# Patient Record
Sex: FEMALE | Race: WHITE | Employment: OTHER | URBAN - METROPOLITAN AREA
[De-identification: names, ages, dates, MRNs, and addresses within clinical notes are randomized per-mention and may not be internally consistent; named-entity substitution may affect disease eponyms.]

---

## 2017-05-22 ENCOUNTER — OFFICE VISIT (OUTPATIENT)
Dept: NEUROLOGY | Age: 82
End: 2017-05-22

## 2017-05-22 VITALS
DIASTOLIC BLOOD PRESSURE: 62 MMHG | OXYGEN SATURATION: 94 % | WEIGHT: 137.4 LBS | SYSTOLIC BLOOD PRESSURE: 160 MMHG | BODY MASS INDEX: 25.96 KG/M2 | HEART RATE: 80 BPM

## 2017-05-22 DIAGNOSIS — R26.9 GAIT DISTURBANCE: Primary | ICD-10-CM

## 2017-05-22 RX ORDER — DICLOFENAC SODIUM 10 MG/G
GEL TOPICAL 4 TIMES DAILY
Status: ON HOLD | COMMUNITY
End: 2022-04-11

## 2017-05-22 RX ORDER — LEVOTHYROXINE SODIUM 25 UG/1
25 TABLET ORAL
COMMUNITY
End: 2022-05-02 | Stop reason: SDUPTHER

## 2017-05-22 RX ORDER — FAMOTIDINE 40 MG/1
20 TABLET, FILM COATED ORAL 2 TIMES DAILY
COMMUNITY
End: 2022-04-14

## 2017-05-22 RX ORDER — PREGABALIN 100 MG/1
CAPSULE ORAL 2 TIMES DAILY
Status: ON HOLD | COMMUNITY
End: 2022-04-11

## 2017-05-22 RX ORDER — FUROSEMIDE 20 MG/1
TABLET ORAL DAILY
Status: ON HOLD | COMMUNITY
End: 2022-04-11

## 2017-05-22 RX ORDER — NIACIN 50 MG
TABLET ORAL
Status: ON HOLD | COMMUNITY
End: 2022-04-11

## 2017-05-22 RX ORDER — AMITRIPTYLINE HYDROCHLORIDE 25 MG/1
TABLET, FILM COATED ORAL
Status: ON HOLD | COMMUNITY
End: 2022-04-11

## 2017-05-22 NOTE — MR AVS SNAPSHOT
Visit Information Date & Time Provider Department Dept. Phone Encounter #  
 5/22/2017  1:00 PM Jd Baez MD Neurology [facility] [phone] [id number] Follow-up Instructions Return if symptoms worsen or fail to improve. Upcoming Health Maintenance Date Due DTaP/Tdap/Td series (1 - Tdap) 6/14/1946 ZOSTER VACCINE AGE 60> 6/14/1985 GLAUCOMA SCREENING Q2Y 6/14/1990 Pneumococcal 65+ Low/Medium Risk (1 of 2 - PCV13) 6/14/1990 MEDICARE YEARLY EXAM 6/14/1990 INFLUENZA AGE 9 TO ADULT 8/1/2017 Allergies as of 5/22/2017  Review Complete On: 5/22/2017 By: Nela Honeycutt LPN Severity Noted Reaction Type Reactions Dilaudid [Hydromorphone (Bulk)]  05/21/2010    Rash, Nausea and Vomiting, Swelling Fenofibrate  08/31/2015    Nausea Only Iodine  05/21/2010    Rash, Nausea and Vomiting, Swelling Pcn [Penicillins]  05/21/2010    Rash, Nausea and Vomiting, Swelling, Contact Dermatitis Prilosec [Omeprazole]  08/31/2015    Nausea Only Shellfish Derived  08/31/2015    Swelling Sulfa (Sulfonamide Antibiotics)  05/21/2010    Rash, Nausea and Vomiting, Swelling Current Immunizations  Never Reviewed No immunizations on file. Not reviewed this visit You Were Diagnosed With   
  
 Codes Comments Gait disturbance    -  Primary ICD-10-CM: R26.9 ICD-9-CM: 698. 2 Vitals BP Pulse Weight(growth percentile) LMP SpO2 BMI  
 160/62 80 137 lb 6.4 oz (62.3 kg) 11[birth year]16[birth year]1975 94% 25.96 kg/m2 OB Status Smoking Status Postmenopausal Never Smoker BMI and BSA Data Body Mass Index Body Surface Area  
 25.96 kg/m 2 1.64 m 2 Preferred Pharmacy Pharmacy Name Phone [address] [phone] Your Updated Medication List  
  
   
This list is accurate as of: 5/22/17  2:04 PM.  Always use your most recent med list.  
  
  
  
  
 amitriptyline 25 mg tablet Commonly known as:  ELAVIL Take  by mouth nightly. atorvastatin 40 mg tablet Commonly known as:  LIPITOR  
  
 calcium-cholecalciferol (D3) tablet Commonly known as:  Calcium 600 + D Take 1 Tab by mouth two (2) times a day. CENTRUM SILVER 500-250 mcg Chew Generic drug:  MV-Min-Folic Acid-Lutein Take  by mouth. diclofenac 1 % Gel Commonly known as:  VOLTAREN Apply  to affected area four (4) times daily. famotidine 40 mg tablet Commonly known as:  PEPCID Take 40 mg by mouth daily. FISH OIL 1,000 mg Cap Generic drug:  omega-3 fatty acids-vitamin e Take  by mouth.  
  
 gabapentin 100 mg capsule Commonly known as:  NEURONTIN  
  
 JANTOVEN 2 mg tablet Generic drug:  warfarin TAKE ONE (1) TABLET(S) DAILY  
  
 LASIX 20 mg tablet Generic drug:  furosemide Take  by mouth daily. levothyroxine 25 mcg tablet Commonly known as:  SYNTHROID Take  by mouth Daily (before breakfast). LYRICA 100 mg capsule Generic drug:  pregabalin Take  by mouth two (2) times a day. meloxicam 7.5 mg tablet Commonly known as:  MOBIC Take  by mouth daily. metoprolol tartrate 25 mg tablet Commonly known as:  LOPRESSOR  
TAKE ONE HALF (1/2) TABLET(S) TWICEDAILY  
  
 nicotinic acid 50 mg tablet Commonly known as:  NIACIN Take  by mouth Daily (before breakfast). Omeprazole delayed release 20 mg tablet Commonly known as:  PRILOSEC D/R Take  by mouth. PLAVIX 75 mg Tab Generic drug:  clopidogrel TAKE ONE (1) TABLET(S) ONCE DAILY PRESERVISION AREDS 2 (OMEGA-3) PO Take  by mouth. PRESERVISION PO Take  by mouth.  
  
 triamterene-hydroCHLOROthiazide 75-50 mg per tablet Commonly known as:  Lena Montanatter TAKE ONE (1) TABLET(S) ONCE DAILY  
  
 TYLENOL ARTHRITIS PAIN 650 mg CR tablet Generic drug:  acetaminophen Take 650 mg by mouth every six (6) hours as needed for Pain. VITAMIN B-6 25 mg tablet Generic drug:  pyridoxine (vitamin B6) Take 25 mg by mouth daily. VITAMIN C 250 mg tablet Generic drug:  ascorbic acid (vitamin C) Take  by mouth. XALATAN 0.005 % ophthalmic solution Generic drug:  latanoprost  
Administer 1 Drop to both eyes nightly. one drop in left eye daily XARELTO 10 mg tablet Generic drug:  rivaroxaban Take  by mouth daily. We Performed the Following REFERRAL TO PHYSICAL THERAPY [QUU54 Custom] Comments:  
 Evaluate and treat; Gait and balance training Follow-up Instructions Return if symptoms worsen or fail to improve. Referral Information Referral ID Referred By Referred To  
  
 [id number] DANIEL Nunez Not Available Visits Status Start Date End Date 1 New Request 5/22/17 5/22/18 If your referral has a status of pending review or denied, additional information will be sent to support the outcome of this decision. Introducing hospitals & HEALTH SERVICES! [employer] introduces [employer] patient portal. Now you can access parts of your medical record, email your doctor's office, and request medication refills online. 1. In your internet browser, go to [email or web address] 2. Click on the First Time User? Click Here link in the Sign In box. You will see the New Member Sign Up page. 3. Enter your [employer] Access Code exactly as it appears below. You will not need to use this code after youve completed the sign-up process. If you do not sign up before the expiration date, you must request a new code. · [employer] Access Code: [email or web address] Expires: 8/20/2017  1:17 PM 
 
4. Enter the last four digits of your Social Security Number (xxxx) and Date of Birth (mm/dd/yyyy) as indicated and click Submit. You will be taken to the next sign-up page. 5. Create a [employer] ID.  This will be your [employer] login ID and cannot be changed, so think of one that is secure and easy to remember. 6. Create a [employer] password. You can change your password at any time. 7. Enter your Password Reset Question and Answer. This can be used at a later time if you forget your password. 8. Enter your e-mail address. You will receive e-mail notification when new information is available in [address] 9. Click Sign Up. You can now view and download portions of your medical record. 10. Click the Download Summary menu link to download a portable copy of your medical information. If you have questions, please visit the Frequently Asked Questions section of the [employer] website. Remember, [employer] is NOT to be used for urgent needs. For medical emergencies, dial 911. Now available from your iPhone and Android! Please provide this summary of care documentation to your next provider. Your primary care clinician is listed as Radha Alan. If you have any questions after today's visit, please call [phone].

## 2017-05-22 NOTE — PROGRESS NOTES
Neurology Progress Note    Patient ID:  Anaid Orr  [id number]  56 y.o.  6/14/1925      Subjective:   History:  Ms. Waqas Kaur history of R parietal and temporal stroke 1999, On Oxygen due to SOB (not sure why), Esophageal stricture, Afib on Xarelto, CHF, DM not on medication   previously seen by Dr Kathryn Moreno for syncope. Patient now comes in for falls for 1 yr, occurring 1/ 2 weeks, described as suddenly felt lightheaded lasting for few secs, while standing, does not happen while sitting or laying down. (-) chest pain (-) SOB (-) palpitations    Last event was 5/19/17, was trying going up steps, due to R knee pain (+) bilateral knee swelling. Patient going to see Dr Gilmar Mcconnell (orthopedic). Baseline uses a walker. Patient scheduled to see an endocrinologist.    Patient feels that she sways. Objective:   ROS:  Per HPI-  Otherwise 12 point ROS was negative    Meds:  Current Outpatient Prescriptions on File Prior to Visit   Medication Sig Dispense Refill    atorvastatin (LIPITOR) 40 mg tablet       acetaminophen (TYLENOL ARTHRITIS PAIN) 650 mg CR tablet Take 650 mg by mouth every six (6) hours as needed for Pain.  rivaroxaban (XARELTO) 10 mg tablet Take  by mouth daily.  metoprolol (LOPRESSOR) 25 mg tablet TAKE ONE HALF (1/2) TABLET(S) TWICEDAILY 30 Tab 0    pyridoxine (VITAMIN B-6) 25 mg tablet Take 25 mg by mouth daily.  omega-3 fatty acids-vitamin e (FISH OIL) 1,000 mg Cap Take  by mouth.  VIT C/DL-E AC/LUT/COPPER/ZNOX (PRESERVISION PO) Take  by mouth.  Calcium-Cholecalciferol, D3, (CALCIUM 600 + D) 600-400 mg-unit Tab Take 1 Tab by mouth two (2) times a day. 100 Tab 11    MV-Min-Folic Acid-Lutein (CENTRUM SILVER) 500-250 mcg Chew Take  by mouth.  latanoprost (XALATAN) 0.005 % ophthalmic solution Administer 1 Drop to both eyes nightly. one drop in left eye daily       meloxicam (MOBIC) 7.5 mg tablet Take  by mouth daily.       gabapentin (NEURONTIN) 100 mg capsule       ascorbic acid (VITAMIN C) 250 mg tablet Take  by mouth.  PLAVIX 75 mg tablet TAKE ONE (1) TABLET(S) ONCE DAILY 90 Tab 1    JANTOVEN 2 mg tablet TAKE ONE (1) TABLET(S) DAILY 30 Tab 1    triamterene-hydrochlorothiazide (MAXZIDE) 75-50 mg per tablet TAKE ONE (1) TABLET(S) ONCE DAILY 90 Tab 1    Omeprazole 20 mg TbEC Take  by mouth. No current facility-administered medications on file prior to visit. Imaging:    CT Results (recent): MRI Results (recent):      IR Results (recent):      VAS/US Results (recent):    Results from Hospital Encounter encounter on 11/10/15   LOWER EXT ART PVR MULT LEVEL SEG PRESSURES    Reviewed records in [employer] and media tab today    Lab Review     No visits with results within 3 Month(s) from this visit. Latest known visit with results is:    Hospital Outpatient Visit on 03/02/2016   Component Date Value Ref Range Status    Creatinine (POC) 03/02/2016 1.5* 0.6 - 1.3 MG/DL Final    GFR-AA (POC) 03/02/2016 40* >60 ml/min/1.73m2 Final    GFR, non-AA (POC) 03/02/2016 33* >60 ml/min/1.73m2 Final    Comment: Estimated GFR is calculated using the IDMS-traceable Modification of Diet in Renal Disease (MDRD) Study equation, reported for both  Americans (GFRAA) and non- Americans (GFRNA), and normalized to 1.73m2 body surface area. The physician must decide which value applies to the patient. The MDRD study equation should only be used in individuals age 25 or older. It has not been validated for the following: pregnant women, patients with serious comorbid conditions, or on certain medications, or persons with extremes of body size, muscle mass, or nutritional status. Exam:  Visit Vitals    /62    Pulse 80    Wt 62.3 kg (137 lb 6.4 oz)    SpO2 94%    BMI 25.96 kg/m2     Gen: Awake, alert, follows commands  Appropriate appearance, normal speech. Oriented to all spheres.   No visual field defect on confrontation exam.  Full eyes movement, with 2-3 beats nystagmus on both L and R gaze  Normal gag and swallow. All remaining cranial nerves were normal  Motor function: 5/5 in all extremities  Sensory: intact to LT, PP and JPS  DTRs + UE, absent knees and ankles in all extremities, (-) Babinski  Gait: arthralgic gait due to R knee pain      Assessment:     1. Gait disturbance      Falls, multifactorial (arthritis of the knees, DM neuropathy, lightheaded spells (BP issue, blood sugar issue, afib/CHF? )      Plan:   1. Discussed with patient and  that her falls seem to be multifactorial. Certainly stroke is in the differential, but patient is already on Xarelto and because of her age, further work up is not warranted  2. Will refer for gait and balance training  3. Agree with patient seeing ortho for her bilateral knee arthritis and Endo for her diabetes  4. Check BP and blood sugar during an event  5. Follow up with cardiologist regarding afib    Follow-up Disposition:  Return if symptoms worsen or fail to improve.           Sima Truong MD  Diplomate, American Board of Psychiatry and Neurology  Diplomate, Neuromuscular Medicine  Diplomate, American Board of Electrodiagnostic Medicine

## 2017-05-22 NOTE — LETTER
5/22/2017 2:10 PM 
 
Patient:  Levy Dacosta YOB: 1925 Date of Visit: 5/22/2017 Dear MD Rj Mcmanus [place] [phone] VIA Facsimile: [phone] 
 : Thank you for referring Ms. David Hidalgo to me for evaluation/treatment. Below are the relevant portions of my assessment and plan of care. If you have questions, please do not hesitate to call me. I look forward to following Ms. Demetra Reeves along with you. Sincerely, Juan Real MD

## 2017-06-20 ENCOUNTER — HOSPITAL ENCOUNTER (OUTPATIENT)
Dept: MAMMOGRAPHY | Age: 82
Discharge: HOME | End: 2017-06-20
Attending: FAMILY MEDICINE
Payer: MEDICARE

## 2017-06-20 DIAGNOSIS — M81.0 OSTEOPOROSIS: ICD-10-CM

## 2017-06-20 PROCEDURE — 77080 DXA BONE DENSITY AXIAL: CPT

## 2019-07-02 ENCOUNTER — HOSPITAL ENCOUNTER (OUTPATIENT)
Dept: GENERAL RADIOLOGY | Age: 84
Discharge: HOME | End: 2019-07-02
Attending: FAMILY MEDICINE
Payer: MEDICARE

## 2019-07-02 DIAGNOSIS — M25.512 LEFT SHOULDER PAIN: ICD-10-CM

## 2019-07-02 PROCEDURE — 73030 X-RAY EXAM OF SHOULDER: CPT

## 2022-04-09 ENCOUNTER — HOSPITAL ENCOUNTER (INPATIENT)
Age: 87
LOS: 5 days | Discharge: NURSING, REHAB OR LONG-TERM CARE | DRG: 438 | End: 2022-04-14
Attending: EMERGENCY MEDICINE | Admitting: FAMILY MEDICINE
Payer: MEDICARE

## 2022-04-09 ENCOUNTER — APPOINTMENT (OUTPATIENT)
Dept: GENERAL RADIOLOGY | Age: 87
DRG: 438 | End: 2022-04-09
Attending: EMERGENCY MEDICINE
Payer: MEDICARE

## 2022-04-09 ENCOUNTER — APPOINTMENT (OUTPATIENT)
Dept: CT IMAGING | Age: 87
DRG: 438 | End: 2022-04-09
Attending: EMERGENCY MEDICINE
Payer: MEDICARE

## 2022-04-09 DIAGNOSIS — K85.00 IDIOPATHIC ACUTE PANCREATITIS WITHOUT INFECTION OR NECROSIS: ICD-10-CM

## 2022-04-09 DIAGNOSIS — M54.41 CHRONIC BILATERAL LOW BACK PAIN WITH BILATERAL SCIATICA: ICD-10-CM

## 2022-04-09 DIAGNOSIS — R57.1 HYPOVOLEMIC SHOCK (HCC): ICD-10-CM

## 2022-04-09 DIAGNOSIS — G89.29 CHRONIC BILATERAL LOW BACK PAIN WITH BILATERAL SCIATICA: ICD-10-CM

## 2022-04-09 DIAGNOSIS — R53.83 MALAISE AND FATIGUE: Primary | ICD-10-CM

## 2022-04-09 DIAGNOSIS — N19 UREMIA, ACUTE: ICD-10-CM

## 2022-04-09 DIAGNOSIS — M54.42 CHRONIC BILATERAL LOW BACK PAIN WITH BILATERAL SCIATICA: ICD-10-CM

## 2022-04-09 DIAGNOSIS — E86.0 SEVERE DEHYDRATION: ICD-10-CM

## 2022-04-09 DIAGNOSIS — N17.9 AKI (ACUTE KIDNEY INJURY) (HCC): ICD-10-CM

## 2022-04-09 DIAGNOSIS — R53.81 MALAISE AND FATIGUE: Primary | ICD-10-CM

## 2022-04-09 PROBLEM — K85.90 ACUTE PANCREATITIS: Status: ACTIVE | Noted: 2022-04-09

## 2022-04-09 LAB
ALBUMIN SERPL-MCNC: 1.9 G/DL (ref 3.5–5)
ALBUMIN/GLOB SERPL: 0.5 {RATIO} (ref 1.1–2.2)
ALP SERPL-CCNC: 119 U/L (ref 45–117)
ALT SERPL-CCNC: 32 U/L (ref 12–78)
ANION GAP SERPL CALC-SCNC: 12 MMOL/L (ref 5–15)
APPEARANCE UR: ABNORMAL
AST SERPL-CCNC: 34 U/L (ref 15–37)
BACTERIA URNS QL MICRO: NEGATIVE /HPF
BASE DEFICIT BLDV-SCNC: 10.9 MMOL/L
BASOPHILS # BLD: 0 K/UL (ref 0–0.1)
BASOPHILS NFR BLD: 0 % (ref 0–1)
BDY SITE: ABNORMAL
BILIRUB SERPL-MCNC: 0.6 MG/DL (ref 0.2–1)
BILIRUB UR QL: NEGATIVE
BNP SERPL-MCNC: 3910 PG/ML
BUN SERPL-MCNC: 86 MG/DL (ref 6–20)
BUN/CREAT SERPL: 35 (ref 12–20)
CALCIUM SERPL-MCNC: 9.1 MG/DL (ref 8.5–10.1)
CHLORIDE SERPL-SCNC: 114 MMOL/L (ref 97–108)
CO2 SERPL-SCNC: 16 MMOL/L (ref 21–32)
COLOR UR: ABNORMAL
COMMENT, HOLDF: NORMAL
CREAT SERPL-MCNC: 2.46 MG/DL (ref 0.55–1.02)
DIFFERENTIAL METHOD BLD: ABNORMAL
EOSINOPHIL # BLD: 0.1 K/UL (ref 0–0.4)
EOSINOPHIL NFR BLD: 1 % (ref 0–7)
EPITH CASTS URNS QL MICRO: ABNORMAL /LPF
ERYTHROCYTE [DISTWIDTH] IN BLOOD BY AUTOMATED COUNT: 12.8 % (ref 11.5–14.5)
GAS FLOW.O2 O2 DELIVERY SYS: 4 L/MIN
GLOBULIN SER CALC-MCNC: 3.7 G/DL (ref 2–4)
GLUCOSE SERPL-MCNC: 122 MG/DL (ref 65–100)
GLUCOSE UR STRIP.AUTO-MCNC: NEGATIVE MG/DL
HCO3 BLDV-SCNC: 14 MMOL/L (ref 23–28)
HCT VFR BLD AUTO: 31.6 % (ref 35–47)
HGB BLD-MCNC: 10.1 G/DL (ref 11.5–16)
HGB UR QL STRIP: NEGATIVE
HYALINE CASTS URNS QL MICRO: ABNORMAL /LPF (ref 0–5)
IMM GRANULOCYTES # BLD AUTO: 0 K/UL
IMM GRANULOCYTES NFR BLD AUTO: 0 %
KETONES UR QL STRIP.AUTO: ABNORMAL MG/DL
LACTATE SERPL-SCNC: 1.2 MMOL/L (ref 0.4–2)
LEUKOCYTE ESTERASE UR QL STRIP.AUTO: ABNORMAL
LIPASE SERPL-CCNC: 1125 U/L (ref 73–393)
LYMPHOCYTES # BLD: 0.2 K/UL (ref 0.8–3.5)
LYMPHOCYTES NFR BLD: 2 % (ref 12–49)
MAGNESIUM SERPL-MCNC: 1.7 MG/DL (ref 1.6–2.4)
MCH RBC QN AUTO: 33.4 PG (ref 26–34)
MCHC RBC AUTO-ENTMCNC: 32 G/DL (ref 30–36.5)
MCV RBC AUTO: 104.6 FL (ref 80–99)
METAMYELOCYTES NFR BLD MANUAL: 1 %
MONOCYTES # BLD: 0.3 K/UL (ref 0–1)
MONOCYTES NFR BLD: 3 % (ref 5–13)
NEUTS BAND NFR BLD MANUAL: 26 % (ref 0–6)
NEUTS SEG # BLD: 8.5 K/UL (ref 1.8–8)
NEUTS SEG NFR BLD: 67 % (ref 32–75)
NITRITE UR QL STRIP.AUTO: NEGATIVE
NRBC # BLD: 0 K/UL (ref 0–0.01)
NRBC BLD-RTO: 0 PER 100 WBC
PCO2 BLDV: 28.8 MMHG (ref 41–51)
PH BLDV: 7.3 [PH] (ref 7.32–7.42)
PH UR STRIP: 5 [PH] (ref 5–8)
PLATELET # BLD AUTO: 216 K/UL (ref 150–400)
PMV BLD AUTO: 10.3 FL (ref 8.9–12.9)
PO2 BLDV: 120 MMHG (ref 25–40)
POTASSIUM SERPL-SCNC: 3.9 MMOL/L (ref 3.5–5.1)
PROT SERPL-MCNC: 5.6 G/DL (ref 6.4–8.2)
PROT UR STRIP-MCNC: 30 MG/DL
RBC # BLD AUTO: 3.02 M/UL (ref 3.8–5.2)
RBC #/AREA URNS HPF: ABNORMAL /HPF (ref 0–5)
RBC MORPH BLD: ABNORMAL
RBC MORPH BLD: ABNORMAL
RETICS # AUTO: 0.04 M/UL (ref 0.02–0.08)
RETICS/RBC NFR AUTO: 1.3 % (ref 0.7–2.1)
SAMPLES BEING HELD,HOLD: NORMAL
SAO2 % BLDV: 98 % (ref 65–88)
SAO2% DEVICE SAO2% SENSOR NAME: ABNORMAL
SODIUM SERPL-SCNC: 142 MMOL/L (ref 136–145)
SP GR UR REFRACTOMETRY: 1.02 (ref 1–1.03)
SPECIMEN SITE: ABNORMAL
TROPONIN-HIGH SENSITIVITY: 39 NG/L (ref 0–51)
TSH SERPL DL<=0.05 MIU/L-ACNC: 0.45 UIU/ML (ref 0.36–3.74)
UR CULT HOLD, URHOLD: NORMAL
UROBILINOGEN UR QL STRIP.AUTO: 1 EU/DL (ref 0.2–1)
WBC # BLD AUTO: 9.1 K/UL (ref 3.6–11)
WBC URNS QL MICRO: ABNORMAL /HPF (ref 0–4)
YEAST URNS QL MICRO: PRESENT

## 2022-04-09 PROCEDURE — 81001 URINALYSIS AUTO W/SCOPE: CPT

## 2022-04-09 PROCEDURE — 74176 CT ABD & PELVIS W/O CONTRAST: CPT

## 2022-04-09 PROCEDURE — 99285 EMERGENCY DEPT VISIT HI MDM: CPT

## 2022-04-09 PROCEDURE — 83735 ASSAY OF MAGNESIUM: CPT

## 2022-04-09 PROCEDURE — 65270000029 HC RM PRIVATE

## 2022-04-09 PROCEDURE — 71045 X-RAY EXAM CHEST 1 VIEW: CPT

## 2022-04-09 PROCEDURE — 83605 ASSAY OF LACTIC ACID: CPT

## 2022-04-09 PROCEDURE — 74011250637 HC RX REV CODE- 250/637: Performed by: EMERGENCY MEDICINE

## 2022-04-09 PROCEDURE — 83880 ASSAY OF NATRIURETIC PEPTIDE: CPT

## 2022-04-09 PROCEDURE — 36415 COLL VENOUS BLD VENIPUNCTURE: CPT

## 2022-04-09 PROCEDURE — 82803 BLOOD GASES ANY COMBINATION: CPT

## 2022-04-09 PROCEDURE — 84443 ASSAY THYROID STIM HORMONE: CPT

## 2022-04-09 PROCEDURE — 96375 TX/PRO/DX INJ NEW DRUG ADDON: CPT

## 2022-04-09 PROCEDURE — 85025 COMPLETE CBC W/AUTO DIFF WBC: CPT

## 2022-04-09 PROCEDURE — 74011250636 HC RX REV CODE- 250/636: Performed by: EMERGENCY MEDICINE

## 2022-04-09 PROCEDURE — 87040 BLOOD CULTURE FOR BACTERIA: CPT

## 2022-04-09 PROCEDURE — 93005 ELECTROCARDIOGRAM TRACING: CPT

## 2022-04-09 PROCEDURE — 80053 COMPREHEN METABOLIC PANEL: CPT

## 2022-04-09 PROCEDURE — 84484 ASSAY OF TROPONIN QUANT: CPT

## 2022-04-09 PROCEDURE — 83690 ASSAY OF LIPASE: CPT

## 2022-04-09 PROCEDURE — 77030019905 HC CATH URETH INTMIT MDII -A

## 2022-04-09 PROCEDURE — 85045 AUTOMATED RETICULOCYTE COUNT: CPT

## 2022-04-09 PROCEDURE — 96365 THER/PROPH/DIAG IV INF INIT: CPT

## 2022-04-09 PROCEDURE — 96361 HYDRATE IV INFUSION ADD-ON: CPT

## 2022-04-09 RX ORDER — HEPARIN SODIUM 5000 [USP'U]/ML
5000 INJECTION, SOLUTION INTRAVENOUS; SUBCUTANEOUS EVERY 8 HOURS
Status: DISCONTINUED | OUTPATIENT
Start: 2022-04-10 | End: 2022-04-14 | Stop reason: HOSPADM

## 2022-04-09 RX ORDER — ONDANSETRON 2 MG/ML
4 INJECTION INTRAMUSCULAR; INTRAVENOUS
Status: DISCONTINUED | OUTPATIENT
Start: 2022-04-09 | End: 2022-04-14 | Stop reason: HOSPADM

## 2022-04-09 RX ORDER — SODIUM CHLORIDE 0.9 % (FLUSH) 0.9 %
5-40 SYRINGE (ML) INJECTION AS NEEDED
Status: DISCONTINUED | OUTPATIENT
Start: 2022-04-09 | End: 2022-04-14 | Stop reason: HOSPADM

## 2022-04-09 RX ORDER — DOPAMINE HYDROCHLORIDE 320 MG/100ML
5-20 INJECTION, SOLUTION INTRAVENOUS
Status: DISCONTINUED | OUTPATIENT
Start: 2022-04-09 | End: 2022-04-10

## 2022-04-09 RX ORDER — ACETAMINOPHEN 500 MG
1000 TABLET ORAL
Status: COMPLETED | OUTPATIENT
Start: 2022-04-09 | End: 2022-04-09

## 2022-04-09 RX ORDER — SODIUM CHLORIDE 9 MG/ML
75 INJECTION, SOLUTION INTRAVENOUS CONTINUOUS
Status: DISCONTINUED | OUTPATIENT
Start: 2022-04-09 | End: 2022-04-11

## 2022-04-09 RX ORDER — SODIUM CHLORIDE 0.9 % (FLUSH) 0.9 %
5-40 SYRINGE (ML) INJECTION EVERY 8 HOURS
Status: DISCONTINUED | OUTPATIENT
Start: 2022-04-09 | End: 2022-04-14 | Stop reason: HOSPADM

## 2022-04-09 RX ORDER — ACETAMINOPHEN 325 MG/1
650 TABLET ORAL
Status: DISCONTINUED | OUTPATIENT
Start: 2022-04-09 | End: 2022-04-14 | Stop reason: HOSPADM

## 2022-04-09 RX ORDER — POLYETHYLENE GLYCOL 3350 17 G/17G
17 POWDER, FOR SOLUTION ORAL DAILY PRN
Status: DISCONTINUED | OUTPATIENT
Start: 2022-04-09 | End: 2022-04-14 | Stop reason: HOSPADM

## 2022-04-09 RX ORDER — DOPAMINE HYDROCHLORIDE 320 MG/100ML
10 INJECTION, SOLUTION INTRAVENOUS
Status: DISCONTINUED | OUTPATIENT
Start: 2022-04-09 | End: 2022-04-09

## 2022-04-09 RX ORDER — ONDANSETRON 4 MG/1
4 TABLET, ORALLY DISINTEGRATING ORAL
Status: DISCONTINUED | OUTPATIENT
Start: 2022-04-09 | End: 2022-04-14 | Stop reason: HOSPADM

## 2022-04-09 RX ORDER — ACETAMINOPHEN 650 MG/1
650 SUPPOSITORY RECTAL
Status: DISCONTINUED | OUTPATIENT
Start: 2022-04-09 | End: 2022-04-14 | Stop reason: HOSPADM

## 2022-04-09 RX ADMIN — ACETAMINOPHEN 1000 MG: 500 TABLET ORAL at 21:29

## 2022-04-09 RX ADMIN — SODIUM CHLORIDE 1000 ML: 9 INJECTION, SOLUTION INTRAVENOUS at 18:50

## 2022-04-09 RX ADMIN — DOPAMINE HYDROCHLORIDE 10 MCG/KG/MIN: 320 INJECTION, SOLUTION INTRAVENOUS at 20:57

## 2022-04-09 RX ADMIN — SODIUM CHLORIDE, POTASSIUM CHLORIDE, SODIUM LACTATE AND CALCIUM CHLORIDE 1000 ML: 600; 310; 30; 20 INJECTION, SOLUTION INTRAVENOUS at 20:51

## 2022-04-09 RX ADMIN — SODIUM CHLORIDE, POTASSIUM CHLORIDE, SODIUM LACTATE AND CALCIUM CHLORIDE 1000 ML: 600; 310; 30; 20 INJECTION, SOLUTION INTRAVENOUS at 22:43

## 2022-04-09 RX ADMIN — ONDANSETRON 4 MG: 2 INJECTION INTRAMUSCULAR; INTRAVENOUS at 21:27

## 2022-04-09 NOTE — ED TRIAGE NOTES
Dx with PNA left lower lobe brought in by EMS for generalized weakness and abdominal discomfort. Taking doxycyclin. On 4L at baseline. Lives alone.

## 2022-04-09 NOTE — ED NOTES
Received report from Little Aaron RN who states plan for patient is for Dr Awa Covington to re-evaluate after normal saline infusion prior to starting Dopamine drip.

## 2022-04-10 ENCOUNTER — APPOINTMENT (OUTPATIENT)
Dept: NON INVASIVE DIAGNOSTICS | Age: 87
DRG: 438 | End: 2022-04-10
Attending: FAMILY MEDICINE
Payer: MEDICARE

## 2022-04-10 ENCOUNTER — APPOINTMENT (OUTPATIENT)
Dept: ULTRASOUND IMAGING | Age: 87
DRG: 438 | End: 2022-04-10
Attending: FAMILY MEDICINE
Payer: MEDICARE

## 2022-04-10 LAB
ANION GAP SERPL CALC-SCNC: 10 MMOL/L (ref 5–15)
BUN SERPL-MCNC: 68 MG/DL (ref 6–20)
BUN/CREAT SERPL: 37 (ref 12–20)
CALCIUM SERPL-MCNC: 8.4 MG/DL (ref 8.5–10.1)
CHLORIDE SERPL-SCNC: 116 MMOL/L (ref 97–108)
CHOLEST SERPL-MCNC: <50 MG/DL
CO2 SERPL-SCNC: 16 MMOL/L (ref 21–32)
CREAT SERPL-MCNC: 1.83 MG/DL (ref 0.55–1.02)
ECHO AV AREA PEAK VELOCITY: 1.7 CM2
ECHO AV AREA VTI: 1.6 CM2
ECHO AV AREA/BSA PEAK VELOCITY: 1.2 CM2/M2
ECHO AV AREA/BSA VTI: 1.1 CM2/M2
ECHO AV MEAN GRADIENT: 3 MMHG
ECHO AV MEAN VELOCITY: 0.9 M/S
ECHO AV PEAK GRADIENT: 5 MMHG
ECHO AV PEAK VELOCITY: 1.2 M/S
ECHO AV VELOCITY RATIO: 0.5
ECHO AV VTI: 28.5 CM
ECHO LA DIAMETER INDEX: 2.6 CM/M2
ECHO LA DIAMETER: 3.8 CM
ECHO LA VOL 2C: 38 ML (ref 22–52)
ECHO LA VOL 4C: 41 ML (ref 22–52)
ECHO LA VOL BP: 41 ML (ref 22–52)
ECHO LA VOL/BSA BIPLANE: 28 ML/M2 (ref 16–34)
ECHO LA VOLUME AREA LENGTH: 46 ML
ECHO LA VOLUME INDEX A2C: 26 ML/M2 (ref 16–34)
ECHO LA VOLUME INDEX A4C: 28 ML/M2 (ref 16–34)
ECHO LA VOLUME INDEX AREA LENGTH: 32 ML/M2 (ref 16–34)
ECHO LV E' LATERAL VELOCITY: 7 CM/S
ECHO LV E' SEPTAL VELOCITY: 7 CM/S
ECHO LV FRACTIONAL SHORTENING: 29 % (ref 28–44)
ECHO LV INTERNAL DIMENSION DIASTOLE INDEX: 2.33 CM/M2
ECHO LV INTERNAL DIMENSION DIASTOLIC: 3.4 CM (ref 3.9–5.3)
ECHO LV INTERNAL DIMENSION SYSTOLIC INDEX: 1.64 CM/M2
ECHO LV INTERNAL DIMENSION SYSTOLIC: 2.4 CM
ECHO LV IVSD: 0.9 CM (ref 0.6–0.9)
ECHO LV MASS 2D: 84.9 G (ref 67–162)
ECHO LV MASS INDEX 2D: 58.1 G/M2 (ref 43–95)
ECHO LV POSTERIOR WALL DIASTOLIC: 0.9 CM (ref 0.6–0.9)
ECHO LV RELATIVE WALL THICKNESS RATIO: 0.53
ECHO LVOT AREA: 3.1 CM2
ECHO LVOT AV VTI INDEX: 0.51
ECHO LVOT DIAM: 2 CM
ECHO LVOT MEAN GRADIENT: 1 MMHG
ECHO LVOT PEAK GRADIENT: 2 MMHG
ECHO LVOT PEAK VELOCITY: 0.6 M/S
ECHO LVOT STROKE VOLUME INDEX: 31.2 ML/M2
ECHO LVOT SV: 45.5 ML
ECHO LVOT VTI: 14.5 CM
ECHO MV A VELOCITY: 1.02 M/S
ECHO MV E DECELERATION TIME (DT): 220.8 MS
ECHO MV E VELOCITY: 0.98 M/S
ECHO MV E/A RATIO: 0.96
ECHO MV E/E' LATERAL: 14
ECHO MV E/E' RATIO (AVERAGED): 14
ECHO MV E/E' SEPTAL: 14
ECHO MV REGURGITANT PEAK GRADIENT: 81 MMHG
ECHO MV REGURGITANT PEAK VELOCITY: 4.5 M/S
ECHO PV MAX VELOCITY: 0.8 M/S
ECHO PV PEAK GRADIENT: 3 MMHG
ECHO RV INTERNAL DIMENSION: 4.1 CM
ECHO RV TAPSE: 2.3 CM (ref 1.7–?)
ECHO RVOT PEAK GRADIENT: 1 MMHG
ECHO RVOT PEAK VELOCITY: 0.5 M/S
ECHO TV REGURGITANT MAX VELOCITY: 3.19 M/S
ECHO TV REGURGITANT PEAK GRADIENT: 41 MMHG
ERYTHROCYTE [DISTWIDTH] IN BLOOD BY AUTOMATED COUNT: 12.9 % (ref 11.5–14.5)
FOLATE SERPL-MCNC: 37.7 NG/ML (ref 5–21)
GLUCOSE SERPL-MCNC: 161 MG/DL (ref 65–100)
HCT VFR BLD AUTO: 32 % (ref 35–47)
HDLC SERPL-MCNC: 8 MG/DL
HDLC SERPL: NORMAL {RATIO} (ref 0–5)
HGB BLD-MCNC: 10.2 G/DL (ref 11.5–16)
INR PPP: 1.1 (ref 0.9–1.1)
IRON SATN MFR SERPL: 7 % (ref 20–50)
IRON SERPL-MCNC: 8 UG/DL (ref 35–150)
LDLC SERPL CALC-MCNC: NORMAL MG/DL (ref 0–100)
LIPASE SERPL-CCNC: 597 U/L (ref 73–393)
MCH RBC QN AUTO: 33.4 PG (ref 26–34)
MCHC RBC AUTO-ENTMCNC: 31.9 G/DL (ref 30–36.5)
MCV RBC AUTO: 104.9 FL (ref 80–99)
NRBC # BLD: 0 K/UL (ref 0–0.01)
NRBC BLD-RTO: 0 PER 100 WBC
PLATELET # BLD AUTO: 202 K/UL (ref 150–400)
PMV BLD AUTO: 10.3 FL (ref 8.9–12.9)
POTASSIUM SERPL-SCNC: 3.5 MMOL/L (ref 3.5–5.1)
PROTHROMBIN TIME: 11.9 SEC (ref 9–11.1)
RBC # BLD AUTO: 3.05 M/UL (ref 3.8–5.2)
SODIUM SERPL-SCNC: 142 MMOL/L (ref 136–145)
TIBC SERPL-MCNC: 109 UG/DL (ref 250–450)
TRIGL SERPL-MCNC: 96 MG/DL (ref ?–150)
VIT B12 SERPL-MCNC: 804 PG/ML (ref 193–986)
VLDLC SERPL CALC-MCNC: NORMAL MG/DL
WBC # BLD AUTO: 9.9 K/UL (ref 3.6–11)

## 2022-04-10 PROCEDURE — 93306 TTE W/DOPPLER COMPLETE: CPT | Performed by: SPECIALIST

## 2022-04-10 PROCEDURE — 82746 ASSAY OF FOLIC ACID SERUM: CPT

## 2022-04-10 PROCEDURE — 74011250636 HC RX REV CODE- 250/636: Performed by: FAMILY MEDICINE

## 2022-04-10 PROCEDURE — 93306 TTE W/DOPPLER COMPLETE: CPT

## 2022-04-10 PROCEDURE — 87040 BLOOD CULTURE FOR BACTERIA: CPT

## 2022-04-10 PROCEDURE — 85610 PROTHROMBIN TIME: CPT

## 2022-04-10 PROCEDURE — 85027 COMPLETE CBC AUTOMATED: CPT

## 2022-04-10 PROCEDURE — 74011250636 HC RX REV CODE- 250/636: Performed by: INTERNAL MEDICINE

## 2022-04-10 PROCEDURE — 74011000250 HC RX REV CODE- 250: Performed by: INTERNAL MEDICINE

## 2022-04-10 PROCEDURE — C9113 INJ PANTOPRAZOLE SODIUM, VIA: HCPCS | Performed by: INTERNAL MEDICINE

## 2022-04-10 PROCEDURE — 80048 BASIC METABOLIC PNL TOTAL CA: CPT

## 2022-04-10 PROCEDURE — 36415 COLL VENOUS BLD VENIPUNCTURE: CPT

## 2022-04-10 PROCEDURE — 76705 ECHO EXAM OF ABDOMEN: CPT

## 2022-04-10 PROCEDURE — 77030037877 HC DRSG MEPILEX >48IN BORD MOLN -A

## 2022-04-10 PROCEDURE — 2709999900 HC NON-CHARGEABLE SUPPLY

## 2022-04-10 PROCEDURE — P9047 ALBUMIN (HUMAN), 25%, 50ML: HCPCS | Performed by: INTERNAL MEDICINE

## 2022-04-10 PROCEDURE — 82607 VITAMIN B-12: CPT

## 2022-04-10 PROCEDURE — 80061 LIPID PANEL: CPT

## 2022-04-10 PROCEDURE — 77030038269 HC DRN EXT URIN PURWCK BARD -A

## 2022-04-10 PROCEDURE — 83540 ASSAY OF IRON: CPT

## 2022-04-10 PROCEDURE — 74011000250 HC RX REV CODE- 250: Performed by: FAMILY MEDICINE

## 2022-04-10 PROCEDURE — 65610000006 HC RM INTENSIVE CARE

## 2022-04-10 PROCEDURE — 83690 ASSAY OF LIPASE: CPT

## 2022-04-10 RX ORDER — ALBUMIN HUMAN 250 G/1000ML
12.5 SOLUTION INTRAVENOUS ONCE
Status: COMPLETED | OUTPATIENT
Start: 2022-04-10 | End: 2022-04-10

## 2022-04-10 RX ADMIN — SODIUM CHLORIDE, PRESERVATIVE FREE 10 ML: 5 INJECTION INTRAVENOUS at 01:50

## 2022-04-10 RX ADMIN — SODIUM CHLORIDE 75 ML/HR: 9 INJECTION, SOLUTION INTRAVENOUS at 01:39

## 2022-04-10 RX ADMIN — HEPARIN SODIUM 5000 UNITS: 5000 INJECTION INTRAVENOUS; SUBCUTANEOUS at 14:04

## 2022-04-10 RX ADMIN — SODIUM CHLORIDE, PRESERVATIVE FREE 10 ML: 5 INJECTION INTRAVENOUS at 06:05

## 2022-04-10 RX ADMIN — HEPARIN SODIUM 5000 UNITS: 5000 INJECTION INTRAVENOUS; SUBCUTANEOUS at 21:25

## 2022-04-10 RX ADMIN — PHENYLEPHRINE HYDROCHLORIDE 40 MCG/MIN: 10 INJECTION INTRAVENOUS at 14:02

## 2022-04-10 RX ADMIN — PHENYLEPHRINE HYDROCHLORIDE 60 MCG/MIN: 10 INJECTION INTRAVENOUS at 20:12

## 2022-04-10 RX ADMIN — ALBUMIN (HUMAN) 12.5 G: 0.25 INJECTION, SOLUTION INTRAVENOUS at 09:28

## 2022-04-10 RX ADMIN — SODIUM CHLORIDE 40 MG: 9 INJECTION, SOLUTION INTRAMUSCULAR; INTRAVENOUS; SUBCUTANEOUS at 11:20

## 2022-04-10 RX ADMIN — SODIUM CHLORIDE 75 ML/HR: 9 INJECTION, SOLUTION INTRAVENOUS at 14:55

## 2022-04-10 RX ADMIN — SODIUM CHLORIDE, PRESERVATIVE FREE 10 ML: 5 INJECTION INTRAVENOUS at 21:25

## 2022-04-10 RX ADMIN — SODIUM CHLORIDE, PRESERVATIVE FREE 10 ML: 5 INJECTION INTRAVENOUS at 14:04

## 2022-04-10 RX ADMIN — HEPARIN SODIUM 5000 UNITS: 5000 INJECTION INTRAVENOUS; SUBCUTANEOUS at 02:04

## 2022-04-10 RX ADMIN — PHENYLEPHRINE HYDROCHLORIDE 30 MCG/MIN: 10 INJECTION INTRAVENOUS at 09:33

## 2022-04-10 NOTE — PROGRESS NOTES
0700- Bedside shift change report given to Woody Bautista (oncoming nurse) by Nancy Maldonado (offgoing nurse). Report included the following information SBAR, Kardex, ED Summary, Intake/Output, MAR, Recent Results, Cardiac Rhythm paced and Alarm Parameters   Primary Nurse Raza Todd RN and Nancy Maldonado RN performed a dual skin assessment on this patient No impairment noted  Destin score is 16.    0730- Shift assessment complete, see doc flowsheets. 0800- Writer spoke with Dr. Rubia Raymundo regarding pts urinary status. Plan is to wait until she wakes up more and then straight cath only if necessary later this afternoon. Raza Todd RN.     9934- Reassessment complete, see doc flowsheets. 1530- Reassessment complete, see doc flowsheets. 1900- Bedside shift change report given to Kristen (oncoming nurse) by Woody Bautista (offgoing nurse). Report included the following information SBAR, Kardex, ED Summary, Intake/Output, MAR, Recent Results, Cardiac Rhythm NSR and Alarm Parameters .

## 2022-04-10 NOTE — ED NOTES
TRANSFER - OUT REPORT:    Verbal report given to Nikole Noel RN (name) on Isac Pack  being transferred to ICU (unit) for routine progression of care       Report consisted of patients Situation, Background, Assessment and   Recommendations(SBAR). Information from the following report(s) SBAR, Kardex, ED Summary, Intake/Output, MAR and Recent Results was reviewed with the receiving nurse. Lines:   Peripheral IV 04/09/22 Right Arm (Active)        Opportunity for questions and clarification was provided.       Patient transported with:   Monitor  O2 @ 3 liters  Patient-specific medications from Pharmacy  Registered Nurse

## 2022-04-10 NOTE — PROGRESS NOTES
Admission Medication Reconciliation:     The patient is alone in the room and unable to complete the interview. Medication not located in the room. Pharmacist spoke with the patient's nurse. Family left to obtain a list of medications and will return to the hospital with this list. The nurse will contact the ED pharmacist when the medication list arrives. Addendum 4/10/22 4562  Pharmacist called and spoke with the patient's spouse and son. The patient's son will bring a list of the patient's home medications to the hospital around 10 am on 4/11. The patient's son will notify the nurse to alert the pharmacist that the home medication list has arrived.      Thank you,      Sharron Powell, PharmD, BCPS

## 2022-04-10 NOTE — ED NOTES
Shift change given to Natalee Tan RN. Opportunity for questions/clarification given. Dr. Moraima Whiteside at bedside. The patient has been examined and the H&P has been reviewed:    There is no interval changes since last encounter.    EGD: Epigastric abdominal pain  Sedation: GA  ASA: Per anesthesia  Mallampati: Per anesthesia    Endoscopy risks, benefits and alternative options discussed and understood by patient/family.          Active Hospital Problems    Diagnosis  POA    Epigastric abdominal pain [R10.13]  Yes      Resolved Hospital Problems   No resolved problems to display.

## 2022-04-10 NOTE — PROGRESS NOTES
Hugo Jnesen demarcus [address]  [address] [facility], [address] [place] [address]  [phone]      Medical Progress Note      NAME: David Issa   :  1925  MRM:  [id number]    Date of service: 4/10/2022  6:44 AM       Assessment and Plan:   1. Acute pancreatitis. Unclear cause. Keep n.p.o., IV fluids, IV morphine as needed for pain. Check triglycerides. Improving lipase       2. Hypovolemic shock. Continue IVF. Better BP. On ruchi. 3.  EVERT on CKD stage 3. likely due to volume depletion. Improving with IVF. 4.  Anemia. Stable H/H. Check iron studies. 5.  Hx of CHF/ Elevated BNP. Compensated. Check echo. Watch fluid status. 6.  RT kidney lesion. Needs outpatient workup if pt wishes. 7.  Chronic respiratory failure with hypoxia due to CHF. Uses 4LPM of O2. Continue supplement O2. Subjective:     Chief Complaint[de-identified] Patient was seen and examined as a follow up for pancreatitis. Chart was reviewed. c/o epigastric abdominal pain and sore throat. ROS:  (bold if positive, if negative)    Tolerating PT  Tolerating Diet        Objective:     Last 24hrs VS reviewed since prior progress note. Most recent are:    Visit Vitals  /66   Pulse 84   Temp 98 °F (36.7 °C)   Resp 13   Ht 5' (1.524 m)   Wt 51.3 kg (113 lb)   SpO2 100%   BMI 22.07 kg/m²     SpO2 Readings from Last 6 Encounters:   04/10/22 100%   17 94%   09/29/15 99%   08/31/15 95%   12 95%   11 97%    O2 Flow Rate (L/min): 3 l/min       Intake/Output Summary (Last 24 hours) at 4/10/2022 0644  Last data filed at 2022 2225  Gross per 24 hour   Intake 2000 ml   Output    Net 2000 ml        Physical Exam:    Gen:  Well-developed, well-nourished, in no acute distress  HEENT:  Pink conjunctivae, PERRL, hearing intact to voice, moist mucous membranes  Neck:  Supple, without masses, thyroid non-tender  Resp:  No accessory muscle use, clear breath sounds without wheezes rales or rhonchi  Card:   No murmurs, normal S1, S2 without thrills, bruits or peripheral edema  Abd:  Soft, non-tender, non-distended, normoactive bowel sounds are present, no palpable organomegaly and no detectable hernias  Lymph:  No cervical or inguinal adenopathy  Musc:  No cyanosis or clubbing  Skin:  No rashes or ulcers, skin turgor is good  Neuro:  Cranial nerves are grossly intact, no focal motor weakness, follows commands appropriately  Psych:  Good insight, oriented to person, place and time, alert  __________________________________________________________________  Medications Reviewed: (see below)  Medications:     Current Facility-Administered Medications   Medication Dose Route Frequency    ondansetron (ZOFRAN) injection 4 mg  4 mg IntraVENous Q1H PRN    DOPamine (INTROPIN) 800 mg in dextrose 5% 250 mL infusion  5-20 mcg/kg/min IntraVENous TITRATE    sodium chloride (NS) flush 5-40 mL  5-40 mL IntraVENous Q8H    sodium chloride (NS) flush 5-40 mL  5-40 mL IntraVENous PRN    acetaminophen (TYLENOL) tablet 650 mg  650 mg Oral Q6H PRN    Or    acetaminophen (TYLENOL) suppository 650 mg  650 mg Rectal Q6H PRN    polyethylene glycol (MIRALAX) packet 17 g  17 g Oral DAILY PRN    ondansetron (ZOFRAN ODT) tablet 4 mg  4 mg Oral Q8H PRN    Or    ondansetron (ZOFRAN) injection 4 mg  4 mg IntraVENous Q6H PRN    heparin (porcine) injection 5,000 Units  5,000 Units SubCUTAneous Q8H    0.9% sodium chloride infusion  75 mL/hr IntraVENous CONTINUOUS        Lab Data Reviewed: (see below)  Lab Review:     Recent Labs     04/10/22  0346 04/09/22  1837   WBC 9.9 9.1   HGB 10.2* 10.1*   HCT 32.0* 31.6*    216     Recent Labs     04/10/22  0346 04/09/22  1837    142   K 3.5 3.9   * 114*   CO2 16* 16*   * 122*   BUN 68* 86*   CREA 1.83* 2.46*   CA 8.4* 9.1   MG  --  1.7   ALB  --  1.9*   TBILI  --  0.6   ALT  --  32   INR 1.1  --      Lab Results   Component Value Date/Time    Glucose (POC) 210 (H) 05/12/2009 04:35 PM    Glucose (POC) 107 (H) 05/07/2009 04:33 PM    Glucose (POC) 68 05/07/2009 11:54 AM    Glucose (POC) 80 05/07/2009 08:31 AM     No results for input(s): PH, PCO2, PO2, HCO3, FIO2 in the last 72 hours. Recent Labs     04/10/22  0346   INR 1.1     All Micro Results     Procedure Component Value Units Date/Time    CULTURE, BLOOD [[id number]] Collected: 04/09/22 1837    Order Status: Completed Specimen: Blood Updated: 04/10/22 0534     Special Requests: NO SPECIAL REQUESTS        Culture result: NO GROWTH AFTER 10 HOURS       CULTURE, BLOOD [[id number]] Collected: 04/10/22 0346    Order Status: Completed Specimen: Blood Updated: 04/10/22 0440    URINE CULTURE HOLD SAMPLE [[id number]] Collected: 04/09/22 2300    Order Status: Completed Specimen: Urine from Serum Updated: 04/09/22 2310     Urine culture hold       Urine on hold in Microbiology dept for 2 days. If unpreserved urine is submitted, it cannot be used for addtional testing after 24 hours, recollection will be required. I have reviewed notes of prior 24hr. Other pertinent lab: Total time spent with patient: 28 I personally reviewed chart, notes, data and current medications in the medical record. I have personally examined and treated the patient at bedside during this period.                  Care Plan discussed with: Patient, Family, Nursing Staff and >50% of time spent in counseling and coordination of care    Discussed:  Care Plan    Prophylaxis:  Lovenox    Disposition:  Home w/Family           ___________________________________________________    Attending Physician: Xiomara Huggins MD

## 2022-04-10 NOTE — ED PROVIDER NOTES
The history is provided by the patient, the spouse and a relative. Fatigue  This is a new problem. The current episode started more than 1 week ago. The problem has been gradually worsening. There was no focality noted. Primary symptoms comment: loss of appetite, not drinking well, vague abdominal pain. There has been no fever. Associated symptoms include shortness of breath (chronic on home O2). Pertinent negatives include no vomiting, no altered mental status and no confusion. Meds prior to arrival: doxycycline for 8 days. 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     Social History     Socioeconomic History    Marital status:      Spouse name: Not on file    Number of children: Not on file    Years of education: Not on file    Highest education level: Not on file   Occupational History    Not on file   Tobacco Use    Smoking status: Never Smoker    Smokeless tobacco: Never Used   Substance and Sexual Activity    Alcohol use: No    Drug use: No    Sexual activity: Not on file   Other Topics Concern    Not on file   Social History Narrative    Not on file     Social Determinants of Health     Financial Resource Strain:     Difficulty of Paying Living Expenses: Not on file   Food Insecurity:     Worried About Running Out of Food in the Last Year: Not on file    Som of Food in the Last Year: Not on file   Transportation Needs:     Lack of Transportation (Medical): Not on file    Lack of Transportation (Non-Medical): Not on file   Physical Activity:     Days of Exercise per Week: Not on file    Minutes of Exercise per Session: Not on file   Stress:     Feeling of Stress : Not on file   Social Connections:     Frequency of Communication with Friends and Family: Not on file    Frequency of Social Gatherings with Friends and Family: Not on file    Attends Yarsani Services: Not on file    Active Member of [address] [place] [address] or Organizations: Not on file    Attends Club or Organization Meetings: Not on file    Marital Status: Not on file   Intimate Partner Violence:     Fear of Current or Ex-Partner: Not on file    Emotionally Abused: Not on file    Physically Abused: Not on file    Sexually Abused: Not on file   Housing Stability:     Unable to Pay for Housing in the Last Year: Not on file    Number of [place] in the Last Year: Not on file    Unstable Housing in the Last Year: Not on file         ALLERGIES: Dilaudid [hydromorphone (bulk)], Fenofibrate, Iodine, Pcn [penicillins], Prilosec [omeprazole], Shellfish derived, and Sulfa (sulfonamide antibiotics)    Review of Systems   Constitutional: Positive for fatigue. Respiratory: Positive for shortness of breath (chronic on home O2). Gastrointestinal: Positive for abdominal pain. Negative for vomiting. Psychiatric/Behavioral: Negative for confusion. All other systems reviewed and are negative. Vitals:    04/09/22 2225 04/09/22 2226 04/09/22 2230 04/09/22 2231   BP: (!) 110/43 (!) 110/43 (!) 122/48    Pulse: (!) 106 (!) 110 (!) 111    Resp: 19  15    Temp:       SpO2: 100%   100%   Weight:       Height:                Physical Exam  Vitals and nursing note reviewed. Constitutional:       General: She is not in acute distress. Appearance: She is well-developed. She is ill-appearing. HENT:      Head: Normocephalic and atraumatic.    Eyes:      Conjunctiva/sclera: Conjunctivae normal.   Cardiovascular:      Rate and Rhythm: Normal rate and regular rhythm. Heart sounds: Normal heart sounds. Pulmonary:      Effort: Pulmonary effort is normal. No respiratory distress. Breath sounds: Normal breath sounds. Abdominal:      General: There is no distension. Palpations: Abdomen is soft. Tenderness: There is abdominal tenderness (epigastric). There is no guarding or rebound. Musculoskeletal:         General: No deformity. Normal range of motion. Cervical back: Neck supple. Skin:     General: Skin is warm and dry. Neurological:      Mental Status: She is alert. Cranial Nerves: No cranial nerve deficit. Psychiatric:         Behavior: Behavior normal.          [facility]  ED Course as of 04/09/22 2242   Sat Apr 09, 2022   1751 EKG 1747: Rate 84, atrial sensed ventricular paced rhythm. No ST segment or T wave abnormalities. [DK]      ED Course User Index  [DK] Malachi Pugh MD     80year-old female presents after taking 8 days of doxycycline for suspected pneumonia diagnosed by auscultation in primary care office. She has had poor oral intake and has not been drinking well at home and having increasing fatigue now with abdominal pain. She arrives hypotensive and appears intravascularly volume depleted. After 2 L of IV fluid her IVC remains completely compressed and on dopamine infusion her cardiac contractility has improved but she remains persistently hypotensive. She has been taking blood pressure medicines and I suspect these are exacerbating. Family and patient are asking for full medical treatment for any identified condition, the patient does not want compressions or invasive mechanical ventilation. One-to-one time was spent with patient discussing CODE STATUS, condition, reassessing hemodynamic status and volume responsiveness and performing judicious fluid resuscitation given her history of heart failure.       Procedures    Critical Care Time: 42 minutes  I personally performed critical care time separate of billable procedures which may include ordering and interpretation of testing, ordering of medications, direct patient assessment and reassessment, discussion with consultants or family, and documentation. Perfect Serve Consult for Admission  10:43 PM    ED Room Number: ER20/20  Patient Name and age:  Alphonse Mauricio 80 y.o.  female  Working Diagnosis:   1. Malaise and fatigue    2. Idiopathic acute pancreatitis without infection or necrosis    3. Hypovolemic shock ([facility]    4. Severe dehydration    5. EVERT (acute kidney injury) ([facility]    6.  Uremia, acute        COVID-19 Suspicion:  no  Sepsis present:  no  Reassessment needed: no  Code Status:  Do Not Resuscitate  Readmission: no  Isolation Requirements:  no  Recommended Level of Care:  ICU  Department:[facility] ED - [phone]

## 2022-04-10 NOTE — PROGRESS NOTES
46 80 Yr old female pt arrived via stretcher from ED. Pt sleepy but arouses easily and is appropriate. Pt placed on monitor. Chlorhexidine bath given. Pt denies pain at present. Dopamine gtt continues. 0300 Sleeping quietly w/o c/o voiced. Dopa titrating down. 0400 Awakened for labs, pt moans and cries. Labs and 1 set blood cultures drawn and sent.

## 2022-04-10 NOTE — H&P
[address] Adult  Hospitalist Group    History & Physical    Date of service: 4/9/2022    Patient name: Pina Herrera  MRN: [id number]  YOB: 1925  Age: 80 y.o. Primary care provider:  Johnathan Avery MD     Source of Information: patient, medical records                                   Chief complain: Abdominal pain    History of present illness  Pina Herrera is a 80 y.o. female who presented with abdominal pain and weakness. Patient states that this has been going on for about a week. She reports abdominal pain is in the epigastric area and associated with nausea and retching. She has a decreased appetite but states food does not make the pain worse. She denies any fevers but endorses having chills. She states that she was diagnosed with pneumonia about a week ago by her PCP and was prescribed doxycycline. 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 Authorizing Provider   levothyroxine (SYNTHROID) 25 mcg tablet Take  by mouth Daily (before breakfast). Provider, Historical   ANTIOX #8/OM3/DHA/EPA/LUT/ZEAX (PRESERVISION AREDS 2, OMEGA-3, PO) Take  by mouth. Provider, Historical   pregabalin (LYRICA) 100 mg capsule Take  by mouth two (2) times a day. Provider, Historical   amitriptyline (ELAVIL) 25 mg tablet Take  by mouth nightly.     Provider, Historical   furosemide (LASIX) 20 mg tablet Take  by mouth daily. Provider, Historical   famotidine (PEPCID) 40 mg tablet Take 40 mg by mouth daily. Provider, Historical   nicotinic acid (NIACIN) 50 mg tablet Take  by mouth Daily (before breakfast). Provider, Historical   diclofenac (VOLTAREN) 1 % gel Apply  to affected area four (4) times daily. Provider, Historical   meloxicam (MOBIC) 7.5 mg tablet Take  by mouth daily. Provider, Historical   atorvastatin (LIPITOR) 40 mg tablet  8/30/15   Provider, Historical   gabapentin (NEURONTIN) 100 mg capsule  8/30/15   Provider, Historical   acetaminophen (TYLENOL ARTHRITIS PAIN) 650 mg CR tablet Take 650 mg by mouth every six (6) hours as needed for Pain. Provider, Historical   ascorbic acid (VITAMIN C) 250 mg tablet Take  by mouth. Provider, Historical   rivaroxaban (XARELTO) 10 mg tablet Take  by mouth daily. Provider, Historical   metoprolol (LOPRESSOR) 25 mg tablet TAKE ONE HALF (1/2) TABLET(S) TWICEDAILY 6/3/14   Jil Spatz, MD   PLAVIX 75 mg tablet TAKE ONE (1) TABLET(S) ONCE DAILY 5/6/12   Jil Spatz, MD   JANTOVEN 2 mg tablet TAKE ONE (1) TABLET(S) DAILY 4/6/12   Jil Spatz, MD   triamterene-hydrochlorothiazide (MAXZIDE) 75-50 mg per tablet TAKE ONE (1) TABLET(S) ONCE DAILY 1/15/12   Sita Reyes MD   pyridoxine (VITAMIN B-6) 25 mg tablet Take 25 mg by mouth daily. Provider, Historical   omega-3 fatty acids-vitamin e (FISH OIL) 1,000 mg Cap Take  by mouth. Provider, Historical   VIT C/DL-E AC/LUT/COPPER/ZNOX (PRESERVISION PO) Take  by mouth. Provider, Historical   Calcium-Cholecalciferol, D3, (CALCIUM 600 + D) 600-400 mg-unit Tab Take 1 Tab by mouth two (2) times a day. 11/16/10   El Yañez MD   MV-Min-Folic Acid-Lutein (CENTRUM SILVER) 500-250 mcg Chew Take  by mouth. Provider, Historical   latanoprost (XALATAN) 0.005 % ophthalmic solution Administer 1 Drop to both eyes nightly.  one drop in left eye daily     Provider, Historical   Omeprazole 20 mg TbEC Take  by mouth. Provider, Historical     Allergies   Allergen Reactions    Dilaudid [Hydromorphone (Bulk)] Rash, Nausea and Vomiting and Swelling    Fenofibrate Nausea Only    Iodine Rash, Nausea and Vomiting and Swelling    Pcn [Penicillins] Rash, Nausea and Vomiting, Swelling and Contact Dermatitis    Prilosec [Omeprazole] Nausea Only    Shellfish Derived Swelling    Sulfa (Sulfonamide Antibiotics) Rash, Nausea and Vomiting and Swelling      No family history on file. Social history    Social History     Tobacco Use   Smoking Status Never Smoker   Smokeless Tobacco Never Used       Social History     Substance and Sexual Activity   Alcohol Use No       Code status  Code status discussed with the patient/caregivers. DNR    Review of systems    A comprehensive review of systems was negative except for that written in the History of Present Illness. Physical Examination   Visit Vitals  BP (!) 110/51   Pulse (!) 107   Temp 97.5 °F (36.4 °C)   Resp 19   Ht 5' (1.524 m)   Wt 51.3 kg (113 lb)   LMP 11/16/1975   SpO2 100%   BMI 22.07 kg/m²      O2 Flow Rate (L/min): 4 l/min   O2 Device: Nasal cannula    General:   Elderly, frail, ill-appearing no acute distress   Head:  Normocephalic, without obvious abnormality, atraumatic   Eyes:  Conjunctivae/corneas clear.  PERRL, EOMs intact   Head/Neck: Nares normal. No nasal drainage or sinus tenderness  Lips, mucosa, and tongue normal   Teeth and gums normal  Clear oropharynx  Trachea midline  No palpable adenopathy  No thyroid enlargement, tenderness     Lungs:   Symmetrical chest expansion and respiratory effort  Clear to auscultation bilaterally       Heart:  Regular rhythm   Sounds normal; no murmur, rub or gallop   Abdomen:    Generalized abdominal tenderness     Back: No CVA tenderness   Extremities: Extremities normal, atraumatic  No cyanosis or edema     Skin: No rashes or ulcers   Musculo-      skeletal: Gait not tested  Normal symmetry, ROM, strength and tone   Neuro: Cranial nerves grossly normal     Psych: Alert, oriented x3  Normal affect, judgement and insight     Data Review    24 Hour Results:  Recent Results (from the past 24 hour(s))   EKG, 12 LEAD, INITIAL    Collection Time: 04/09/22  5:47 PM   Result Value Ref Range    Ventricular Rate 84 BPM    Atrial Rate 84 BPM    P-R Interval 206 ms    QRS Duration 156 ms    Q-T Interval 408 ms    QTC Calculation (Bezet) 482 ms    Calculated P Axis 71 degrees    Calculated R Axis -75 degrees    Calculated T Axis 80 degrees    Diagnosis       Atrial-sensed ventricular-paced rhythm  Abnormal ECG  When compared with ECG of 06-MAY-2009 11:47,  Electronic ventricular pacemaker has replaced Sinus rhythm     CBC WITH AUTOMATED DIFF    Collection Time: 04/09/22  6:37 PM   Result Value Ref Range    WBC 9.1 3.6 - 11.0 K/uL    RBC 3.02 (L) 3.80 - 5.20 M/uL    HGB 10.1 (L) 11.5 - 16.0 g/dL    HCT 31.6 (L) 35.0 - 47.0 %    .6 (H) 80.0 - 99.0 FL    MCH 33.4 26.0 - 34.0 PG    MCHC 32.0 30.0 - 36.5 g/dL    RDW 12.8 11.5 - 14.5 %    PLATELET 670 298 - 276 K/uL    MPV 10.3 8.9 - 12.9 FL    NRBC 0.0 0  WBC    ABSOLUTE NRBC 0.00 0.00 - 0.01 K/uL    NEUTROPHILS 67 32 - 75 %    BAND NEUTROPHILS 26 (H) 0 - 6 %    LYMPHOCYTES 2 (L) 12 - 49 %    MONOCYTES 3 (L) 5 - 13 %    EOSINOPHILS 1 0 - 7 %    BASOPHILS 0 0 - 1 %    METAMYELOCYTES 1 (H) 0 %    IMMATURE GRANULOCYTES 0 %    ABS. NEUTROPHILS 8.5 (H) 1.8 - 8.0 K/UL    ABS. LYMPHOCYTES 0.2 (L) 0.8 - 3.5 K/UL    ABS. MONOCYTES 0.3 0.0 - 1.0 K/UL    ABS. EOSINOPHILS 0.1 0.0 - 0.4 K/UL    ABS. BASOPHILS 0.0 0.0 - 0.1 K/UL    ABS. IMM.  GRANS. 0.0 K/UL    DF MANUAL      RBC COMMENTS MACROCYTOSIS  1+        RBC COMMENTS MISAEL CELLS  PRESENT       METABOLIC PANEL, COMPREHENSIVE    Collection Time: 04/09/22  6:37 PM   Result Value Ref Range    Sodium 142 136 - 145 mmol/L    Potassium 3.9 3.5 - 5.1 mmol/L    Chloride 114 (H) 97 - 108 mmol/L    CO2 16 (L) 21 - 32 mmol/L    Anion gap 12 5 - 15 mmol/L    Glucose 122 (H) 65 - 100 mg/dL    BUN 86 (H) 6 - 20 MG/DL    Creatinine 2.46 (H) 0.55 - 1.02 MG/DL    BUN/Creatinine ratio 35 (H) 12 - 20      GFR est AA 22 (L) >60 ml/min/1.73m2    GFR est non-AA 18 (L) >60 ml/min/1.73m2    Calcium 9.1 8.5 - 10.1 MG/DL    Bilirubin, total 0.6 0.2 - 1.0 MG/DL    ALT (SGPT) 32 12 - 78 U/L    AST (SGOT) 34 15 - 37 U/L    Alk. phosphatase 119 (H) 45 - 117 U/L    Protein, total 5.6 (L) 6.4 - 8.2 g/dL    Albumin 1.9 (L) 3.5 - 5.0 g/dL    Globulin 3.7 2.0 - 4.0 g/dL    A-G Ratio 0.5 (L) 1.1 - 2.2     LACTIC ACID    Collection Time: 04/09/22  6:37 PM   Result Value Ref Range    Lactic acid 1.2 0.4 - 2.0 MMOL/L   SAMPLES BEING HELD    Collection Time: 04/09/22  6:37 PM   Result Value Ref Range    SAMPLES BEING HELD 1BLUE,1SST     COMMENT        Add-on orders for these samples will be processed based on acceptable specimen integrity and analyte stability, which may vary by analyte.    TROPONIN-HIGH SENSITIVITY    Collection Time: 04/09/22  6:37 PM   Result Value Ref Range    Troponin-High Sensitivity 39 0 - 51 ng/L   NT-PRO BNP    Collection Time: 04/09/22  6:37 PM   Result Value Ref Range    NT pro-BNP 3,910 (H) <450 PG/ML   MAGNESIUM    Collection Time: 04/09/22  6:37 PM   Result Value Ref Range    Magnesium 1.7 1.6 - 2.4 mg/dL   RETICULOCYTE COUNT    Collection Time: 04/09/22  6:37 PM   Result Value Ref Range    Reticulocyte count 1.3 0.7 - 2.1 %    Absolute Retic Cnt. 0.0396 0.0164 - 0.0776 M/ul   TSH 3RD GENERATION    Collection Time: 04/09/22  6:39 PM   Result Value Ref Range    TSH 0.45 0.36 - 3.74 uIU/mL   BLOOD GAS, VENOUS    Collection Time: 04/09/22  9:03 PM   Result Value Ref Range    VENOUS PH 7.30 (L) 7.32 - 7.42      VENOUS PCO2 28.8 (L) 41 - 51 mmHg    VENOUS PO2 120 (H) 25 - 40 mmHg    VENOUS BICARBONATE 14 (L) 23 - 28 mmol/L    VENOUS BASE DEFICIT 10.9 mmol/L    VENOUS O2 SATURATION 98 (H) 65 - 88 %    O2 METHOD NASAL CANNULA      O2 FLOW RATE 4.00 L/min    Sample source VENOUS BLOOD      SITE OTHER       Recent Labs     04/09/22  1837   WBC 9.1   HGB 10.1*   HCT 31.6*        Recent Labs     04/09/22 1837      K 3.9   *   CO2 16*   *   BUN 86*   CREA 2.46*   CA 9.1   MG 1.7   ALB 1.9*   TBILI 0.6   ALT 32       Imaging  CT ABD PELV WO CONT    Result Date: 4/9/2022  EXAM:  CT ABD PELV WO CONT INDICATION: Acute kidney injury. Abdominal discomfort. Hypotension. COMPARISON: CT 9/1/2009. TECHNIQUE: Helical CT of the abdomen  and pelvis  without contrast. Coronal and sagittal reformats are performed. CT dose reduction was achieved through use of a standardized protocol tailored for this examination and automatic exposure control for dose modulation. FINDINGS: Solid organ evaluation is limited without contrast. The visualized lung bases demonstrate trace pleural effusions with adjacent atelectasis. The heart size is normal. There is no pericardial effusion. Bilateral kidney cysts are demonstrated measuring up to 8.1 cm on the right. A heterogeneous right kidney lesion measures 3.1 x 3.1 cm (series 2, image 29). There is no renal calculus or hydronephrosis. There is mild pancreatic atrophy with possible mild peripancreatic fat stranding. There is no pseudocyst. The liver, spleen, and adrenal glands are normal.  The gall bladder is surgically absent with postsurgical biliary duct prominence. There are no dilated bowel loops. The appendix is not visualized. There is marked distal colonic diverticulosis without focal adjacent inflammation. There are no enlarged lymph nodes. There is no free fluid or free air. The aorta tapers without aneurysm. There is aortoiliac atherosclerosis. The urinary bladder is unremarkable. There is no pelvic mass. There is a left hip prosthesis. There are lower lumbar and right hip degenerative changes. There is no aggressive bony lesion.      1. Possible mild peripancreatic inflammation raising the possibility of acute pancreatitis. No pseudocyst. 2. Incompletely characterized heterogeneous right kidney lesion measuring 3.1 cm for which a solid mass is not excluded. 3. Trace pleural effusions with adjacent atelectasis. XR CHEST PORT    Result Date: 4/9/2022  EXAM:  XR CHEST PORT INDICATION: Fatigue. Hypotension. COMPARISON: 5/25/2011 TECHNIQUE: Portable AP semiupright chest view at 1859 hours FINDINGS: There is a left chest ICD with wires terminating in profile with the right ventricle and right atrium. The cardiomediastinal and hilar contours are within normal limits. The pulmonary vasculature is within normal limits. The lungs and pleural spaces are clear. There is no pneumothorax. There are degenerative changes in the shoulders and chronic deformity of the left humeral neck. The upper abdomen is unremarkable. No acute process. Assessment and Plan     Acute pancreatitis  -Keep n.p.o., IV fluids, IV morphine as needed for pain  -Check triglycerides and right upper quadrant ultrasound  -Trend lipase    Hypovolemic shock  -Secondary to above  -Currently on dopamine drip, wean as tolerated  -Continue IV fluids  -Monitor cultures    Anemia  -Baseline unknown but likely iron deficiency  -Check iron profile, B12, folate    EVERT on CKD  -Likely due to hypovolemia  -Continue to monitor    Elevated BNP  -History of CHF and on 4 L nasal cannula  -Check echo  -Pharmacy to confirm meds      Diet: N.p.o. Activity: As tolerated  DVT prophylaxis: Heparin  Isolation precautions: None       Signed by:  Stephanie Amato MD    April 9, 2022 at 11:05 PM

## 2022-04-10 NOTE — PROGRESS NOTES
1900- Bedside shift change report given to Kristen MilliganRN (oncoming nurse) by Emili Da Silva (offgoing nurse). Report included the following information SBAR, Kardex, ED Summary, Intake/Output, MAR, Recent Results, Cardiac Rhythm NSR and Alarm Parameters . 1930-Shift assessment complete, see doc flowsheets. 0600-blood glucose 59. Notified hospitalist on call; dr. Karime Doe. Per Md RN to give D10 250ml now. Telephone order readback to clarified. See flow sheet for documentation  See MAR for medication administration and drip titration      0700- Bedside shift change report given to [facility] (oncoming nurse) by Bobby Sanchez (offgoing nurse). Report included the following information SBAR, Kardex, ED Summary, Intake/Output, MAR, Recent Results, Cardiac Rhythm paced and Alarm Parameters.

## 2022-04-10 NOTE — PROGRESS NOTES
CARE MANAGEMENT NOTE      CM attempted to meet with Pt to complete assessment, however, Pt is sleeping. No family in room.  CM will follow up when Pt and/or family is available.       _____________________________________  MARLIN Hendrickson - Care Management  4/10/2022   3:58 PM

## 2022-04-10 NOTE — CONSULTS
Consult Date: 4/10/2022    Consults    Subjective    A 81 yo female presented with abdominal pain. Pt was found to have EVERT and pancreatitis. Pt was admitted to the floor. She is transferred to ICU this morning for hypotension requiring Dopamine infusion. Pt is DNR. 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    Social History     Tobacco Use    Smoking status: Never Smoker    Smokeless tobacco: Never Used   Substance Use Topics    Alcohol use: No       Current Facility-Administered Medications   Medication Dose Route Frequency Provider Last Rate Last Admin    PHENYLephrine (POORNIMA-SYNEPHRINE) 30 mg in 0.9% sodium chloride 250 mL infusion   mcg/min IntraVENous TITRATE Andrea Saenz MD 15 mL/hr at 04/10/22 0933 30 mcg/min at 04/10/22 0933    ondansetron (ZOFRAN) injection 4 mg  4 mg IntraVENous Q1H PRN Yumi Machuca MD   4 mg at 04/09/22 2127    DOPamine (INTROPIN) 800 mg in dextrose 5% 250 mL infusion  5-20 mcg/kg/min IntraVENous TITRATE Yumi Machuca MD 3.4 mL/hr at 04/10/22 0935 3.5 mcg/kg/min at 04/10/22 0935    sodium chloride (NS) flush 5-40 mL  5-40 mL IntraVENous Q8H Dennie Hageman, MD   10 mL at 04/10/22 2764    sodium chloride (NS) flush 5-40 mL  5-40 mL IntraVENous PRN Dennie Hageman, MD        acetaminophen (TYLENOL) tablet 650 mg  650 mg Oral Q6H PRN Dennie Hageman, MD        Or    acetaminophen (TYLENOL) suppository 650 mg  650 mg Rectal Q6H PRN Dennie Hageman, MD        polyethylene glycol [facility]) packet 17 g  17 g Oral DAILY PRN Dennie Hageman, MD        ondansetron [facility]) tablet 4 mg  4 mg Oral Q8H PRN Dennie Hageman, MD        Or    ondansetron [facility]) injection 4 mg  4 mg IntraVENous Q6H PRN Dennie Hageman, MD        heparin (porcine) injection 5,000 Units  5,000 Units SubCUTAneous Q8H Dennie Hageman, MD   5,000 Units at 04/10/22 0204    0.9% sodium chloride infusion  75 mL/hr IntraVENous CONTINUOUS Dennie Hageman, MD 75 mL/hr at 04/10/22 0139 75 mL/hr at 04/10/22 0139        Review of Systems  Unable to obtain given clinical condition   Objective     Vital signs for last 24 hours:  Visit Vitals  BP (!) 108/46   Pulse 70   Temp 97.9 °F (36.6 °C)   Resp 13   Ht 5' (1.524 m)   Wt 51.3 kg (113 lb)   LMP 11/16/1975   SpO2 100%   BMI 22.07 kg/m²       Intake/Output this shift:  Current Shift: 04/10 0701 - 04/10 1900  In: 447 [I.V.:447]  Out: 100 [Urine:100]  Last 3 Shifts: 04/08 1901 - 04/10 0700  In: 3516 [I.V.:3516]  Out: 0     Data Review:   Recent Results (from the past 24 hour(s))   EKG, 12 LEAD, INITIAL    Collection Time: 04/09/22  5:47 PM   Result Value Ref Range    Ventricular Rate 84 BPM    Atrial Rate 84 BPM    P-R Interval 206 ms    QRS Duration 156 ms    Q-T Interval 408 ms    QTC Calculation (Bezet) 482 ms    Calculated P Axis 71 degrees    Calculated R Axis -75 degrees    Calculated T Axis 80 degrees    Diagnosis       Atrial-sensed ventricular-paced rhythm  Abnormal ECG  When compared with ECG of 06-MAY-2009 11:47,  Electronic ventricular pacemaker has replaced Sinus rhythm     CBC WITH AUTOMATED DIFF    Collection Time: 04/09/22  6:37 PM   Result Value Ref Range    WBC 9.1 3.6 - 11.0 K/uL    RBC 3.02 (L) 3.80 - 5.20 M/uL    HGB 10.1 (L) 11.5 - 16.0 g/dL    HCT 31.6 (L) 35.0 - 47.0 %    .6 (H) 80.0 - 99.0 FL    MCH 33.4 26.0 - 34.0 PG    MCHC 32.0 30.0 - 36.5 g/dL    RDW 12.8 11.5 - 14.5 %    PLATELET 981 865 - 470 K/uL    MPV 10.3 8.9 - 12.9 FL    NRBC 0.0 0  WBC    ABSOLUTE NRBC 0.00 0.00 - 0.01 K/uL    NEUTROPHILS 67 32 - 75 %    BAND NEUTROPHILS 26 (H) 0 - 6 %    LYMPHOCYTES 2 (L) 12 - 49 %    MONOCYTES 3 (L) 5 - 13 %    EOSINOPHILS 1 0 - 7 %    BASOPHILS 0 0 - 1 %    METAMYELOCYTES 1 (H) 0 %    IMMATURE GRANULOCYTES 0 %    ABS. NEUTROPHILS 8.5 (H) 1.8 - 8.0 K/UL    ABS. LYMPHOCYTES 0.2 (L) 0.8 - 3.5 K/UL    ABS. MONOCYTES 0.3 0.0 - 1.0 K/UL    ABS. EOSINOPHILS 0.1 0.0 - 0.4 K/UL    ABS. BASOPHILS 0.0 0.0 - 0.1 K/UL    ABS. IMM. GRANS. 0.0 K/UL    DF MANUAL      RBC COMMENTS MACROCYTOSIS  1+        RBC COMMENTS MISAEL CELLS  PRESENT       METABOLIC PANEL, COMPREHENSIVE    Collection Time: 04/09/22  6:37 PM   Result Value Ref Range    Sodium 142 136 - 145 mmol/L    Potassium 3.9 3.5 - 5.1 mmol/L    Chloride 114 (H) 97 - 108 mmol/L    CO2 16 (L) 21 - 32 mmol/L    Anion gap 12 5 - 15 mmol/L    Glucose 122 (H) 65 - 100 mg/dL    BUN 86 (H) 6 - 20 MG/DL    Creatinine 2.46 (H) 0.55 - 1.02 MG/DL    BUN/Creatinine ratio 35 (H) 12 - 20      GFR est AA 22 (L) >60 ml/min/1.73m2    GFR est non-AA 18 (L) >60 ml/min/1.73m2    Calcium 9.1 8.5 - 10.1 MG/DL    Bilirubin, total 0.6 0.2 - 1.0 MG/DL    ALT (SGPT) 32 12 - 78 U/L    AST (SGOT) 34 15 - 37 U/L    Alk.  phosphatase 119 (H) 45 - 117 U/L    Protein, total 5.6 (L) 6.4 - 8.2 g/dL    Albumin 1.9 (L) 3.5 - 5.0 g/dL    Globulin 3.7 2.0 - 4.0 g/dL    A-G Ratio 0.5 (L) 1.1 - 2.2     CULTURE, BLOOD    Collection Time: 04/09/22  6:37 PM    Specimen: Blood   Result Value Ref Range    Special Requests: NO SPECIAL REQUESTS      Culture result: NO GROWTH AFTER 10 HOURS     LACTIC ACID    Collection Time: 04/09/22  6:37 PM   Result Value Ref Range    Lactic acid 1.2 0.4 - 2.0 MMOL/L   SAMPLES BEING HELD    Collection Time: 04/09/22  6:37 PM   Result Value Ref Range    SAMPLES BEING HELD 1BLUE,1SST     COMMENT        Add-on orders for these samples will be processed based on acceptable specimen integrity and analyte stability, which may vary by analyte.    TROPONIN-HIGH SENSITIVITY    Collection Time: 04/09/22  6:37 PM   Result Value Ref Range    Troponin-High Sensitivity 39 0 - 51 ng/L   NT-PRO BNP    Collection Time: 04/09/22  6:37 PM   Result Value Ref Range    NT pro-BNP 3,910 (H) <450 PG/ML   MAGNESIUM    Collection Time: 04/09/22  6:37 PM   Result Value Ref Range    Magnesium 1.7 1.6 - 2.4 mg/dL   RETICULOCYTE COUNT    Collection Time: 04/09/22  6:37 PM   Result Value Ref Range    Reticulocyte count 1.3 0.7 - 2.1 %    Absolute Retic Cnt. 0.0396 0.0164 - 0.0776 M/ul   LIPASE    Collection Time: 04/09/22  6:37 PM   Result Value Ref Range    Lipase 1,125 (H) 73 - 393 U/L   TSH 3RD GENERATION    Collection Time: 04/09/22  6:39 PM   Result Value Ref Range    TSH 0.45 0.36 - 3.74 uIU/mL   BLOOD GAS, VENOUS    Collection Time: 04/09/22  9:03 PM   Result Value Ref Range    VENOUS PH 7.30 (L) 7.32 - 7.42      VENOUS PCO2 28.8 (L) 41 - 51 mmHg    VENOUS PO2 120 (H) 25 - 40 mmHg    VENOUS BICARBONATE 14 (L) 23 - 28 mmol/L    VENOUS BASE DEFICIT 10.9 mmol/L    VENOUS O2 SATURATION 98 (H) 65 - 88 %    O2 METHOD NASAL CANNULA      O2 FLOW RATE 4.00 L/min    Sample source VENOUS BLOOD      SITE OTHER     URINALYSIS W/MICROSCOPIC    Collection Time: 04/09/22 11:00 PM   Result Value Ref Range    Color YELLOW/STRAW      Appearance CLOUDY (A) CLEAR      Specific gravity 1.020 1.003 - 1.030      pH (UA) 5.0 5.0 - 8.0      Protein 30 (A) NEG mg/dL    Glucose Negative NEG mg/dL    Ketone TRACE (A) NEG mg/dL    Bilirubin Negative NEG      Blood Negative NEG      Urobilinogen 1.0 0.2 - 1.0 EU/dL    Nitrites Negative NEG      Leukocyte Esterase SMALL (A) NEG      WBC 20-50 0 - 4 /hpf    RBC 0-5 0 - 5 /hpf    Epithelial cells MODERATE (A) FEW /lpf    Bacteria Negative NEG /hpf    Hyaline cast 5-10 0 - 5 /lpf    Yeast PRESENT (A) NEG     URINE CULTURE HOLD SAMPLE    Collection Time: 04/09/22 11:00 PM    Specimen: Serum; Urine Result Value Ref Range    Urine culture hold        Urine on hold in Microbiology dept for 2 days. If unpreserved urine is submitted, it cannot be used for addtional testing after 24 hours, recollection will be required. METABOLIC PANEL, BASIC    Collection Time: 04/10/22  3:46 AM   Result Value Ref Range    Sodium 142 136 - 145 mmol/L    Potassium 3.5 3.5 - 5.1 mmol/L    Chloride 116 (H) 97 - 108 mmol/L    CO2 16 (L) 21 - 32 mmol/L    Anion gap 10 5 - 15 mmol/L    Glucose 161 (H) 65 - 100 mg/dL    BUN 68 (H) 6 - 20 MG/DL    Creatinine 1.83 (H) 0.55 - 1.02 MG/DL    BUN/Creatinine ratio 37 (H) 12 - 20      GFR est AA 31 (L) >60 ml/min/1.73m2    GFR est non-AA 26 (L) >60 ml/min/1.73m2    Calcium 8.4 (L) 8.5 - 10.1 MG/DL   CBC W/O DIFF    Collection Time: 04/10/22  3:46 AM   Result Value Ref Range    WBC 9.9 3.6 - 11.0 K/uL    RBC 3.05 (L) 3.80 - 5.20 M/uL    HGB 10.2 (L) 11.5 - 16.0 g/dL    HCT 32.0 (L) 35.0 - 47.0 %    .9 (H) 80.0 - 99.0 FL    MCH 33.4 26.0 - 34.0 PG    MCHC 31.9 30.0 - 36.5 g/dL    RDW 12.9 11.5 - 14.5 %    PLATELET 258 094 - 938 K/uL    MPV 10.3 8.9 - 12.9 FL    NRBC 0.0 0  WBC    ABSOLUTE NRBC 0.00 0.00 - 0.01 K/uL   LIPASE    Collection Time: 04/10/22  3:46 AM   Result Value Ref Range    Lipase 597 (H) 73 - 393 U/L   PROTHROMBIN TIME + INR    Collection Time: 04/10/22  3:46 AM   Result Value Ref Range    INR 1.1 0.9 - 1.1      Prothrombin time 11.9 (H) 9.0 - 11.1 sec       Physical Exam    I examined the patient via telemedicine, with its associated limitations.   I beamed in and examined the patient with assistance of house staff     On exam:    Gen:confused, oriented to self    HEENT:  Mucous membrane dry  Chest: symmetrical chest rise  CV:S1,S2  Abd: soft, non-distended  Ext: no edema  Neuro: FC intermittent    Assessment and Plan:  A 79 yo female with pancreatitis and EVERT is transferred to ICU with hypotension    Plan:   Start Phenylephrine and wean Dopamine to keep MAP>65  Albumin *1  Continue IVF  Follow Echo results  US of abdomen   NPO   Monitor UO and renal indices. Trend Lactic acid   Monitor H/H and Plts     DNR  Protonix/ SQ Heparin    Discussed with bedside RN     I reviewed the electronic medical record, the x-rays, labs, progress notes, previous history and physicals and consultation notes that were available in the electronic medical record. I performed all aspects of the physical examination via Telemedicine associated with two way audio and video communication and with the on-site assistance of  the bedside nurse. I am located in [place] and the patient is located in [facility]   The patient is critically ill in the ICU. I  personally  reviewed the pertinent medical records, laboratory/ pathology data and radiographic images. The decision making regarding this patient is as documented above, which was generated  following  discussion  with the multidisciplinary ICU team and creation of a treatment plan for  the patient. We discussed the patient's interval history and future coordination of care and  plans. The patient's medications  were reviewed and changes made as stipulated above. Due to  critical illness impairing one or more vital organs of this patient resulting in life threatening clinical situation  I have provided direct, frequent personal  assessment and manipulation in management plan and spent 35 minutes  of  critical care time excluding the time spent on procedures and teaching. Greater than 50% of this time  in patients care was  employed  in counseling and coordination of care and engaged in face to face discussion of case management issues, addressing questions, and outlining a plan of  therapy. Pt at risk of life threatening deterioration from hypotension. Pt seen and evaluated via tele encounter. Audio and Video were used for this interaction.         ATTENTION:  This medical record was transcribed using an electronic medical records/speech recognition system. Although proofread, it may and can contain electronic, spelling and other errors. Corrections may be executed at a later time. Please feel free to contact us for any clarifications as needed.

## 2022-04-10 NOTE — ED NOTES
Verbal shift change report given to Ayaz Nichols RN (oncoming nurse) by Valeriy De Paz RN (offgoing nurse). Report included the following information SBAR, Kardex, ED Summary and MAR.

## 2022-04-10 NOTE — PROGRESS NOTES
Problem: Pressure Injury - Risk of  Goal: *Prevention of pressure injury  Description: Document Destin Scale and appropriate interventions in the flowsheet. Outcome: Progressing Towards Goal  Note: Pressure Injury Interventions:  Sensory Interventions: Assess changes in LOC,Assess need for specialty bed,Avoid rigorous massage over bony prominences,Discuss PT/OT consult with provider,Float heels,Keep linens dry and wrinkle-free,Maintain/enhance activity level,Minimize linen layers,Monitor skin under medical devices,Pressure redistribution bed/mattress (bed type),Turn and reposition approx. every two hours (pillows and wedges if needed)    Moisture Interventions: Absorbent underpads,Apply protective barrier, creams and emollients,Assess need for specialty bed,Check for incontinence Q2 hours and as needed,Internal/External urinary devices,Limit adult briefs,Minimize layers,Offer toileting Q_hr    Activity Interventions: Assess need for specialty bed,Pressure redistribution bed/mattress(bed type),PT/OT evaluation    Mobility Interventions: Assess need for specialty bed,Float heels,HOB 30 degrees or less,Pressure redistribution bed/mattress (bed type),PT/OT evaluation,Turn and reposition approx. every two hours(pillow and wedges)    Nutrition Interventions: Document food/fluid/supplement intake,Discuss nutritional consult with provider    Friction and Shear Interventions: Apply protective barrier, creams and emollients,Feet elevated on foot rest,Foam dressings/transparent film/skin sealants,HOB 30 degrees or less,Lift sheet,Lift team/patient mobility team,Minimize layers,Transferring/repositioning devices                Problem: Patient Education: Go to Patient Education Activity  Goal: Patient/Family Education  Outcome: Progressing Towards Goal     Problem: Falls - Risk of  Goal: *Absence of Falls  Description: Document Rei Fall Risk and appropriate interventions in the flowsheet.   Outcome: Progressing Towards Goal  Note: Fall Risk Interventions:  Mobility Interventions: Bed/chair exit alarm,OT consult for ADLs,Patient to call before getting OOB,PT Consult for mobility concerns,PT Consult for assist device competence,Strengthening exercises (ROM-active/passive)    Mentation Interventions: Adequate sleep, hydration, pain control,Bed/chair exit alarm,Door open when patient unattended,Evaluate medications/consider consulting pharmacy,Increase mobility,More frequent rounding,Toileting rounds,Update white board,Room close to nurse's station,Reorient patient    Medication Interventions: Assess postural VS orthostatic hypotension,Bed/chair exit alarm,Patient to call before getting OOB,Evaluate medications/consider consulting pharmacy,Teach patient to arise slowly    Elimination Interventions: Bed/chair exit alarm,Call light in reach,Patient to call for help with toileting needs,Toileting schedule/hourly rounds              Problem: Patient Education: Go to Patient Education Activity  Goal: Patient/Family Education  Outcome: Progressing Towards Goal

## 2022-04-11 PROBLEM — K85.90 ACUTE PANCREATITIS: Status: ACTIVE | Noted: 2022-04-09

## 2022-04-11 LAB
ANION GAP SERPL CALC-SCNC: 11 MMOL/L (ref 5–15)
ANION GAP SERPL CALC-SCNC: 7 MMOL/L (ref 5–15)
ATRIAL RATE: 84 BPM
BASOPHILS # BLD: 0 K/UL (ref 0–0.1)
BASOPHILS NFR BLD: 0 % (ref 0–1)
BUN SERPL-MCNC: 44 MG/DL (ref 6–20)
BUN SERPL-MCNC: 53 MG/DL (ref 6–20)
BUN/CREAT SERPL: 35 (ref 12–20)
BUN/CREAT SERPL: 40 (ref 12–20)
CALCIUM SERPL-MCNC: 7.2 MG/DL (ref 8.5–10.1)
CALCIUM SERPL-MCNC: 7.7 MG/DL (ref 8.5–10.1)
CALCULATED P AXIS, ECG09: 71 DEGREES
CALCULATED R AXIS, ECG10: -75 DEGREES
CALCULATED T AXIS, ECG11: 80 DEGREES
CHLORIDE SERPL-SCNC: 119 MMOL/L (ref 97–108)
CHLORIDE SERPL-SCNC: 123 MMOL/L (ref 97–108)
CO2 SERPL-SCNC: 14 MMOL/L (ref 21–32)
CO2 SERPL-SCNC: 18 MMOL/L (ref 21–32)
CREAT SERPL-MCNC: 1.25 MG/DL (ref 0.55–1.02)
CREAT SERPL-MCNC: 1.33 MG/DL (ref 0.55–1.02)
DIAGNOSIS, 93000: NORMAL
DIFFERENTIAL METHOD BLD: ABNORMAL
EOSINOPHIL # BLD: 0.1 K/UL (ref 0–0.4)
EOSINOPHIL NFR BLD: 1 % (ref 0–7)
ERYTHROCYTE [DISTWIDTH] IN BLOOD BY AUTOMATED COUNT: 13.2 % (ref 11.5–14.5)
GLUCOSE BLD STRIP.AUTO-MCNC: 207 MG/DL (ref 65–117)
GLUCOSE BLD STRIP.AUTO-MCNC: 234 MG/DL (ref 65–117)
GLUCOSE BLD STRIP.AUTO-MCNC: 59 MG/DL (ref 65–117)
GLUCOSE SERPL-MCNC: 191 MG/DL (ref 65–100)
GLUCOSE SERPL-MCNC: 53 MG/DL (ref 65–100)
HCT VFR BLD AUTO: 30.8 % (ref 35–47)
HGB BLD-MCNC: 9.6 G/DL (ref 11.5–16)
IMM GRANULOCYTES # BLD AUTO: 0 K/UL
IMM GRANULOCYTES NFR BLD AUTO: 0 %
LACTATE SERPL-SCNC: 1.8 MMOL/L (ref 0.4–2)
LIPASE SERPL-CCNC: 411 U/L (ref 73–393)
LYMPHOCYTES # BLD: 0.9 K/UL (ref 0.8–3.5)
LYMPHOCYTES NFR BLD: 9 % (ref 12–49)
MAGNESIUM SERPL-MCNC: 1.5 MG/DL (ref 1.6–2.4)
MAGNESIUM SERPL-MCNC: 1.6 MG/DL (ref 1.6–2.4)
MCH RBC QN AUTO: 33.9 PG (ref 26–34)
MCHC RBC AUTO-ENTMCNC: 31.2 G/DL (ref 30–36.5)
MCV RBC AUTO: 108.8 FL (ref 80–99)
METAMYELOCYTES NFR BLD MANUAL: 1 %
MONOCYTES # BLD: 0.6 K/UL (ref 0–1)
MONOCYTES NFR BLD: 6 % (ref 5–13)
NEUTS BAND NFR BLD MANUAL: 1 % (ref 0–6)
NEUTS SEG # BLD: 8.1 K/UL (ref 1.8–8)
NEUTS SEG NFR BLD: 82 % (ref 32–75)
NRBC # BLD: 0 K/UL (ref 0–0.01)
NRBC BLD-RTO: 0 PER 100 WBC
P-R INTERVAL, ECG05: 206 MS
PHOSPHATE SERPL-MCNC: 2 MG/DL (ref 2.6–4.7)
PLATELET # BLD AUTO: 219 K/UL (ref 150–400)
PMV BLD AUTO: 10.2 FL (ref 8.9–12.9)
POTASSIUM SERPL-SCNC: 3.4 MMOL/L (ref 3.5–5.1)
POTASSIUM SERPL-SCNC: 3.4 MMOL/L (ref 3.5–5.1)
Q-T INTERVAL, ECG07: 408 MS
QRS DURATION, ECG06: 156 MS
QTC CALCULATION (BEZET), ECG08: 482 MS
RBC # BLD AUTO: 2.83 M/UL (ref 3.8–5.2)
RBC MORPH BLD: ABNORMAL
SERVICE CMNT-IMP: ABNORMAL
SODIUM SERPL-SCNC: 144 MMOL/L (ref 136–145)
SODIUM SERPL-SCNC: 148 MMOL/L (ref 136–145)
VENTRICULAR RATE, ECG03: 84 BPM
WBC # BLD AUTO: 9.7 K/UL (ref 3.6–11)

## 2022-04-11 PROCEDURE — 74011250637 HC RX REV CODE- 250/637: Performed by: INTERNAL MEDICINE

## 2022-04-11 PROCEDURE — C9113 INJ PANTOPRAZOLE SODIUM, VIA: HCPCS | Performed by: INTERNAL MEDICINE

## 2022-04-11 PROCEDURE — 97161 PT EVAL LOW COMPLEX 20 MIN: CPT

## 2022-04-11 PROCEDURE — 77030038269 HC DRN EXT URIN PURWCK BARD -A

## 2022-04-11 PROCEDURE — 74011250636 HC RX REV CODE- 250/636: Performed by: INTERNAL MEDICINE

## 2022-04-11 PROCEDURE — 83735 ASSAY OF MAGNESIUM: CPT

## 2022-04-11 PROCEDURE — 74011000250 HC RX REV CODE- 250: Performed by: HOSPITALIST

## 2022-04-11 PROCEDURE — 80048 BASIC METABOLIC PNL TOTAL CA: CPT

## 2022-04-11 PROCEDURE — 74011000258 HC RX REV CODE- 258: Performed by: INTERNAL MEDICINE

## 2022-04-11 PROCEDURE — 94761 N-INVAS EAR/PLS OXIMETRY MLT: CPT

## 2022-04-11 PROCEDURE — 85025 COMPLETE CBC W/AUTO DIFF WBC: CPT

## 2022-04-11 PROCEDURE — 74011000250 HC RX REV CODE- 250: Performed by: INTERNAL MEDICINE

## 2022-04-11 PROCEDURE — 36415 COLL VENOUS BLD VENIPUNCTURE: CPT

## 2022-04-11 PROCEDURE — 74011250637 HC RX REV CODE- 250/637: Performed by: NURSE PRACTITIONER

## 2022-04-11 PROCEDURE — 74011250636 HC RX REV CODE- 250/636: Performed by: FAMILY MEDICINE

## 2022-04-11 PROCEDURE — 83690 ASSAY OF LIPASE: CPT

## 2022-04-11 PROCEDURE — 82962 GLUCOSE BLOOD TEST: CPT

## 2022-04-11 PROCEDURE — 65610000006 HC RM INTENSIVE CARE

## 2022-04-11 PROCEDURE — 83605 ASSAY OF LACTIC ACID: CPT

## 2022-04-11 PROCEDURE — 97530 THERAPEUTIC ACTIVITIES: CPT

## 2022-04-11 PROCEDURE — 84100 ASSAY OF PHOSPHORUS: CPT

## 2022-04-11 PROCEDURE — 74011250637 HC RX REV CODE- 250/637: Performed by: HOSPITALIST

## 2022-04-11 PROCEDURE — 94762 N-INVAS EAR/PLS OXIMTRY CONT: CPT

## 2022-04-11 PROCEDURE — 2709999900 HC NON-CHARGEABLE SUPPLY

## 2022-04-11 PROCEDURE — 74011000250 HC RX REV CODE- 250: Performed by: FAMILY MEDICINE

## 2022-04-11 PROCEDURE — 74011250636 HC RX REV CODE- 250/636: Performed by: HOSPITALIST

## 2022-04-11 PROCEDURE — 77010033678 HC OXYGEN DAILY

## 2022-04-11 PROCEDURE — 97165 OT EVAL LOW COMPLEX 30 MIN: CPT

## 2022-04-11 RX ORDER — METOPROLOL SUCCINATE 25 MG/1
25 TABLET, EXTENDED RELEASE ORAL 2 TIMES DAILY
COMMUNITY
End: 2022-04-14

## 2022-04-11 RX ORDER — POTASSIUM CHLORIDE 14.9 MG/ML
20 INJECTION INTRAVENOUS
Status: DISCONTINUED | OUTPATIENT
Start: 2022-04-11 | End: 2022-04-11

## 2022-04-11 RX ORDER — GABAPENTIN 100 MG/1
200 CAPSULE ORAL 2 TIMES DAILY
Status: DISCONTINUED | OUTPATIENT
Start: 2022-04-11 | End: 2022-04-11

## 2022-04-11 RX ORDER — HEPARIN SODIUM 5000 [USP'U]/ML
5000 INJECTION, SOLUTION INTRAVENOUS; SUBCUTANEOUS EVERY 8 HOURS
Status: DISCONTINUED | OUTPATIENT
Start: 2022-04-11 | End: 2022-04-11 | Stop reason: SDUPTHER

## 2022-04-11 RX ORDER — ASPIRIN 81 MG/1
81 TABLET ORAL DAILY
COMMUNITY
End: 2022-05-02 | Stop reason: SDUPTHER

## 2022-04-11 RX ORDER — ONDANSETRON 4 MG/1
4 TABLET, ORALLY DISINTEGRATING ORAL
COMMUNITY
End: 2022-05-02 | Stop reason: SDUPTHER

## 2022-04-11 RX ORDER — POTASSIUM CHLORIDE 750 MG/1
40 TABLET, FILM COATED, EXTENDED RELEASE ORAL
Status: COMPLETED | OUTPATIENT
Start: 2022-04-11 | End: 2022-04-11

## 2022-04-11 RX ORDER — AMITRIPTYLINE HYDROCHLORIDE 25 MG/1
25 TABLET, FILM COATED ORAL
COMMUNITY
End: 2022-05-02 | Stop reason: SDUPTHER

## 2022-04-11 RX ORDER — BENZONATATE 100 MG/1
100 CAPSULE ORAL
COMMUNITY
End: 2022-05-02 | Stop reason: SDUPTHER

## 2022-04-11 RX ORDER — ASPIRIN 81 MG/1
81 TABLET ORAL DAILY
Status: DISCONTINUED | OUTPATIENT
Start: 2022-04-12 | End: 2022-04-14 | Stop reason: HOSPADM

## 2022-04-11 RX ORDER — FERROUS SULFATE 137(45) MG
142 TABLET, EXTENDED RELEASE ORAL
COMMUNITY
End: 2022-05-02 | Stop reason: SDUPTHER

## 2022-04-11 RX ORDER — MIDODRINE HYDROCHLORIDE 5 MG/1
10 TABLET ORAL
Status: DISCONTINUED | OUTPATIENT
Start: 2022-04-11 | End: 2022-04-14 | Stop reason: HOSPADM

## 2022-04-11 RX ORDER — POTASSIUM CHLORIDE 7.45 MG/ML
10 INJECTION INTRAVENOUS
Status: COMPLETED | OUTPATIENT
Start: 2022-04-11 | End: 2022-04-11

## 2022-04-11 RX ORDER — DEXTROSE MONOHYDRATE 100 MG/ML
250 INJECTION, SOLUTION INTRAVENOUS ONCE
Status: COMPLETED | OUTPATIENT
Start: 2022-04-11 | End: 2022-04-11

## 2022-04-11 RX ORDER — GABAPENTIN 100 MG/1
200 CAPSULE ORAL EVERY EVENING
Status: DISCONTINUED | OUTPATIENT
Start: 2022-04-11 | End: 2022-04-14 | Stop reason: HOSPADM

## 2022-04-11 RX ORDER — POTASSIUM CHLORIDE 7.45 MG/ML
10 INJECTION INTRAVENOUS
Status: DISCONTINUED | OUTPATIENT
Start: 2022-04-11 | End: 2022-04-11

## 2022-04-11 RX ORDER — PANTOPRAZOLE SODIUM 40 MG/1
40 TABLET, DELAYED RELEASE ORAL
Status: DISCONTINUED | OUTPATIENT
Start: 2022-04-11 | End: 2022-04-14 | Stop reason: HOSPADM

## 2022-04-11 RX ORDER — KETOROLAC TROMETHAMINE 30 MG/ML
15 INJECTION, SOLUTION INTRAMUSCULAR; INTRAVENOUS
Status: COMPLETED | OUTPATIENT
Start: 2022-04-11 | End: 2022-04-11

## 2022-04-11 RX ORDER — MAGNESIUM SULFATE 100 %
4 CRYSTALS MISCELLANEOUS AS NEEDED
Status: DISCONTINUED | OUTPATIENT
Start: 2022-04-11 | End: 2022-04-14 | Stop reason: HOSPADM

## 2022-04-11 RX ORDER — LATANOPROST 50 UG/ML
1 SOLUTION/ DROPS OPHTHALMIC EVERY EVENING
Status: DISCONTINUED | OUTPATIENT
Start: 2022-04-11 | End: 2022-04-14 | Stop reason: HOSPADM

## 2022-04-11 RX ORDER — LEVOTHYROXINE SODIUM 25 UG/1
25 TABLET ORAL
Status: DISCONTINUED | OUTPATIENT
Start: 2022-04-12 | End: 2022-04-14 | Stop reason: HOSPADM

## 2022-04-11 RX ORDER — BENZONATATE 100 MG/1
100 CAPSULE ORAL
Status: DISCONTINUED | OUTPATIENT
Start: 2022-04-11 | End: 2022-04-14 | Stop reason: HOSPADM

## 2022-04-11 RX ORDER — DEXTROSE MONOHYDRATE 100 MG/ML
0-250 INJECTION, SOLUTION INTRAVENOUS AS NEEDED
Status: DISCONTINUED | OUTPATIENT
Start: 2022-04-11 | End: 2022-04-14 | Stop reason: HOSPADM

## 2022-04-11 RX ORDER — PANTOPRAZOLE SODIUM 40 MG/1
40 TABLET, DELAYED RELEASE ORAL
Status: DISCONTINUED | OUTPATIENT
Start: 2022-04-12 | End: 2022-04-11

## 2022-04-11 RX ORDER — PANTOPRAZOLE SODIUM 40 MG/1
40 TABLET, DELAYED RELEASE ORAL DAILY
COMMUNITY
End: 2022-05-02 | Stop reason: SDUPTHER

## 2022-04-11 RX ORDER — ATORVASTATIN CALCIUM 20 MG/1
40 TABLET, FILM COATED ORAL DAILY
Status: DISCONTINUED | OUTPATIENT
Start: 2022-04-12 | End: 2022-04-11

## 2022-04-11 RX ORDER — MAGNESIUM SULFATE HEPTAHYDRATE 40 MG/ML
2 INJECTION, SOLUTION INTRAVENOUS ONCE
Status: COMPLETED | OUTPATIENT
Start: 2022-04-11 | End: 2022-04-11

## 2022-04-11 RX ORDER — SACUBITRIL AND VALSARTAN 97; 103 MG/1; MG/1
1 TABLET, FILM COATED ORAL 2 TIMES DAILY
COMMUNITY
End: 2022-04-14

## 2022-04-11 RX ORDER — AMITRIPTYLINE HYDROCHLORIDE 10 MG/1
25 TABLET, FILM COATED ORAL
Status: DISCONTINUED | OUTPATIENT
Start: 2022-04-11 | End: 2022-04-11

## 2022-04-11 RX ADMIN — GABAPENTIN 200 MG: 100 CAPSULE ORAL at 18:27

## 2022-04-11 RX ADMIN — MIDODRINE HYDROCHLORIDE 10 MG: 5 TABLET ORAL at 12:13

## 2022-04-11 RX ADMIN — POTASSIUM CHLORIDE 10 MEQ: 7.46 INJECTION, SOLUTION INTRAVENOUS at 16:59

## 2022-04-11 RX ADMIN — POTASSIUM PHOSPHATE, MONOBASIC POTASSIUM PHOSPHATE, DIBASIC: 224; 236 INJECTION, SOLUTION, CONCENTRATE INTRAVENOUS at 08:23

## 2022-04-11 RX ADMIN — HEPARIN SODIUM 5000 UNITS: 5000 INJECTION INTRAVENOUS; SUBCUTANEOUS at 05:24

## 2022-04-11 RX ADMIN — MAGNESIUM SULFATE HEPTAHYDRATE 2 G: 40 INJECTION, SOLUTION INTRAVENOUS at 16:51

## 2022-04-11 RX ADMIN — SODIUM CHLORIDE, PRESERVATIVE FREE 10 ML: 5 INJECTION INTRAVENOUS at 21:01

## 2022-04-11 RX ADMIN — HEPARIN SODIUM 5000 UNITS: 5000 INJECTION INTRAVENOUS; SUBCUTANEOUS at 13:13

## 2022-04-11 RX ADMIN — POTASSIUM CHLORIDE 40 MEQ: 750 TABLET, EXTENDED RELEASE ORAL at 16:38

## 2022-04-11 RX ADMIN — ALUMINUM HYDROXIDE AND MAGNESIUM HYDROXIDE 15 ML: 200; 200 SUSPENSION ORAL at 20:56

## 2022-04-11 RX ADMIN — SODIUM BICARBONATE: 84 INJECTION, SOLUTION INTRAVENOUS at 17:52

## 2022-04-11 RX ADMIN — PANTOPRAZOLE SODIUM 40 MG: 40 TABLET, DELAYED RELEASE ORAL at 16:38

## 2022-04-11 RX ADMIN — SODIUM CHLORIDE 40 MG: 9 INJECTION, SOLUTION INTRAMUSCULAR; INTRAVENOUS; SUBCUTANEOUS at 08:04

## 2022-04-11 RX ADMIN — KETOROLAC TROMETHAMINE 15 MG: 30 INJECTION, SOLUTION INTRAMUSCULAR at 20:56

## 2022-04-11 RX ADMIN — HEPARIN SODIUM 5000 UNITS: 5000 INJECTION INTRAVENOUS; SUBCUTANEOUS at 21:00

## 2022-04-11 RX ADMIN — SODIUM BICARBONATE: 84 INJECTION, SOLUTION INTRAVENOUS at 07:34

## 2022-04-11 RX ADMIN — SODIUM CHLORIDE 75 ML/HR: 9 INJECTION, SOLUTION INTRAVENOUS at 04:11

## 2022-04-11 RX ADMIN — SODIUM CHLORIDE, PRESERVATIVE FREE 10 ML: 5 INJECTION INTRAVENOUS at 05:24

## 2022-04-11 RX ADMIN — SODIUM CHLORIDE, PRESERVATIVE FREE 10 ML: 5 INJECTION INTRAVENOUS at 13:13

## 2022-04-11 RX ADMIN — POTASSIUM CHLORIDE 10 MEQ: 7.46 INJECTION, SOLUTION INTRAVENOUS at 18:27

## 2022-04-11 RX ADMIN — DEXTROSE MONOHYDRATE 250 ML: 100 INJECTION, SOLUTION INTRAVENOUS at 06:01

## 2022-04-11 RX ADMIN — PHENYLEPHRINE HYDROCHLORIDE 20 MCG/MIN: 10 INJECTION INTRAVENOUS at 17:52

## 2022-04-11 RX ADMIN — PHENYLEPHRINE HYDROCHLORIDE 55 MCG/MIN: 10 INJECTION INTRAVENOUS at 06:03

## 2022-04-11 RX ADMIN — MIDODRINE HYDROCHLORIDE 10 MG: 5 TABLET ORAL at 16:38

## 2022-04-11 RX ADMIN — LATANOPROST 1 DROP: 50 SOLUTION OPHTHALMIC at 18:32

## 2022-04-11 NOTE — PROGRESS NOTES
Reason for Admission:   Acute pancreatitis,hypovolemic shock,EVERT,metabolic acidosis                    RUR Score:     16%             PCP: First and Last name:   Rahul Lugo MD     Name of Practice:    Are you a current patient: Yes/No: yes   Approximate date of last visit:    Can you participate in a virtual visit if needed:     Do you (patient/family) have any concerns for transition/discharge? no              Plan for utilizing home health: To be determined by discharge    Current Advanced Directive/Advance Care Plan:  DNR      Healthcare Decision Maker:          Wally Durham would default to her . However, is displaying some memory deficits. Pt would like to see  to designate her son,Tristan as her primary decision-maker. Spiritual care consultation placed. Transition of Care Plan:          I met with pt, her  and son,Tristan. Pt confirmed their address as correct on demographics. Pt states she ,her  and son all live together @ the address on demographics. At home,pt has a chair lift that helps maneuver her in her chair through the garage into their home. Pt ambulates @ home with the assistance of a rolling walker. She eats her meals @ the dining room table. Son prepares all meals. Son and pt.'s  clean their home and do the laundry,etc.    Tristan Ann's number is [phone]. DME @ home:  Home oxygen (4 liters continuous) through [employer]  Chairlift    Pt has had home health previously but \"it has been such a long time ago that I don't remember. \"    Pt;s chief complaint today is \"I want to eat and drink. \"  PCP is DR Inderjit Ruiz. Outpatient cardiologist is Dr Yunior Kuhn. Pt has Medicare A & B. PT/OT consulted. Will follow therapy recommendations and will discuss with pt and pt.'s family.     Franklin Mullen  Perfect Serve

## 2022-04-11 NOTE — WOUND CARE
Wound Consult:  New consult Visit. Chart reviewed. Consulted for buttocks ecchymosis. Spoke with patients nurse,  [address] RN. Patient is resting on a progressa bed with supportive mattress. Heels off loaded with heel boots. Patient is awake, alert; requires 2 assist to move side to side in bed. Destin score 15  Assessment:  POA Buttocks/ischial area- pink/purplish with areas of purple, all lenny. Ecchymosis. Patient denies falling at home. Bilateral heels- pink/red/blanches, boggy, heel boots applied  Treatment:  Sacral foam to sacrum for protection  Heel boots applied bilaterally  Wound Recommendations:  Buttocks- sacral foam for protection, change every 3 days or as needed for incontinence,  Heel boots while in bed  Skin Care / PI Prevention Recommendations:  1. Minimize friction/shear: minimize layers of linen/pads under patient. 2. Off load pressure/reposition:  turn and reposition approximately every 2 hours; float heels with pillows or use off loading heel boots; waffle cushion for sitting; position wedge. 3. Manage Moisture - keep skin folds dry; incontinence skin care with incontinence wipes; zinc guard barrier ointment; purewick in use to help contain urine. 4. Continue to monitor nutrition, pain, and skin risk scale, and skin assessment. Plan:  Spoke with Dr. Arvin Kay regarding findings and proposed orders for treatment. We will continue to reassess  and as needed. Please re-consult should concerns arise despite continued skin/PI prevention measures.     [address] [place], Wound / [address] [facility] Healing Office [phone]

## 2022-04-11 NOTE — PROGRESS NOTES
Spiritual Care Assessment/Progress Note  [address]      NAME: Nikki Andrade      MRN: [id number]  AGE: 80 y.o.  SEX: female  Cheondoism Affiliation: Taoism   Language: English     4/11/2022     Total Time (in minutes): 102     Spiritual Assessment begun in [facility] INTENSIVE CARE through conversation with:         [x]Patient        [x] Family    [] Friend(s)        Reason for Consult: Advance medical directive consult     Spiritual beliefs: (Please include comment if needed)     [x] Identifies with a sweetie tradition:    Taoism     [x] Supported by a sweetie community:            [] Claims no spiritual orientation:           [] Seeking spiritual identity:                [] Adheres to an individual form of spirituality:           [] Not able to assess:                           Identified resources for coping:      [] Prayer                               [] Music                  [] Guided Imagery     [x] Family/friends                 [] Pet visits     [] Devotional reading                         [] Unknown     [] Other:                                         Interventions offered during this visit: (See comments for more details)    Patient Interventions: Advance medical directive consult,Advance medical directive completed,Affirmation of sweetie,Iconic (affirming the presence of God/Higher Power),Initial/Spiritual assessment, patient floor,Prayer (assurance of)           Plan of Care:     [] Support spiritual and/or cultural needs    [] Support AMD and/or advance care planning process      [] Support grieving process   [] Coordinate Rites and/or Rituals    [] Coordination with community clergy   [] No spiritual needs identified at this time   [] Detailed Plan of Care below (See Comments)  [] Make referral to Music Therapy  [] Make referral to Pet Therapy     [] Make referral to Addiction services  [] Make referral to [facility]  [] Make referral to Spiritual Care Partner  [] No future visits requested        [x] Contact Spiritual Care for further referrals     Comments: Responded to  request for and Advance Medical Directive (AMD) consult in the ICU. Miss Uday Cueto was very clear that she wanted her son Nas Alatorre to be her primary medical decision maker. She indicated her  is having some memory problems. She has 3 other children who all have things going on in their lives. Medina Dominguez has been caring for Kelli Keller and her . We reviewed the entire AMD form and called Medina Dominguez to assure he would be primary decision maker. Medina Dominguez and his mother talked and they are in agreement that at this time Miss Uday Cueto is choosing not to name a secondary decision maker. Reviewed most of the form with Medina Garibayfouzia over the phone. Nurse Dilshad Mendoza and myself witnessed her signature. Made copies of the AMD form, gave the original and 3 copies back to Kelli Arianna. Gave one copy to her nurse. She shared he is a life long [address]. She knows of Father Paulino Romero but never met him. She would be delighted to meet him when he comes by tomorrow if possible. Provided her with the Communion prayer. Contact Spiritual Care for any further referrals.   Dru Russell., MS., [address] ([phone])

## 2022-04-11 NOTE — PROGRESS NOTES
Problem: Self Care Deficits Care Plan (Adult)  Goal: *Acute Goals and Plan of Care (Insert Text)  Description: FUNCTIONAL STATUS PRIOR TO ADMISSION: Patient was modified independent using a walker for functional mobility and short distance ambulation. She uses a wheelchair for longer distances and community ambulation. Patient needs occasional assistance for LB ADLs but otherwise independent with self care. HOME SUPPORT: The patient lived with  and son. Occupational Therapy Goals  Initiated 4/11/2022  1. Patient will perform grooming with supervision/set-up within 7 day(s). 2.  Patient will perform upper body dressing and bathing with minimal assistance within 7 day(s). 3.  Patient will perform lower body dressing and bathing with moderate assistance using AE PRN within 7 day(s). 4.  Patient will perform toilet transfers with minimal assistance using rolling walker within 7 day(s). 5.  Patient will perform all aspects of toileting with moderate assistance  within 7 day(s). 6.  Patient will participate in upper extremity therapeutic exercise/activities with supervision/set-up for 10 minutes within 7 day(s). 7.  Patient will utilize energy conservation techniques during functional activities with verbal cues within 7 day(s). Outcome: Progressing Towards Goal   OCCUPATIONAL THERAPY EVALUATION  Patient: Ciera Esparza (84 y.o. female)  Date: 4/11/2022  Primary Diagnosis: Acute pancreatitis [K85.90]        Precautions: fall       ASSESSMENT  Based on the objective data described below, the patient presents with decreased activity tolerance, generalized weakness, impaired balance, and low BP following admission on 4/9/22 for PNA and pancreatitis. Patient was alert, oriented x4, and motivated for activity. She is currently requiring pressors but cleared for activity as tolerated. BP was low but remained stable and appropriately increased with positional changes.   Patient performed bed mobility with significant x2 person assist.  Patient with poor B shoulder ROM (L>R) however this is her baseline. She engaged in ADLs with fair tolerance. Patient verbalizing increased anxiety when sitting EOB associated with fear of falling. She was unable to tolerate OOB transfers today therefore returned to bed and placed in modified chair position. Patient would benefit from continued skilled OT to progress towards goals and improve overall independence. Current Level of Function Impacting Discharge (ADLs/self-care): Patient required max A x2 for bed mobility. Patient required setup to mod A for UB ADLs and max to total A for LB ADLs. Functional Outcome Measure: The patient scored Total: 20/100 on the Barthel Index outcome measure. Patient will benefit from skilled therapy intervention to address the above noted impairments. PLAN :  Recommendations and Planned Interventions: self care training, functional mobility training, therapeutic exercise, balance training, therapeutic activities, endurance activities, patient education, home safety training, and family training/education    Frequency/Duration: Patient will be followed by occupational therapy 5 times a week to address goals. Recommendation for discharge: (in order for the patient to meet his/her long term goals)  Occupational therapy at least 2 days/week in the home with family assistance vs. Rehab pending progress    This discharge recommendation:  Has been made in collaboration with the attending provider and/or case management    IF patient discharges home will need the following DME: none       SUBJECTIVE:   Patient stated My  can help but I just have to remind him several times.  patient shared her  has Dementia but can assist physically at home some.       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    [facility]     [facility]rom[facility]embolus [facility])      Past Surgical History:   Procedure Laterality Date    HX CHOLECYSTECTOMY      HX HEENT      HX ORTHOPAEDIC      VASCULAR SURGERY PROCEDURE UNLIST         Expanded or extensive additional review of patient history:     Home Situation  Home Environment: Private residence  # Steps to Enter:  (chair lift)  One/Two Story Residence: One story  Living Alone: No  Support Systems: Spouse/Significant Other,Child(ramo)  Patient Expects to be Discharged to[de-identified] Home with family assistance  Current DME Used/Available at Home: Oxygen, portable,Cane, straight  Tub or Shower Type:  (built in shower seat)    Hand dominance: Right    EXAMINATION OF PERFORMANCE DEFICITS:  Cognitive/Behavioral Status:  Neurologic State: Alert  Orientation Level: Oriented X4  Cognition: Follows commands  Perception: Appears intact  Perseveration: No perseveration noted  Safety/Judgement: Awareness of environment    Skin: Intact in the uppers    Edema: None noted in the uppers    Hearing: Auditory  Auditory Impairment: Hard of hearing, bilateral    Vision/Perceptual:    Tracking: Able to track stimulus in all quadrants w/o difficulty    Diplopia: No    Acuity: Within Defined Limits       Range of Motion:  AROM: Generally decreased, functional  PROM: Generally decreased, functional    Strength:  Strength: Generally decreased, functional     Coordination:  Fine Motor Skills-Upper: Left Intact; Right Intact    Gross Motor Skills-Upper: Left Intact; Right Intact    Tone & Sensation:  Tone: normal  Sensation: intact per pt report      Balance:  Sitting: Impaired;High guard; With support  Sitting - Static: Fair (occasional)  Sitting - Dynamic: Fair (occasional)    Functional Mobility and Transfers for ADLs:  Bed Mobility:  Rolling: Maximum assistance;Assist x2  Supine to Sit: Maximum assistance;Assist x2  Sit to Supine: Maximum assistance;Assist x2  Scooting: Maximum assistance;Assist x2      ADL Assessment:  Feeding: Setup    Oral Facial Hygiene/Grooming: Minimum assistance    Bathing: Maximum assistance    Upper Body Dressing: Moderate assistance    Lower Body Dressing: Total assistance    Toileting: Total assistance    Cognitive Retraining  Safety/Judgement: Awareness of environment    Functional Measure:    Barthel Index:  Bathin  Bladder: 5  Bowels: 5  Groomin  Dressin  Feedin  Mobility: 0  Stairs: 0  Toilet Use: 0  Transfer (Bed to Chair and Back): 5  Total: 20/100      The Barthel ADL Index: Guidelines  1. The index should be used as a record of what a patient does, not as a record of what a patient could do. 2. The main aim is to establish degree of independence from any help, physical or verbal, however minor and for whatever reason. 3. The need for supervision renders the patient not independent. 4. A patient's performance should be established using the best available evidence. Asking the patient, friends/relatives and nurses are the usual sources, but direct observation and common sense are also important. However direct testing is not needed. 5. Usually the patient's performance over the preceding 24-48 hours is important, but occasionally longer periods will be relevant. 6. Middle categories imply that the patient supplies over 50 per cent of the effort. 7. Use of aids to be independent is allowed. Score Interpretation (from [address] [address])    Independent   60-79 Minimally independent   40-59 Partially dependent   20-39 Very dependent   <20 Totally dependent     -Jl Medina, Barthel, D.W. (1965). Functional evaluation: the Barthel Index. [address] [facility] ([address] (1997).  The Barthel activities of daily living index: self-reporting versus actual performance in the old (> or = 75 years). Journal of [address] [place] [address] 45(9), 14 [facility], IVON, Caroline Winter., Florina Pope. (1999). Measuring the change in disability after inpatient rehabilitation; comparison of the responsiveness of the Barthel Index and Functional [place] Measure. Journal of Neurology, Neurosurgery, and Psychiatry, 66(4), 250-840. HUMBERTO Hernandez, JEN Painting, & Vesna Shepard M.A. (2004) Assessment of post-stroke quality of life in cost-effectiveness studies: The usefulness of the Barthel Index and the EuroQoL-5D. Quality of Life Research, 15, 821-48     Occupational Therapy Evaluation Charge Determination   History Examination Decision-Making   LOW Complexity : Brief history review  LOW Complexity : 1-3 performance deficits relating to physical, cognitive , or psychosocial skils that result in activity limitations and / or participation restrictions  LOW Complexity : No comorbidities that affect functional and no verbal or physical assistance needed to complete eval tasks       Based on the above components, the patient evaluation is determined to be of the following complexity level: LOW     Activity Tolerance:   Good and Fair    After treatment patient left in no apparent distress:    Supine in bed, Call bell within reach, and Bed / chair alarm activated    COMMUNICATION/EDUCATION:   The patients plan of care was discussed with: Physical therapist, Registered nurse, and patient . Home safety education was provided and the patient/caregiver indicated understanding., Patient/family have participated as able in goal setting and plan of care. , and Patient/family agree to work toward stated goals and plan of care. This patients plan of care is appropriate for delegation to [facility].     Thank you for this referral.  Mady Urias, OTR/L  Time Calculation: 38 mins

## 2022-04-11 NOTE — CONSULTS
Sue Marvin, NP-C  [phone] cell     Gastroenterology Consultation Note      Admit Date: 4/9/2022  Consult Date: 4/11/2022   I greatly appreciate your asking me to see Ciera Esparza, thank you very much for the opportunity to participate in her care. Narrative Assessment and Plan   GI consult for acute pancreatitis in this 81 y/o female admitted with 1 week history of epigastric pain with nausea and dry heaves. Reports it started after starting doxycycline for pneumonia. However, she also reports that 2-3 weeks ago she was taking a large MVI and it became stuck about mid-esophagus, it finally passed to distal esophagus where it remained for several hours with severe burning pain before it passed. No current odynophagia or dysphagia and none before episode. No reflux symptoms, takes pantoprazole daily. Pain has currently resolved and no nausea. Lipase 1125 (now 411), WBC normal, HCT 30.8, normal LFTs,TG 96, MONISHA on labs. CT showed possible mild peripancreatic fat stranding, s/p CCY with post-op biliary duct dilation. Ultrasound with dilated intra hepatic and extra hepatic ducts, CBD 11 mm. Last EGD in 2017 by Dr. Hannah Pcaheco with moderate stenosis GE junction that was dilated. No NSAID use, no alcohol or tobacco use. · Increase PPI to BID, no plans for endoscopic examination at this time  · Full liquids, may ADAT but would keep diet soft--even after discharge home     Subjective:     Chief Complaint: Abdominal Pain and Nausea    History of Present Illness: GI consult for acute pancreatitis in this 81 y/o female admitted with 1 week history of epigastric pain with nausea and dry heaves. Reports it started after starting doxycycline for pneumonia. However, she also reports that 2-3 weeks ago she was taking a large MVI and it became stuck about mid-esophagus, it finally passed to distal esophagus where it remained for several hours with severe burning pain before it passed.  No current odynophagia or dysphagia and none before episode. No reflux symptoms, takes pantoprazole daily. Pain has currently resolved and no nausea. Labs: WBC 9.7, hgb 9.6, hct 30.8, .8, sodium 148, potassium 3.4, BUN 53, creatinine 1.33, lipase 1125 (now 411), B12 804, folate 37.7, TB 96, iron 8, TIBC 109, saturation 7, TB 0.6, , AST 34, ALT 32. CT showed possible mild peripancreatic fat stranding, s/p CCY with post-op biliary duct dilation. Ultrasound with dilated intra hepatic and extra hepatic ducts, CBD 11 mm. ERCP in 2009 for gallstone pancreatitis. Last EGD in 2017 by Dr. Audrey Liu with moderate stenosis GE junction that was dilated. No NSAID use, no alcohol or tobacco use. 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      Allergies   Allergen Reactions    Dilaudid [Hydromorphone (Bulk)] Rash, Nausea and Vomiting and Swelling    Fenofibrate Nausea Only    Iodine Rash, Nausea and Vomiting and Swelling    Pcn [Penicillins] Rash, Nausea and Vomiting, Swelling and Contact Dermatitis    Prilosec [Omeprazole] Nausea Only    Shellfish Derived Swelling    Sulfa (Sulfonamide Antibiotics) Rash, Nausea and Vomiting and Swelling            Home Medications:  Prior to Admission Medications Prescriptions Last Dose Informant Patient Reported? Taking? ANTIOX #8/OM3/DHA/EPA/LUT/ZEAX (PRESERVISION AREDS 2, OMEGA-3, PO)  Family Member Yes Yes   Sig: Take 1 Caplet by mouth daily. MV-Min-Folic Acid-Lutein (CENTRUM SILVER) 500-250 mcg Chew  Family Member Yes Yes   Sig: Take 1 Tablet by mouth daily. acetaminophen (TYLENOL ARTHRITIS PAIN) 650 mg CR tablet  Family Member Yes Yes   Sig: Take 650 mg by mouth every six (6) hours as needed for Pain. amitriptyline (ELAVIL) 25 mg tablet  Family Member Yes Yes   Sig: Take 25 mg by mouth nightly. aspirin delayed-release 81 mg tablet  Family Member Yes Yes   Sig: Take 81 mg by mouth daily. atorvastatin (LIPITOR) 40 mg tablet  Family Member Yes Yes   Sig: Take 40 mg by mouth daily. benzonatate (TESSALON) 100 mg capsule  Family Member Yes Yes   Sig: Take 100 mg by mouth three (3) times daily as needed for Cough. famotidine (PEPCID) 40 mg tablet  Family Member Yes Yes   Sig: Take 20 mg by mouth two (2) times a day. ferrous sulfate (Slow Fe) 142 mg (45 mg iron) ER tablet  Family Member Yes Yes   Sig: Take 142 mg by mouth Daily (before breakfast). gabapentin (NEURONTIN) 100 mg capsule  Family Member Yes Yes   Sig: Take 200 mg by mouth two (2) times a day. latanoprost (XALATAN) 0.005 % ophthalmic solution  Family Member Yes Yes   Sig: Administer 1 Drop to left eye every evening. one drop in left eye in the evening   levothyroxine (SYNTHROID) 25 mcg tablet  Family Member Yes Yes   Sig: Take 25 mcg by mouth Daily (before breakfast). metoprolol succinate (TOPROL-XL) 25 mg XL tablet  Family Member Yes Yes   Sig: Take 25 mg by mouth two (2) times a day. ondansetron (ZOFRAN ODT) 4 mg disintegrating tablet  Family Member Yes Yes   Sig: Take 4 mg by mouth every six (6) hours as needed for Nausea or Vomiting. pantoprazole (PROTONIX) 40 mg tablet  Family Member Yes Yes   Sig: Take 40 mg by mouth daily.    pyridoxine (VITAMIN B-6) 25 mg tablet  Family Member Yes Yes   Sig: Take 25 mg by mouth daily. sacubitriL-valsartan (Entresto)  mg tablet  Family Member Yes Yes   Sig: Take 1 Tablet by mouth two (2) times a day. Facility-Administered Medications: None       Hospital Medications:  Current Facility-Administered Medications   Medication Dose Route Frequency    sodium bicarbonate (8.4%) 100 mEq in dextrose 5% 1,000 mL infusion   IntraVENous CONTINUOUS    midodrine (PROAMATINE) tablet 10 mg  10 mg Oral TID WITH MEALS    glucose chewable tablet 16 g  4 Tablet Oral PRN    glucagon (GLUCAGEN) injection 1 mg  1 mg IntraMUSCular PRN    dextrose 10% infusion 0-250 mL  0-250 mL IntraVENous PRN    latanoprost (XALATAN) 0.005 % ophthalmic solution 1 Drop  1 Drop Left Eye QPM    [START ON 4/12/2022] pantoprazole (PROTONIX) tablet 40 mg  40 mg Oral ACB    [START ON 4/12/2022] aspirin delayed-release tablet 81 mg  81 mg Oral DAILY    benzonatate (TESSALON) capsule 100 mg  100 mg Oral TID PRN    [START ON 4/12/2022] levothyroxine (SYNTHROID) tablet 25 mcg  25 mcg Oral ACB    gabapentin (NEURONTIN) capsule 200 mg  200 mg Oral QPM    PHENYLephrine (POORNIMA-SYNEPHRINE) 30 mg in 0.9% sodium chloride 250 mL infusion   mcg/min IntraVENous TITRATE    ondansetron (ZOFRAN) injection 4 mg  4 mg IntraVENous Q1H PRN    sodium chloride (NS) flush 5-40 mL  5-40 mL IntraVENous Q8H    sodium chloride (NS) flush 5-40 mL  5-40 mL IntraVENous PRN    acetaminophen (TYLENOL) tablet 650 mg  650 mg Oral Q6H PRN    Or    acetaminophen (TYLENOL) suppository 650 mg  650 mg Rectal Q6H PRN    polyethylene glycol (MIRALAX) packet 17 g  17 g Oral DAILY PRN    ondansetron (ZOFRAN ODT) tablet 4 mg  4 mg Oral Q8H PRN    Or    ondansetron (ZOFRAN) injection 4 mg  4 mg IntraVENous Q6H PRN    heparin (porcine) injection 5,000 Units  5,000 Units SubCUTAneous Q8H       Review of Systems: Per HPI, otherwise negative.       Objective:     Physical Exam:  Visit Vitals  BP (!) 104/52   Pulse 94   Temp 97.7 °F (36.5 °C)   Resp 20   Ht 5' (1.524 m)   Wt 51.3 kg (113 lb)   SpO2 100%   BMI 22.07 kg/m²     SpO2 Readings from Last 6 Encounters:   04/11/22 100%   05/22/17 94%   09/29/15 99%   08/31/15 95%   03/01/12 95%   05/25/11 97%    O2 Flow Rate (L/min): 4 l/min       Intake/Output Summary (Last 24 hours) at 4/11/2022 1408  Last data filed at 4/11/2022 1200  Gross per 24 hour   Intake 3043.73 ml   Output 400 ml   Net 2643.73 ml      General: no distress, comfortable  Skin:  No rash or palpable dermatologic mass lesions  HEENT: Pupils equal, sclera anicteric, oropharynx with no gross lesions  Cardiovascular: No abnormal audible heart sounds, well perfused, no edema  Respiratory:  No abnormal audible breath sounds, normal respiratory effort, no throacic deformity  GI:  Abdomen nondistended, nontender, no mass, no free fluid, no rebound or guarding. Musculoskeletal:  No skeletal deformity nor acute arthritis noted. Neurological:  Motor and sensory function intact in upper extremeties  Psychiatric:  Normal affect, memory intact, appears to have insight into current illness    Laboratory:    Recent Results (from the past 24 hour(s))   CBC WITH AUTOMATED DIFF    Collection Time: 04/11/22  4:16 AM   Result Value Ref Range    WBC 9.7 3.6 - 11.0 K/uL    RBC 2.83 (L) 3.80 - 5.20 M/uL    HGB 9.6 (L) 11.5 - 16.0 g/dL    HCT 30.8 (L) 35.0 - 47.0 %    .8 (H) 80.0 - 99.0 FL    MCH 33.9 26.0 - 34.0 PG    MCHC 31.2 30.0 - 36.5 g/dL    RDW 13.2 11.5 - 14.5 %    PLATELET 704 452 - 007 K/uL    MPV 10.2 8.9 - 12.9 FL    NRBC 0.0 0  WBC    ABSOLUTE NRBC 0.00 0.00 - 0.01 K/uL    NEUTROPHILS 82 (H) 32 - 75 %    BAND NEUTROPHILS 1 0 - 6 %    LYMPHOCYTES 9 (L) 12 - 49 %    MONOCYTES 6 5 - 13 %    EOSINOPHILS 1 0 - 7 %    BASOPHILS 0 0 - 1 %    METAMYELOCYTES 1 (H) 0 %    IMMATURE GRANULOCYTES 0 %    ABS. NEUTROPHILS 8.1 (H) 1.8 - 8.0 K/UL    ABS. LYMPHOCYTES 0.9 0.8 - 3.5 K/UL    ABS.  MONOCYTES 0.6 0.0 - 1.0 K/UL ABS. EOSINOPHILS 0.1 0.0 - 0.4 K/UL    ABS. BASOPHILS 0.0 0.0 - 0.1 K/UL    ABS. IMM. GRANS. 0.0 K/UL    DF MANUAL      RBC COMMENTS NORMOCYTIC, NORMOCHROMIC     METABOLIC PANEL, BASIC    Collection Time: 04/11/22  4:16 AM   Result Value Ref Range    Sodium 148 (H) 136 - 145 mmol/L    Potassium 3.4 (L) 3.5 - 5.1 mmol/L    Chloride 123 (H) 97 - 108 mmol/L    CO2 14 (LL) 21 - 32 mmol/L    Anion gap 11 5 - 15 mmol/L    Glucose 53 (LL) 65 - 100 mg/dL    BUN 53 (H) 6 - 20 MG/DL    Creatinine 1.33 (H) 0.55 - 1.02 MG/DL    BUN/Creatinine ratio 40 (H) 12 - 20      GFR est AA 45 (L) >60 ml/min/1.73m2    GFR est non-AA 37 (L) >60 ml/min/1.73m2    Calcium 7.7 (L) 8.5 - 10.1 MG/DL   MAGNESIUM    Collection Time: 04/11/22  4:16 AM   Result Value Ref Range    Magnesium 1.6 1.6 - 2.4 mg/dL   PHOSPHORUS    Collection Time: 04/11/22  4:16 AM   Result Value Ref Range    Phosphorus 2.0 (L) 2.6 - 4.7 MG/DL   LIPASE    Collection Time: 04/11/22  4:16 AM   Result Value Ref Range    Lipase 411 (H) 73 - 393 U/L   GLUCOSE, POC    Collection Time: 04/11/22  5:43 AM   Result Value Ref Range    Glucose (POC) 59 (L) 65 - 117 mg/dL    Performed by [employer]    GLUCOSE, POC    Collection Time: 04/11/22  7:59 AM   Result Value Ref Range    Glucose (POC) 234 (H) 65 - 117 mg/dL    Performed by [employer]    LACTIC ACID    Collection Time: 04/11/22  8:17 AM   Result Value Ref Range    Lactic acid 1.8 0.4 - 2.0 MMOL/L   GLUCOSE, POC    Collection Time: 04/11/22 11:07 AM   Result Value Ref Range    Glucose (POC) 207 (H) 65 - 117 mg/dL    Performed by [employer]          Assessment/Plan:     Active Problems:    Acute pancreatitis (4/9/2022)         See above narrative for full detail.     Sandra Hobby, NP

## 2022-04-11 NOTE — PROGRESS NOTES
Progress Note  Date:2022       Room:[address] [place] [address]  Patient Louis Gregory     YOB: 1925     Age:89 y.o. Subjective    Subjective awake; reported pain   Review of Systems  Objective         Vitals Last 24 Hours:  TEMPERATURE:  Temp  Av.1 °F (36.7 °C)  Min: 97.7 °F (36.5 °C)  Max: 98.7 °F (37.1 °C)  RESPIRATIONS RANGE: Resp  Av.5  Min: 10  Max: 26  PULSE OXIMETRY RANGE: SpO2  Av %  Min: 100 %  Max: 100 %  PULSE RANGE: Pulse  Av.9  Min: 64  Max: 104  BLOOD PRESSURE RANGE: Systolic (89LEQ), ALT:986 , Min:82 , VXT:704   ; Diastolic (16JHW), DGP:13, Min:35, Max:98    I/O (24Hr): Intake/Output Summary (Last 24 hours) at 2022 1000  Last data filed at 2022 0800  Gross per 24 hour   Intake 2479.69 ml   Output 400 ml   Net 2079.69 ml     Objective  Labs/Imaging/Diagnostics    Labs:  CBC:  Recent Labs     04/11/22  0416 04/10/22  0346 04/09/22  1837   WBC 9.7 9.9 9.1   RBC 2.83* 3.05* 3.02*   HGB 9.6* 10.2* 10.1*   HCT 30.8* 32.0* 31.6*   .8* 104.9* 104.6*   RDW 13.2 12.9 12.8    202 216     CHEMISTRIES:  Recent Labs     04/11/22  0416 04/10/22  0346 04/09/22  1837   * 142 142   K 3.4* 3.5 3.9   * 116* 114*   CO2 14* 16* 16*   BUN 53* 68* 86*   CA 7.7* 8.4* 9.1   PHOS 2.0*  --   --    MG 1.6  --  1.7   PT/INR:  Recent Labs     04/10/22  0346   INR 1.1     APTT:No results for input(s): APTT in the last 72 hours. LIVER PROFILE:  Recent Labs     22  1837   AST 34   ALT 32     Lab Results   Component Value Date/Time    ALT (SGPT) 32 2022 06:37 PM    AST (SGOT) 34 2022 06:37 PM    Alk. phosphatase 119 (H) 2022 06:37 PM    Bilirubin, total 0.6 2022 06:37 PM       Imaging Last 24 Hours:  ECHO ADULT COMPLETE    Result Date: 4/10/2022    Left Ventricle: Left ventricle size is normal. Normal wall thickness. See diagram for wall motion findings.  The EF by visual approximation is 35 - 40%.visualization ot the endocardium is difficult.   Right Ventricle: Right ventricle is mildly dilated. Lead present in the right ventricle.   Aortic Valve: Tricuspid valve. Moderately calcified cusp.   Mitral Valve: Mildly thickened leaflet. Moderately calcified leaflet. Severe annular calcification of the mitral valve. Calcified subvalvular apparatus. Mild transvalvular regurgitation.   Tricuspid Valve: Mildly thickened leaflets. Mildly elevated RVSP.   Right Atrium: Right atrium is dilated. Assessment//Plan   Active Problems:    Acute pancreatitis (4/9/2022)        Hypotension  Pancreatitis  CKD  CAD  DM    Neuro  pain controlled and CAM negative  -h/o prior CVA per notes but no obvious deficits noted this morning    cvs  remains on vasopressors and meeting MAP goals without ongoing hypoperfusion; given habitus and age, we added midodrine but did not yet touch the SBP goals  -s/p resuscitative efforts    pulm  Chronic respiratory failure with hypoxia due to CHF and uses 4L O2 at home; acceptable oxygenation and wob    gi  pancreatitis on fluids and pain regimen; may need to switch from morphine if creatinine worsens  US demonstrated:     1. Intrahepatic and extrahepatic biliary ductal dilatation. 2. Status post cholecystectomy. 3. Again identified is a solid right upper pole renal mass, concerning for renal  cell carcinoma. 4. Nonvisualization of the pancreas. gu  acute on chronic renal failure  numbers improving; may need follow-up for renal mass    Heme - no evidence of hemorrhage    ID-no compelling evidence of acute bacterial infection    Endo - at risk for both hypo and hyperglycemia - monitor for now    CCT 35 minutes excluding teaching and procedures    I performed all aspects of the physical examination via Telemedicine associated with two way audio and video communication and with the on-site assistance of the bedside nurse.   I am located in [place] and the patient is located in [place] at [address] [facility]   The patient is critically ill in the ICU. I  personally  reviewed the pertinent medical records, laboratory/ pathology data and radiographic images. The decision making regarding this patient is as documented above, which was generated  following  discussion  with the multidisciplinary ICU team and creation of a treatment plan for  the patient. We discussed the patient's interval history and future coordination of care and  plans. The patient's medications  were reviewed and changes made as stipulated above. Due to  critical illness impairing one or more vital organs of this patient resulting in life threatening clinical situation  I have provided direct, frequent personal  assessment and manipulation in management plan.     Electronically signed by Destin Leija MD on 4/11/2022 at 10:00 AM

## 2022-04-11 NOTE — PROGRESS NOTES
[facility]: MED RECONCILIATION    Comments/Recommendations:    PTA medications reviewed with patient's son who brought in the medication list from home. Provided list is consistent with history in chart. Will place a copy of her home medication list in the ICU paper chart. Information obtained from: Provided home medication list placed in patient's paper chart    Allergies: Dilaudid [hydromorphone (bulk)], Fenofibrate, Iodine, Pcn [penicillins], Prilosec [omeprazole], Shellfish derived, and Sulfa (sulfonamide antibiotics)    Prior to Admission Medications:     Medication Documentation Review Audit     Reviewed by YONY BecerrilD (Pharmacist) on 04/11/22 at 1208    Medication Sig Documenting Provider Last Dose Status Taking?   acetaminophen (TYLENOL ARTHRITIS PAIN) 650 mg CR tablet Take 650 mg by mouth every six (6) hours as needed for Pain. Provider, Historical  Active Yes   amitriptyline (ELAVIL) 25 mg tablet Take 25 mg by mouth nightly. Provider, Historical  Active Yes   ANTIOX #8/OM3/DHA/EPA/LUT/ZEAX (PRESERVISION AREDS 2, OMEGA-3, PO) Take 1 Caplet by mouth daily. Provider, Historical  Active Yes   aspirin delayed-release 81 mg tablet Take 81 mg by mouth daily. Provider, Historical  Active Yes   atorvastatin (LIPITOR) 40 mg tablet Take 40 mg by mouth daily. Provider, Historical  Active Yes           Med Note Parag Fuentesr Apr 11, 2022 10:07 AM)     benzonatate (TESSALON) 100 mg capsule Take 100 mg by mouth three (3) times daily as needed for Cough. Provider, Historical  Active Yes   famotidine (PEPCID) 40 mg tablet Take 20 mg by mouth two (2) times a day. Provider, Historical  Active Yes   ferrous sulfate (Slow Fe) 142 mg (45 mg iron) ER tablet Take 142 mg by mouth Daily (before breakfast). Provider, Historical  Active Yes   gabapentin (NEURONTIN) 100 mg capsule Take 200 mg by mouth two (2) times a day.  Provider, Historical  Active Yes           Med Note Parag Fuentesr Apr 11, 2022 10:10 AM)     latanoprost (XALATAN) 0.005 % ophthalmic solution Administer 1 Drop to left eye every evening. one drop in left eye in the evening Provider, Historical  Active Yes   levothyroxine (SYNTHROID) 25 mcg tablet Take 25 mcg by mouth Daily (before breakfast). Provider, Historical  Active Yes   metoprolol succinate (TOPROL-XL) 25 mg XL tablet Take 25 mg by mouth two (2) times a day. Provider, Historical  Active Yes   MV-Min-Folic Acid-Lutein (CENTRUM SILVER) 500-250 mcg Chew Take 1 Tablet by mouth daily. Provider, Historical  Active Yes           Med Note Kathleen Velazquez Apr 11, 2022 10:06 AM)     ondansetron (ZOFRAN ODT) 4 mg disintegrating tablet Take 4 mg by mouth every six (6) hours as needed for Nausea or Vomiting. Provider, Historical  Active Yes   pantoprazole (PROTONIX) 40 mg tablet Take 40 mg by mouth daily. Provider, Historical  Active Yes   pyridoxine (VITAMIN B-6) 25 mg tablet Take 25 mg by mouth daily. Provider, Historical  Active Yes   sacubitriL-valsartan (Entresto)  mg tablet Take 1 Tablet by mouth two (2) times a day.  Provider, Historical  Active Yes              Ivett Mckeon [facility]   Contact: [phone]

## 2022-04-11 NOTE — PROGRESS NOTES
Neuro: A&Ox4, follows command, moves all extremities. Cardiac: patient has pacemaker, vpaced. On ruchi gtt for BP control, ruchi titrated to maintain MAP >65. started on midodrine  Respiratory: on 4L NC, this is baseline O2 need because of CHF. O2 >90%  GI: abd tenderness. Diet advanced to full liquids. No bm  : adequate urine output  Progress Notes:  Afebrile   and son updated  Electrolyte repleated per order  Will continue to monitor  Problem: Pressure Injury - Risk of  Goal: *Prevention of pressure injury  Description: Document Destin Scale and appropriate interventions in the flowsheet. Note: Pressure Injury Interventions:  Sensory Interventions: Avoid rigorous massage over bony prominences,Assess need for specialty bed    Moisture Interventions: Check for incontinence Q2 hours and as needed    Activity Interventions: Pressure redistribution bed/mattress(bed type),PT/OT evaluation    Mobility Interventions: Pressure redistribution bed/mattress (bed type),HOB 30 degrees or less    Nutrition Interventions: Document food/fluid/supplement intake    Friction and Shear Interventions: Foam dressings/transparent film/skin sealants,HOB 30 degrees or less                Problem: Falls - Risk of  Goal: *Absence of Falls  Description: Document Rei Fall Risk and appropriate interventions in the flowsheet.   Outcome: Progressing Towards Goal  Note: Fall Risk Interventions:  Mobility Interventions: Bed/chair exit alarm,Communicate number of staff needed for ambulation/transfer,Patient to call before getting OOB    Mentation Interventions: Evaluate medications/consider consulting pharmacy    Medication Interventions: Patient to call before getting OOB,Evaluate medications/consider consulting pharmacy    Elimination Interventions: Bed/chair exit alarm,Call light in reach,Toilet paper/wipes in reach              Problem: Patient Education: Go to Patient Education Activity  Goal: Patient/Family Education  Outcome: Progressing Towards Goal     Problem: Hypotension  Goal: *Blood pressure within specified parameters  Outcome: Progressing Towards Goal  Goal: *Fluid volume balance  Outcome: Progressing Towards Goal  Goal: *Labs within defined limits  Outcome: Progressing Towards Goal

## 2022-04-11 NOTE — PROGRESS NOTES
Problem: Mobility Impaired (Adult and Pediatric)  Goal: *Acute Goals and Plan of Care (Insert Text)  Description: FUNCTIONAL STATUS PRIOR TO ADMISSION: Patient was modified independent using a rolling walker for functional mobility. HOME SUPPORT PRIOR TO ADMISSION: The patient lived with family but did not require assist.    Physical Therapy Goals  Initiated 4/11/2022  1. Patient will move from supine to sit and sit to supine , scoot up and down, and roll side to side in bed with independence within 7 day(s). 2.  Patient will transfer from bed to chair and chair to bed with modified independence using the least restrictive device within 7 day(s). 3.  Patient will perform sit to stand with modified independence within 7 day(s). 4.  Patient will ambulate with modified independence for 200 feet with the least restrictive device within 7 day(s). 4/11/2022 1637 by Kamran Gonzalez PT  Outcome: Progressing Towards Goal   PHYSICAL THERAPY EVALUATION  Patient: Tomy Barksdale (1 y.o. female)  Date: 4/11/2022  Primary Diagnosis: Acute pancreatitis [K85.90]        Precautions: fall       ASSESSMENT  Based on the objective data described below, the patient presents with generalized weakness and debility. Communicated with nurse cleared for therapy patient on pressure support caution with her blood pressure. Rolled on the edge of bed max assist x 2, supine to sit max assist x 2, dangle on the edge of bed max assist x 2 sitting balance poor need some support while sitting on the edge of bed patient anxious about falling reassured patient she will not fall therapist on both her side. Tolerated sitting for at least 10 minutes. Assisted back to bed reposition up high on the bed place bed to modified chair position. Performed some active range of motion exercise on both LE all planes. Activated bed alarm notified nurse who agreed to monitor patient.       Current Level of Function Impacting Discharge (mobility/balance): max assist x 2 with bed mobility    Functional Outcome Measure: The patient scored 20/100 on the barthel index outcome measure . Other factors to consider for discharge: fall     Patient will benefit from skilled therapy intervention to address the above noted impairments. PLAN :  Recommendations and Planned Interventions: bed mobility training, transfer training, gait training, therapeutic exercises, neuromuscular re-education, orthotic/prosthetic training, patient and family training/education and therapeutic activities      Frequency/Duration: Patient will be followed by physical therapy:  5 times a week to address goals. Recommendation for discharge: (in order for the patient to meet his/her long term goals)  Therapy up to 5 days/week in SNF setting    This discharge recommendation:  Has been made in collaboration with the attending provider and/or case management    IF patient discharges home will need the following DME: patient owns DME required for discharge         SUBJECTIVE:   Patient stated [address] 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    Stroke ([facility] [address]     Thromboembolus (HCC)      Past Surgical History:   Procedure Laterality Date    HX CHOLECYSTECTOMY      HX HEENT      HX ORTHOPAEDIC      VASCULAR SURGERY PROCEDURE UNLIST         Personal factors and/or comorbidities impacting plan of care:     Home Situation  Home Environment: Private residence  # Steps to Enter:  (chair lift)  One/Two Story Residence: One story  Living Alone: No  Support Systems: Spouse/Significant Other,Child(ramo)  Patient Expects to be Discharged to[de-identified] Home with family assistance  Current DME Used/Available at Home: Oxygen, portable,Cane, straight  Tub or Shower Type:  (built in shower seat)    EXAMINATION/PRESENTATION/DECISION MAKING:   Critical Behavior:  Neurologic State: Alert  Orientation Level: Oriented X4  Cognition: Follows commands     Hearing: Auditory  Auditory Impairment: Hard of hearing, bilateral    Range Of Motion:  AROM: Generally decreased, functional           PROM: Generally decreased, functional           Strength:    Strength: Generally decreased, functional                    Tone & Sensation:                                  Coordination:  Coordination: Generally decreased, functional  Vision:      Functional Mobility:  Bed Mobility:  Rolling: Maximum assistance;Assist x2  Supine to Sit: Maximum assistance;Assist x2  Sit to Supine: Maximum assistance;Assist x2  Scooting: Maximum assistance;Assist x2  Transfers:                             Balance:   Sitting: Impaired;High guard; With support  Sitting - Static: Fair (occasional)  Sitting - Dynamic: Fair (occasional)  Ambulation/Gait Training:              Therapeutic Exercises:   Educate and instructed patient to continue active range of motion exercise on both legs while up on chair or on bed multiple times. Functional Measure:  Barthel Index:    Bathin  Bladder: 5  Bowels: 5  Groomin  Dressin  Feedin  Mobility: 0  Stairs: 0  Toilet Use: 0  Transfer (Bed to Chair and Back): 5  Total: 20/100       The Barthel ADL Index: Guidelines  1. The index should be used as a record of what a patient does, not as a record of what a patient could do. 2. The main aim is to establish degree of independence from any help, physical or verbal, however minor and for whatever reason. 3. The need for supervision renders the patient not independent. 4. A patient's performance should be established using the best available evidence.  Asking the patient, friends/relatives and nurses are the usual sources, but direct observation and common sense are also important. However direct testing is not needed. 5. Usually the patient's performance over the preceding 24-48 hours is important, but occasionally longer periods will be relevant. 6. Middle categories imply that the patient supplies over 50 per cent of the effort. 7. Use of aids to be independent is allowed. Score Interpretation (from [address])    Independent   60-79 Minimally independent   40-59 Partially dependent   20-39 Very dependent   <20 Totally dependent     -Daya Medina., Barthel, D.W. (1965). Functional evaluation: the Barthel Index. [address] ([address] (1997). The Barthel activities of daily living index: self-reporting versus actual performance in the old (> or = 75 years). Journal of [address] [place] [address] 45(7), [address] [facility], J.YOSEFF, Pérez Mantilla., Bhavani Barakat. (1999). Measuring the change in disability after inpatient rehabilitation; comparison of the responsiveness of the Barthel Index and Functional [place] Measure. Journal of Neurology, Neurosurgery, and Psychiatry, 66(4), 732-813. Ping Hanna, N.J.A, STEVAN Painting.JOSÉ MIGUEL, & Lewis Hampton MQING. (2004) Assessment of post-stroke quality of life in cost-effectiveness studies: The usefulness of the Barthel Index and the EuroQoL-5D.  Quality of Life Research, 15, 105-33          Physical Therapy Evaluation Charge Determination   History Examination Presentation Decision-Making   LOW Complexity : Zero comorbidities / personal factors that will impact the outcome / POC LOW Complexity : 1-2 Standardized tests and measures addressing body structure, function, activity limitation and / or participation in recreation  LOW Complexity : Stable, uncomplicated  Other outcome measures barthel  LOW       Based on the above components, the patient evaluation is determined to be of the following complexity level: LOW     Pain Ratin/10    Activity Tolerance:   Good    After treatment patient left in no apparent distress:   Supine in bed, Heels elevated for pressure relief, Call bell within reach, Bed / chair alarm activated and Side rails x 3    COMMUNICATION/EDUCATION:   The patients plan of care was discussed with: Occupational therapist and Registered nurse. Fall prevention education was provided and the patient/caregiver indicated understanding. Thank you for this referral.  Trice Bush, PT,WCC.    Time Calculation: 28 mins

## 2022-04-11 NOTE — PROGRESS NOTES
Hugo Jensen [facility] 79  [address] [facility], [place], [address] [place] [address]  [phone]      Medical Progress Note      NAME: Crys Waldrop   :  1925  MRM:  [id number]    Date of service: 2022  6:44 AM       Assessment and Plan:   1. Acute pancreatitis. Burning pain epigastric area with radiating to the back and chest.  Keep n.p.o., IV fluids, IV morphine as needed for pain. Normal triglycerides. Improving lipase. GI consult      2. Hypovolemic shock. Continue IVF. Better BP. On ruchi. 3.  EVERT on CKD stage 3. likely due to volume depletion. Improving with IVF. 4.  Metabolic acidosis. This is most likely due to above/ volume depletion. Lactic acid is normal. Start bicarb drip    5. Anemia. Stable H/H. Check iron studies. 6.  Hx of chronic systolic CHF/ Elevated BNP. EF is 35%. Compensated. Watch fluid status. 7.  RT kidney lesion/ mass. Pt doesn't want further workup. 8.  Chronic respiratory failure with hypoxia due to chronic systolic CHF. Uses 4LPM of O2. Continue supplement O2.       code status: DNR       Subjective:     Chief Complaint[de-identified] Patient was seen and examined as a follow up for pancreatitis. Chart was reviewed. c/o epigastric burning pain       ROS:  (bold if positive, if negative)    Tolerating PT  Tolerating Diet        Objective:     Last 24hrs VS reviewed since prior progress note.  Most recent are:    Visit Vitals  BP (!) 111/52   Pulse (!) 104   Temp 97.7 °F (36.5 °C)   Resp 18   Ht 5' (1.524 m)   Wt 51.3 kg (113 lb)   SpO2 100%   BMI 22.07 kg/m²     SpO2 Readings from Last 6 Encounters:   22 100%   17 94%   09/29/15 99%   08/31/15 95%   12 95%   11 97%    O2 Flow Rate (L/min): 4 l/min       Intake/Output Summary (Last 24 hours) at 2022 5989  Last data filed at 2022 0400  Gross per 24 hour   Intake 2519.3 ml   Output 500 ml   Net 2019.3 ml        Physical Exam:    Gen:  Well-developed, well-nourished, in no acute distress  HEENT:  Pink conjunctivae, PERRL, hearing intact to voice, moist mucous membranes  Neck:  Supple, without masses, thyroid non-tender  Resp:  No accessory muscle use, clear breath sounds without wheezes rales or rhonchi  Card:  No murmurs, normal S1, S2 without thrills, bruits or peripheral edema  Abd:  Soft, non-tender, non-distended, normoactive bowel sounds are present, no palpable organomegaly and no detectable hernias  Lymph:  No cervical or inguinal adenopathy  Musc:  No cyanosis or clubbing  Skin:  No rashes or ulcers, skin turgor is good  Neuro:  Cranial nerves are grossly intact, no focal motor weakness, follows commands appropriately  Psych:  Good insight, oriented to person, place and time, alert  __________________________________________________________________  Medications Reviewed: (see below)  Medications:     Current Facility-Administered Medications   Medication Dose Route Frequency    sodium bicarbonate (8.4%) 100 mEq in dextrose 5% 1,000 mL infusion   IntraVENous CONTINUOUS    potassium phosphate 30 mmol in dextrose 5% 250 mL infusion   IntraVENous ONCE    PHENYLephrine (POORNIMA-SYNEPHRINE) 30 mg in 0.9% sodium chloride 250 mL infusion   mcg/min IntraVENous TITRATE    pantoprazole (PROTONIX) 40 mg in 0.9% sodium chloride 10 mL injection  40 mg IntraVENous DAILY    ondansetron (ZOFRAN) injection 4 mg  4 mg IntraVENous Q1H PRN    sodium chloride (NS) flush 5-40 mL  5-40 mL IntraVENous Q8H    sodium chloride (NS) flush 5-40 mL  5-40 mL IntraVENous PRN    acetaminophen (TYLENOL) tablet 650 mg  650 mg Oral Q6H PRN    Or    acetaminophen (TYLENOL) suppository 650 mg  650 mg Rectal Q6H PRN    polyethylene glycol (MIRALAX) packet 17 g  17 g Oral DAILY PRN    ondansetron (ZOFRAN ODT) tablet 4 mg  4 mg Oral Q8H PRN    Or    ondansetron (ZOFRAN) injection 4 mg  4 mg IntraVENous Q6H PRN    heparin (porcine) injection 5,000 Units  5,000 Units SubCUTAneous Q8H    0.9% sodium chloride infusion  75 mL/hr IntraVENous CONTINUOUS        Lab Data Reviewed: (see below)  Lab Review:     Recent Labs     04/11/22  0416 04/10/22  0346 04/09/22  1837   WBC 9.7 9.9 9.1   HGB 9.6* 10.2* 10.1*   HCT 30.8* 32.0* 31.6*    202 216     Recent Labs     04/11/22 0416 04/10/22  0346 04/09/22  1837   * 142 142   K 3.4* 3.5 3.9   * 116* 114*   CO2 14* 16* 16*   GLU 53* 161* 122*   BUN 53* 68* 86*   CREA 1.33* 1.83* 2.46*   CA 7.7* 8.4* 9.1   MG 1.6  --  1.7   PHOS 2.0*  --   --    ALB  --   --  1.9*   TBILI  --   --  0.6   ALT  --   --  32   INR  --  1.1  --      Lab Results   Component Value Date/Time    Glucose (POC) 59 (L) 04/11/2022 05:43 AM    Glucose (POC) 210 (H) 05/12/2009 04:35 PM    Glucose (POC) 107 (H) 05/07/2009 04:33 PM    Glucose (POC) 68 05/07/2009 11:54 AM    Glucose (POC) 80 05/07/2009 08:31 AM     No results for input(s): PH, PCO2, PO2, HCO3, FIO2 in the last 72 hours. Recent Labs     04/10/22  0346   INR 1.1     All Micro Results     Procedure Component Value Units Date/Time    CULTURE, BLOOD [[id number]] Collected: 04/09/22 1837    Order Status: Completed Specimen: Blood Updated: 04/11/22 0520     Special Requests: NO SPECIAL REQUESTS        Culture result: NO GROWTH 2 DAYS       MRSA CULTURE [[id number]]     Order Status: Sent Specimen: Nasal from Nares     CULTURE, BLOOD [[id number]] Collected: 04/10/22 0346    Order Status: Completed Specimen: Blood Updated: 04/10/22 0440    URINE CULTURE HOLD SAMPLE [[id number]] Collected: 04/09/22 2300    Order Status: Completed Specimen: Urine from Serum Updated: 04/09/22 2310     Urine culture hold       Urine on hold in Microbiology dept for 2 days. If unpreserved urine is submitted, it cannot be used for addtional testing after 24 hours, recollection will be required. I have reviewed notes of prior 24hr. Other pertinent lab:      Total time spent with patient: 28 I personally reviewed chart, notes, data and current medications in the medical record. I have personally examined and treated the patient at bedside during this period.                  Care Plan discussed with: Patient, Family, Nursing Staff and >50% of time spent in counseling and coordination of care    Discussed:  Care Plan    Prophylaxis:  Lovenox    Disposition:  Home w/Family           ___________________________________________________    Attending Physician: Moise Blackburn MD

## 2022-04-12 LAB
ANION GAP SERPL CALC-SCNC: 6 MMOL/L (ref 5–15)
BASOPHILS # BLD: 0.1 K/UL (ref 0–0.1)
BASOPHILS NFR BLD: 2 % (ref 0–1)
BUN SERPL-MCNC: 38 MG/DL (ref 6–20)
BUN/CREAT SERPL: 35 (ref 12–20)
CALCIUM SERPL-MCNC: 7.2 MG/DL (ref 8.5–10.1)
CHLORIDE SERPL-SCNC: 119 MMOL/L (ref 97–108)
CO2 SERPL-SCNC: 20 MMOL/L (ref 21–32)
CREAT SERPL-MCNC: 1.09 MG/DL (ref 0.55–1.02)
DIFFERENTIAL METHOD BLD: ABNORMAL
EOSINOPHIL # BLD: 0.7 K/UL (ref 0–0.4)
EOSINOPHIL NFR BLD: 9 % (ref 0–7)
ERYTHROCYTE [DISTWIDTH] IN BLOOD BY AUTOMATED COUNT: 13.2 % (ref 11.5–14.5)
GLUCOSE SERPL-MCNC: 81 MG/DL (ref 65–100)
HCT VFR BLD AUTO: 27.6 % (ref 35–47)
HGB BLD-MCNC: 8.6 G/DL (ref 11.5–16)
IMM GRANULOCYTES # BLD AUTO: 0 K/UL
IMM GRANULOCYTES NFR BLD AUTO: 0 %
LYMPHOCYTES # BLD: 1.1 K/UL (ref 0.8–3.5)
LYMPHOCYTES NFR BLD: 15 % (ref 12–49)
MAGNESIUM SERPL-MCNC: 2.7 MG/DL (ref 1.6–2.4)
MCH RBC QN AUTO: 34.3 PG (ref 26–34)
MCHC RBC AUTO-ENTMCNC: 31.2 G/DL (ref 30–36.5)
MCV RBC AUTO: 110 FL (ref 80–99)
METAMYELOCYTES NFR BLD MANUAL: 1 %
MONOCYTES # BLD: 0.3 K/UL (ref 0–1)
MONOCYTES NFR BLD: 4 % (ref 5–13)
MYELOCYTES NFR BLD MANUAL: 1 %
NEUTS SEG # BLD: 5 K/UL (ref 1.8–8)
NEUTS SEG NFR BLD: 68 % (ref 32–75)
NRBC # BLD: 0 K/UL (ref 0–0.01)
NRBC BLD-RTO: 0 PER 100 WBC
PLATELET # BLD AUTO: 201 K/UL (ref 150–400)
PMV BLD AUTO: 10.2 FL (ref 8.9–12.9)
POTASSIUM SERPL-SCNC: 4.3 MMOL/L (ref 3.5–5.1)
RBC # BLD AUTO: 2.51 M/UL (ref 3.8–5.2)
RBC MORPH BLD: ABNORMAL
SODIUM SERPL-SCNC: 145 MMOL/L (ref 136–145)
WBC # BLD AUTO: 7.4 K/UL (ref 3.6–11)

## 2022-04-12 PROCEDURE — 97530 THERAPEUTIC ACTIVITIES: CPT

## 2022-04-12 PROCEDURE — 65660000000 HC RM CCU STEPDOWN

## 2022-04-12 PROCEDURE — 74011000250 HC RX REV CODE- 250: Performed by: FAMILY MEDICINE

## 2022-04-12 PROCEDURE — 80048 BASIC METABOLIC PNL TOTAL CA: CPT

## 2022-04-12 PROCEDURE — 77010033678 HC OXYGEN DAILY

## 2022-04-12 PROCEDURE — 74011250636 HC RX REV CODE- 250/636: Performed by: INTERNAL MEDICINE

## 2022-04-12 PROCEDURE — 85025 COMPLETE CBC W/AUTO DIFF WBC: CPT

## 2022-04-12 PROCEDURE — 74011250637 HC RX REV CODE- 250/637: Performed by: NURSE PRACTITIONER

## 2022-04-12 PROCEDURE — 74011250636 HC RX REV CODE- 250/636: Performed by: FAMILY MEDICINE

## 2022-04-12 PROCEDURE — 74011250637 HC RX REV CODE- 250/637: Performed by: INTERNAL MEDICINE

## 2022-04-12 PROCEDURE — 36415 COLL VENOUS BLD VENIPUNCTURE: CPT

## 2022-04-12 PROCEDURE — 74011000250 HC RX REV CODE- 250: Performed by: INTERNAL MEDICINE

## 2022-04-12 PROCEDURE — 97116 GAIT TRAINING THERAPY: CPT

## 2022-04-12 PROCEDURE — 77030038269 HC DRN EXT URIN PURWCK BARD -A

## 2022-04-12 PROCEDURE — 83735 ASSAY OF MAGNESIUM: CPT

## 2022-04-12 PROCEDURE — 97110 THERAPEUTIC EXERCISES: CPT

## 2022-04-12 PROCEDURE — 94761 N-INVAS EAR/PLS OXIMETRY MLT: CPT

## 2022-04-12 RX ORDER — AMITRIPTYLINE HYDROCHLORIDE 10 MG/1
25 TABLET, FILM COATED ORAL
Status: DISCONTINUED | OUTPATIENT
Start: 2022-04-12 | End: 2022-04-14 | Stop reason: HOSPADM

## 2022-04-12 RX ADMIN — MIDODRINE HYDROCHLORIDE 10 MG: 5 TABLET ORAL at 12:29

## 2022-04-12 RX ADMIN — HEPARIN SODIUM 5000 UNITS: 5000 INJECTION INTRAVENOUS; SUBCUTANEOUS at 15:00

## 2022-04-12 RX ADMIN — PANTOPRAZOLE SODIUM 40 MG: 40 TABLET, DELAYED RELEASE ORAL at 17:33

## 2022-04-12 RX ADMIN — SODIUM CHLORIDE, PRESERVATIVE FREE 10 ML: 5 INJECTION INTRAVENOUS at 14:40

## 2022-04-12 RX ADMIN — PHENYLEPHRINE HYDROCHLORIDE 10 MCG/MIN: 10 INJECTION INTRAVENOUS at 20:23

## 2022-04-12 RX ADMIN — PHENYLEPHRINE HYDROCHLORIDE 10 MCG/MIN: 10 INJECTION INTRAVENOUS at 20:08

## 2022-04-12 RX ADMIN — HEPARIN SODIUM 5000 UNITS: 5000 INJECTION INTRAVENOUS; SUBCUTANEOUS at 21:41

## 2022-04-12 RX ADMIN — SODIUM CHLORIDE, PRESERVATIVE FREE 10 ML: 5 INJECTION INTRAVENOUS at 06:13

## 2022-04-12 RX ADMIN — HEPARIN SODIUM 5000 UNITS: 5000 INJECTION INTRAVENOUS; SUBCUTANEOUS at 06:12

## 2022-04-12 RX ADMIN — LEVOTHYROXINE SODIUM 25 MCG: 0.03 TABLET ORAL at 06:46

## 2022-04-12 RX ADMIN — MIDODRINE HYDROCHLORIDE 10 MG: 5 TABLET ORAL at 08:21

## 2022-04-12 RX ADMIN — LATANOPROST 1 DROP: 50 SOLUTION OPHTHALMIC at 20:10

## 2022-04-12 RX ADMIN — ASPIRIN 81 MG: 81 TABLET, COATED ORAL at 08:21

## 2022-04-12 RX ADMIN — GABAPENTIN 200 MG: 100 CAPSULE ORAL at 17:20

## 2022-04-12 RX ADMIN — PANTOPRAZOLE SODIUM 40 MG: 40 TABLET, DELAYED RELEASE ORAL at 06:46

## 2022-04-12 RX ADMIN — ALUMINUM HYDROXIDE AND MAGNESIUM HYDROXIDE 15 ML: 200; 200 SUSPENSION ORAL at 20:12

## 2022-04-12 RX ADMIN — SODIUM CHLORIDE, PRESERVATIVE FREE 10 ML: 5 INJECTION INTRAVENOUS at 21:41

## 2022-04-12 RX ADMIN — MIDODRINE HYDROCHLORIDE 10 MG: 5 TABLET ORAL at 17:20

## 2022-04-12 RX ADMIN — SODIUM BICARBONATE: 84 INJECTION, SOLUTION INTRAVENOUS at 17:33

## 2022-04-12 RX ADMIN — AMITRIPTYLINE HYDROCHLORIDE 25 MG: 10 TABLET, FILM COATED ORAL at 21:40

## 2022-04-12 NOTE — PROGRESS NOTES
Problem: Pressure Injury - Risk of  Goal: *Prevention of pressure injury  Description: Document Dsetin Scale and appropriate interventions in the flowsheet. Outcome: Progressing Towards Goal  Note: Pressure Injury Interventions:  Sensory Interventions: Avoid rigorous massage over bony prominences    Moisture Interventions: Check for incontinence Q2 hours and as needed    Activity Interventions: Pressure redistribution bed/mattress(bed type),PT/OT evaluation    Mobility Interventions: Pressure redistribution bed/mattress (bed type),PT/OT evaluation    Nutrition Interventions: Document food/fluid/supplement intake    Friction and Shear Interventions: Foam dressings/transparent film/skin sealants,HOB 30 degrees or less                Problem: Patient Education: Go to Patient Education Activity  Goal: Patient/Family Education  Outcome: Progressing Towards Goal     Problem: Falls - Risk of  Goal: *Absence of Falls  Description: Document Rei Fall Risk and appropriate interventions in the flowsheet.   Outcome: Progressing Towards Goal  Note: Fall Risk Interventions:  Mobility Interventions: Bed/chair exit alarm,Communicate number of staff needed for ambulation/transfer,Patient to call before getting OOB    Mentation Interventions: Evaluate medications/consider consulting pharmacy    Medication Interventions: Patient to call before getting OOB,Evaluate medications/consider consulting pharmacy    Elimination Interventions: Bed/chair exit alarm,Call light in reach,Toilet paper/wipes in reach              Problem: Patient Education: Go to Patient Education Activity  Goal: Patient/Family Education  Outcome: Progressing Towards Goal     Problem: Hypotension  Goal: *Blood pressure within specified parameters  Outcome: Progressing Towards Goal  Goal: *Fluid volume balance  Outcome: Progressing Towards Goal  Goal: *Labs within defined limits  Outcome: Progressing Towards Goal     Problem: Patient Education: Go to Patient Education Activity  Goal: Patient/Family Education  Outcome: Progressing Towards Goal     Problem: Patient Education: Go to Patient Education Activity  Goal: Patient/Family Education  Outcome: Progressing Towards Goal     Problem: Patient Education: Go to Patient Education Activity  Goal: Patient/Family Education  Outcome: Progressing Towards Goal

## 2022-04-12 NOTE — PROGRESS NOTES
Hugo Jensen [facility] 79  [address] [facility], [place], [address] [place] [address]  [phone]      Medical Progress Note      NAME: Gaye Mayen   :  1925  MRM:  [id number]    Date of service: 2022  6:44 AM       Assessment and Plan:   1. Acute pancreatitis/ abdominal pain. Burning pain epigastric area with radiating to the back and chest. Tolerating diet. IV fluids, IV morphine as needed for pain. Normal triglycerides. Improving lipase. Evaluated by GI. Pt doesn't want EGD      2. Hypotension. Started on midodrine. Wean ruchi. 3.  EVERT on CKD stage 3. likely due to volume depletion. Improving with IVF. 4.  Metabolic acidosis. This is most likely due to above/ volume depletion. Lactic acid is normal. On bicarb drip, improving     5. Anemia. Iron deficient by serology. Pt refused iron infusion. 6.  Hx of chronic systolic CHF/ Elevated BNP. EF is 35%. Compensated. Watch fluid status. On entresto, which is on hold due to low BP.     7.  RT kidney lesion/ mass. Pt doesn't want further workup. Discussed this with pt and her son     8. Chronic respiratory failure with hypoxia due to chronic systolic CHF. Uses 4LPM of O2. Continue supplement O2.       code status: DNR       Subjective:     Chief Complaint[de-identified] Patient was seen and examined as a follow up for pancreatitis. Chart was reviewed. abdominal pian is better. Still hypotensive. ROS:  (bold if positive, if negative)    Tolerating PT  Tolerating Diet        Objective:     Last 24hrs VS reviewed since prior progress note.  Most recent are:    Visit Vitals  /75   Pulse 95   Temp 97.8 °F (36.6 °C)   Resp 25   Ht 5' (1.524 m)   Wt 51.3 kg (113 lb)   SpO2 100%   BMI 22.07 kg/m²     SpO2 Readings from Last 6 Encounters:   22 100%   17 94%   09/29/15 99%   08/31/15 95%   12 95%   11 97%    O2 Flow Rate (L/min): 4 l/min       Intake/Output Summary (Last 24 hours) at 2022 8284  Last data filed at 4/12/2022 0649  Gross per 24 hour   Intake 2757.49 ml   Output 600 ml   Net 2157.49 ml        Physical Exam:    Gen:  Well-developed, well-nourished, in no acute distress  HEENT:  Pink conjunctivae, PERRL, hearing intact to voice, moist mucous membranes  Neck:  Supple, without masses, thyroid non-tender  Resp:  No accessory muscle use, clear breath sounds without wheezes rales or rhonchi  Card:  No murmurs, normal S1, S2 without thrills, bruits or peripheral edema  Abd:  Soft, non-tender, non-distended, normoactive bowel sounds are present, no palpable organomegaly and no detectable hernias  Lymph:  No cervical or inguinal adenopathy  Musc:  No cyanosis or clubbing  Skin:  No rashes or ulcers, skin turgor is good  Neuro:  Cranial nerves are grossly intact, no focal motor weakness, follows commands appropriately  Psych:  Good insight, oriented to person, place and time, alert  __________________________________________________________________  Medications Reviewed: (see below)  Medications:     Current Facility-Administered Medications   Medication Dose Route Frequency    midodrine (PROAMATINE) tablet 10 mg  10 mg Oral TID WITH MEALS    glucose chewable tablet 16 g  4 Tablet Oral PRN    glucagon (GLUCAGEN) injection 1 mg  1 mg IntraMUSCular PRN    dextrose 10% infusion 0-250 mL  0-250 mL IntraVENous PRN    latanoprost (XALATAN) 0.005 % ophthalmic solution 1 Drop  1 Drop Left Eye QPM    aspirin delayed-release tablet 81 mg  81 mg Oral DAILY    benzonatate (TESSALON) capsule 100 mg  100 mg Oral TID PRN    levothyroxine (SYNTHROID) tablet 25 mcg  25 mcg Oral ACB    gabapentin (NEURONTIN) capsule 200 mg  200 mg Oral QPM    pantoprazole (PROTONIX) tablet 40 mg  40 mg Oral ACB&D    sodium bicarbonate (8.4%) 100 mEq in 0.45% sodium chloride 1,000 mL infusion   IntraVENous CONTINUOUS    aluminum-magnesium hydroxide (MAALOX) oral suspension 15 mL  15 mL Oral QID PRN    PHENYLephrine (POORNIMA-SYNEPHRINE) 30 mg in 0.9% sodium chloride 250 mL infusion   mcg/min IntraVENous TITRATE    ondansetron (ZOFRAN) injection 4 mg  4 mg IntraVENous Q1H PRN    sodium chloride (NS) flush 5-40 mL  5-40 mL IntraVENous Q8H    sodium chloride (NS) flush 5-40 mL  5-40 mL IntraVENous PRN    acetaminophen (TYLENOL) tablet 650 mg  650 mg Oral Q6H PRN    Or    acetaminophen (TYLENOL) suppository 650 mg  650 mg Rectal Q6H PRN    polyethylene glycol (MIRALAX) packet 17 g  17 g Oral DAILY PRN    ondansetron (ZOFRAN ODT) tablet 4 mg  4 mg Oral Q8H PRN    Or    ondansetron (ZOFRAN) injection 4 mg  4 mg IntraVENous Q6H PRN    heparin (porcine) injection 5,000 Units  5,000 Units SubCUTAneous Q8H        Lab Data Reviewed: (see below)  Lab Review:     Recent Labs     04/12/22  0610 04/11/22  0416 04/10/22  0346   WBC 7.4 9.7 9.9   HGB 8.6* 9.6* 10.2*   HCT 27.6* 30.8* 32.0*    219 202     Recent Labs     04/12/22  0610 04/11/22  1539 04/11/22  0416 04/10/22  0346 04/10/22  0346 04/09/22  1837 04/09/22  1837    144 148*   < > 142   < > 142   K 4.3 3.4* 3.4*   < > 3.5   < > 3.9   * 119* 123*   < > 116*   < > 114*   CO2 20* 18* 14*   < > 16*   < > 16*   GLU 81 191* 53*   < > 161*   < > 122*   BUN 38* 44* 53*   < > 68*   < > 86*   CREA 1.09* 1.25* 1.33*   < > 1.83*   < > 2.46*   CA 7.2* 7.2* 7.7*   < > 8.4*   < > 9.1   MG  --  1.5* 1.6  --   --   --  1.7   PHOS  --   --  2.0*  --   --   --   --    ALB  --   --   --   --   --   --  1.9*   TBILI  --   --   --   --   --   --  0.6   ALT  --   --   --   --   --   --  32   INR  --   --   --   --  1.1  --   --     < > = values in this interval not displayed.      Lab Results   Component Value Date/Time    Glucose (POC) 207 (H) 04/11/2022 11:07 AM    Glucose (POC) 234 (H) 04/11/2022 07:59 AM    Glucose (POC) 59 (L) 04/11/2022 05:43 AM    Glucose (POC) 210 (H) 05/12/2009 04:35 PM    Glucose (POC) 107 (H) 05/07/2009 04:33 PM     No results for input(s): PH, PCO2, PO2, HCO3, FIO2 in the last 72 hours. Recent Labs     04/10/22  0346   INR 1.1     All Micro Results     Procedure Component Value Units Date/Time    CULTURE, BLOOD [[id number]] Collected: 04/09/22 1837    Order Status: Completed Specimen: Blood Updated: 04/12/22 0603     Special Requests: NO SPECIAL REQUESTS        Culture result: NO GROWTH 3 DAYS       MRSA CULTURE [[id number]]     Order Status: Sent Specimen: Nasal from Nares     CULTURE, BLOOD [[id number]] Collected: 04/10/22 0346    Order Status: Completed Specimen: Blood Updated: 04/10/22 0440    URINE CULTURE HOLD SAMPLE [[id number]] Collected: 04/09/22 2300    Order Status: Completed Specimen: Urine from Serum Updated: 04/09/22 2310     Urine culture hold       Urine on hold in Microbiology dept for 2 days. If unpreserved urine is submitted, it cannot be used for addtional testing after 24 hours, recollection will be required. I have reviewed notes of prior 24hr. Other pertinent lab: Total time spent with patient: 28 I personally reviewed chart, notes, data and current medications in the medical record. I have personally examined and treated the patient at bedside during this period.                  Care Plan discussed with: Patient, Family, Nursing Staff and >50% of time spent in counseling and coordination of care    Discussed:  Care Plan    Prophylaxis:  Lovenox    Disposition:  Home w/Family           ___________________________________________________    Attending Physician: Sharron Dey MD

## 2022-04-12 NOTE — PROGRESS NOTES
1905- Bedside and Verbal shift change report given to GERARDO Peterson (oncoming nurse) by Mari Roa RN (offgoing nurse). Report included the following information SBAR, Intake/Output, Recent Results, Med Rec Status, Cardiac Rhythm Vpaced and Alarm Parameters .      Visit Vitals  BP (!) 95/35   Pulse 93   Temp 97.7 °F (36.5 °C)   Resp 18   Ht 5' (1.524 m)   Wt 51.3 kg (113 lb)   LMP 11/16/1975   SpO2 100%   BMI 22.07 kg/m²     Lab Results 24Hours     Procedure Component Value Units Date/Time    METABOLIC PANEL, BASIC [[id number]]  (Abnormal) Collected: 04/11/22 1539    Order Status: Completed Specimen: Serum or Plasma Updated: 04/11/22 1616     Sodium 144 mmol/L      Potassium 3.4 mmol/L      Chloride 119 mmol/L      CO2 18 mmol/L      Anion gap 7 mmol/L      Glucose 191 mg/dL      BUN 44 MG/DL      Creatinine 1.25 MG/DL      BUN/Creatinine ratio 35        GFR est AA 48 ml/min/1.73m2      GFR est non-AA 40 ml/min/1.73m2      Calcium 7.2 MG/DL     MAGNESIUM [[id number]]  (Abnormal) Collected: 04/11/22 1539    Order Status: Completed Specimen: Serum or Plasma Updated: 04/11/22 1616     Magnesium 1.5 mg/dL     LACTIC ACID [[id number]] Collected: 04/11/22 0817    Order Status: Completed Specimen: Whole Blood from Plasma Updated: 04/11/22 0905     Lactic acid 1.8 MMOL/L     METABOLIC PANEL, BASIC [[id number]]  (Abnormal) Collected: 04/11/22 0416    Order Status: Completed Specimen: Serum or Plasma Updated: 04/11/22 0541     Sodium 148 mmol/L      Potassium 3.4 mmol/L      Chloride 123 mmol/L      CO2 14 mmol/L      Anion gap 11 mmol/L      Glucose 53 mg/dL      BUN 53 MG/DL      Creatinine 1.33 MG/DL      BUN/Creatinine ratio 40        GFR est AA 45 ml/min/1.73m2      GFR est non-AA 37 ml/min/1.73m2      Calcium 7.7 MG/DL     MAGNESIUM [[id number]] Collected: 04/11/22 0416    Order Status: Completed Specimen: Serum or Plasma Updated: 04/11/22 0541     Magnesium 1.6 mg/dL     PHOSPHORUS [[id number]]  (Abnormal) Collected: 04/11/22 0416 Order Status: Completed Specimen: Serum or Plasma Updated: 04/11/22 0541     Phosphorus 2.0 MG/DL     LIPASE [[id number]]  (Abnormal) Collected: 04/11/22 0416    Order Status: Completed Specimen: Serum or Plasma Updated: 04/11/22 0541     Lipase 411 U/L     CBC WITH AUTOMATED DIFF [[id number]]  (Abnormal) Collected: 04/11/22 0416    Order Status: Completed Specimen: Whole Blood Updated: 04/11/22 0533     WBC 9.7 K/uL      RBC 2.83 M/uL      HGB 9.6 g/dL      HCT 30.8 %      .8 FL      MCH 33.9 PG      MCHC 31.2 g/dL      RDW 13.2 %      PLATELET 630 K/uL      MPV 10.2 FL      NRBC 0.0  WBC      ABSOLUTE NRBC 0.00 K/uL      NEUTROPHILS 82 %      BAND NEUTROPHILS 1 %      LYMPHOCYTES 9 %      MONOCYTES 6 %      EOSINOPHILS 1 %      BASOPHILS 0 %      METAMYELOCYTES 1 %      IMMATURE GRANULOCYTES 0 %      ABS. NEUTROPHILS 8.1 K/UL      ABS. LYMPHOCYTES 0.9 K/UL      ABS. MONOCYTES 0.6 K/UL      ABS. EOSINOPHILS 0.1 K/UL      ABS. BASOPHILS 0.0 K/UL      ABS. IMM. GRANS. 0.0 K/UL      DF MANUAL        RBC COMMENTS NORMOCYTIC, NORMOCHROMIC           See flow sheet for assessment   See MAR for medication administration and drip titration    2038- notified hospitalist on call; Dr. Kalina Guajardo. Patient c/o heart burn and request medication to help. Also, she c/o chronic back pain. Per MD, give ketorolac 15 mg iv now. Per MD all contraindication reviewed and overrided per MD. Telephone order received read back and clarified. 7069-   Visit Vitals  /75   Pulse 95   Temp 97.8 °F (36.6 °C)   Resp 25   Ht 5' (1.524 m)   Wt 51.3 kg (113 lb)   LMP 11/16/1975   SpO2 100%   BMI 22.07 kg/m²         0700- Bedside and Verbal shift change report given to GERARDO Bangura (oncoming nurse) by Art Garcia (offgoing nurse). Report included the following information SBAR, Intake/Output, Med Rec Status, Cardiac Rhythm Vpace, Alarm Parameters  and Quality Measures.

## 2022-04-12 NOTE — PROGRESS NOTES
768 [facility] visit. Mrs. Francesca Lennox is alert and talkative. She asked for a small piece of host because she is having difficulty swallowing. Prayer and communion offered. She shared about her children. She has four, three boys and a girl. Active listening offered and assured of prayer for her. She is a member of [employer].     SATURNINO Burton, RN, ACSW, LCSW   Page:  [phone]

## 2022-04-12 NOTE — PROGRESS NOTES
Problem: Mobility Impaired (Adult and Pediatric)  Goal: *Acute Goals and Plan of Care (Insert Text)  Description: FUNCTIONAL STATUS PRIOR TO ADMISSION: Patient was modified independent using a rolling walker for functional mobility. HOME SUPPORT PRIOR TO ADMISSION: The patient lived with family but did not require assist.    Physical Therapy Goals  Initiated 4/11/2022  1. Patient will move from supine to sit and sit to supine , scoot up and down, and roll side to side in bed with independence within 7 day(s). 2.  Patient will transfer from bed to chair and chair to bed with modified independence using the least restrictive device within 7 day(s). 3.  Patient will perform sit to stand with modified independence within 7 day(s). 4.  Patient will ambulate with modified independence for 200 feet with the least restrictive device within 7 day(s). Outcome: Progressing Towards Goal   PHYSICAL THERAPY TREATMENT  Patient: Clay Salas (54 y.o. female)  Date: 4/12/2022  Diagnosis: Acute pancreatitis [K85.90] <principal problem not specified>       Precautions:  fall  Chart, physical therapy assessment, plan of care and goals were reviewed. ASSESSMENT  Patient continues with skilled PT services and is progressing towards goals. Communicated with nurse cleared for therapy. Patient supine on bed when received. Rolled on the edge of bed mod assist x 2, supine to sit mod assist x 2, sit to stand mod assist x 2, dangle on the edge of bed with and without support no loss of balance. Ambulate with rolling walker mod assist mod assist x 2 in the room towards the recliner. OOB to chair as tolerated. Performed some active range of motion exercise on both LE all planes while on bed and on the recliner. Educate and instructed patient to continue active range of motion exercise on both legs while up on chair or on bed multiple times.  Recommend patient to be up on the chair at least 3 times a day every meal times as tolerated. Communicated with nurse who agreed to monitor patient and assist back to bed when ask. Current Level of Function Impacting Discharge (mobility/balance): mod assist x 2 with rolling walker. Other factors to consider for discharge: fall         PLAN :  Patient continues to benefit from skilled intervention to address the above impairments. Continue treatment per established plan of care. to address goals. Recommendation for discharge: (in order for the patient to meet his/her long term goals)  Therapy up to 5 days/week in SNF setting    This discharge recommendation:  Has been made in collaboration with the attending provider and/or case management    IF patient discharges home will need the following DME: patient owns DME required for discharge       SUBJECTIVE:   Patient stated ok we can try to sit up.     OBJECTIVE DATA SUMMARY:   Critical Behavior:  Neurologic State: Alert  Orientation Level: Oriented X4  Cognition: Follows commands,Appropriate for age attention/concentration  Safety/Judgement: Awareness of environment  Functional Mobility Training:  Bed Mobility:  Rolling: Moderate assistance;Assist x2  Supine to Sit: Moderate assistance;Assist x2  Sit to Supine: Moderate assistance;Assist x2  Scooting: Moderate assistance;Assist x2        Transfers:  Sit to Stand: Moderate assistance;Assist x2  Stand to Sit: Moderate assistance;Assist x2  Stand Pivot Transfers: Moderate assistance;Assist x2     Bed to Chair: Moderate assistance;Assist x2                    Balance:  Sitting: Impaired;High guard  Sitting - Static: Fair (occasional)  Sitting - Dynamic: Fair (occasional)  Standing: Impaired; With support  Standing - Static: Fair  Standing - Dynamic : Fair  Ambulation/Gait Training:  Distance (ft): 5 Feet (ft)  Assistive Device: Walker, rolling;Gait belt  Ambulation - Level of Assistance:  Moderate assistance;Assist x2     Gait Description (WDL): Exceptions to WDL  Gait Abnormalities: Path deviations; Step to gait        Base of Support: Widened     Speed/Jenise: Fluctuations; Slow  Step Length: Right shortened;Left shortened                      Therapeutic Exercises:   Educate and instructed patient to continue active range of motion exercise on both legs while up on chair or on bed multiple times. Recommend patient to be up on the chair at least 3 times a day every meal times as tolerated. Pain Ratin/10    Activity Tolerance:   Good    After treatment patient left in no apparent distress:   Sitting in chair, Heels elevated for pressure relief, Call bell within reach, and Bed / chair alarm activated    COMMUNICATION/COLLABORATION:   The patients plan of care was discussed with: Occupational therapist, Registered nurse, and Case management. Apple Hernandez PT,WCC.    Time Calculation: 36 mins

## 2022-04-12 NOTE — PROGRESS NOTES
Progress Note  Date:2022       Room:[facility]  Patient Ina Genao     YOB: 1925     Age:89 y.o. Subjective    Subjective:  Diet:  Poor intake. No nausea or vomiting. Pain:  She complains of pain that is mild. She reports pain is improving. Review of Systems   Constitutional: Negative for appetite change, fatigue and fever. Gastrointestinal: Positive for abdominal pain (Described as tenderness, better than yesterday). Negative for blood in stool, nausea and vomiting. Skin: Negative for pallor. Objective         Vitals Last 24 Hours:  TEMPERATURE:  Temp  Av.9 °F (36.6 °C)  Min: 97.7 °F (36.5 °C)  Max: 98.1 °F (36.7 °C)  RESPIRATIONS RANGE: Resp  Av  Min: 15  Max: 26  PULSE OXIMETRY RANGE: SpO2  Av.5 %  Min: 91 %  Max: 100 %  PULSE RANGE: Pulse  Av.2  Min: 66  Max: 96  BLOOD PRESSURE RANGE: Systolic (03KAT), AWI:174 , Min:85 , ECQ:449   ; Diastolic (79AVT), IBW:87, Min:26, Max:94    I/O (24Hr): Intake/Output Summary (Last 24 hours) at 2022 1025  Last data filed at 2022 0824  Gross per 24 hour   Intake 2494.13 ml   Output 600 ml   Net 1894.13 ml     Objective:  General Appearance:  Comfortable and not in pain. Vital signs: (most recent): Blood pressure (!) 108/59, pulse 79, temperature 98 °F (36.7 °C), resp. rate 24, height 5' (1.524 m), weight 51.3 kg (113 lb), last menstrual period 1975, SpO2 91 %. No fever. Output: Producing stool. HEENT: Normal HEENT exam.    Lungs:  Normal effort and normal respiratory rate. Breath sounds clear to auscultation. Heart: Normal rate. Regular rhythm. S1 normal and S2 normal.  No murmur. Abdomen: Abdomen is soft and non-distended. Bowel sounds are normal.   There is epigastric tenderness. Extremities: Normal range of motion. There is no dependent edema. Pulses: Distal pulses are intact.     Neurological: Patient is alert and oriented to person, place and time. Pupils:  Pupils are equal, round, and reactive to light. Skin:  Warm and dry. Labs/Imaging/Diagnostics    Labs:  CBC:  Recent Labs     04/12/22  0610 04/11/22 0416 04/10/22  0346   WBC 7.4 9.7 9.9   RBC 2.51* 2.83* 3.05*   HGB 8.6* 9.6* 10.2*   HCT 27.6* 30.8* 32.0*   .0* 108.8* 104.9*   RDW 13.2 13.2 12.9    219 202     CHEMISTRIES:  Recent Labs     04/12/22  0610 04/11/22  1539 04/11/22 0416 04/10/22  0346 04/09/22  1837    144 148*   < > 142   K 4.3 3.4* 3.4*   < > 3.9   * 119* 123*   < > 114*   CO2 20* 18* 14*   < > 16*   BUN 38* 44* 53*   < > 86*   CA 7.2* 7.2* 7.7*   < > 9.1   PHOS  --   --  2.0*  --   --    MG  --  1.5* 1.6  --  1.7    < > = values in this interval not displayed. PT/INR:  Recent Labs     04/10/22  0346   INR 1.1     APTT:No results for input(s): APTT in the last 72 hours. LIVER PROFILE:  Recent Labs     04/09/22  1837   AST 34   ALT 32     Lab Results   Component Value Date/Time    ALT (SGPT) 32 04/09/2022 06:37 PM    AST (SGOT) 34 04/09/2022 06:37 PM    Alk. phosphatase 119 (H) 04/09/2022 06:37 PM    Bilirubin, total 0.6 04/09/2022 06:37 PM       Imaging Last 24 Hours:  No results found. Assessment//Plan   Active Problems:    Acute pancreatitis (4/9/2022)      Assessment:  (GI consult for acute pancreatitis in this 79 y/o female admitted with 1 week history of epigastric pain with nausea and dry heaves. Reports it started after starting doxycycline for pneumonia. However, she also reports that 2-3 weeks ago she was taking a large MVI and it became stuck about mid-esophagus, it finally passed to distal esophagus where it remained for several hours with severe burning pain before it passed. No current odynophagia or dysphagia and none before episode. No reflux symptoms, takes pantoprazole daily. Pain has currently resolved and no nausea. Lipase 1125 (now 411), WBC normal, HCT 30.8, normal LFTs,TG 96, MONISHA on labs.  CT showed possible mild peripancreatic fat stranding, s/p CCY with post-op biliary duct dilation. Ultrasound with dilated intra hepatic and extra hepatic ducts, CBD 11 mm. Last EGD in 2017 by Dr. Swapna Andrade with moderate stenosis GE junction that was dilated. No NSAID use, no alcohol or tobacco use. 4/12/2022: Epigastric tenderness better this morning. No nausea or vomiting. Tolerating full liquids, taking small bites. ). Plan:   (- Continue PPI BID. She has declined any endoscopic procedures, we also were not planning on EGD. - Advance diet to dysphagia diet. GI will sign off, please call if needed further. ).        Electronically signed by Alma Cordero NP on 4/12/2022 at 10:25 AM

## 2022-04-12 NOTE — PROGRESS NOTES
Rounded on Nondenominational patients and provided Anointing of the Sick at request of patient.     Scotty Whitman

## 2022-04-12 NOTE — PROGRESS NOTES
Problem: Self Care Deficits Care Plan (Adult)  Goal: *Acute Goals and Plan of Care (Insert Text)  Description: FUNCTIONAL STATUS PRIOR TO ADMISSION: Patient was modified independent using a walker for functional mobility and short distance ambulation. She uses a wheelchair for longer distances and community ambulation. Patient needs occasional assistance for LB ADLs but otherwise independent with self care. HOME SUPPORT: The patient lived with  and son. Occupational Therapy Goals  Initiated 4/11/2022  1. Patient will perform grooming with supervision/set-up within 7 day(s). 2.  Patient will perform upper body dressing and bathing with minimal assistance within 7 day(s). 3.  Patient will perform lower body dressing and bathing with moderate assistance using AE PRN within 7 day(s). 4.  Patient will perform toilet transfers with minimal assistance using rolling walker within 7 day(s). 5.  Patient will perform all aspects of toileting with moderate assistance  within 7 day(s). 6.  Patient will participate in upper extremity therapeutic exercise/activities with supervision/set-up for 10 minutes within 7 day(s). 7.  Patient will utilize energy conservation techniques during functional activities with verbal cues within 7 day(s). Outcome: Progressing Towards Goal   OCCUPATIONAL THERAPY TREATMENT  Patient: Stacey Titus (45 y.o. female)  Date: 4/12/2022  Diagnosis: Acute pancreatitis [K85.90] <principal problem not specified>       Precautions:  fall  Chart, occupational therapy assessment, plan of care, and goals were reviewed. ASSESSMENT  Patient continues with skilled OT services and is progressing towards goals. Ms. Zack Kwan was received in bed agreeable and motivated for activity. Patient's BP running low however SBP remained >95 throughout tx. Patient performed bed mobility with x2 person assist.  She had improved sitting balance but continues to c/o fear of falling.   Patient performed transfer from bed to chair to remain OOB using rolling walker. Education provided regarding safe transfer techniques and energy conservation techniques. Encouraged UE exercises with demo and patient agreeable to complete frequently as tolerated. Patient is making good progress overall but would benefit from continued skilled OT. Current Level of Function Impacting Discharge (ADLs): Patient required mod A x2 for bed mobility and OOB transfers. Patient required total A for LB dressing. PLAN :  Patient continues to benefit from skilled intervention to address the above impairments. Continue treatment per established plan of care to address goals. Recommend with staff:   Recommend patient be OOB to chair as frequently as tolerated; Goal of 3x/day for all meals for 60 minutes at a time. For toileting needs, recommend BSC. Encourage patient involvement in personal care as able. Encourage exercises frequently throughout the day. Recommend next OT session: Continue towards set goals. Recommendation for discharge: (in order for the patient to meet his/her long term goals)  To be determined: therapy 5 days/week at rehab setting vs.  Home health pending progress     This discharge recommendation:  Has been made in collaboration with the attending provider and/or case management    IF patient discharges home will need the following DME: none       SUBJECTIVE:   Patient stated I can sit up for awhile.     OBJECTIVE DATA SUMMARY:   Cognitive/Behavioral Status:  Neurologic State: Alert  Orientation Level: Oriented X4  Cognition: Follows commands  Perception: Appears intact  Perseveration: No perseveration noted  Safety/Judgement: Awareness of environment    Functional Mobility and Transfers for ADLs:  Bed Mobility:  Rolling: Minimum assistance;Assist x1;Additional time  Supine to Sit: Moderate assistance;Assist x2; Additional time  Sit to Supine:  (pt remained up at end of tx)  Scooting: Moderate assistance;Assist x2    Transfers:  Sit to Stand: Moderate assistance;Assist x2; Additional time  Bed to Chair: Moderate assistance;Assist x2; Additional time    Balance:  Sitting: Impaired;High guard  Sitting - Static: Fair (occasional)  Sitting - Dynamic: Fair (occasional)  Standing: Impaired; With support  Standing - Static: Fair  Standing - Dynamic : Fair    ADL Intervention:  Lower Body Dressing Assistance  Dressing Assistance: Maximum assistance  Socks: Maximum assistance  Position Performed: Long sitting on bed     Cognitive Retraining  Safety/Judgement: Awareness of environment    Activity Tolerance:   Good    After treatment patient left in no apparent distress:   Sitting in chair, Call bell within reach, and Bed / chair alarm activated    COMMUNICATION/COLLABORATION:   The patients plan of care was discussed with: Physical therapist, Registered nurse, and patient .      Washington Cabrera OTR/L  Time Calculation: 31 mins

## 2022-04-12 NOTE — PROGRESS NOTES
Progress Note  Date:2022       Room:[address] [place] [address]  Patient Surjit Liu     YOB: 1925     Age:89 y.o. Subjective    Subjective reduced vasopressor requirements and tolerating po  Review of Systems denied abdominal pain; lower back and leg pain; no fever/chills; no CP  Objective         Vitals Last 24 Hours:  TEMPERATURE:  Temp  Av.9 °F (36.6 °C)  Min: 97.7 °F (36.5 °C)  Max: 98.1 °F (36.7 °C)  RESPIRATIONS RANGE: Resp  Av  Min: 15  Max: 26  PULSE OXIMETRY RANGE: SpO2  Av.5 %  Min: 91 %  Max: 100 %  PULSE RANGE: Pulse  Av.2  Min: 66  Max: 96  BLOOD PRESSURE RANGE: Systolic (29KFT), XTO:476 , Min:85 , ORF:932   ; Diastolic (74IWM), MXX:59, Min:26, Max:94    I/O (24Hr):     Intake/Output Summary (Last 24 hours) at 2022 1027  Last data filed at 2022 0824  Gross per 24 hour   Intake 2494.13 ml   Output 600 ml   Net 1894.13 ml     Objective   Exam facilitated by use of telemedicine platform and with assistance from in house team  Gen  awake and interactive; no acute distress; full sentences  Head  nc/at  Eyes  anicteric sclera; eomi  Ent  normal external anatomy  Cvs  sinus rhythm without external evidence of hypoperfusion  Pulm  normal WOB and adequate SaO2 on RA  GI-no evidence of tenderness with passive movement; no bruising or ecchymosis  Ext  no c/e  Msk  normal bulk  Neuro  oriented, no deficits in strength noted; no tremor; follows commands  Labs/Imaging/Diagnostics    Labs:  CBC:  Recent Labs     22  0610 22  0416 04/10/22  0346   WBC 7.4 9.7 9.9   RBC 2.51* 2.83* 3.05*   HGB 8.6* 9.6* 10.2*   HCT 27.6* 30.8* 32.0*   .0* 108.8* 104.9*   RDW 13.2 13.2 12.9    219 202     CHEMISTRIES:  Recent Labs     22  0610 22  1539 22  0416 04/10/22  0346 22  1837    144 148*   < > 142   K 4.3 3.4* 3.4*   < > 3.9   * 119* 123*   < > 114*   CO2 20* 18* 14*   < > 16*   BUN 38* 44* 53*   < > 86*   CA 7.2* 7.2* 7.7*   < > 9.1   PHOS  --   --  2.0*  --   --    MG  --  1.5* 1.6  --  1.7    < > = values in this interval not displayed. PT/INR:  Recent Labs     04/10/22  0346   INR 1.1     APTT:No results for input(s): APTT in the last 72 hours. LIVER PROFILE:  Recent Labs     04/09/22  1837   AST 34   ALT 32     Lab Results   Component Value Date/Time    ALT (SGPT) 32 04/09/2022 06:37 PM    AST (SGOT) 34 04/09/2022 06:37 PM    Alk. phosphatase 119 (H) 04/09/2022 06:37 PM    Bilirubin, total 0.6 04/09/2022 06:37 PM       Imaging Last 24 Hours:  No results found.   Assessment//Plan   Active Problems:    Acute pancreatitis (4/9/2022)      Assessment & Plan    Hypotension  Pancreatitis  CKD  CAD  DM     Neuro  pain controlled and CAM negative  -h/o prior CVA per notes but no obvious deficits   -may benefit from PT OT     cvs  remains on vasopressors and meeting MAP goals without ongoing hypoperfusion; given habitus and age, we added midodrine; will drop BP goals as still no evidence of hypoperfusion  -s/p resuscitative efforts     pulm  Chronic respiratory failure with hypoxia due to CHF and uses 4L O2 at home; acceptable oxygenation and wob     gi  pancreatitis s/p fluids and remains on pain regimen; may need to switch from morphine if creatinine worsens but not an issue as yet; advancing diet  US demonstrated: Intrahepatic and extrahepatic biliary ductal dilatation; solid right upper pole renal mass, concerning for renal cell carcinoma.       gu  acute on chronic renal failure  numbers improving; may need follow-up for renal mass     Heme - no evidence of hemorrhage     ID-no compelling evidence of acute bacterial infection     Endo - at risk for both hypo and hyperglycemia - monitor for now     CCT 35 minutes excluding teaching and procedures     I performed all aspects of the physical examination via Telemedicine associated with two way audio and video communication and with the on-site assistance of the bedside nurse. Rocio Mohamud am located in [place] and the patient is located in [place] at [facility]   The patient is critically ill in the ICU. Mikayla Curry  reviewed the pertinent medical records, laboratory/ pathology data and radiographic images.  The decision making regarding this patient is as documented above, which was generated  following  discussion  with the multidisciplinary ICU team and creation of a treatment plan for  the patient. We discussed the patient's interval history and future coordination of care and Rosy Flor patient's medications  were reviewed and changes made as stipulated above.  Due to  critical illness impairing one or more vital organs of this patient resulting in life threatening clinical situation  I have provided direct, frequent personal  assessment and manipulation in management plan.      Electronically signed by Nishant Sanchez MD on 4/12/2022 at 10:27 AM

## 2022-04-12 NOTE — PROGRESS NOTES
Comprehensive Nutrition Assessment    Type and Reason for Visit: Initial,RD nutrition re-screen/LOS    Nutrition Recommendations/Plan:   1. Continue soft & bite sized diet order  2. Provide Ensure Compact once daily to aid in meeting kcal needs (220 kcal, 32 g carbs, 9 g protein)    Nutrition Assessment:   Pt is a 80year old female admitted with Acute pancreatitis [K85.90]. 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 No PO intake documented. Patient sitting up in bed eating lunch at time of RD visit. States her appetite has been poor x 1 week. Denies N/V/D at this time. Shellfish allergy. States -115# - lack of recent weight hx. Denies weight changes within last year. States her abd pain was due to abx. Voiced acceptance of Ensure once daily, though she prefers Boost. On 4 L O2, baseline. Wt Readings from Last 10 Encounters:   04/10/22 51.3 kg (113 lb)   05/22/17 62.3 kg (137 lb 6.4 oz)   09/29/15 58.6 kg (129 lb 1.6 oz)   09/05/15 58.1 kg (128 lb)   08/31/15 58.1 kg (128 lb)   05/15/12 57.2 kg (126 lb)   03/19/12 61.2 kg (135 lb)   03/06/12 63 kg (139 lb)   03/01/12 62.1 kg (137 lb)   05/31/11 59.5 kg (131 lb 3.2 oz)       Malnutrition Assessment:  Malnutrition Status:   Moderate malnutrition    Context:  Acute illness     Findings of the 6 clinical characteristics of malnutrition:   Energy Intake:  1 - 75% or less of est energy req for 7 or more days  Weight Loss:  No significant weight loss     Body Fat Loss:  1 - Mild body fat loss, Fat overlying ribs   Muscle Mass Loss:  1 - Mild muscle mass loss, Clavicles (pectoralis & deltoids),Temples (temporalis)  Fluid Accumulation:  No significant fluid accumulation,     Strength:  Not performed       Estimated Daily Nutrient Needs:  Energy (kcal): 1238 (MSJ x 1.2 + 250); Weight Used for Energy Requirements: Current  Protein (g): 51-61 (1.0-1.2); Weight Used for Protein Requirements: Current  Fluid (ml/day): 1238; Method Used for Fluid Requirements: 1 ml/kcal    Nutrition Related Findings:       ABD: Poor appetite with active bowel sounds  Last BM: 04/12/22, Soft  Edema:Generalized: No Edema (4/12/2022  4:00 AM)  LLE: Trace (4/12/2022  8:00 AM)  LUE: Trace (4/12/2022  8:00 AM)  RLE: Trace (4/12/2022  8:00 AM)  RUE: Trace (4/12/2022  8:00 AM)      Nutr. Labs:    Lab Results   Component Value Date/Time    GFR est AA 56 (L) 04/12/2022 06:10 AM    GFR est non-AA 47 (L) 04/12/2022 06:10 AM    Creatinine (POC) 1.5 (H) 03/02/2016 12:08 PM    Creatinine 1.09 (H) 04/12/2022 06:10 AM    BUN 38 (H) 04/12/2022 06:10 AM    Sodium 145 04/12/2022 06:10 AM    Potassium 4.3 04/12/2022 06:10 AM    Chloride 119 (H) 04/12/2022 06:10 AM    CO2 20 (L) 04/12/2022 06:10 AM       Lab Results   Component Value Date/Time    Glucose 81 04/12/2022 06:10 AM    Glucose (POC) 207 (H) 04/11/2022 11:07 AM       No results found for: HBA1C, AIW3ZUCC, FYJ1ZPMD, EQO2SXBS      Nutr. Meds:  Maalox PRN, heparin, synthroid, midodrine, Zofran PRN, protonix, ruchi-syn*, miralax PRN      Wounds:    Multiple (bruises, see wound care note)       Current Nutrition Therapies:  ADULT DIET Dysphagia - Soft & Bite Sized  ADULT ORAL NUTRITION SUPPLEMENT Dinner; Standard 4 oz    Anthropometric Measures:  · Height:  5' (152.4 cm)  · Current Body Wt:  51.3 kg (113 lb)   · Admission Body Wt:  113 lb    · Usual Body Wt:        · Ideal Body Wt:  100 lbs:  113 %   Body mass index is 22.07 kg/m².   · BMI Category:  Normal weight (BMI 22.0-24.9) age over 72       Nutrition Diagnosis:   · Moderate malnutrition related to inadequate protein-energy intake (predicted) as evidenced by moderate loss of subcutaneous fat,mild muscle loss    Nutrition Interventions:   Food and/or Nutrient Delivery: Continue current diet,Start oral nutrition supplement  Nutrition Education and Counseling: No recommendations at this time  Coordination of Nutrition Care: Continue to monitor while inpatient,Interdisciplinary rounds    Goals:  PO intake >/= 75% estimated needs within 1 - 3 days       Nutrition Monitoring and Evaluation:   Behavioral-Environmental Outcomes: None identified  Food/Nutrient Intake Outcomes: Food and nutrient intake,Supplement intake  Physical Signs/Symptoms Outcomes: Biochemical data,Hemodynamic status,Skin,Weight    Discharge Planning:     Too soon to determine     Electronically signed by Marlena López RD on 2/84/1762  Contact: [phone] or via [employer]IS[employer]ZE

## 2022-04-12 NOTE — PROGRESS NOTES
PT informed me that now pt is agreeable to go to SNF if it is the same SNF she was in previously. pt cannot recall the name of the facility. I contacted pt.'s son who stated [facility] was the facility pt was in previously. Son prefers for pt to return home with home health if feasible. Son agrees with pt for clinicals to be sent to [facility] which I have done through the cc link. I informed son I will meet with pt and family tomorrow to follow-up with disposition discussion.     Nelida Swanson

## 2022-04-12 NOTE — PROGRESS NOTES
Transition of care note:    RUR 16%    PT/OT are recommending SNF versus home health. I met with pt to discuss SNF versus home health. Pt told me 'I have been in skilled nursing before and I am not going to do that now. \"  Pt states she thinks she was in SNF @[facility] previously. Pt is receptive to home health for skilled nursing,PT and OT for evaluation & treatment. Pt states she has no preferences for home health agencies. When son returns,I will discuss home health with son also  When stable for discharge,will need home health orders for SN/PT/OT from attending.     Shira Alvarez Serve

## 2022-04-12 NOTE — PROGRESS NOTES
0700: Bedside and Verbal shift change report given to Sveta GALINDO RN (oncoming nurse) by Paty Fraire (offgoing nurse). Report included the following information SBAR, Kardex, Intake/Output, MAR, Recent Results, Cardiac Rhythm Paced and Alarm Parameters . Primary Nurse [address] [place] [address], RN and Kristen SOTO RN performed a dual skin assessment on this patient Impairment noted- see wound doc flow sheet  0800: Shift assessment completed. 1200: Reassessment completed. 1500: Pt up in chair with physical therapy. 1600: Reassessment completed. 1700: Pt back to bed.   1900: Bedside and Verbal shift change report given to Kristen SOTO RN (oncoming nurse) by Samantha Damon RN (offgoing nurse). Report included the following information SBAR, Kardex, Intake/Output, MAR, Recent Results, Cardiac Rhythm V paced and Alarm Parameters .

## 2022-04-13 LAB
ANION GAP SERPL CALC-SCNC: 8 MMOL/L (ref 5–15)
BASOPHILS # BLD: 0.1 K/UL (ref 0–0.1)
BASOPHILS NFR BLD: 1 % (ref 0–1)
BUN SERPL-MCNC: 30 MG/DL (ref 6–20)
BUN/CREAT SERPL: 30 (ref 12–20)
CALCIUM SERPL-MCNC: 7.9 MG/DL (ref 8.5–10.1)
CHLORIDE SERPL-SCNC: 116 MMOL/L (ref 97–108)
CO2 SERPL-SCNC: 22 MMOL/L (ref 21–32)
COVID-19 RAPID TEST, COVR: NOT DETECTED
CREAT SERPL-MCNC: 1 MG/DL (ref 0.55–1.02)
DIFFERENTIAL METHOD BLD: ABNORMAL
EOSINOPHIL # BLD: 0.4 K/UL (ref 0–0.4)
EOSINOPHIL NFR BLD: 4 % (ref 0–7)
ERYTHROCYTE [DISTWIDTH] IN BLOOD BY AUTOMATED COUNT: 13.2 % (ref 11.5–14.5)
GLUCOSE SERPL-MCNC: 91 MG/DL (ref 65–100)
HCT VFR BLD AUTO: 31 % (ref 35–47)
HGB BLD-MCNC: 9.6 G/DL (ref 11.5–16)
IMM GRANULOCYTES # BLD AUTO: 0 K/UL
IMM GRANULOCYTES NFR BLD AUTO: 0 %
LYMPHOCYTES # BLD: 0.8 K/UL (ref 0.8–3.5)
LYMPHOCYTES NFR BLD: 9 % (ref 12–49)
MCH RBC QN AUTO: 33.6 PG (ref 26–34)
MCHC RBC AUTO-ENTMCNC: 31 G/DL (ref 30–36.5)
MCV RBC AUTO: 108.4 FL (ref 80–99)
METAMYELOCYTES NFR BLD MANUAL: 1 %
MONOCYTES # BLD: 0.4 K/UL (ref 0–1)
MONOCYTES NFR BLD: 4 % (ref 5–13)
MYELOCYTES NFR BLD MANUAL: 1 %
NEUTS SEG # BLD: 7.4 K/UL (ref 1.8–8)
NEUTS SEG NFR BLD: 80 % (ref 32–75)
NRBC # BLD: 0 K/UL (ref 0–0.01)
NRBC BLD-RTO: 0 PER 100 WBC
PLATELET # BLD AUTO: 212 K/UL (ref 150–400)
PMV BLD AUTO: 10.5 FL (ref 8.9–12.9)
POTASSIUM SERPL-SCNC: 4.5 MMOL/L (ref 3.5–5.1)
RBC # BLD AUTO: 2.86 M/UL (ref 3.8–5.2)
RBC MORPH BLD: ABNORMAL
SODIUM SERPL-SCNC: 146 MMOL/L (ref 136–145)
SOURCE, COVRS: NORMAL
WBC # BLD AUTO: 9.2 K/UL (ref 3.6–11)

## 2022-04-13 PROCEDURE — 77010033678 HC OXYGEN DAILY

## 2022-04-13 PROCEDURE — 36415 COLL VENOUS BLD VENIPUNCTURE: CPT

## 2022-04-13 PROCEDURE — 65660000000 HC RM CCU STEPDOWN

## 2022-04-13 PROCEDURE — 87635 SARS-COV-2 COVID-19 AMP PRB: CPT

## 2022-04-13 PROCEDURE — 74011250637 HC RX REV CODE- 250/637: Performed by: NURSE PRACTITIONER

## 2022-04-13 PROCEDURE — 74011000250 HC RX REV CODE- 250: Performed by: FAMILY MEDICINE

## 2022-04-13 PROCEDURE — 85025 COMPLETE CBC W/AUTO DIFF WBC: CPT

## 2022-04-13 PROCEDURE — 74011250636 HC RX REV CODE- 250/636: Performed by: FAMILY MEDICINE

## 2022-04-13 PROCEDURE — 74011250637 HC RX REV CODE- 250/637: Performed by: INTERNAL MEDICINE

## 2022-04-13 PROCEDURE — 97110 THERAPEUTIC EXERCISES: CPT

## 2022-04-13 PROCEDURE — 97530 THERAPEUTIC ACTIVITIES: CPT

## 2022-04-13 PROCEDURE — 80048 BASIC METABOLIC PNL TOTAL CA: CPT

## 2022-04-13 PROCEDURE — 97535 SELF CARE MNGMENT TRAINING: CPT

## 2022-04-13 PROCEDURE — 94761 N-INVAS EAR/PLS OXIMETRY MLT: CPT

## 2022-04-13 PROCEDURE — 77030038269 HC DRN EXT URIN PURWCK BARD -A

## 2022-04-13 RX ADMIN — SODIUM CHLORIDE, PRESERVATIVE FREE 10 ML: 5 INJECTION INTRAVENOUS at 05:53

## 2022-04-13 RX ADMIN — GABAPENTIN 200 MG: 100 CAPSULE ORAL at 17:02

## 2022-04-13 RX ADMIN — HEPARIN SODIUM 5000 UNITS: 5000 INJECTION INTRAVENOUS; SUBCUTANEOUS at 13:52

## 2022-04-13 RX ADMIN — PANTOPRAZOLE SODIUM 40 MG: 40 TABLET, DELAYED RELEASE ORAL at 17:02

## 2022-04-13 RX ADMIN — MIDODRINE HYDROCHLORIDE 10 MG: 5 TABLET ORAL at 07:56

## 2022-04-13 RX ADMIN — MIDODRINE HYDROCHLORIDE 10 MG: 5 TABLET ORAL at 17:02

## 2022-04-13 RX ADMIN — HEPARIN SODIUM 5000 UNITS: 5000 INJECTION INTRAVENOUS; SUBCUTANEOUS at 05:52

## 2022-04-13 RX ADMIN — HEPARIN SODIUM 5000 UNITS: 5000 INJECTION INTRAVENOUS; SUBCUTANEOUS at 22:22

## 2022-04-13 RX ADMIN — PANTOPRAZOLE SODIUM 40 MG: 40 TABLET, DELAYED RELEASE ORAL at 06:37

## 2022-04-13 RX ADMIN — LEVOTHYROXINE SODIUM 25 MCG: 0.03 TABLET ORAL at 06:37

## 2022-04-13 RX ADMIN — LATANOPROST 1 DROP: 50 SOLUTION OPHTHALMIC at 17:02

## 2022-04-13 RX ADMIN — SODIUM CHLORIDE, PRESERVATIVE FREE 10 ML: 5 INJECTION INTRAVENOUS at 13:52

## 2022-04-13 RX ADMIN — AMITRIPTYLINE HYDROCHLORIDE 25 MG: 10 TABLET, FILM COATED ORAL at 22:19

## 2022-04-13 RX ADMIN — ASPIRIN 81 MG: 81 TABLET, COATED ORAL at 08:01

## 2022-04-13 RX ADMIN — MIDODRINE HYDROCHLORIDE 10 MG: 5 TABLET ORAL at 12:04

## 2022-04-13 NOTE — PROGRESS NOTES
Verbal shift change report given to Will (oncoming nurse) by Poornima Steve (offgoing nurse). Report included the following information SBAR, Kardex, MAR and Cardiac Rhythm V paced. Verbal shift change report given to [employer] (oncoming nurse) by Will (offgoing nurse). Report included the following information SBAR, Kardex, Intake/Output, MAR, Recent Results and Cardiac Rhythm Paced.

## 2022-04-13 NOTE — PROGRESS NOTES
I met with pt.'s son and updated son that per discussion with attending,if pt is weaned off the ruchi today,the plan is for pt to go to SNF @ [facility] tomorrow. Son had concerns about pt.'s pain and expressed that he is concerned about her being discharged \"so soon. \"    I updated attending who is meeting with pt and family now,    Luis Manuel Gar

## 2022-04-13 NOTE — PROGRESS NOTES
Problem: Falls - Risk of  Goal: *Absence of Falls  Description: Document Morene Hole Fall Risk and appropriate interventions in the flowsheet.   Outcome: Progressing Towards Goal  Note: Fall Risk Interventions:  Mobility Interventions: Patient to call before getting OOB    Mentation Interventions: Room close to nurse's station    Medication Interventions: Patient to call before getting OOB    Elimination Interventions: Call light in reach    History of Falls Interventions: Room close to nurse's station         Problem: Hypotension  Goal: *Blood pressure within specified parameters  Outcome: Progressing Towards Goal  Goal: *Fluid volume balance  Outcome: Progressing Towards Goal     Problem: Hypotension  Goal: *Blood pressure within specified parameters  Outcome: Progressing Towards Goal  Goal: *Fluid volume balance  Outcome: Progressing Towards Goal

## 2022-04-13 NOTE — PROGRESS NOTES
1911-Bedside and Verbal shift change report given to [id number] (oncoming nurse) by Balwinder Luong RN (offgoing nurse). Report included the following information SBAR, Kardex, Intake/Output, Med Rec Status, Cardiac Rhythm Vpace and Alarm Parameters . Visit Vitals  BP (!) 88/40   Pulse (!) 103   Temp (!) 48.2 °F (9 °C)   Resp 19   Ht 5' (1.524 m)   Wt 51.3 kg (113 lb)   LMP 11/16/1975   SpO2 100%   BMI 22.07 kg/m²     Lab Results 24Hours     Procedure Component Value Units Date/Time    MAGNESIUM [[id number]]  (Abnormal) Collected: 04/12/22 0610    Order Status: Completed Specimen: Serum or Plasma Updated: 04/12/22 1035     Magnesium 2.7 mg/dL     CBC WITH AUTOMATED DIFF [[id number]]  (Abnormal) Collected: 04/12/22 0610    Order Status: Completed Specimen: Whole Blood Updated: 04/12/22 0801     WBC 7.4 K/uL      RBC 2.51 M/uL      HGB 8.6 g/dL      HCT 27.6 %      .0 FL      MCH 34.3 PG      MCHC 31.2 g/dL      RDW 13.2 %      PLATELET 891 K/uL      MPV 10.2 FL      NRBC 0.0  WBC      ABSOLUTE NRBC 0.00 K/uL      NEUTROPHILS 68 %      LYMPHOCYTES 15 %      MONOCYTES 4 %      EOSINOPHILS 9 %      BASOPHILS 2 %      METAMYELOCYTES 1 %      MYELOCYTES 1 %      IMMATURE GRANULOCYTES 0 %      ABS. NEUTROPHILS 5.0 K/UL      ABS. LYMPHOCYTES 1.1 K/UL      ABS. MONOCYTES 0.3 K/UL      ABS. EOSINOPHILS 0.7 K/UL      ABS. BASOPHILS 0.1 K/UL      ABS. IMM.  GRANS. 0.0 K/UL      DF MANUAL        RBC COMMENTS --        MACROCYTOSIS  1+      METABOLIC PANEL, BASIC [[id number]]  (Abnormal) Collected: 04/12/22 0610    Order Status: Completed Specimen: Serum or Plasma Updated: 04/12/22 0702     Sodium 145 mmol/L      Potassium 4.3 mmol/L      Chloride 119 mmol/L      CO2 20 mmol/L      Anion gap 6 mmol/L      Glucose 81 mg/dL      BUN 38 MG/DL      Creatinine 1.09 MG/DL      BUN/Creatinine ratio 35        GFR est AA 56 ml/min/1.73m2      GFR est non-AA 47 ml/min/1.73m2      Calcium 7.2 MG/DL         See flow sheet for assessment documentation  See MAR for medication administration  0700-Bedside and Verbal shift change report given to Saira Jason Nolasco (oncoming nurse) by Mady Stewart (offgoing nurse). Report included the following information SBAR, Kardex, Intake/Output, Recent Results, Cardiac Rhythm Vpace and Alarm Parameters . Problem: Pressure Injury - Risk of  Goal: *Prevention of pressure injury  Description: Document Destin Scale and appropriate interventions in the flowsheet. Outcome: Progressing Towards Goal  Note: Pressure Injury Interventions:  Sensory Interventions: Assess changes in LOC,Avoid rigorous massage over bony prominences,Discuss PT/OT consult with provider,Keep linens dry and wrinkle-free,Maintain/enhance activity level,Minimize linen layers,Monitor skin under medical devices,Pad between skin to skin,Pressure redistribution bed/mattress (bed type),Turn and reposition approx. every two hours (pillows and wedges if needed)    Moisture Interventions: Absorbent underpads,Check for incontinence Q2 hours and as needed,Internal/External urinary devices,Maintain skin hydration (lotion/cream)    Activity Interventions: Chair cushion,Increase time out of bed,Pressure redistribution bed/mattress(bed type),PT/OT evaluation    Mobility Interventions: Assess need for specialty bed,HOB 30 degrees or less,PT/OT evaluation,Turn and reposition approx.  every two hours(pillow and wedges)    Nutrition Interventions: Document food/fluid/supplement intake,Discuss nutritional consult with provider,Offer support with meals,snacks and hydration    Friction and Shear Interventions: Apply protective barrier, creams and emollients,Foam dressings/transparent film/skin sealants,Lift sheet,Lift team/patient mobility team,Minimize layers,Transfer aides:transfer board/Joe lift/ceiling lift,Transferring/repositioning devices                Problem: Patient Education: Go to Patient Education Activity  Goal: Patient/Family Education  Outcome: Progressing Towards Goal     Problem: Falls - Risk of  Goal: *Absence of Falls  Description: Document Barbara Caldera Fall Risk and appropriate interventions in the flowsheet.   Outcome: Progressing Towards Goal  Note: Fall Risk Interventions:  Mobility Interventions: Bed/chair exit alarm,OT consult for ADLs,Patient to call before getting OOB,PT Consult for mobility concerns,PT Consult for assist device competence,Strengthening exercises (ROM-active/passive),Utilize walker, cane, or other assistive device,Utilize gait belt for transfers/ambulation    Mentation Interventions: Adequate sleep, hydration, pain control,Door open when patient unattended,Gait belt with transfers/ambulation,More frequent rounding,Increase mobility,Room close to nurse's station,Update white board,Toileting rounds    Medication Interventions: Assess postural VS orthostatic hypotension,Bed/chair exit alarm,Evaluate medications/consider consulting pharmacy,Patient to call before getting OOB,Teach patient to arise slowly    Elimination Interventions: Bed/chair exit alarm,Elevated toilet seat,Patient to call for help with toileting needs,Stay With Me (per policy),Toilet paper/wipes in reach,Toileting schedule/hourly rounds,Urinal in reach              Problem: Patient Education: Go to Patient Education Activity  Goal: Patient/Family Education  Outcome: Progressing Towards Goal     Problem: Hypotension  Goal: *Blood pressure within specified parameters  Outcome: Progressing Towards Goal  Goal: *Fluid volume balance  Outcome: Progressing Towards Goal  Goal: *Labs within defined limits  Outcome: Progressing Towards Goal     Problem: Patient Education: Go to Patient Education Activity  Goal: Patient/Family Education  Outcome: Progressing Towards Goal     Problem: Patient Education: Go to Patient Education Activity  Goal: Patient/Family Education  Outcome: Progressing Towards Goal     Problem: Patient Education: Go to Patient Education Activity  Goal: Patient/Family Education  Outcome: Progressing Towards Goal     Problem: Nutrition Deficit  Goal: *Optimize nutritional status  Outcome: Progressing Towards Goal

## 2022-04-13 NOTE — PROGRESS NOTES
Transition of care note:    RUR 14% (low readmission risk score)    Plan:    Per discussion with attending,pt will likely be stable to go to [facility] for Rehab tomorrow. I left a message with Petty Reynaga in admissions @ [facility] to please notify me regarding bed acceptance/placement for pt tomorrow. When son arrives,I plan to review the plan with son who is pt.'s chief decision-maker per her advance medical directive. Scott Cole had some reservations regarding pt going to SNF so want to further discuss these issues with son.     Benny Aguayo

## 2022-04-13 NOTE — PROGRESS NOTES
[facility] confirmed [facility] have a bed for pt. Tomorrow. Jonathan needs a rapid covid test to be completed 72 hour or less in order for pt to be admitted @ their facility.   I notified attending.(for screening)    Reuel Soulier

## 2022-04-13 NOTE — PROGRESS NOTES
Problem: Mobility Impaired (Adult and Pediatric)  Goal: *Acute Goals and Plan of Care (Insert Text)  Description: FUNCTIONAL STATUS PRIOR TO ADMISSION: Patient was modified independent using a rolling walker for functional mobility. HOME SUPPORT PRIOR TO ADMISSION: The patient lived with family but did not require assist.    Physical Therapy Goals  Initiated 4/11/2022  1. Patient will move from supine to sit and sit to supine , scoot up and down, and roll side to side in bed with independence within 7 day(s). 2.  Patient will transfer from bed to chair and chair to bed with modified independence using the least restrictive device within 7 day(s). 3.  Patient will perform sit to stand with modified independence within 7 day(s). 4.  Patient will ambulate with modified independence for 200 feet with the least restrictive device within 7 day(s). Outcome: Progressing Towards Goal   PHYSICAL THERAPY TREATMENT  Patient: Patrice Ellsworth (38 y.o. female)  Date: 4/13/2022  Diagnosis: Acute pancreatitis [K85.90] <principal problem not specified>       Precautions:  fall  Chart, physical therapy assessment, plan of care and goals were reviewed. ASSESSMENT  Patient continues with skilled PT services and is progressing towards goals. Communicated with nurse cleared for therapy. Patient supine on bed when received. Initially patient declining to be OOB because she had a hard time going back to bed yesterday afternoon. Patient very scared of falling reassured patient the she will not fall and practice with Janeen Camp patient agreed. Rolled on the edge of bed min assist supine to sit min assist, sit to stand min assist x 2. OOB to chair as tolerated performed some active range of motion exercise on both LE all plane while on bed and on the recliner. Educate and instructed patient to continue active range of motion exercise on both legs while up on chair or on bed multiple times.  Recommend patient to be up on the chair at least 3 times a day every meal times as tolerated. Communicated with nurse to use Marcella Castanon to transfer patient back to bed when ask. Current Level of Function Impacting Discharge (mobility/balance): min assist with transfers using Marcella Castanon    Other factors to consider for discharge: fall         PLAN :  Patient continues to benefit from skilled intervention to address the above impairments. Continue treatment per established plan of care. to address goals. Recommendation for discharge: (in order for the patient to meet his/her long term goals)  Therapy up to 5 days/week in SNF setting    This discharge recommendation:  Has been made in collaboration with the attending provider and/or case management    IF patient discharges home will need the following DME: patient owns DME required for discharge       SUBJECTIVE:   Patient stated Tommie Black had a rough time going back to bed yesterday.     OBJECTIVE DATA SUMMARY:   Critical Behavior:  Neurologic State: Alert  Orientation Level: Oriented X4  Cognition: Follows commands  Safety/Judgement: Awareness of environment  Functional Mobility Training:  Bed Mobility:  Rolling: Minimum assistance  Supine to Sit: Minimum assistance  Sit to Supine: Minimum assistance  Scooting: Minimum assistance        Transfers:  Sit to Stand: Minimum assistance;Assist x2 (lima steady)                                Balance:  Sitting: Intact; High guard  Sitting - Static: Fair (occasional)  Sitting - Dynamic: Fair (occasional)  Standing: Impaired; With support  Standing - Static: Fair  Standing - Dynamic : Fair  Ambulation/Gait Training:                                              Therapeutic Exercises:   Educate and instructed patient to continue active range of motion exercise on both legs while up on chair or on bed multiple times. Recommend patient to be up on the chair at least 3 times a day every meal times as tolerated.        Pain Ratin/10    Activity Tolerance: Good    After treatment patient left in no apparent distress:   Sitting in chair, Heels elevated for pressure relief, Call bell within reach, Bed / chair alarm activated, and Side rails x 3    COMMUNICATION/COLLABORATION:   The patients plan of care was discussed with: Occupational therapist, Registered nurse, and Case management. Lalo Munoz, PT,WCC.    Time Calculation: 35 mins

## 2022-04-13 NOTE — PROGRESS NOTES
Problem: Self Care Deficits Care Plan (Adult)  Goal: *Acute Goals and Plan of Care (Insert Text)  Description: FUNCTIONAL STATUS PRIOR TO ADMISSION: Patient was modified independent using a walker for functional mobility and short distance ambulation. She uses a wheelchair for longer distances and community ambulation. Patient needs occasional assistance for LB ADLs but otherwise independent with self care. HOME SUPPORT: The patient lived with  and son. Occupational Therapy Goals  Initiated 4/11/2022  1. Patient will perform grooming with supervision/set-up within 7 day(s). 2.  Patient will perform upper body dressing and bathing with minimal assistance within 7 day(s). 3.  Patient will perform lower body dressing and bathing with moderate assistance using AE PRN within 7 day(s). 4.  Patient will perform toilet transfers with minimal assistance using rolling walker within 7 day(s). 5.  Patient will perform all aspects of toileting with moderate assistance  within 7 day(s). 6.  Patient will participate in upper extremity therapeutic exercise/activities with supervision/set-up for 10 minutes within 7 day(s). 7.  Patient will utilize energy conservation techniques during functional activities with verbal cues within 7 day(s). Outcome: Progressing Towards Goal   OCCUPATIONAL THERAPY TREATMENT  Patient: Rupesh Rich (21 y.o. female)  Date: 4/13/2022  Diagnosis: Acute pancreatitis [K85.90] <principal problem not specified>       Precautions:  fall  Chart, occupational therapy assessment, plan of care, and goals were reviewed. ASSESSMENT  Patient continues with skilled OT services and is progressing towards goals. Ms. Mia Quinonez was received in bed agreeable to activity. She reports increased anxiety associated with fear of falling and OOB activity; She verbalizes transfer back to bed was difficulty previous day.   Patient required total A don socks from long sitting position and to complete bed level toileting (incontinent with [employer] use). Patient was able to come to sitting EOB with x2 person assist and cues for improved technique. Patient performed x2 sit to stand transfers and able to use Arabella Man to transfer to the chair to remain OOB. Patient able to tolerate room air at rest but needed reapplication of baseline home O2 d/t drop in O2 (85%) with activity on room air. Patient with elevated HR as high as 125bpm with max exertion. Patient needed mod A to to change gown and engaged in grooming tasks with setup from the chair. Patient educated on adaptive ADL techniques, energy conservation techniques, and pursed lip breathing techniques. Patient would benefit from continued skilled OT to progress towards goals and improve overall independence. Current Level of Function Impacting Discharge (ADLs): Patient required min A x1-2 for bed mobility, mod A x2 for sit to stand transfers, and total A using Arabella Man for bed to chair transfer. Patient required total A for LB dressing and toileting, mod A for UB dressing, and setup for grooming. PLAN :  Patient continues to benefit from skilled intervention to address the above impairments. Continue treatment per established plan of care to address goals. Recommend with staff:   Recommend patient be OOB to chair as frequently as tolerated; Goal of 3x/day for all meals for 60 minutes at a time. X2 person assist using Arabella Man. For toileting needs, recommend transfers to/from [address] [place] [address] and x2 person assist.  Encourage patient involvement in personal care as able. Recommend next OT session: Continue towards set goals. Recommendation for discharge: (in order for the patient to meet his/her long term goals)  Therapy up to 5 days/week in rehab setting.     This discharge recommendation:  Has been made in collaboration with the attending provider and/or case management    IF patient discharges home will need the following DME: none       SUBJECTIVE:   Patient stated I hope today goes better.     OBJECTIVE DATA SUMMARY:   Cognitive/Behavioral Status:  Neurologic State: Alert  Orientation Level: Oriented X4  Cognition: Follows commands  Perception: Appears intact  Perseveration: No perseveration noted  Safety/Judgement: Awareness of environment    Functional Mobility and Transfers for ADLs:  Bed Mobility:  Rolling: Minimum assistance; Additional time;Assist x1  Supine to Sit: Minimum assistance;Assist x2; Additional time  Sit to Supine:  (pt remained up at end of tx)  Scooting: Minimum assistance    Transfers:  Sit to Stand: Moderate assistance;Assist x2; Additional time  Bed to Chair: Total assistance Yara Evitanatalio used for tx)    Balance:  Sitting: Impaired  Sitting - Static: Fair (occasional)  Sitting - Dynamic: Poor (constant support)  Standing: Impaired  Standing - Static: Poor  Standing - Dynamic : Poor    ADL Intervention:  Grooming  Grooming Assistance: Set-up  Position Performed: Seated in chair  Washing Face: Set-up  Washing Hands: Set-up  Cues: Verbal cues provided    Upper Body Dressing Assistance  Dressing Assistance: Moderate assistance  Shirt simulation with hospital gown: Moderate assistance    Lower Body Dressing Assistance  Dressing Assistance: Total assistance(dependent)  Socks: Total assistance (dependent)    Toileting  Toileting Assistance: Total assistance(dependent)  Bladder Hygiene: Total assistance (dependent)    Cognitive Retraining  Safety/Judgement: Awareness of environment    Activity Tolerance:   Fair    After treatment patient left in no apparent distress:   Sitting in chair and Call bell within reach    COMMUNICATION/COLLABORATION:   The patients plan of care was discussed with: Physical therapist, Registered nurse, and patient .      Tahir Mayorga OTR/L  Time Calculation: 45 mins

## 2022-04-14 VITALS
WEIGHT: 113 LBS | HEIGHT: 60 IN | SYSTOLIC BLOOD PRESSURE: 100 MMHG | RESPIRATION RATE: 18 BRPM | HEART RATE: 102 BPM | BODY MASS INDEX: 22.19 KG/M2 | TEMPERATURE: 98 F | DIASTOLIC BLOOD PRESSURE: 88 MMHG | OXYGEN SATURATION: 100 %

## 2022-04-14 LAB
ANION GAP SERPL CALC-SCNC: 4 MMOL/L (ref 5–15)
BASOPHILS # BLD: 0.1 K/UL (ref 0–0.1)
BASOPHILS NFR BLD: 1 % (ref 0–1)
BUN SERPL-MCNC: 28 MG/DL (ref 6–20)
BUN/CREAT SERPL: 27 (ref 12–20)
CALCIUM SERPL-MCNC: 8.2 MG/DL (ref 8.5–10.1)
CHLORIDE SERPL-SCNC: 116 MMOL/L (ref 97–108)
CO2 SERPL-SCNC: 25 MMOL/L (ref 21–32)
CREAT SERPL-MCNC: 1.03 MG/DL (ref 0.55–1.02)
DIFFERENTIAL METHOD BLD: ABNORMAL
EOSINOPHIL # BLD: 0.4 K/UL (ref 0–0.4)
EOSINOPHIL NFR BLD: 5 % (ref 0–7)
ERYTHROCYTE [DISTWIDTH] IN BLOOD BY AUTOMATED COUNT: 13.2 % (ref 11.5–14.5)
GLUCOSE SERPL-MCNC: 119 MG/DL (ref 65–100)
HCT VFR BLD AUTO: 26.8 % (ref 35–47)
HGB BLD-MCNC: 8.5 G/DL (ref 11.5–16)
IMM GRANULOCYTES # BLD AUTO: 0 K/UL
IMM GRANULOCYTES NFR BLD AUTO: 0 %
LYMPHOCYTES # BLD: 1.1 K/UL (ref 0.8–3.5)
LYMPHOCYTES NFR BLD: 14 % (ref 12–49)
MCH RBC QN AUTO: 33.9 PG (ref 26–34)
MCHC RBC AUTO-ENTMCNC: 31.7 G/DL (ref 30–36.5)
MCV RBC AUTO: 106.8 FL (ref 80–99)
METAMYELOCYTES NFR BLD MANUAL: 1 %
MONOCYTES # BLD: 0.5 K/UL (ref 0–1)
MONOCYTES NFR BLD: 7 % (ref 5–13)
MYELOCYTES NFR BLD MANUAL: 1 %
NEUTS BAND NFR BLD MANUAL: 2 % (ref 0–6)
NEUTS SEG # BLD: 5.5 K/UL (ref 1.8–8)
NEUTS SEG NFR BLD: 69 % (ref 32–75)
NRBC # BLD: 0 K/UL (ref 0–0.01)
NRBC BLD-RTO: 0 PER 100 WBC
PLATELET # BLD AUTO: 223 K/UL (ref 150–400)
PMV BLD AUTO: 10.6 FL (ref 8.9–12.9)
POTASSIUM SERPL-SCNC: 4.6 MMOL/L (ref 3.5–5.1)
RBC # BLD AUTO: 2.51 M/UL (ref 3.8–5.2)
RBC MORPH BLD: ABNORMAL
SODIUM SERPL-SCNC: 145 MMOL/L (ref 136–145)
WBC # BLD AUTO: 7.7 K/UL (ref 3.6–11)

## 2022-04-14 PROCEDURE — 85025 COMPLETE CBC W/AUTO DIFF WBC: CPT

## 2022-04-14 PROCEDURE — 74011250637 HC RX REV CODE- 250/637: Performed by: INTERNAL MEDICINE

## 2022-04-14 PROCEDURE — 77010033678 HC OXYGEN DAILY

## 2022-04-14 PROCEDURE — 74011250637 HC RX REV CODE- 250/637: Performed by: NURSE PRACTITIONER

## 2022-04-14 PROCEDURE — 36415 COLL VENOUS BLD VENIPUNCTURE: CPT

## 2022-04-14 PROCEDURE — 80048 BASIC METABOLIC PNL TOTAL CA: CPT

## 2022-04-14 PROCEDURE — 74011250636 HC RX REV CODE- 250/636: Performed by: FAMILY MEDICINE

## 2022-04-14 PROCEDURE — 94761 N-INVAS EAR/PLS OXIMETRY MLT: CPT

## 2022-04-14 RX ORDER — GABAPENTIN 100 MG/1
200 CAPSULE ORAL 2 TIMES DAILY
Qty: 20 CAPSULE | Refills: 0 | Status: SHIPPED | OUTPATIENT
Start: 2022-04-14 | End: 2022-05-02 | Stop reason: SDUPTHER

## 2022-04-14 RX ORDER — MIDODRINE HYDROCHLORIDE 10 MG/1
10 TABLET ORAL
Qty: 90 TABLET | Refills: 0 | Status: SHIPPED
Start: 2022-04-14 | End: 2022-05-02 | Stop reason: SDUPTHER

## 2022-04-14 RX ADMIN — ASPIRIN 81 MG: 81 TABLET, COATED ORAL at 08:12

## 2022-04-14 RX ADMIN — LEVOTHYROXINE SODIUM 25 MCG: 0.03 TABLET ORAL at 08:12

## 2022-04-14 RX ADMIN — MIDODRINE HYDROCHLORIDE 10 MG: 5 TABLET ORAL at 13:26

## 2022-04-14 RX ADMIN — MIDODRINE HYDROCHLORIDE 10 MG: 5 TABLET ORAL at 08:12

## 2022-04-14 RX ADMIN — HEPARIN SODIUM 5000 UNITS: 5000 INJECTION INTRAVENOUS; SUBCUTANEOUS at 05:58

## 2022-04-14 RX ADMIN — HEPARIN SODIUM 5000 UNITS: 5000 INJECTION INTRAVENOUS; SUBCUTANEOUS at 13:26

## 2022-04-14 RX ADMIN — PANTOPRAZOLE SODIUM 40 MG: 40 TABLET, DELAYED RELEASE ORAL at 08:12

## 2022-04-14 NOTE — PROGRESS NOTES
Transition of Care Plan to SNF/Rehab    SNF/Rehab Transition:  Patient has been accepted to [facility] SNF  and meets criteria for admission. Patient will transported by  [facility] and expected to leave at     Communication to Patient/Family:  Met with patient and son,Tristan Figueroa and they are agreeable to the transition plan. Communication to SNF/Rehab:  Bedside RN, Will , has been notified to update the transition plan to the facility and call report (phone number [phone]). Discharge information has been updated on the AVS.     Discharge instructions to be fax'd to facility at [facility] [phone]). Nursing Please include all hard scripts for controlled substances, med rec and dc summary, and AVS in packet.      Reviewed and confirmed with facility, [facility] , can manage the patient care needs for the following:     SNF/Rehab Transition:    PCP/Specialist:Dr Connie Acevedo @ SNF while in SNF    Reviewed and confirmed with facility, [facility]  they can manage the patient care needs for the following:     [place] with (X) only those applicable:    Medication:  [x]  Medications will be available at the facility  []  IV Antibiotics  [x]  Controlled Substance - hard copy to be sent with patient -gabapentin  []  Weekly Labs   Documents:  [x] Hard RX  [x] MAR  [x] Kardex  [x] AVS  [x]Transfer Summary  [x]Discharge   Equipment:  []  CPAP/BiPAP  []  Wound Vacuum  []  Witt or Urinary Device  []  PICC/Central Line  []  Nebulizer  []  Ventilator   Treatment:  []Isolation (for MRSA, VRE, etc.)  []Surgical Drain Management  []Tracheostomy Care  []Dressing Changes  []Dialysis with transportation and chair time   []PEG Care  [x]Oxygen 4 liters nc   [x]Daily Weights for Heart Failure   Dietary:  [x]Any diet limitations  []Tube Feedings   []Total Parenteral Management (TPN)   Eligible for Medicaid Long Term Services and Supports  Yes:  [] Eligible for medical assistance or will become eligible within 180 days and UAI completed. [] Provider/Patient and/or support system has requested screening. [] UAI copy provided to patient or responsible party  [] UAI unavailable at discharge will send once processed to SNF provider. [] UAI unavailable at discharged mailed to patient  No:   [x] Private pay and is not financially eligible for Medicaid within the next 180 days. [] Reside out-of-state.   [] A residents of a state owned/operated facility that is licensed  by [address] [facility] or [facility]  [] Enrollment in [facility] hospice services  [] [facility]  [] Patient /Family declines to have screening completed or provide financial information for screening     Financial Resources:  Medicaid    [] Initiated and application pending   [] Full coverage     Advanced Care Plan:  [x]Surrogate Decision Maker of Emely Salvador has been designated as primary decision-maker per AMD  []POA  [x]Communicated Code Status DNR   Other  Adult dysphagia diet (soft and bite sized),nutritional supplements with dinner  DDNR sent with ambulance  Covid rapid completed on 4/13/22 is negative  Copy of AMD faxed and sent to [address] via transport     [facility]

## 2022-04-14 NOTE — PROGRESS NOTES
[address]  [address] [facility], [place], [address] [place] [address]  [phone]      Medical Progress Note      NAME: Gaye Mayen   :  1925  MRM:  [id number]    Date of service: 2022  6:44 AM       Assessment and Plan:   1. Acute pancreatitis/ abdominal pain. Burning pain epigastric area with radiating to the back and chest. Tolerating diet. IV fluids, IV morphine as needed for pain. Normal triglycerides. Improving lipase. Evaluated by GI. Pt doesn't want EGD      2. Hypotension. Started on midodrine. Wean ruchi. 3.  EVERT on CKD stage 3. likely due to volume depletion. Improving with IVF. 4.  Metabolic acidosis. This is most likely due to above/ volume depletion. Lactic acid is normal. On bicarb drip, improving     5. Anemia. Iron deficient by serology. Pt refused iron infusion. 6.  Hx of chronic systolic CHF/ Elevated BNP. EF is 35%. Compensated. Watch fluid status. On entresto, which is on hold due to low BP.     7.  RT kidney lesion/ mass. Pt doesn't want further workup. Discussed this with pt and her son     8. Chronic respiratory failure with hypoxia due to chronic systolic CHF. Uses 4LPM of O2. Continue supplement O2.       code status: DNR       Subjective:     Chief Complaint[de-identified] Patient was seen and examined as a follow up for pancreatitis. Chart was reviewed. abdominal pian is better. Still hypotensive. ROS:  (bold if positive, if negative)    Tolerating PT  Tolerating Diet        Objective:     Last 24hrs VS reviewed since prior progress note.  Most recent are:    Visit Vitals  BP (!) 105/42   Pulse 79   Temp 97.9 °F (36.6 °C)   Resp 15   Ht 5' (1.524 m)   Wt 51.3 kg (113 lb)   SpO2 100%   BMI 22.07 kg/m²     SpO2 Readings from Last 6 Encounters:   22 100%   17 94%   09/29/15 99%   08/31/15 95%   12 95%   11 97%    O2 Flow Rate (L/min): 4 l/min       Intake/Output Summary (Last 24 hours) at 2022 9311  Last data filed at 4/13/2022 1939  Gross per 24 hour   Intake 1097.81 ml   Output 601 ml   Net 496.81 ml        Physical Exam:    Gen:  Well-developed, well-nourished, in no acute distress  HEENT:  Pink conjunctivae, PERRL, hearing intact to voice, moist mucous membranes  Neck:  Supple, without masses, thyroid non-tender  Resp:  No accessory muscle use, clear breath sounds without wheezes rales or rhonchi  Card:  No murmurs, normal S1, S2 without thrills, bruits or peripheral edema  Abd:  Soft, non-tender, non-distended, normoactive bowel sounds are present, no palpable organomegaly and no detectable hernias  Lymph:  No cervical or inguinal adenopathy  Musc:  No cyanosis or clubbing  Skin:  No rashes or ulcers, skin turgor is good  Neuro:  Cranial nerves are grossly intact, no focal motor weakness, follows commands appropriately  Psych:  Good insight, oriented to person, place and time, alert  __________________________________________________________________  Medications Reviewed: (see below)  Medications:     Current Facility-Administered Medications   Medication Dose Route Frequency    PHENYLephrine (POORNIMA-SYNEPHRINE) 30 mg in 0.9% sodium chloride 250 mL infusion   mcg/min IntraVENous TITRATE    amitriptyline (ELAVIL) tablet 25 mg  25 mg Oral QHS    midodrine (PROAMATINE) tablet 10 mg  10 mg Oral TID WITH MEALS    glucose chewable tablet 16 g  4 Tablet Oral PRN    glucagon (GLUCAGEN) injection 1 mg  1 mg IntraMUSCular PRN    dextrose 10% infusion 0-250 mL  0-250 mL IntraVENous PRN    latanoprost (XALATAN) 0.005 % ophthalmic solution 1 Drop  1 Drop Left Eye QPM    aspirin delayed-release tablet 81 mg  81 mg Oral DAILY    benzonatate (TESSALON) capsule 100 mg  100 mg Oral TID PRN    levothyroxine (SYNTHROID) tablet 25 mcg  25 mcg Oral ACB    gabapentin (NEURONTIN) capsule 200 mg  200 mg Oral QPM    pantoprazole (PROTONIX) tablet 40 mg  40 mg Oral ACB&D    aluminum-magnesium hydroxide (MAALOX) oral suspension 15 mL 15 mL Oral QID PRN    ondansetron (ZOFRAN) injection 4 mg  4 mg IntraVENous Q1H PRN    sodium chloride (NS) flush 5-40 mL  5-40 mL IntraVENous Q8H    sodium chloride (NS) flush 5-40 mL  5-40 mL IntraVENous PRN    acetaminophen (TYLENOL) tablet 650 mg  650 mg Oral Q6H PRN    Or    acetaminophen (TYLENOL) suppository 650 mg  650 mg Rectal Q6H PRN    polyethylene glycol (MIRALAX) packet 17 g  17 g Oral DAILY PRN    ondansetron (ZOFRAN ODT) tablet 4 mg  4 mg Oral Q8H PRN    Or    ondansetron (ZOFRAN) injection 4 mg  4 mg IntraVENous Q6H PRN    heparin (porcine) injection 5,000 Units  5,000 Units SubCUTAneous Q8H        Lab Data Reviewed: (see below)  Lab Review:     Recent Labs     04/14/22  0340 04/13/22  0541 04/12/22  0610   WBC 7.7 9.2 7.4   HGB 8.5* 9.6* 8.6*   HCT 26.8* 31.0* 27.6*    212 201     Recent Labs     04/14/22  0340 04/13/22  0541 04/12/22  0610 04/11/22  1539 04/11/22  1539    146* 145   < > 144   K 4.6 4.5 4.3   < > 3.4*   * 116* 119*   < > 119*   CO2 25 22 20*   < > 18*   * 91 81   < > 191*   BUN 28* 30* 38*   < > 44*   CREA 1.03* 1.00 1.09*   < > 1.25*   CA 8.2* 7.9* 7.2*   < > 7.2*   MG  --   --  2.7*  --  1.5*    < > = values in this interval not displayed. Lab Results   Component Value Date/Time    Glucose (POC) 207 (H) 04/11/2022 11:07 AM    Glucose (POC) 234 (H) 04/11/2022 07:59 AM    Glucose (POC) 59 (L) 04/11/2022 05:43 AM    Glucose (POC) 210 (H) 05/12/2009 04:35 PM    Glucose (POC) 107 (H) 05/07/2009 04:33 PM     No results for input(s): PH, PCO2, PO2, HCO3, FIO2 in the last 72 hours. No results for input(s): INR, INREXT, INREXT in the last 72 hours.   All Micro Results     Procedure Component Value Units Date/Time    CULTURE, BLOOD [[id number]] Collected: 04/10/22 0346    Order Status: Completed Specimen: Blood Updated: 04/14/22 0616     Special Requests: NO SPECIAL REQUESTS        Culture result: NO GROWTH 4 DAYS       CULTURE, BLOOD [[id number]] Collected: 04/09/22 1837    Order Status: Completed Specimen: Blood Updated: 04/14/22 0616     Special Requests: NO SPECIAL REQUESTS        Culture result: NO GROWTH 5 DAYS       COVID-19 RAPID TEST [[id number]] Collected: 04/13/22 1205    Order Status: Completed Specimen: Nasopharyngeal Updated: 04/13/22 1253     Specimen source Nasopharyngeal        COVID-19 rapid test Not detected        Comment: Rapid Abbott ID Now       Rapid NAAT:  The specimen is NEGATIVE for SARS-CoV-2, the novel coronavirus associated with COVID-19. Negative results should be treated as presumptive and, if inconsistent with clinical signs and symptoms or necessary for patient management, should be tested with an alternative molecular assay. Negative results do not preclude SARS-CoV-2 infection and should not be used as the sole basis for patient management decisions. This test has been authorized by the FDA under an Emergency Use Authorization (EUA) for use by authorized laboratories. Fact sheet for Healthcare Providers: ConventionUpdate.co.nz  Fact sheet for Patients: ConventionUpdate.co.nz       Methodology: Isothermal Nucleic Acid Amplification         MRSA CULTURE [[id number]] Collected: 04/10/22 1115    Order Status: Canceled Specimen: Nasal from [address] [[id number]] Collected: 04/09/22 2300    Order Status: Completed Specimen: Urine from Serum Updated: 04/09/22 2310     Urine culture hold       Urine on hold in Microbiology dept for 2 days. If unpreserved urine is submitted, it cannot be used for addtional testing after 24 hours, recollection will be required. I have reviewed notes of prior 24hr. Other pertinent lab: Total time spent with patient: 28 I personally reviewed chart, notes, data and current medications in the medical record. I have personally examined and treated the patient at bedside during this period. Care Plan discussed with: Patient, Family, Nursing Staff and >50% of time spent in counseling and coordination of care    Discussed:  Care Plan    Prophylaxis:  Lovenox    Disposition:  Home w/Family           ___________________________________________________    Attending Physician: Kali Benoit MD

## 2022-04-14 NOTE — PROGRESS NOTES
Update:    Pt informed attending that she prefers to go to [facility]. I contacted pt.'s son,Tristan Hamilton @ [phone] to discuss disposition. Son also wants pt to go to inpatient rehab. I discussed this with attending. Clinicals faxed through [facility] to [facility]. I also left a voice mail with the admissions department @ [facility]. I updated son . Once [facility] has made a determination,I will update son. I reiterated with pt,'s son that PT and OT are recommending SNF. Son stated that if [facility] does not accept pt,then he is okay with [facility].     Shira Velazco  Perfect Serve  [phone]

## 2022-04-14 NOTE — PROGRESS NOTES
Provided pastoral care visit to [facility] 5 patient. Did not include sacramental care    Fr. Iveth Atkinson.

## 2022-04-14 NOTE — DISCHARGE SUMMARY
Hospitalist Discharge Summary     Patient ID:    Alphonse Mauricio  [id number]  45 y.o.  6/14/1925    Admit date of service: 4/9/2022    Discharge date of service: 4/14/2022    Admission Diagnoses: Acute pancreatitis [K85.90]    Chronic Diagnoses:    Problem List as of 4/14/2022 Date Reviewed: 9/26/2015          Codes Class Noted - Resolved    Acute pancreatitis ICD-10-CM: K85.90  ICD-9-CM: 291.5  4/9/2022 - Present        Back pain ICD-10-CM: M54.9  ICD-9-CM: 724.5  4/17/2012 - Present    Overview Addendum 4/17/2012  1:48 PM by Nena Reyes [facility]  MRI 3/15/2012:  1. Degenerative grade 1 anterolisthesis of L4 on L5.  2. Mild central spinal canal stenosis of L2-L3 through L4-L5. Stage III CKD - stable (5/2011) Secondary tHTN + Nephrosclerosis  - Dr. Filiberto Lyons (Nephrologist) ICD-10-CM: R79.89  ICD-9-CM: 790.99  4/19/2011 - Present        Osteoporosis, post-menopausal - Bone Density 11/30/2010 ICD-10-CM: M81.0  ICD-9-CM: 733.01  12/10/2010 - Present        St[facility] ([facility] ICD-10-CM: I63.9  ICD-9-CM: 434.91  5/21/2010 - Present        High cholesterol ICD-10-CM: E78.00  ICD-9-CM: 272.0  5/21/2010 - Present    Overview Signed 12/13/2011 12:17 PM by Nena Velazquez     LDL goal < 70             DVT (deep venous thrombosis) ([facility] ICD-10-CM: I82.409  ICD-9-CM: 453.40  5/21/2010 - Present    Overview Signed 3/1/2012 11:32 AM by Nena Velazquez     Coumadin for life  Several DVTs  Most recent one 2004             LBBB (left bundle branch block) ICD-10-CM: I44.7  ICD-9-CM: 426.3  5/21/2010 - Present        Pancreatitis ICD-10-CM: K85.90  ICD-9-CM: 577.0  5/21/2010 - Present              Discharge Medications:   Current Discharge Medication List      START taking these medications    Details   midodrine (PROAMATINE) 10 mg tablet Take 1 Tablet by mouth three (3) times daily (with meals) for 30 days.   Qty: 90 Tablet, Refills: 0         CONTINUE these medications which have CHANGED    Details   gabapentin (NEURONTIN) 100 mg capsule Take 2 Capsules by mouth two (2) times a day. Max Daily Amount: 400 mg. Qty: 20 Capsule, Refills: 0    Associated Diagnoses: Chronic bilateral low back pain with bilateral sciatica         CONTINUE these medications which have NOT CHANGED    Details   ferrous sulfate (Slow Fe) 142 mg (45 mg iron) ER tablet Take 142 mg by mouth Daily (before breakfast). aspirin delayed-release 81 mg tablet Take 81 mg by mouth daily. pantoprazole (PROTONIX) 40 mg tablet Take 40 mg by mouth daily. amitriptyline (ELAVIL) 25 mg tablet Take 25 mg by mouth nightly. ondansetron (ZOFRAN ODT) 4 mg disintegrating tablet Take 4 mg by mouth every six (6) hours as needed for Nausea or Vomiting.      benzonatate (TESSALON) 100 mg capsule Take 100 mg by mouth three (3) times daily as needed for Cough. levothyroxine (SYNTHROID) 25 mcg tablet Take 25 mcg by mouth Daily (before breakfast). Associated Diagnoses: Gait disturbance      atorvastatin (LIPITOR) 40 mg tablet Take 40 mg by mouth daily. acetaminophen (TYLENOL ARTHRITIS PAIN) 650 mg CR tablet Take 650 mg by mouth every six (6) hours as needed for Pain.      pyridoxine (VITAMIN B-6) 25 mg tablet Take 25 mg by mouth daily. MV-Min-Folic Acid-Lutein (CENTRUM SILVER) 500-250 mcg Chew Take 1 Tablet by mouth daily. latanoprost (XALATAN) 0.005 % ophthalmic solution Administer 1 Drop to left eye every evening. one drop in left eye in the evening         STOP taking these medications       sacubitriL-valsartan (Entresto)  mg tablet Comments:   Reason for Stopping:         metoprolol succinate (TOPROL-XL) 25 mg XL tablet Comments:   Reason for Stopping:         ANTIOX #8/OM3/DHA/EPA/LUT/ZEAX (PRESERVISION AREDS 2, OMEGA-3, PO) Comments:   Reason for Stopping:         famotidine (PEPCID) 40 mg tablet Comments:   Reason for Stopping: Follow up Care:    1. Hien Sánchez MD in 1-2 weeks  2.       Diet:  Regular Diet    Disposition:  SNF. Advanced Directive:    Discharge Exam:  See today's note. CONSULTATIONS: Pulmonary/Intensive care and GI    Significant Diagnostic Studies:   Recent Labs     04/14/22  0340 04/13/22  0541   WBC 7.7 9.2   HGB 8.5* 9.6*   HCT 26.8* 31.0*    212     Recent Labs     04/14/22  0340 04/13/22  0541 04/12/22  0610 04/11/22  1539 04/11/22  1539    146* 145   < > 144   K 4.6 4.5 4.3   < > 3.4*   * 116* 119*   < > 119*   CO2 25 22 20*   < > 18*   BUN 28* 30* 38*   < > 44*   CREA 1.03* 1.00 1.09*   < > 1.25*   * 91 81   < > 191*   CA 8.2* 7.9* 7.2*   < > 7.2*   MG  --   --  2.7*  --  1.5*    < > = values in this interval not displayed. No results for input(s): ALT, AP, TBIL, TBILI, TP, ALB, GLOB, GGT, AML, LPSE in the last 72 hours. No lab exists for component: SGOT, GPT, AMYP, HLPSE  No results for input(s): INR, PTP, APTT, INREXT in the last 72 hours. No results for input(s): FE, TIBC, PSAT, FERR in the last 72 hours. No results for input(s): PH, PCO2, PO2 in the last 72 hours. No results for input(s): CPK, CKMB in the last 72 hours. No lab exists for component: TROPONINI  Lab Results   Component Value Date/Time    Glucose (POC) 207 (H) 04/11/2022 11:07 AM    Glucose (POC) 234 (H) 04/11/2022 07:59 AM    Glucose (POC) 59 (L) 04/11/2022 05:43 AM    Glucose (POC) 210 (H) 05/12/2009 04:35 PM    Glucose (POC) 107 (H) 05/07/2009 04:33 PM             HOSPITAL COURSE & TREATMENT RENDERED:   1. Acute pancreatitis/ abdominal pain. Burning pain epigastric area with radiating to the back and chest. Resolved. Tolerating diet. Normal triglycerides. Evaluated by GI. Pt doesn't want EGD      2. Hypotension. on midodrine. Off ruchi. 3.  EVERT on CKD stage 3. likely due to volume depletion. Improving with IVF.      4.  Metabolic acidosis. This is most likely due to above/ volume depletion. Lactic acid is normal. Resolved with bicarb drip      5. Anemia.  Iron deficient by serology. Pt refused iron infusion. 6.  Hx of chronic systolic CHF/ Elevated BNP. EF is 35%. Compensated. Watch fluid status. On entresto, which is on hold due to low BP.      7.  RT kidney lesion/ mass. Pt doesn't want further workup. Discussed this with pt and her son      8. Chronic respiratory failure with hypoxia due to chronic systolic CHF. Uses 4LPM of O2. Continue supplement O2.       code status: DNR           Discharged in stable condition.     Spent 35 minutes    Signed:  Darcie Boss MD  4/14/2022  11:34 AM

## 2022-04-14 NOTE — PROGRESS NOTES
[facility] visit. Mrs. Ilan Shook talked about being discharged to rehab this afternoon. She is concerned about her  who suffers with dementia. She says she tries to call him many times during the day. Prayer and communion offered.     SATURNINO Bonilla, RN, ACSW, LCSW   Page:  [phone]

## 2022-04-14 NOTE — PROGRESS NOTES
Problem: Pressure Injury - Risk of  Goal: *Prevention of pressure injury  Description: Document Destin Scale and appropriate interventions in the flowsheet. Outcome: Not Progressing Towards Goal  Note: Pressure Injury Interventions:  Sensory Interventions: Float heels    Moisture Interventions: Absorbent underpads,Limit adult briefs    Activity Interventions: Increase time out of bed,Pressure redistribution bed/mattress(bed type),PT/OT evaluation    Mobility Interventions: HOB 30 degrees or less,Pressure redistribution bed/mattress (bed type)    Nutrition Interventions: Document food/fluid/supplement intake    Friction and Shear Interventions: Lift team/patient mobility team                Problem: Falls - Risk of  Goal: *Absence of Falls  Description: Document Rei Fall Risk and appropriate interventions in the flowsheet.   Outcome: Progressing Towards Goal  Note: Fall Risk Interventions:  Mobility Interventions: Patient to call before getting OOB    Mentation Interventions: Room close to nurse's station    Medication Interventions: Patient to call before getting OOB    Elimination Interventions: Call light in reach    History of Falls Interventions: Room close to nurse's station         Problem: Hypotension  Goal: *Blood pressure within specified parameters  Outcome: Progressing Towards Goal

## 2022-04-14 NOTE — PROGRESS NOTES
Bedside shift change report given to A (oncoming nurse) by Julio Rocha. [facility] (offgoing nurse). Report included the following information SBAR, Kardex, and MAR. Problem: Pressure Injury - Risk of  Goal: *Prevention of pressure injury  Description: Document Destin Scale and appropriate interventions in the flowsheet. Outcome: Progressing Towards Goal  Note: Pressure Injury Interventions:  Sensory Interventions: Assess changes in LOC,Avoid rigorous massage over bony prominences,Discuss PT/OT consult with provider,Keep linens dry and wrinkle-free,Minimize linen layers,Pad between skin to skin,Pressure redistribution bed/mattress (bed type),Turn and reposition approx. every two hours (pillows and wedges if needed),Use 30-degree side-lying position    Moisture Interventions: Absorbent underpads,Limit adult briefs    Activity Interventions: Increase time out of bed,Pressure redistribution bed/mattress(bed type),PT/OT evaluation    Mobility Interventions: Float heels,HOB 30 degrees or less,Pressure redistribution bed/mattress (bed type),PT/OT evaluation    Nutrition Interventions: Document food/fluid/supplement intake    Friction and Shear Interventions: Feet elevated on foot rest,Foam dressings/transparent film/skin sealants,HOB 30 degrees or less,Lift team/patient mobility team                Problem: Falls - Risk of  Goal: *Absence of Falls  Description: Document Rei Fall Risk and appropriate interventions in the flowsheet.   Outcome: Progressing Towards Goal  Note: Fall Risk Interventions:  Mobility Interventions: Patient to call before getting OOB    Mentation Interventions: Room close to nurse's station    Medication Interventions: Patient to call before getting OOB    Elimination Interventions: Call light in reach    History of Falls Interventions: Room close to nurse's station

## 2022-04-14 NOTE — PROGRESS NOTES
Son notified that ambulance is here to transport pt to [facility] SNF. I emailed son some resources (personal care aides) to [email or web address]    Nelida Swanson

## 2022-04-14 NOTE — PROGRESS NOTES
[facility] pt is SNF appropriate. If pt improves @ SNF. then inpatient rehab will be willing to consider accepting pt from SNF . I updated son with the above information. Pt will be admitted to room 37 A. Number to call nurse to nurse report is [phone] and ask for unit where 37 A is located. Covid results and AMD faxed to [facility] @ [phone]. PCS for pt transport prepared. DDNR completed. List of personal care aides give to pt for son to line up caregivers @ home eventually.     Arvind Franco

## 2022-04-14 NOTE — PROGRESS NOTES
Comprehensive Nutrition Assessment    Type and Reason for Visit: Reassess    Nutrition Recommendations/Plan:   1. Continue soft & bite sized diet order  2. Provide Ensure Compact once daily to aid in meeting kcal needs (220 kcal, 32 g carbs, 9 g protein)    Nutrition Assessment:   4/14: Follow up. PO intake averaging 25% of all meals. Patient endorses 100% intake of supplement. No N/V/D. Dislikes the texture of current diet. Currently on 4L O2, baseline. Per discussion with CM, patient may discharge this afternoon. If PO intake trend continues, patient likely meeting >/= 60% kcal needs and >/= 68% protein needs. 4/12: Pt is a 80year old female admitted with Acute pancreatitis [K85.90]. She  has a past medical history of Anemia NEC, Arthritis, CAD (coronary artery disease), Chronic pain, Contact dermatitis and other eczema, due to unspecified cause, Diabetes ([facility] [address] Elevated serum creatinine (4/19/2011), GERD (gastroesophageal reflux disease), Hypercholesterolemia, Osteoporosis, post-menopausal (12/10/2010), Stroke ([facility] [address] and Thromboembolus ([facility] [address] No PO intake documented. Patient sitting up in bed eating lunch at time of RD visit. States her appetite has been poor x 1 week. Denies N/V/D at this time. Shellfish allergy. States -115# - lack of recent weight hx. Denies weight changes within last year. States her abd pain was due to abx. Voiced acceptance of Ensure once daily, though she prefers Boost. On 4 L O2, baseline. Wt Readings from Last 10 Encounters:   04/10/22 51.3 kg (113 lb)   05/22/17 62.3 kg (137 lb 6.4 oz)   09/29/15 58.6 kg (129 lb 1.6 oz)   09/05/15 58.1 kg (128 lb)   08/31/15 58.1 kg (128 lb)   05/15/12 57.2 kg (126 lb)   03/19/12 61.2 kg (135 lb)   03/06/12 63 kg (139 lb)   03/01/12 62.1 kg (137 lb)   05/31/11 59.5 kg (131 lb 3.2 oz)       Malnutrition Assessment:  Malnutrition Status:   Moderate malnutrition    Context:  Acute illness     Findings of the 6 clinical characteristics of malnutrition:   Energy Intake:  1 - 75% or less of est energy req for 7 or more days  Weight Loss:  No significant weight loss     Body Fat Loss:  1 - Mild body fat loss, Fat overlying ribs   Muscle Mass Loss:  1 - Mild muscle mass loss, Clavicles (pectoralis & deltoids),Temples (temporalis)  Fluid Accumulation:  No significant fluid accumulation,     Strength:  Not performed       Estimated Daily Nutrient Needs:  Energy (kcal): 1238 (MSJ x 1.2 + 250); Weight Used for Energy Requirements: Current  Protein (g): 51-61 (1.0-1.2); Weight Used for Protein Requirements: Current  Fluid (ml/day): 1238; Method Used for Fluid Requirements: 1 ml/kcal    Nutrition Related Findings:       ABD: Fair appetite with active bowel sounds  Last BM: 04/14/22, Loose  Edema:Facial: No Edema (4/14/2022  4:00 AM)  Generalized: Trace (4/14/2022  4:00 AM)  LLE: Non-pitting (4/14/2022  8:30 AM)  LUE: Non-pitting (4/14/2022  8:30 AM)  RLE: Non-pitting (4/14/2022  8:30 AM)  RUE: Non-pitting (4/14/2022  8:30 AM)      Nutr. Labs:    Lab Results   Component Value Date/Time    GFR est AA >60 04/14/2022 03:40 AM    GFR est non-AA 50 (L) 04/14/2022 03:40 AM    Creatinine (POC) 1.5 (H) 03/02/2016 12:08 PM    Creatinine 1.03 (H) 04/14/2022 03:40 AM    BUN 28 (H) 04/14/2022 03:40 AM    Sodium 145 04/14/2022 03:40 AM    Potassium 4.6 04/14/2022 03:40 AM    Chloride 116 (H) 04/14/2022 03:40 AM    CO2 25 04/14/2022 03:40 AM       Lab Results   Component Value Date/Time    Glucose 119 (H) 04/14/2022 03:40 AM    Glucose (POC) 207 (H) 04/11/2022 11:07 AM       No results found for: HBA1C, [id number], IEX6TYOI, [id number]      Nutr.  Meds:  Maalox PRN, heparin, synthroid, midodrine, Zofran PRN, protonix, miralax PRN    Wounds:    Multiple (bruises, see wound care note)       Current Nutrition Therapies:  ADULT DIET Dysphagia - Soft & Bite Sized  ADULT ORAL NUTRITION SUPPLEMENT Dinner; Standard 4 oz    Anthropometric Measures:  · Height:  5' (152.4 cm)  · Current Body Wt:  51.3 kg (113 lb)   · Admission Body Wt:  113 lb    · Usual Body Wt:        · Ideal Body Wt:  100 lbs:  113 %   Body mass index is 22.07 kg/m².   · BMI Category:  Normal weight (BMI 22.0-24.9) age over 72       Nutrition Diagnosis:   · Moderate malnutrition related to inadequate protein-energy intake (predicted) as evidenced by moderate loss of subcutaneous fat,mild muscle loss    Nutrition Interventions:   Food and/or Nutrient Delivery: Continue current diet,Start oral nutrition supplement  Nutrition Education and Counseling: No recommendations at this time  Coordination of Nutrition Care: Continue to monitor while inpatient,Interdisciplinary rounds    Goals:  PO intake>/= 75% estimated needs within 3-5 days       Nutrition Monitoring and Evaluation:   Behavioral-Environmental Outcomes: None identified  Food/Nutrient Intake Outcomes: Food and nutrient intake,Supplement intake  Physical Signs/Symptoms Outcomes: Hemodynamic status    Discharge Planning:    Continue oral nutrition supplement     Electronically signed by Daniel Luna RD on 6/31/7653  Contact: [phone] or via [employer]

## 2022-04-14 NOTE — PROGRESS NOTES
[facility]  [address] [facility], [place], [id number] [place] [address]  [phone]      Medical Progress Note      NAME: Isac Hughes   :  1925  MRM:  [id number]    Date of service: 2022  6:44 AM       Assessment and Plan:   1. Acute pancreatitis/ abdominal pain. Burning pain epigastric area with radiating to the back and chest. Resolved. Tolerating diet. Normal triglycerides. Evaluated by GI. Pt doesn't want EGD      2. Hypotension. Started on midodrine. Off ruchi. 3.  EVERT on CKD stage 3. likely due to volume depletion. Improving with IVF. 4.  Metabolic acidosis. This is most likely due to above/ volume depletion. Lactic acid is normal. Resolved with bicarb drip     5. Anemia. Iron deficient by serology. Pt refused iron infusion. 6.  Hx of chronic systolic CHF/ Elevated BNP. EF is 35%. Compensated. Watch fluid status. On entresto, which is on hold due to low BP.     7.  RT kidney lesion/ mass. Pt doesn't want further workup. Discussed this with pt and her son     8. Chronic respiratory failure with hypoxia due to chronic systolic CHF. Uses 4LPM of O2. Continue supplement O2.       code status: DNR       Subjective:     Chief Complaint[de-identified] Patient was seen and examined as a follow up for pancreatitis. Chart was reviewed. abdominal pian is better. Wants to go to [facility]    ROS:  (bold if positive, if negative)    Tolerating PT  Tolerating Diet        Objective:     Last 24hrs VS reviewed since prior progress note.  Most recent are:    Visit Vitals  /65   Pulse (!) 103   Temp 97.9 °F (36.6 °C)   Resp 17   Ht 5' (1.524 m)   Wt 51.3 kg (113 lb)   SpO2 94%   BMI 22.07 kg/m²     SpO2 Readings from Last 6 Encounters:   22 94%   17 94%   09/29/15 99%   08/31/15 95%   12 95%   11 97%    O2 Flow Rate (L/min): 4 l/min       Intake/Output Summary (Last 24 hours) at 2022 0841  Last data filed at 2022 1939  Gross per 24 hour   Intake 1097.81 ml Output 201 ml   Net 896.81 ml        Physical Exam:    Gen:  Well-developed, well-nourished, in no acute distress  HEENT:  Pink conjunctivae, PERRL, hearing intact to voice, moist mucous membranes  Neck:  Supple, without masses, thyroid non-tender  Resp:  No accessory muscle use, clear breath sounds without wheezes rales or rhonchi  Card:  No murmurs, normal S1, S2 without thrills, bruits or peripheral edema  Abd:  Soft, non-tender, non-distended, normoactive bowel sounds are present, no palpable organomegaly and no detectable hernias  Lymph:  No cervical or inguinal adenopathy  Musc:  No cyanosis or clubbing  Skin:  No rashes or ulcers, skin turgor is good  Neuro:  Cranial nerves are grossly intact, no focal motor weakness, follows commands appropriately  Psych:  Good insight, oriented to person, place and time, alert  __________________________________________________________________  Medications Reviewed: (see below)  Medications:     Current Facility-Administered Medications   Medication Dose Route Frequency    PHENYLephrine (POORNIMA-SYNEPHRINE) 30 mg in 0.9% sodium chloride 250 mL infusion   mcg/min IntraVENous TITRATE    amitriptyline (ELAVIL) tablet 25 mg  25 mg Oral QHS    midodrine (PROAMATINE) tablet 10 mg  10 mg Oral TID WITH MEALS    glucose chewable tablet 16 g  4 Tablet Oral PRN    glucagon (GLUCAGEN) injection 1 mg  1 mg IntraMUSCular PRN    dextrose 10% infusion 0-250 mL  0-250 mL IntraVENous PRN    latanoprost (XALATAN) 0.005 % ophthalmic solution 1 Drop  1 Drop Left Eye QPM    aspirin delayed-release tablet 81 mg  81 mg Oral DAILY    benzonatate (TESSALON) capsule 100 mg  100 mg Oral TID PRN    levothyroxine (SYNTHROID) tablet 25 mcg  25 mcg Oral ACB    gabapentin (NEURONTIN) capsule 200 mg  200 mg Oral QPM    pantoprazole (PROTONIX) tablet 40 mg  40 mg Oral ACB&D    aluminum-magnesium hydroxide (MAALOX) oral suspension 15 mL  15 mL Oral QID PRN    ondansetron (ZOFRAN) injection 4 mg  4 mg IntraVENous Q1H PRN    sodium chloride (NS) flush 5-40 mL  5-40 mL IntraVENous Q8H    sodium chloride (NS) flush 5-40 mL  5-40 mL IntraVENous PRN    acetaminophen (TYLENOL) tablet 650 mg  650 mg Oral Q6H PRN    Or    acetaminophen (TYLENOL) suppository 650 mg  650 mg Rectal Q6H PRN    polyethylene glycol (MIRALAX) packet 17 g  17 g Oral DAILY PRN    ondansetron (ZOFRAN ODT) tablet 4 mg  4 mg Oral Q8H PRN    Or    ondansetron (ZOFRAN) injection 4 mg  4 mg IntraVENous Q6H PRN    heparin (porcine) injection 5,000 Units  5,000 Units SubCUTAneous Q8H        Lab Data Reviewed: (see below)  Lab Review:     Recent Labs     04/14/22 0340 04/13/22  0541 04/12/22  0610   WBC 7.7 9.2 7.4   HGB 8.5* 9.6* 8.6*   HCT 26.8* 31.0* 27.6*    212 201     Recent Labs     04/14/22  0340 04/13/22  0541 04/12/22  0610 04/11/22  1539 04/11/22  1539    146* 145   < > 144   K 4.6 4.5 4.3   < > 3.4*   * 116* 119*   < > 119*   CO2 25 22 20*   < > 18*   * 91 81   < > 191*   BUN 28* 30* 38*   < > 44*   CREA 1.03* 1.00 1.09*   < > 1.25*   CA 8.2* 7.9* 7.2*   < > 7.2*   MG  --   --  2.7*  --  1.5*    < > = values in this interval not displayed. Lab Results   Component Value Date/Time    Glucose (POC) 207 (H) 04/11/2022 11:07 AM    Glucose (POC) 234 (H) 04/11/2022 07:59 AM    Glucose (POC) 59 (L) 04/11/2022 05:43 AM    Glucose (POC) 210 (H) 05/12/2009 04:35 PM    Glucose (POC) 107 (H) 05/07/2009 04:33 PM     No results for input(s): PH, PCO2, PO2, HCO3, FIO2 in the last 72 hours. No results for input(s): INR, INREXT, INREXT in the last 72 hours.   All Micro Results     Procedure Component Value Units Date/Time    CULTURE, BLOOD [[id number]] Collected: 04/10/22 0346    Order Status: Completed Specimen: Blood Updated: 04/14/22 0616     Special Requests: NO SPECIAL REQUESTS        Culture result: NO GROWTH 4 DAYS       CULTURE, BLOOD [[id number]] Collected: 04/09/22 1837    Order Status: Completed Specimen: Blood Updated: 04/14/22 0616     Special Requests: NO SPECIAL REQUESTS        Culture result: NO GROWTH 5 DAYS       COVID-19 RAPID TEST [[id number]] Collected: 04/13/22 1205    Order Status: Completed Specimen: Nasopharyngeal Updated: 04/13/22 1253     Specimen source Nasopharyngeal        COVID-19 rapid test Not detected        Comment: Rapid Abbott ID Now       Rapid NAAT:  The specimen is NEGATIVE for SARS-CoV-2, the novel coronavirus associated with COVID-19. Negative results should be treated as presumptive and, if inconsistent with clinical signs and symptoms or necessary for patient management, should be tested with an alternative molecular assay. Negative results do not preclude SARS-CoV-2 infection and should not be used as the sole basis for patient management decisions. This test has been authorized by the FDA under an Emergency Use Authorization (EUA) for use by authorized laboratories. Fact sheet for Healthcare Providers: ConventionUpdate.co.nz  Fact sheet for Patients: ConventionUpdate.co.nz       Methodology: Isothermal Nucleic Acid Amplification         MRSA CULTURE [[id number]] Collected: 04/10/22 1115    Order Status: Canceled Specimen: Nasal from [address] [[id number]] Collected: 04/09/22 2300    Order Status: Completed Specimen: Urine from Serum Updated: 04/09/22 2310     Urine culture hold       Urine on hold in Microbiology dept for 2 days. If unpreserved urine is submitted, it cannot be used for addtional testing after 24 hours, recollection will be required. I have reviewed notes of prior 24hr. Other pertinent lab: Total time spent with patient: 28 I personally reviewed chart, notes, data and current medications in the medical record. I have personally examined and treated the patient at bedside during this period.                  Care Plan discussed with: Patient, Family, Nursing Staff and >50% of time spent in counseling and coordination of care    Discussed:  Care Plan    Prophylaxis:  Lovenox    Disposition:  Home w/Family           ___________________________________________________    Attending Physician: Martita Rodriguez MD

## 2022-04-14 NOTE — DISCHARGE INSTRUCTIONS
ACUTE DIAGNOSES:  Acute pancreatitis [K85.90]    CHRONIC MEDICAL DIAGNOSES:  Problem List as of 4/14/2022 Date Reviewed: 9/26/2015          Codes Class Noted - Resolved    Acute pancreatitis ICD-10-CM: K85.90  ICD-9-CM: 763.4  4/9/2022 - Present        Back pain ICD-10-CM: M54.9  ICD-9-CM: 724.5  4/17/2012 - Present    Overview Addendum 4/17/2012  1:48 PM by Heather Villagran  MRI 3/15/2012:  1. Degenerative grade 1 anterolisthesis of L4 on L5.  2. Mild central spinal canal stenosis of L2-L3 through L4-L5. Stage III CKD - stable (5/2011) Secondary tHTN + Nephrosclerosis  - Dr. Vincent Durham (Nephrologist) ICD-10-CM: R79.89  ICD-9-CM: 790.99  4/19/2011 - Present        Osteoporosis, post-menopausal - Bone Density 11/30/2010 ICD-10-CM: M81.0  ICD-9-CM: 733.01  12/10/2010 - Present        Stroke ([facility] ICD-10-CM: I63.9  ICD-9-CM: 434.91  5/21/2010 - Present        High cholesterol ICD-10-CM: E78.00  ICD-9-CM: 272.0  5/21/2010 - Present    Overview Signed 12/13/2011 12:17 PM by Heather Marc     LDL goal < 70             DV[facility] ICD-10-CM: I82.409  ICD-9-CM: 453.40  5/21/2010 - Present    Overview Signed 3/1/2012 11:32 AM by Heather Marc     Coumadin for life  Several DVTs  Most recent one 2004             LBBB (left bundle branch block) ICD-10-CM: I44.7  ICD-9-CM: 426.3  5/21/2010 - Present        Pancreatitis ICD-10-CM: K85.90  ICD-9-CM: 577.0  5/21/2010 - Present              DISCHARGE MEDICATIONS:          · It is important that you take the medication exactly as they are prescribed. · Keep your medication in the bottles provided by the pharmacist and keep a list of the medication names, dosages, and times to be taken in your wallet. · Do not take other medications without consulting your doctor.        DIET:  Regular Diet    ACTIVITY: Activity as tolerated    ADDITIONAL INFORMATION: If you experience any of the following symptoms then please call your primary care physician or return to the emergency room if you cannot get hold of your doctor: Fever, chills, nausea, vomiting, diarrhea, change in mentation, falling, bleeding, shortness of breath. FOLLOW UP CARE:  Dr. Gonzalez Myers MD  you are to call and set up an appointment to see them in 5 days. Information obtained by :  I understand that if any problems occur once I am at home I am to contact my physician. I understand and acknowledge receipt of the instructions indicated above.                                                                                                                                            Physician's or R.N.'s Signature                                                                  Date/Time                                                                                                                                              Patient or Representative Signature                                                          Date/Time

## 2022-04-14 NOTE — PROGRESS NOTES
Verbal shift change report given to Will (oncoming nurse) by Domi López (offgoing nurse). Report included the following information SBAR and Kardex. TRANSFER - OUT REPORT:    Verbal report given to Jctyrese (name) on Rosalino Shearer  being transferred to SNF (unit) for routine progression of care       Report consisted of patients Situation, Background, Assessment and   Recommendations(SBAR). Information from the following report(s) SBAR, Kardex, Intake/Output, MAR, Recent Results and Cardiac Rhythm Paced was reviewed with the receiving nurse. Lines:   Peripheral IV 04/09/22 Right Arm (Active)   Site Assessment Clean, dry, & intact 04/14/22 0400   Phlebitis Assessment 0 04/14/22 0400   Infiltration Assessment 0 04/14/22 0400   Dressing Status Clean, dry, & intact 04/14/22 0400   Dressing Type Transparent 04/14/22 0400   Hub Color/Line Status Pink;Flushed 04/14/22 0400   Action Taken Open ports on tubing capped 04/14/22 0400   Alcohol Cap Used Yes 04/14/22 0400       Peripheral IV 04/11/22 Anterior; Left Forearm (Active)   Site Assessment Clean, dry, & intact 04/14/22 0400   Phlebitis Assessment 0 04/14/22 0400   Infiltration Assessment 0 04/14/22 0400   Dressing Status Clean, dry, & intact 04/14/22 0400   Dressing Type Transparent 04/14/22 0400   Hub Color/Line Status Blue;Flushed 04/14/22 0400   Action Taken Open ports on tubing capped 04/14/22 0400   Alcohol Cap Used Yes 04/14/22 0400        Opportunity for questions and clarification was provided. Patient transported with:   O2 @ 4 liters      1410 - [facility] on unit to p/u pt, paperwork & update given, opportunity for questions & clarification provided.   Pt belongings in bag along with eye drops

## 2022-04-15 ENCOUNTER — PATIENT OUTREACH (OUTPATIENT)
Dept: CASE MANAGEMENT | Age: 87
End: 2022-04-15

## 2022-04-15 NOTE — PROGRESS NOTES
Per EMR patient discharged to [facility]. Transition of care outreach postponed for 14 days due to patient's discharge to SNF. Will continue to monitor patient progress.

## 2022-04-16 LAB
BACTERIA SPEC CULT: NORMAL
BACTERIA SPEC CULT: NORMAL
SERVICE CMNT-IMP: NORMAL
SERVICE CMNT-IMP: NORMAL

## 2022-04-21 ENCOUNTER — PATIENT OUTREACH (OUTPATIENT)
Dept: CASE MANAGEMENT | Age: 87
End: 2022-04-21

## 2022-04-22 ENCOUNTER — APPOINTMENT (OUTPATIENT)
Dept: GENERAL RADIOLOGY | Age: 87
DRG: 871 | End: 2022-04-22
Attending: EMERGENCY MEDICINE
Payer: MEDICARE

## 2022-04-22 ENCOUNTER — HOSPITAL ENCOUNTER (INPATIENT)
Age: 87
LOS: 10 days | Discharge: NURSING, REHAB OR LONG-TERM CARE | DRG: 871 | End: 2022-05-02
Attending: EMERGENCY MEDICINE | Admitting: INTERNAL MEDICINE
Payer: MEDICARE

## 2022-04-22 ENCOUNTER — APPOINTMENT (OUTPATIENT)
Dept: CT IMAGING | Age: 87
DRG: 871 | End: 2022-04-22
Attending: EMERGENCY MEDICINE
Payer: MEDICARE

## 2022-04-22 DIAGNOSIS — R26.9 GAIT DISTURBANCE: ICD-10-CM

## 2022-04-22 DIAGNOSIS — M54.42 CHRONIC BILATERAL LOW BACK PAIN WITH BILATERAL SCIATICA: ICD-10-CM

## 2022-04-22 DIAGNOSIS — R65.21 SEPTIC SHOCK (HCC): ICD-10-CM

## 2022-04-22 DIAGNOSIS — G89.29 CHRONIC BILATERAL LOW BACK PAIN WITH BILATERAL SCIATICA: ICD-10-CM

## 2022-04-22 DIAGNOSIS — I50.23 ACUTE ON CHRONIC SYSTOLIC (CONGESTIVE) HEART FAILURE (HCC): ICD-10-CM

## 2022-04-22 DIAGNOSIS — Z71.89 GOALS OF CARE, COUNSELING/DISCUSSION: ICD-10-CM

## 2022-04-22 DIAGNOSIS — I44.7 LBBB (LEFT BUNDLE BRANCH BLOCK): ICD-10-CM

## 2022-04-22 DIAGNOSIS — N30.01 ACUTE CYSTITIS WITH HEMATURIA: Primary | ICD-10-CM

## 2022-04-22 DIAGNOSIS — R53.1 WEAKNESS GENERALIZED: ICD-10-CM

## 2022-04-22 DIAGNOSIS — R79.89 ELEVATED LFTS: ICD-10-CM

## 2022-04-22 DIAGNOSIS — M54.41 CHRONIC BILATERAL LOW BACK PAIN WITH BILATERAL SCIATICA: ICD-10-CM

## 2022-04-22 DIAGNOSIS — A41.9 SEPTIC SHOCK (HCC): ICD-10-CM

## 2022-04-22 DIAGNOSIS — J90 PLEURAL EFFUSION: ICD-10-CM

## 2022-04-22 PROBLEM — N17.9 AKI (ACUTE KIDNEY INJURY) (HCC): Status: ACTIVE | Noted: 2022-04-22

## 2022-04-22 PROBLEM — N39.0 UTI (URINARY TRACT INFECTION): Status: ACTIVE | Noted: 2022-04-22

## 2022-04-22 LAB
ALBUMIN SERPL-MCNC: 2 G/DL (ref 3.5–5)
ALBUMIN/GLOB SERPL: 0.6 {RATIO} (ref 1.1–2.2)
ALP SERPL-CCNC: 865 U/L (ref 45–117)
ALT SERPL-CCNC: 222 U/L (ref 12–78)
ANION GAP SERPL CALC-SCNC: 7 MMOL/L (ref 5–15)
APPEARANCE UR: ABNORMAL
AST SERPL-CCNC: 1042 U/L (ref 15–37)
ATRIAL RATE: 73 BPM
BACTERIA URNS QL MICRO: ABNORMAL /HPF
BASOPHILS # BLD: 0 K/UL (ref 0–0.1)
BASOPHILS NFR BLD: 0 % (ref 0–1)
BILIRUB SERPL-MCNC: 1.7 MG/DL (ref 0.2–1)
BILIRUB UR QL: NEGATIVE
BNP SERPL-MCNC: ABNORMAL PG/ML
BUN SERPL-MCNC: 21 MG/DL (ref 6–20)
BUN/CREAT SERPL: 15 (ref 12–20)
CALCIUM SERPL-MCNC: 8.8 MG/DL (ref 8.5–10.1)
CALCULATED P AXIS, ECG09: 79 DEGREES
CALCULATED R AXIS, ECG10: -66 DEGREES
CALCULATED T AXIS, ECG11: 81 DEGREES
CHLORIDE SERPL-SCNC: 110 MMOL/L (ref 97–108)
CO2 SERPL-SCNC: 25 MMOL/L (ref 21–32)
COLOR UR: ABNORMAL
COMMENT, HOLDF: NORMAL
COMMENT, HOLDF: NORMAL
CREAT SERPL-MCNC: 1.43 MG/DL (ref 0.55–1.02)
DIAGNOSIS, 93000: NORMAL
DIFFERENTIAL METHOD BLD: ABNORMAL
EOSINOPHIL # BLD: 0 K/UL (ref 0–0.4)
EOSINOPHIL NFR BLD: 0 % (ref 0–7)
EPITH CASTS URNS QL MICRO: ABNORMAL /LPF
ERYTHROCYTE [DISTWIDTH] IN BLOOD BY AUTOMATED COUNT: 12.2 % (ref 11.5–14.5)
GLOBULIN SER CALC-MCNC: 3.1 G/DL (ref 2–4)
GLUCOSE SERPL-MCNC: 88 MG/DL (ref 65–100)
GLUCOSE UR STRIP.AUTO-MCNC: NEGATIVE MG/DL
HCT VFR BLD AUTO: 31.6 % (ref 35–47)
HGB BLD-MCNC: 9.8 G/DL (ref 11.5–16)
HGB UR QL STRIP: ABNORMAL
IMM GRANULOCYTES # BLD AUTO: 0.1 K/UL (ref 0–0.04)
IMM GRANULOCYTES NFR BLD AUTO: 1 % (ref 0–0.5)
KETONES UR QL STRIP.AUTO: NEGATIVE MG/DL
LACTATE SERPL-SCNC: 1.2 MMOL/L (ref 0.4–2)
LACTATE SERPL-SCNC: 2.2 MMOL/L (ref 0.4–2)
LEUKOCYTE ESTERASE UR QL STRIP.AUTO: ABNORMAL
LIPASE SERPL-CCNC: 284 U/L (ref 73–393)
LYMPHOCYTES # BLD: 0.2 K/UL (ref 0.8–3.5)
LYMPHOCYTES NFR BLD: 3 % (ref 12–49)
MCH RBC QN AUTO: 33.8 PG (ref 26–34)
MCHC RBC AUTO-ENTMCNC: 31 G/DL (ref 30–36.5)
MCV RBC AUTO: 109 FL (ref 80–99)
MONOCYTES # BLD: 0.4 K/UL (ref 0–1)
MONOCYTES NFR BLD: 6 % (ref 5–13)
NEUTS SEG # BLD: 6 K/UL (ref 1.8–8)
NEUTS SEG NFR BLD: 90 % (ref 32–75)
NITRITE UR QL STRIP.AUTO: NEGATIVE
NRBC # BLD: 0 K/UL (ref 0–0.01)
NRBC BLD-RTO: 0 PER 100 WBC
P-R INTERVAL, ECG05: 200 MS
PH UR STRIP: 6.5 [PH] (ref 5–8)
PLATELET # BLD AUTO: 396 K/UL (ref 150–400)
PMV BLD AUTO: 10.6 FL (ref 8.9–12.9)
POTASSIUM SERPL-SCNC: 3.6 MMOL/L (ref 3.5–5.1)
PROT SERPL-MCNC: 5.1 G/DL (ref 6.4–8.2)
PROT UR STRIP-MCNC: 30 MG/DL
Q-T INTERVAL, ECG07: 440 MS
QRS DURATION, ECG06: 136 MS
QTC CALCULATION (BEZET), ECG08: 484 MS
RBC # BLD AUTO: 2.9 M/UL (ref 3.8–5.2)
RBC #/AREA URNS HPF: ABNORMAL /HPF (ref 0–5)
RBC MORPH BLD: ABNORMAL
SAMPLES BEING HELD,HOLD: NORMAL
SAMPLES BEING HELD,HOLD: NORMAL
SODIUM SERPL-SCNC: 142 MMOL/L (ref 136–145)
SP GR UR REFRACTOMETRY: 1.01 (ref 1–1.03)
TROPONIN-HIGH SENSITIVITY: 47 NG/L (ref 0–51)
UR CULT HOLD, URHOLD: NORMAL
UROBILINOGEN UR QL STRIP.AUTO: 2 EU/DL (ref 0.2–1)
VENTRICULAR RATE, ECG03: 73 BPM
WBC # BLD AUTO: 6.7 K/UL (ref 3.6–11)
WBC URNS QL MICRO: >100 /HPF (ref 0–4)
YEAST URNS QL MICRO: PRESENT

## 2022-04-22 PROCEDURE — 71045 X-RAY EXAM CHEST 1 VIEW: CPT

## 2022-04-22 PROCEDURE — 96360 HYDRATION IV INFUSION INIT: CPT

## 2022-04-22 PROCEDURE — 87186 SC STD MICRODIL/AGAR DIL: CPT

## 2022-04-22 PROCEDURE — 87077 CULTURE AEROBIC IDENTIFY: CPT

## 2022-04-22 PROCEDURE — 74011000250 HC RX REV CODE- 250: Performed by: INTERNAL MEDICINE

## 2022-04-22 PROCEDURE — 80053 COMPREHEN METABOLIC PANEL: CPT

## 2022-04-22 PROCEDURE — 02HV33Z INSERTION OF INFUSION DEVICE INTO SUPERIOR VENA CAVA, PERCUTANEOUS APPROACH: ICD-10-PCS | Performed by: EMERGENCY MEDICINE

## 2022-04-22 PROCEDURE — 87040 BLOOD CULTURE FOR BACTERIA: CPT

## 2022-04-22 PROCEDURE — 94761 N-INVAS EAR/PLS OXIMETRY MLT: CPT

## 2022-04-22 PROCEDURE — 87086 URINE CULTURE/COLONY COUNT: CPT

## 2022-04-22 PROCEDURE — 74176 CT ABD & PELVIS W/O CONTRAST: CPT

## 2022-04-22 PROCEDURE — 65270000046 HC RM TELEMETRY

## 2022-04-22 PROCEDURE — 99285 EMERGENCY DEPT VISIT HI MDM: CPT

## 2022-04-22 PROCEDURE — 74011250636 HC RX REV CODE- 250/636: Performed by: EMERGENCY MEDICINE

## 2022-04-22 PROCEDURE — 81001 URINALYSIS AUTO W/SCOPE: CPT

## 2022-04-22 PROCEDURE — 83605 ASSAY OF LACTIC ACID: CPT

## 2022-04-22 PROCEDURE — 83690 ASSAY OF LIPASE: CPT

## 2022-04-22 PROCEDURE — 83880 ASSAY OF NATRIURETIC PEPTIDE: CPT

## 2022-04-22 PROCEDURE — 77010033678 HC OXYGEN DAILY

## 2022-04-22 PROCEDURE — 93005 ELECTROCARDIOGRAM TRACING: CPT

## 2022-04-22 PROCEDURE — 85025 COMPLETE CBC W/AUTO DIFF WBC: CPT

## 2022-04-22 PROCEDURE — 84484 ASSAY OF TROPONIN QUANT: CPT

## 2022-04-22 PROCEDURE — 74011250637 HC RX REV CODE- 250/637: Performed by: EMERGENCY MEDICINE

## 2022-04-22 PROCEDURE — 74011250636 HC RX REV CODE- 250/636: Performed by: INTERNAL MEDICINE

## 2022-04-22 PROCEDURE — 36415 COLL VENOUS BLD VENIPUNCTURE: CPT

## 2022-04-22 RX ORDER — PROMETHAZINE HYDROCHLORIDE 25 MG/1
12.5 TABLET ORAL
Status: DISCONTINUED | OUTPATIENT
Start: 2022-04-22 | End: 2022-05-02 | Stop reason: HOSPADM

## 2022-04-22 RX ORDER — BUMETANIDE 0.25 MG/ML
1 INJECTION INTRAMUSCULAR; INTRAVENOUS
Status: DISCONTINUED | OUTPATIENT
Start: 2022-04-22 | End: 2022-04-26

## 2022-04-22 RX ORDER — SODIUM CHLORIDE 0.9 % (FLUSH) 0.9 %
5-40 SYRINGE (ML) INJECTION EVERY 8 HOURS
Status: DISCONTINUED | OUTPATIENT
Start: 2022-04-22 | End: 2022-05-02 | Stop reason: HOSPADM

## 2022-04-22 RX ORDER — SODIUM CHLORIDE 0.9 % (FLUSH) 0.9 %
5-40 SYRINGE (ML) INJECTION AS NEEDED
Status: DISCONTINUED | OUTPATIENT
Start: 2022-04-22 | End: 2022-05-02 | Stop reason: HOSPADM

## 2022-04-22 RX ORDER — ACETAMINOPHEN 650 MG/1
650 SUPPOSITORY RECTAL
Status: DISCONTINUED | OUTPATIENT
Start: 2022-04-22 | End: 2022-05-02 | Stop reason: HOSPADM

## 2022-04-22 RX ORDER — FACIAL-BODY WIPES
10 EACH TOPICAL DAILY PRN
Status: DISCONTINUED | OUTPATIENT
Start: 2022-04-22 | End: 2022-05-02 | Stop reason: HOSPADM

## 2022-04-22 RX ORDER — ACETAMINOPHEN 500 MG
1000 TABLET ORAL ONCE
Status: COMPLETED | OUTPATIENT
Start: 2022-04-22 | End: 2022-04-22

## 2022-04-22 RX ORDER — LEVOFLOXACIN 5 MG/ML
500 INJECTION, SOLUTION INTRAVENOUS
Status: DISCONTINUED | OUTPATIENT
Start: 2022-04-22 | End: 2022-04-22

## 2022-04-22 RX ORDER — HEPARIN SODIUM 5000 [USP'U]/ML
5000 INJECTION, SOLUTION INTRAVENOUS; SUBCUTANEOUS EVERY 8 HOURS
Status: DISCONTINUED | OUTPATIENT
Start: 2022-04-22 | End: 2022-05-02 | Stop reason: HOSPADM

## 2022-04-22 RX ORDER — SODIUM CHLORIDE 9 MG/ML
75 INJECTION, SOLUTION INTRAVENOUS CONTINUOUS
Status: DISCONTINUED | OUTPATIENT
Start: 2022-04-22 | End: 2022-04-24

## 2022-04-22 RX ORDER — ONDANSETRON 2 MG/ML
4 INJECTION INTRAMUSCULAR; INTRAVENOUS
Status: DISCONTINUED | OUTPATIENT
Start: 2022-04-22 | End: 2022-05-02 | Stop reason: HOSPADM

## 2022-04-22 RX ORDER — ACETAMINOPHEN 325 MG/1
650 TABLET ORAL
Status: DISCONTINUED | OUTPATIENT
Start: 2022-04-22 | End: 2022-05-02 | Stop reason: HOSPADM

## 2022-04-22 RX ORDER — SODIUM CHLORIDE 9 MG/ML
25 INJECTION, SOLUTION INTRAVENOUS AS NEEDED
Status: DISCONTINUED | OUTPATIENT
Start: 2022-04-22 | End: 2022-05-02 | Stop reason: HOSPADM

## 2022-04-22 RX ADMIN — SODIUM CHLORIDE 75 ML/HR: 9 INJECTION, SOLUTION INTRAVENOUS at 17:43

## 2022-04-22 RX ADMIN — PHENYLEPHRINE HYDROCHLORIDE 15 MCG/MIN: 10 INJECTION INTRAVENOUS at 18:19

## 2022-04-22 RX ADMIN — MEROPENEM 1 G: 1 INJECTION, POWDER, FOR SOLUTION INTRAVENOUS at 17:39

## 2022-04-22 RX ADMIN — SODIUM CHLORIDE, PRESERVATIVE FREE 10 ML: 5 INJECTION INTRAVENOUS at 23:18

## 2022-04-22 RX ADMIN — PHENYLEPHRINE HYDROCHLORIDE 10 MCG/MIN: 10 INJECTION INTRAVENOUS at 18:01

## 2022-04-22 RX ADMIN — ACETAMINOPHEN 1000 MG: 500 TABLET ORAL at 16:03

## 2022-04-22 RX ADMIN — SODIUM CHLORIDE 500 ML: 9 INJECTION, SOLUTION INTRAVENOUS at 16:02

## 2022-04-22 RX ADMIN — HEPARIN SODIUM 5000 UNITS: 5000 INJECTION INTRAVENOUS; SUBCUTANEOUS at 23:17

## 2022-04-22 NOTE — ED PROVIDER NOTES
The history is provided by the patient. No  was used. Epigastric Pain   This is a recurrent problem. The current episode started more than 2 days ago. The problem occurs constantly. The problem has been gradually worsening. The pain is located in the epigastric region. The quality of the pain is dull. The pain is mild. Associated symptoms include nausea. Pertinent negatives include no anorexia, no fever, no belching, no diarrhea, no flatus, no hematochezia, no melena, no vomiting, no constipation, no dysuria, no frequency, no hematuria, no headaches, no arthralgias, no myalgias, no trauma, no chest pain and no back pain. Nothing worsens the pain. The pain is relieved by nothing. Past workup includes CT scan, ultrasound, surgery. The patient's surgical history includes cholecystectomy. 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     Social History     Socioeconomic History    Marital status:      Spouse name: Not on file    Number of children: Not on file    Years of education: Not on file    Highest education level: Not on file   Occupational History    Not on file   Tobacco Use    Smoking status: Never Smoker    Smokeless tobacco: Never Used   Substance and Sexual Activity    Alcohol use: No    Drug use: No    Sexual activity: Not on file   Other Topics Concern    Not on file   Social History Narrative    Not on file Social Determinants of Health     Financial Resource Strain:     Difficulty of Paying Living Expenses: Not on file   Food Insecurity:     Worried About Running Out of Food in the Last Year: Not on file    Som of Food in the Last Year: Not on file   Transportation Needs:     Lack of Transportation (Medical): Not on file    Lack of Transportation (Non-Medical): Not on file   Physical Activity:     Days of Exercise per Week: Not on file    Minutes of Exercise per Session: Not on file   Stress:     Feeling of Stress : Not on file   Social Connections:     Frequency of Communication with Friends and Family: Not on file    Frequency of Social Gatherings with Friends and Family: Not on file    Attends Nondenominational Services: Not on file    Active Member of [address] [place] [address] [employer] or Organizations: Not on file    Attends Club or Organization Meetings: Not on file    Marital Status: Not on file   Intimate Partner Violence:     Fear of Current or Ex-Partner: Not on file    Emotionally Abused: Not on file    Physically Abused: Not on file    Sexually Abused: Not on file   Housing Stability:     Unable to Pay for Housing in the Last Year: Not on file    Number of [place] in the Last Year: Not on file    Unstable Housing in the Last Year: Not on file         ALLERGIES: Cephalexin, Dilaudid [hydromorphone (bulk)], Fenofibrate, Gemfibrozil, Iodine, Oxycodone, Pcn [penicillins], Prilosec [omeprazole], Shellfish derived, and Sulfa (sulfonamide antibiotics)    Review of Systems   Constitutional: Negative for activity change, chills and fever. HENT: Negative for nosebleeds, sore throat, trouble swallowing and voice change. Eyes: Negative for visual disturbance. Respiratory: Negative for shortness of breath. Cardiovascular: Negative for chest pain and palpitations. Gastrointestinal: Positive for abdominal pain and nausea. Negative for anorexia, constipation, diarrhea, flatus, hematochezia, melena and vomiting. Genitourinary: Negative for difficulty urinating, dysuria, frequency, hematuria and urgency. Musculoskeletal: Negative for arthralgias, back pain, myalgias, neck pain and neck stiffness. Skin: Negative for color change. Allergic/Immunologic: Negative for immunocompromised state. Neurological: Negative for dizziness, seizures, syncope, weakness, light-headedness, numbness and headaches. Psychiatric/Behavioral: Negative for behavioral problems, confusion, hallucinations, self-injury and suicidal ideas. Vitals:    04/22/22 1459 04/22/22 1506   BP:  (!) 84/50   Pulse:  73   Resp:  20   Temp:  (!) 101.7 °F (38.7 °C)   SpO2:  96%   Weight: 52.6 kg (116 lb)    Height: 5' (1.524 m)             Physical Exam  Vitals and nursing note reviewed. Constitutional:       General: She is not in acute distress. Appearance: She is well-developed. She is not diaphoretic. HENT:      Head: Normocephalic and atraumatic. Eyes:      Pupils: Pupils are equal, round, and reactive to light. Cardiovascular:      Rate and Rhythm: Normal rate and regular rhythm. Heart sounds: Normal heart sounds. No murmur heard. No friction rub. No gallop. Pulmonary:      Effort: Pulmonary effort is normal. No respiratory distress. Breath sounds: Normal breath sounds. No wheezing. Abdominal:      General: Bowel sounds are normal. There is no distension. Palpations: Abdomen is soft. Tenderness: There is abdominal tenderness in the epigastric area and left upper quadrant. There is no guarding or rebound. Musculoskeletal:         General: Normal range of motion. Cervical back: Normal range of motion and neck supple. Skin:     General: Skin is warm. Findings: No rash. Neurological:      Mental Status: She is alert and oriented to person, place, and time. Psychiatric:         Behavior: Behavior normal.         Thought Content:  Thought content normal.         Judgment: Judgment normal. MDM      Is a 43-year-old female with past medical history, review of systems, physical exam as above, presenting with complaints of epigastric pain. Patient was discharged from this facility 8 days ago, discharge summary listing the admitting diagnosis is pancreatitis. Patient states nausea and epigastric pain mild and intensity started 2 days ago, gradually worsening, loss of appetite. Patient states pain has resolved since receiving morphine at her nursing home. She endorses nausea, without vomiting, states stooling and urinating at baseline. Denies chest pain or shortness of breath. Physical exam is remarkable for well-appearing elderly female, in no acute distress, noted to be hypotensive, febrile, satting well on her baseline nasal cannula, without tachycardia. She has epigastric and left upper quadrant tenderness to palpation, clear breath sounds to auscultation. Chart review indicates EF of approximately 40% via echo during her last admission as well. We will gently hydrate and provide Tylenol for fever, obtain CMP, CBC, EKG, chest x-ray, cardiac enzymes, lipase, UA, lactic acid and blood cultures. We will reassess, and make a disposition, however the patient may require admission for further care and evaluation. Central Line    Date/Time: 4/22/2022 8:00 PM  Performed by: Bruno Manning MD  Authorized by: Bruno Manning MD     Consent:     Consent obtained:  Written    Consent given by:  Patient    Risks discussed:  Arterial puncture, bleeding, infection, incorrect placement, nerve damage and pneumothorax    Alternatives discussed:  No treatment  Pre-procedure details:     Hand hygiene: Hand hygiene performed prior to insertion      Sterile barrier technique:  All elements of maximal sterile technique followed      Skin preparation:  2% chlorhexidine    Skin preparation agent: Skin preparation agent completely dried prior to procedure    Anesthesia (see MAR for exact dosages): Anesthesia method:  Local infiltration  Procedure details:     Location:  R internal jugular    Patient position:  Flat    Procedural supplies:  Triple lumen    Landmarks identified: yes      Ultrasound guidance: yes      Sterile ultrasound techniques: Sterile gel and sterile probe covers were used      Number of attempts:  1    Successful placement: yes    Post-procedure details:     Post-procedure:  Dressing applied and line sutured    Assessment:  Blood return through all ports, free fluid flow, no pneumothorax on x-ray and placement verified by x-ray    Patient tolerance of procedure: Tolerated well, no immediate complications        Perfect Serve Consult for Admission  5:07 PM    ED Room Number: ER09/09  Patient Name and age:  Basil James 80 y.o.  female  Working Diagnosis:   1. Acute cystitis with hematuria    2.  Pleural effusion        COVID-19 Suspicion:  no  Sepsis present:  no  Reassessment needed: no  Code Status:  Do Not Resuscitate  Readmission: yes  Isolation Requirements:  no  Recommended Level of Care:  med/surg  Department:[facility] ED - [phone]  Other:  D/w Dr. Peter Dodge

## 2022-04-22 NOTE — H&P
[facility] [address]  [address] [facility], [address] [place] [address]  [phone]    Admission History and Physical      NAME:  Stacey Titus   :   1925   MRN:  [id number]     PCP:  Ema Zamudio MD     Date/Time of service:  2022  5:28 PM        Subjective:     CHIEF COMPLAINT: abd pain     HISTORY OF PRESENT ILLNESS:     The patient is a 81 yo hx of chronic resp failure on 4L O2, chronic sCHF EF 35-40%, hypotension, right renal mass, CKD 3, presented w/ suprapupic pain, UTI, septic shock, CHF. The patient was admitted 1 week ago for pancreatitis. She improved with IVF and pain meds. Today, the patient c/o suprapubic pain for 2 days, associated with fevers/chills and fatigue. She denied chest pain, cough, nausea, vomiting, diarrhea. In the ED, SBP ~80s, temp 101.7, WBC 6.7, lactate 2.2. U/A significant for a UTI. Abd CT showed improved peripancreatic stranding, but moderate pleural effusion. Allergies   Allergen Reactions    Cephalexin Unknown (comments)    Dilaudid [Hydromorphone (Bulk)] Rash, Nausea and Vomiting and Swelling    Fenofibrate Nausea Only    Gemfibrozil Unknown (comments)    Iodine Rash, Nausea and Vomiting and Swelling    Oxycodone Unknown (comments)    Pcn [Penicillins] Rash, Nausea and Vomiting, Swelling and Contact Dermatitis    Prilosec [Omeprazole] Nausea Only    Shellfish Derived Swelling    Sulfa (Sulfonamide Antibiotics) Rash, Nausea and Vomiting and Swelling       Prior to Admission medications    Medication Sig Start Date End Date Taking? Authorizing Provider   gabapentin (NEURONTIN) 100 mg capsule Take 2 Capsules by mouth two (2) times a day. Max Daily Amount: 400 mg. 22   Christiano Ortiz MD   midodrine (PROAMATINE) 10 mg tablet Take 1 Tablet by mouth three (3) times daily (with meals) for 30 days.  22  Christiano Ortiz MD   ferrous sulfate (Slow Fe) 142 mg (45 mg iron) ER tablet Take 142 mg by mouth Daily (before breakfast). Provider, Historical   aspirin delayed-release 81 mg tablet Take 81 mg by mouth daily. Provider, Historical   pantoprazole (PROTONIX) 40 mg tablet Take 40 mg by mouth daily. Provider, Historical   amitriptyline (ELAVIL) 25 mg tablet Take 25 mg by mouth nightly. Provider, Historical   ondansetron (ZOFRAN ODT) 4 mg disintegrating tablet Take 4 mg by mouth every six (6) hours as needed for Nausea or Vomiting. Provider, Historical   benzonatate (TESSALON) 100 mg capsule Take 100 mg by mouth three (3) times daily as needed for Cough. Provider, Historical   levothyroxine (SYNTHROID) 25 mcg tablet Take 25 mcg by mouth Daily (before breakfast). Provider, Historical   atorvastatin (LIPITOR) 40 mg tablet Take 40 mg by mouth daily. 8/30/15   Provider, Historical   acetaminophen (TYLENOL ARTHRITIS PAIN) 650 mg CR tablet Take 650 mg by mouth every six (6) hours as needed for Pain. Provider, Historical   pyridoxine (VITAMIN B-6) 25 mg tablet Take 25 mg by mouth daily. Provider, Historical   MV-Min-Folic Acid-Lutein (CENTRUM SILVER) 500-250 mcg Chew Take 1 Tablet by mouth daily. Provider, Historical   latanoprost (XALATAN) 0.005 % ophthalmic solution Administer 1 Drop to left eye every evening.  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 Review of Systems:  (bold if positive, if negative)    Gen:   fever, chills, Eyes:  ENT:  CVS:  Pulm:  GI:  Abdominal pain, :  MS:  Skin:  Psych:  Endo:  Hem:  Renal:  Neuro:          Objective:      VITALS:    Vital signs reviewed; most recent are:    Visit Vitals  BP (!) 90/58   Pulse 90   Temp (!) 101.7 °F (38.7 °C)   Resp 29   Ht 5' (1.524 m)   Wt 52.6 kg (116 lb)   SpO2 99%   BMI 22.65 kg/m²     SpO2 Readings from Last 6 Encounters:   04/22/22 99%   04/14/22 100%   05/22/17 94%   09/29/15 99%   08/31/15 95%   03/01/12 95%    O2 Flow Rate (L/min): 4 l/min   No intake or output data in the 24 hours ending 04/22/22 1728     Exam:     Physical Exam:    Gen:  Elderly, disheveled, ill-appearing, NAD  HEENT:  Pink conjunctivae, PERRL, hearing intact to voice, dry mucous membranes  Neck:  Supple, without masses, thyroid non-tender  Resp:  No accessory muscle use, decreased BS at bases  Card:  No murmurs, normal S1, S2 without thrills, bruits or peripheral edema  Abd:  Soft, suprapubic pain, non-distended, normoactive bowel sounds are present  Lymph:  No cervical adenopathy  Musc:  No cyanosis or clubbing, cap refills <2sec, pulses 2+  Skin:  No rashes   Neuro:  Cranial nerves 3-12 are grossly intact, follows commands appropriately  Psych:  Alert with poor insight. Oriented to person, place, and time    Labs:    Recent Labs     04/22/22  1520   WBC 6.7   HGB 9.8*   HCT 31.6*        Recent Labs     04/22/22  1520      K 3.6   *   CO2 25   GLU 88   BUN 21*   CREA 1.43*   CA 8.8   ALB 2.0*   TBILI 1.7*   *     Lab Results   Component Value Date/Time    Glucose (POC) 207 (H) 04/11/2022 11:07 AM    Glucose (POC) 234 (H) 04/11/2022 07:59 AM     No results for input(s): PH, PCO2, PO2, HCO3, FIO2 in the last 72 hours. No results for input(s): INR, INREXT in the last 72 hours.     Radiology and EKG reviewed:  abd CT reviewed    **Old Records reviewed in [facility]**       Assessment/Plan:       Principal Problem:    81 yo hx of chronic resp failure on 4L O2, chronic sCHF EF 35-40%, hypotension, right renal mass, CKD 3, presented w/ suprapupic pain, UTI, septic shock, CHF    1) Septic shock/UTI: 3/4 SIRS with SBP <90. Will monitor on step down. Check blood Cx, urine Cx, lactate. Given multiple allergies, will start IV Meropenem. Start Kyree gtt for hypotension. Be careful with IVF due to CHF. Discussed with ED physician about central line if unable to obtain good peripheral IVs    2) Acute on chronic systolic (congestive) heart failure/pleural effusions: last echo with EF 35-40%. Pro BNP >10,000 on admission. BP too low for diuretics. Will use IV bumex prn for when BP can tolerate. Consult Cards    3) EVERT/CKD 3: due to hypoperfusion. Will cont IVF, pressors gtt. Monitor closely    4) Recent pancreatitis: resolved on CT. Will monitor closely    5) Chronic resp failure: on chronic 4L O2 due to CHF. Cont O2, incentive spirometry    6) R renal mass: likely RCC.  Patient has refused w/u in the past     Risk of deterioration: high      Total time spent with patient: 70 Minutes (35 min on critical care) **I personally saw and examined the patient during this time period**                 Care Plan discussed with: Patient, nursing, pharmacy, ED    Discussed:  Care Plan    Prophylaxis:  Hep SQ    Probable Disposition:  SNF/LTC           ___________________________________________________    Attending Physician: Diana Lundborg, MD

## 2022-04-22 NOTE — PROGRESS NOTES
[address] [id number] RX Pharmacy Progress Note: Antimicrobial Stewardship    Consult for antibiotic dosing of meropenem by Dr. Lanette Collazo  Indication: UTI/sepsis/hypotensive/allergic to pcn and cephalosporin  Day of Therapy: 1      Plan:    Non-Kinetic Antimicrobial Dosing:   Recommendation: Meropenem 1000 mg IV once then 500 mg every 12 hours       Other Antimicrobial  (not dosed by pharmacist)      Cultures        Serum Creatinine     Lab Results   Component Value Date/Time    Creatinine 1.43 (H) 04/22/2022 03:20 PM    Creatinine (POC) 1.5 (H) 03/02/2016 12:08 PM       Creatinine Clearance Estimated Creatinine Clearance: 16.5 mL/min (A) (based on SCr of 1.43 mg/dL (H)). Procalcitonin  No results found for: PCT     Temp   (!) 101.7 °F (38.7 °C)    WBC   Lab Results   Component Value Date/Time    WBC 6.7 04/22/2022 03:20 PM       For Antifungals, Metronidazole and Nafcillin: Lab Results   Component Value Date/Time    ALT (SGPT) 222 (H) 04/22/2022 03:20 PM    AST (SGOT) 1,042 (H) 04/22/2022 03:20 PM    Alk.  phosphatase 865 (H) 04/22/2022 03:20 PM    Bilirubin, total 1.7 (H) 04/22/2022 03:20 PM         Pharmacist: Radha Melendez

## 2022-04-22 NOTE — ED NOTES
Patient care transferred to [facility], RN, 3rd floor. Report accepted with all questions answered.

## 2022-04-22 NOTE — ED TRIAGE NOTES
Patient presents to ED via EMS for reported chest pain. Patient denies any pain at triage. EMS reports patient is from nursing home who administered 5 mg morphine PO earlier that brought pain level from 10/10 to 8/10. Patient only complains of right shoulder and back pain at triage.

## 2022-04-22 NOTE — PROGRESS NOTES
1845 TRANSFER - IN REPORT:    Verbal report received from LEANN RN (name) on Alphonse Mauricio  being received from ED (unit) for routine progression of care      Report consisted of patients Situation, Background, Assessment and   Recommendations(SBAR). Information from the following report(s) SBAR, Kardex, ED Summary, Intake/Output, MAR, Accordion, Recent Results and Med Rec Status was reviewed with the receiving nurse. Opportunity for questions and clarification was provided. Assessment completed upon patients arrival to unit and care assumed. 8491 Patient began c/o pain at her IV site. Upon assessment, I noted that the IV had infiltrated. This was the patient's only access and she had been stuck repeatedly in the ED, including via ultrasound, and this was the only line they were able to obtain; therefore, I called the MD, who stated that the patient should not have been sent from the ED without a central line as he specifically told them the patient needed one. He stated he will call anesthesia to have someone come and place one. In the meantime, the patient is resting in bed, alert and oriented, just c/o right arm pain from the infiltration. MAPs continue to hover around 65. Night shift RN who will be taking care of this patient is at the bedside now too.    1900 Bedside and Verbal shift change report given to Sharri  (oncoming nurse) by Luciano Cranker RN (offgoing nurse). Report included the following information SBAR, Kardex, Intake/Output, MAR, Accordion, Recent Results, Med Rec Status and Cardiac Rhythm VPACED.

## 2022-04-23 ENCOUNTER — APPOINTMENT (OUTPATIENT)
Dept: CT IMAGING | Age: 87
DRG: 871 | End: 2022-04-23
Attending: INTERNAL MEDICINE
Payer: MEDICARE

## 2022-04-23 LAB
ALBUMIN SERPL-MCNC: 1.8 G/DL (ref 3.5–5)
ALBUMIN/GLOB SERPL: 0.7 {RATIO} (ref 1.1–2.2)
ALP SERPL-CCNC: 793 U/L (ref 45–117)
ALT SERPL-CCNC: 252 U/L (ref 12–78)
ANION GAP SERPL CALC-SCNC: 7 MMOL/L (ref 5–15)
AST SERPL-CCNC: 768 U/L (ref 15–37)
BASE DEFICIT BLDV-SCNC: 4.2 MMOL/L
BDY SITE: ABNORMAL
BILIRUB DIRECT SERPL-MCNC: 1.4 MG/DL (ref 0–0.2)
BILIRUB SERPL-MCNC: 1.8 MG/DL (ref 0.2–1)
BUN SERPL-MCNC: 22 MG/DL (ref 6–20)
BUN/CREAT SERPL: 16 (ref 12–20)
CALCIUM SERPL-MCNC: 7.9 MG/DL (ref 8.5–10.1)
CHLORIDE SERPL-SCNC: 114 MMOL/L (ref 97–108)
CO2 SERPL-SCNC: 23 MMOL/L (ref 21–32)
CREAT SERPL-MCNC: 1.39 MG/DL (ref 0.55–1.02)
ERYTHROCYTE [DISTWIDTH] IN BLOOD BY AUTOMATED COUNT: 12.3 % (ref 11.5–14.5)
EST. AVERAGE GLUCOSE BLD GHB EST-MCNC: 100 MG/DL
GAS FLOW.O2 O2 DELIVERY SYS: 2 L/MIN
GLOBULIN SER CALC-MCNC: 2.6 G/DL (ref 2–4)
GLUCOSE BLD STRIP.AUTO-MCNC: 82 MG/DL (ref 65–117)
GLUCOSE SERPL-MCNC: 97 MG/DL (ref 65–100)
HAV IGM SER QL: NONREACTIVE
HBA1C MFR BLD: 5.1 % (ref 4–5.6)
HBV CORE IGM SER QL: NONREACTIVE
HBV SURFACE AG SER QL: <0.1 INDEX
HBV SURFACE AG SER QL: NEGATIVE
HCO3 BLDV-SCNC: 22 MMOL/L (ref 23–28)
HCT VFR BLD AUTO: 30.1 % (ref 35–47)
HCV AB SERPL QL IA: NONREACTIVE
HGB BLD-MCNC: 9.2 G/DL (ref 11.5–16)
LACTATE SERPL-SCNC: 0.7 MMOL/L (ref 0.4–2)
MAGNESIUM SERPL-MCNC: 1.2 MG/DL (ref 1.6–2.4)
MCH RBC QN AUTO: 33.7 PG (ref 26–34)
MCHC RBC AUTO-ENTMCNC: 30.6 G/DL (ref 30–36.5)
MCV RBC AUTO: 110.3 FL (ref 80–99)
NRBC # BLD: 0 K/UL (ref 0–0.01)
NRBC BLD-RTO: 0 PER 100 WBC
PCO2 BLDV: 42 MMHG (ref 41–51)
PH BLDV: 7.33 [PH] (ref 7.32–7.42)
PHOSPHATE SERPL-MCNC: 3.5 MG/DL (ref 2.6–4.7)
PLATELET # BLD AUTO: 418 K/UL (ref 150–400)
PMV BLD AUTO: 10.2 FL (ref 8.9–12.9)
PO2 BLDV: 36 MMHG (ref 25–40)
POTASSIUM SERPL-SCNC: 3.8 MMOL/L (ref 3.5–5.1)
PROT SERPL-MCNC: 4.4 G/DL (ref 6.4–8.2)
RBC # BLD AUTO: 2.73 M/UL (ref 3.8–5.2)
SAO2 % BLDV: 65 % (ref 65–88)
SAO2% DEVICE SAO2% SENSOR NAME: ABNORMAL
SERVICE CMNT-IMP: NORMAL
SODIUM SERPL-SCNC: 144 MMOL/L (ref 136–145)
SP1: NORMAL
SP2: NORMAL
SP3: NORMAL
SPECIMEN SITE: ABNORMAL
TSH SERPL DL<=0.05 MIU/L-ACNC: 1.57 UIU/ML (ref 0.36–3.74)
WBC # BLD AUTO: 18.5 K/UL (ref 3.6–11)

## 2022-04-23 PROCEDURE — 74011000258 HC RX REV CODE- 258: Performed by: INTERNAL MEDICINE

## 2022-04-23 PROCEDURE — 74011250636 HC RX REV CODE- 250/636: Performed by: INTERNAL MEDICINE

## 2022-04-23 PROCEDURE — 83036 HEMOGLOBIN GLYCOSYLATED A1C: CPT

## 2022-04-23 PROCEDURE — 70450 CT HEAD/BRAIN W/O DYE: CPT

## 2022-04-23 PROCEDURE — 94762 N-INVAS EAR/PLS OXIMTRY CONT: CPT

## 2022-04-23 PROCEDURE — 82803 BLOOD GASES ANY COMBINATION: CPT

## 2022-04-23 PROCEDURE — 74011000250 HC RX REV CODE- 250: Performed by: INTERNAL MEDICINE

## 2022-04-23 PROCEDURE — 2709999900 HC NON-CHARGEABLE SUPPLY

## 2022-04-23 PROCEDURE — 83605 ASSAY OF LACTIC ACID: CPT

## 2022-04-23 PROCEDURE — 80048 BASIC METABOLIC PNL TOTAL CA: CPT

## 2022-04-23 PROCEDURE — 36415 COLL VENOUS BLD VENIPUNCTURE: CPT

## 2022-04-23 PROCEDURE — 77030040361 HC SLV COMPR DVT MDII -B

## 2022-04-23 PROCEDURE — 99223 1ST HOSP IP/OBS HIGH 75: CPT | Performed by: SPECIALIST

## 2022-04-23 PROCEDURE — 85027 COMPLETE CBC AUTOMATED: CPT

## 2022-04-23 PROCEDURE — 83735 ASSAY OF MAGNESIUM: CPT

## 2022-04-23 PROCEDURE — 84443 ASSAY THYROID STIM HORMONE: CPT

## 2022-04-23 PROCEDURE — 65270000046 HC RM TELEMETRY

## 2022-04-23 PROCEDURE — 94761 N-INVAS EAR/PLS OXIMETRY MLT: CPT

## 2022-04-23 PROCEDURE — 84100 ASSAY OF PHOSPHORUS: CPT

## 2022-04-23 PROCEDURE — 77010033678 HC OXYGEN DAILY

## 2022-04-23 PROCEDURE — 82962 GLUCOSE BLOOD TEST: CPT

## 2022-04-23 PROCEDURE — 80076 HEPATIC FUNCTION PANEL: CPT

## 2022-04-23 PROCEDURE — 80074 ACUTE HEPATITIS PANEL: CPT

## 2022-04-23 PROCEDURE — 74011250637 HC RX REV CODE- 250/637: Performed by: INTERNAL MEDICINE

## 2022-04-23 RX ORDER — FUROSEMIDE 10 MG/ML
40 INJECTION INTRAMUSCULAR; INTRAVENOUS ONCE
Status: DISCONTINUED | OUTPATIENT
Start: 2022-04-23 | End: 2022-04-23

## 2022-04-23 RX ORDER — LATANOPROST 50 UG/ML
1 SOLUTION/ DROPS OPHTHALMIC EVERY EVENING
Status: DISCONTINUED | OUTPATIENT
Start: 2022-04-23 | End: 2022-05-02 | Stop reason: HOSPADM

## 2022-04-23 RX ORDER — PANTOPRAZOLE SODIUM 40 MG/1
40 TABLET, DELAYED RELEASE ORAL
Status: DISCONTINUED | OUTPATIENT
Start: 2022-04-23 | End: 2022-05-02 | Stop reason: HOSPADM

## 2022-04-23 RX ORDER — AMITRIPTYLINE HYDROCHLORIDE 50 MG/1
25 TABLET, FILM COATED ORAL
Status: DISCONTINUED | OUTPATIENT
Start: 2022-04-23 | End: 2022-05-02 | Stop reason: HOSPADM

## 2022-04-23 RX ORDER — BENZONATATE 100 MG/1
100 CAPSULE ORAL
Status: DISCONTINUED | OUTPATIENT
Start: 2022-04-23 | End: 2022-05-02 | Stop reason: HOSPADM

## 2022-04-23 RX ORDER — ATORVASTATIN CALCIUM 20 MG/1
40 TABLET, FILM COATED ORAL DAILY
Status: DISCONTINUED | OUTPATIENT
Start: 2022-04-23 | End: 2022-04-23

## 2022-04-23 RX ORDER — NOREPINEPHRINE BITARTRATE/D5W 8 MG/250ML
.5-16 PLASTIC BAG, INJECTION (ML) INTRAVENOUS
Status: DISCONTINUED | OUTPATIENT
Start: 2022-04-23 | End: 2022-04-23

## 2022-04-23 RX ORDER — GABAPENTIN 100 MG/1
200 CAPSULE ORAL 2 TIMES DAILY
Status: DISCONTINUED | OUTPATIENT
Start: 2022-04-23 | End: 2022-05-02 | Stop reason: HOSPADM

## 2022-04-23 RX ORDER — METOPROLOL TARTRATE 5 MG/5ML
1.25 INJECTION INTRAVENOUS
Status: DISCONTINUED | OUTPATIENT
Start: 2022-04-23 | End: 2022-05-02 | Stop reason: HOSPADM

## 2022-04-23 RX ORDER — ASPIRIN 81 MG/1
81 TABLET ORAL DAILY
Status: DISCONTINUED | OUTPATIENT
Start: 2022-04-23 | End: 2022-05-02 | Stop reason: HOSPADM

## 2022-04-23 RX ORDER — LEVOTHYROXINE SODIUM 25 UG/1
25 TABLET ORAL
Status: DISCONTINUED | OUTPATIENT
Start: 2022-04-23 | End: 2022-05-02 | Stop reason: HOSPADM

## 2022-04-23 RX ORDER — MIDODRINE HYDROCHLORIDE 5 MG/1
10 TABLET ORAL
Status: DISCONTINUED | OUTPATIENT
Start: 2022-04-23 | End: 2022-05-02 | Stop reason: HOSPADM

## 2022-04-23 RX ORDER — LANOLIN ALCOHOL/MO/W.PET/CERES
25 CREAM (GRAM) TOPICAL DAILY
Status: DISCONTINUED | OUTPATIENT
Start: 2022-04-23 | End: 2022-05-02 | Stop reason: HOSPADM

## 2022-04-23 RX ADMIN — GABAPENTIN 200 MG: 100 CAPSULE ORAL at 17:05

## 2022-04-23 RX ADMIN — PANTOPRAZOLE SODIUM 40 MG: 40 TABLET, DELAYED RELEASE ORAL at 08:06

## 2022-04-23 RX ADMIN — VANCOMYCIN HYDROCHLORIDE 1250 MG: 1.25 INJECTION, POWDER, LYOPHILIZED, FOR SOLUTION INTRAVENOUS at 01:11

## 2022-04-23 RX ADMIN — SODIUM CHLORIDE 75 ML/HR: 9 INJECTION, SOLUTION INTRAVENOUS at 13:52

## 2022-04-23 RX ADMIN — SODIUM CHLORIDE, PRESERVATIVE FREE 10 ML: 5 INJECTION INTRAVENOUS at 22:23

## 2022-04-23 RX ADMIN — GABAPENTIN 200 MG: 100 CAPSULE ORAL at 08:06

## 2022-04-23 RX ADMIN — SODIUM CHLORIDE, PRESERVATIVE FREE 10 ML: 5 INJECTION INTRAVENOUS at 13:52

## 2022-04-23 RX ADMIN — PHENYLEPHRINE HYDROCHLORIDE 55 MCG/MIN: 10 INJECTION INTRAVENOUS at 10:46

## 2022-04-23 RX ADMIN — MEROPENEM 500 MG: 500 INJECTION, POWDER, FOR SOLUTION INTRAVENOUS at 17:05

## 2022-04-23 RX ADMIN — ASPIRIN 81 MG: 81 TABLET, COATED ORAL at 08:06

## 2022-04-23 RX ADMIN — MEROPENEM 500 MG: 500 INJECTION, POWDER, FOR SOLUTION INTRAVENOUS at 05:39

## 2022-04-23 RX ADMIN — SODIUM CHLORIDE 75 ML/HR: 9 INJECTION, SOLUTION INTRAVENOUS at 00:17

## 2022-04-23 RX ADMIN — FAMOTIDINE 20 MG: 10 INJECTION, SOLUTION INTRAVENOUS at 08:06

## 2022-04-23 RX ADMIN — PYRIDOXINE HCL TAB 50 MG 25 MG: 50 TAB at 08:22

## 2022-04-23 RX ADMIN — LATANOPROST 1 DROP: 50 SOLUTION OPHTHALMIC at 17:15

## 2022-04-23 RX ADMIN — MIDODRINE HYDROCHLORIDE 10 MG: 5 TABLET ORAL at 08:06

## 2022-04-23 RX ADMIN — LEVOTHYROXINE SODIUM 25 MCG: 0.03 TABLET ORAL at 08:06

## 2022-04-23 RX ADMIN — HEPARIN SODIUM 5000 UNITS: 5000 INJECTION INTRAVENOUS; SUBCUTANEOUS at 22:06

## 2022-04-23 RX ADMIN — HEPARIN SODIUM 5000 UNITS: 5000 INJECTION INTRAVENOUS; SUBCUTANEOUS at 13:52

## 2022-04-23 RX ADMIN — PHENYLEPHRINE HYDROCHLORIDE 90 MCG/MIN: 10 INJECTION INTRAVENOUS at 03:35

## 2022-04-23 RX ADMIN — AMITRIPTYLINE HYDROCHLORIDE 25 MG: 50 TABLET, FILM COATED ORAL at 22:05

## 2022-04-23 RX ADMIN — MIDODRINE HYDROCHLORIDE 10 MG: 5 TABLET ORAL at 12:08

## 2022-04-23 RX ADMIN — SODIUM CHLORIDE, PRESERVATIVE FREE 10 ML: 5 INJECTION INTRAVENOUS at 05:40

## 2022-04-23 RX ADMIN — HEPARIN SODIUM 5000 UNITS: 5000 INJECTION INTRAVENOUS; SUBCUTANEOUS at 05:39

## 2022-04-23 RX ADMIN — ATORVASTATIN CALCIUM 40 MG: 20 TABLET, FILM COATED ORAL at 08:06

## 2022-04-23 RX ADMIN — MIDODRINE HYDROCHLORIDE 10 MG: 5 TABLET ORAL at 17:05

## 2022-04-23 NOTE — PROGRESS NOTES
Jessika Reid, RN to explain of my light duty work restrictions. Asked if scan can wait until tomorrow or if the patient can be brought to vascular lab. Waiting for feedback after RN speaks to Doctor.

## 2022-04-23 NOTE — PROGRESS NOTES
TRANSFER - OUT REPORT:    Verbal report given to Brains (name) on Raven Washburn  being transferred to  ICU (unit) for routine progression of care       Report consisted of patients Situation, Background, Assessment and   Recommendations(SBAR). Information from the following report(s) SBAR, Intake/Output, MAR and Recent Results was reviewed with the receiving nurse. Lines:   Triple Lumen 04/22/22 Anterior;Right Neck (Active)   Central Line Being Utilized Yes 04/22/22 2300   Criteria for Appropriate Use Hemodynamically unstable, requiring monitoring lines, vasopressors, or volume resuscitation 04/22/22 2300   Site Assessment Clean, dry, & intact 04/22/22 2300   Infiltration Assessment 0 04/22/22 2300   Affected Extremity/Extremities Color distal to insertion site pink (or appropriate for race); Pulses palpable;Range of motion performed 04/22/22 2300   Date of Last Dressing Change 04/22/22 04/22/22 2300   Dressing Status Clean, dry, & intact 04/22/22 2300   Dressing Type Transparent;Disk with Chlorhexadine gluconate (CHG) 04/22/22 2300   Action Taken Open ports on tubing capped 04/22/22 2300   Proximal Hub Color/Line Status Patent; Flushed 04/22/22 2300   Positive Blood Return (Medial Site) Yes 04/22/22 2300   Medial Hub Color/Line Status Patent; Flushed 04/22/22 2300   Positive Blood Return (Lateral Site) Yes 04/22/22 2300   Distal Hub Color/Line Status Patent; Flushed 04/22/22 2300   Positive Blood Return (Site #3) Yes 04/22/22 2300   Alcohol Cap Used Yes 04/22/22 2300        Opportunity for questions and clarification was provided.       Patient transported with:   Monitor  O2 @ 4 liters  Registered Nurse  [employer]

## 2022-04-23 NOTE — PROGRESS NOTES
Responded to RRT for Stroke like symptoms. LTKW around 1430. Patient woke up from a 1.5 hour nap confused, not able to tell primary RN name. No weakness/tingling. VAN negative  1630: Code S called  8462: Patient to CT on monitor x3 with primary RN and RRT  1640: Tele-neuro on screen evaluating patient. Will not proceed with CTA/CTP.  Thinking of more encephalopathy  1700: Patient back in IP room    Provider at bedside: yes- Dr Umesh Jessica    Interventions ordered: CT head    Sepsis Suspected: no-  Admitting diagnosis of sepsis    Transfer to Higher Level of Care: no    Blood Glucose: 82     Visit Vitals  BP (!) 122/59   Pulse (!) 101   Temp 97.4 °F (36.3 °C)   Resp 17   Ht 5' (1.524 m)   Wt 57.4 kg (126 lb 8.7 oz)   SpO2 100%   BMI 24.71 kg/m²

## 2022-04-23 NOTE — PROGRESS NOTES
[address] [address] RX Pharmacy Progress Note: Antimicrobial Stewardship    Consult for antibiotic dosing of Vancomycin by Dr. Elizabeth Keith  Indication: UTI, septic shock  Day of Therapy: 1    Plan:  Vancomycin therapy:   Start with loading dose of vancomycin 1250 mg IV (25 mg/kg, max 2.5 gm)   Follow with maintenance dose of vancomycin : by random level   Dose calculated to approximate a   Target AUC/DEWAYNE of: n/a  Trough of </=15 mcg/mL. Plan:     Pharmacy to follow daily and will make changes to dose and/or frequency based on clinical status. Date Dose & Interval Measured (mcg/mL) Extrapolated (mcg/mL)   ? ? ? ?   ? ? ? ?   ? ? ? ? Non-Kinetic Antimicrobial Dosing:   Current Regimen:    Recommendation:   Dose administration notes: Other Antimicrobial  (not dosed by pharmacist)   meropenem   Cultures        Serum Creatinine     Lab Results   Component Value Date/Time    Creatinine 1.43 (H) 04/22/2022 03:20 PM    Creatinine (POC) 1.5 (H) 03/02/2016 12:08 PM       Creatinine Clearance Estimated Creatinine Clearance: 16.5 mL/min (A) (based on SCr of 1.43 mg/dL (H)). Procalcitonin  No results found for: PCT     Temp   97.6 °F (36.4 °C)    WBC   Lab Results   Component Value Date/Time    WBC 6.7 04/22/2022 03:20 PM       For Antifungals, Metronidazole and Nafcillin: Lab Results   Component Value Date/Time    ALT (SGPT) 222 (H) 04/22/2022 03:20 PM    AST (SGOT) 1,042 (H) 04/22/2022 03:20 PM    Alk.  phosphatase 865 (H) 04/22/2022 03:20 PM    Bilirubin, total 1.7 (H) 04/22/2022 03:20 PM         Pharmacist: Signed Ragena

## 2022-04-23 NOTE — ROUTINE PROCESS
0700 - Bedside shift change report given to Marc Knowles RN (oncoming nurse) by Leelee Karimi RN (offgoing nurse). Report included the following information SBAR, Kardex, ED Summary, Intake/Output, MAR, Accordion, Recent Results, Med Rec Status and Cardiac Rhythm V-paced. [id number] - vascular technician was asking if duplex can be completed tomorrow. Dr. Otoniel Pena stated duplex can be done tomorrow. 12 - Notified Dr. Otoniel Pena of [facility] resutls, no new orders at this time. 18 - RN provided  and son update at bedside. 1600 - RN went into patient room, pt lethargic, waking up from a nap. Pt asked \"where am I?\". This morning patient was oriented to person, place, and situation. Now patient is oriented to self only. Blood sugar checked, was 82. Pt able to follow commands. Smile is symmetric and  are equal bilaterally. Nurse asked pt if she remembers her son and  visiting her this morning, pt has no recollection. Notified charge RN, stated to give pt time to wakeup. 80 - RN assisted pt eating dinner. Pt still very confused. Pt states she thought she came to the hospital to visit a friend and her  Clover Aguirre and son Sharyle Matter were here with her today. Pt states she does not remember her name. RN reminded patient of her name but pt states she \"does not feel like [facility]". Pt remembers RN's name is Chelsey Foil. Code Stroke called. 26 - Pt down to [place] - teleneuro doctor at bedside, does not think this is a stroke and feels this is more related to encephalopathy. 1655 - pt returned to room. 1900 - Bedside shift change report given to Arley Barger RN (oncoming nurse) by Marc Knowles RN (offgoing nurse). Report included the following information SBAR, Kardex, ED Summary, Intake/Output, MAR, Accordion, Recent Results, Med Rec Status and Cardiac Rhythm ventricular paced.

## 2022-04-23 NOTE — PROGRESS NOTES
Spiritual Care Assessment/Progress Note  [address]      NAME: Patrice Ellsworth      MRN: [id number]  AGE: 80 y.o. SEX: female  Rastafari Affiliation: Zoroastrian   Language: English     4/23/2022     Total Time (in minutes): 25     Spiritual Assessment begun in [facility] INTENSIVE CARE through conversation with:         [x]Patient        [x] Family    [] Friend(s)        Reason for Consult: Palliative Care, Interdisciplinary Rounds     Spiritual beliefs: (Please include comment if needed)     [x] Identifies with a sweetie tradition:    Zoroastrian      [x] Supported by a sweetie community:            [] Claims no spiritual orientation:           [] Seeking spiritual identity:                [] Adheres to an individual form of spirituality:           [] Not able to assess:                           Identified resources for coping:      [x] Prayer                               [] Music                  [] Guided Imagery     [x] Family/friends                 [] Pet visits     [] Devotional reading                         [] Unknown     [] Other:                                              Interventions offered during this visit: (See comments for more details)    Patient Interventions: Affirmation of emotions/emotional suffering,Affirmation of sweetie,Spiritism/blessing,Catharsis/review of pertinent events in supportive environment,Initial/Spiritual assessment, Critical care,Life review/legacy,Normalization of emotional/spiritual concerns,Prayer (assurance of),Rastafari beliefs/image of God discussed     Family/Friend(s):  Affirmation of emotions/emotional suffering,Affirmation of sweetie,Catharsis/review of pertinent events in supportive environment,Normalization of emotional/spiritual concerns,Life review/legacy,Initial Assessment,Rastafari beliefs/image of God discussed,Prayer (assurance of)     Plan of Care:     [] Support spiritual and/or cultural needs    [] Support AMD and/or advance care planning process      [] Support grieving process   [] Coordinate Rites and/or Rituals    [] Coordination with community clergy   [] No spiritual needs identified at this time   [] Detailed Plan of Care below (See Comments)  [] Make referral to Music Therapy  [] Make referral to Pet Therapy     [] Make referral to Addiction services  [] Make referral to [facility]  [] Make referral to Spiritual Care Partner  [] No future visits requested        [x] Contact Spiritual Care for further referrals     Comments:   Spiritual care assessment with Mrs. Carlitos Chase in ICU. Mrs Lynnette Guajardo is sharp and witty and being visited by her , Jane Chan, a war Moffit, and their oldest son. She shared that she is Latter day and attends Mass regularly when she can. She talked at length and welcomes prayers and has hopes to be returning home soon. She knows Fr Terrazas from previous visits, and her and family expressed much appreciation.    Chaplain Gilmar Tyson M.Div.  Shayan Morelos ([id number])

## 2022-04-23 NOTE — PROGRESS NOTES
Physical Therapy - Orders received and acknowledged and chart reviewed. Patient on pressor support and BP 87/61 upon arrival then retake 91/63. Will follow tomorrow when BP more stable for activity.  Thank you  Ruiz Mccollum, PT, DPT

## 2022-04-23 NOTE — PROGRESS NOTES
[facility]  Hospitalist Group                                                                                          Hospitalist Progress Note  Jae Pérez MD  Answering service: [phone] from in house phone        Date of Service:  2022  NAME:  Deborah Sow  :  1925  MRN:  [id number]      Admission Summary: \"The patient is a 81 yo hx of chronic resp failure on 4L O2, chronic sCHF EF 35-40%, hypotension, right renal mass, CKD 3, presented w/ suprapupic pain, UTI, septic shock, CHF. The patient was admitted 1 week ago for pancreatitis. She improved with IVF and pain meds. Today, the patient c/o suprapubic pain for 2 days, associated with fevers/chills and fatigue. She denied chest pain, cough, nausea, vomiting, diarrhea. In the ED, SBP ~80s, temp 101.7, WBC 6.7, lactate 2.2. U/A significant for a UTI. Abd CT showed improved peripancreatic stranding, but moderate pleural effusion\"    Interval history / Subjective:   2022. No acute events overnight. Saw patient this morning, awake alert and oriented, very pleasant and cooperative with physical examination, denies any fever, chills, nausea, vomiting. Believes that her sepsis was caused because she choked on one of her multivitamins. Assessment & Plan:     1) Septic shock/UTI: 3/4 SIRS with SBP <90. Will monitor on step down. Check blood Cx, urine Cx, lactate. Given multiple allergies, will start IV Meropenem. Start Kyree gtt for hypotension. Be careful with IVF due to CHF. Discussed with ED physician about central line if unable to obtain good peripheral IVs     2) Acute on chronic systolic (congestive) heart failure/pleural effusions: last echo with EF 35-40%. Pro BNP >10,000 on admission. BP too low for diuretics. Will use IV bumex prn for when BP can tolerate. Consult Cards     3) EVERT/CKD 3: due to hypoperfusion. Will cont IVF, pressors gtt.   Monitor closely     4) Recent pancreatitis: resolved on CT. Will monitor closely     5) Chronic resp failure: on chronic 4L O2 due to CHF. Cont O2, incentive spirometry     6) R renal mass: likely RCC. Patient has refused w/u in the past     Code status: DNR  DVT prophylaxis: Lovenox    Care Plan discussed with: Patient/Family  Anticipated Disposition: Home w/Family  Anticipated Discharge: Less than 24 hours     Hospital Problems  Date Reviewed: 4/22/2022          Codes Class Noted POA    Acute on chronic systolic (congestive) heart failure (HCC) ICD-10-CM: I50.23  ICD-9-CM: 428.23, 428.0  4/22/2022 Unknown        * (Principal) Septic shock (HCC) ICD-10-CM: A41.9, R65.21  ICD-9-CM: 038.9, 785.52, 995.92  4/22/2022 Yes        EVERT (acute kidney injury) ([facility] [address] ICD-10-CM: N17.9  ICD-9-CM: 584.9  4/22/2022 Yes        UTI (urinary tract infection) ICD-10-CM: N39.0  ICD-9-CM: 599.0  4/22/2022 Yes                Review of Systems:   A comprehensive review of systems was negative except for that written in the HPI. Vital Signs:    Last 24hrs VS reviewed since prior progress note. Most recent are:  Visit Vitals  BP (!) 122/59   Pulse (!) 101   Temp 97.5 °F (36.4 °C)   Resp 17   Ht 5' (1.524 m)   Wt 57.4 kg (126 lb 8.7 oz)   SpO2 100%   BMI 24.71 kg/m²         Intake/Output Summary (Last 24 hours) at 4/23/2022 1419  Last data filed at 4/23/2022 1300  Gross per 24 hour   Intake 2213.75 ml   Output 320 ml   Net 1893.75 ml        Physical Examination:     I had a face to face encounter with this patient and independently examined them on 4/23/2022 as outlined below:          Constitutional:  No acute distress, cooperative, pleasant    ENT:  Oral mucosa moist, oropharynx benign. Resp:  CTA bilaterally. No wheezing/rhonchi/rales. No accessory muscle use   CV:  Regular rhythm, normal rate, no murmurs, gallops, rubs    GI:  Soft, non distended, non tender.  normoactive bowel sounds, no hepatosplenomegaly     Musculoskeletal:  No edema, warm, 2+ pulses throughout Neurologic:  Moves all extremities. AAOx3, CN II-XII reviewed            Data Review:    Review and/or order of clinical lab test      Labs:     Recent Labs     04/23/22  0055 04/22/22  1520   WBC 18.5* 6.7   HGB 9.2* 9.8*   HCT 30.1* 31.6*   * 396     Recent Labs     04/23/22  0055 04/22/22  1520    142   K 3.8 3.6   * 110*   CO2 23 25   BUN 22* 21*   CREA 1.39* 1.43*   GLU 97 88   CA 7.9* 8.8   MG 1.2*  --    PHOS 3.5  --      Recent Labs     04/23/22  0055 04/22/22  1520   * 222*   * 865*   TBILI 1.8* 1.7*   TP 4.4* 5.1*   ALB 1.8* 2.0*   GLOB 2.6 3.1   LPSE  --  284     No results for input(s): INR, PTP, APTT, INREXT in the last 72 hours. No results for input(s): FE, TIBC, PSAT, FERR in the last 72 hours. Lab Results   Component Value Date/Time    Folate 37.7 (H) 04/10/2022 03:46 AM      No results for input(s): PH, PCO2, PO2 in the last 72 hours. No results for input(s): CPK, CKNDX, TROIQ in the last 72 hours. No lab exists for component: CPKMB  Lab Results   Component Value Date/Time    Cholesterol, total <50 04/10/2022 03:46 AM    HDL Cholesterol 8 04/10/2022 03:46 AM    LDL, calculated  04/10/2022 03:46 AM     Unable to calculate LDL or VLDL due to low cholesterol.     Triglyceride 96 04/10/2022 03:46 AM    CHOL/HDL Ratio Cannot be calculated 04/10/2022 03:46 AM     Lab Results   Component Value Date/Time    Glucose (POC) 207 (H) 04/11/2022 11:07 AM    Glucose (POC) 234 (H) 04/11/2022 07:59 AM    Glucose (POC) 59 (L) 04/11/2022 05:43 AM    Glucose (POC) 210 (H) 05/12/2009 04:35 PM    Glucose (POC) 107 (H) 05/07/2009 04:33 PM     Lab Results   Component Value Date/Time    Color YELLOW/STRAW 04/22/2022 04:08 PM    Appearance TURBID (A) 04/22/2022 04:08 PM    Specific gravity 1.010 04/22/2022 04:08 PM    pH (UA) 6.5 04/22/2022 04:08 PM    Protein 30 (A) 04/22/2022 04:08 PM    Glucose Negative 04/22/2022 04:08 PM    Ketone Negative 04/22/2022 04:08 PM    Bilirubin Negative 04/22/2022 04:08 PM    Urobilinogen 2.0 (H) 04/22/2022 04:08 PM    Nitrites Negative 04/22/2022 04:08 PM    Leukocyte Esterase LARGE (A) 04/22/2022 04:08 PM    Epithelial cells MODERATE (A) 04/22/2022 04:08 PM    Bacteria 4+ (A) 04/22/2022 04:08 PM    WBC >100 (H) 04/22/2022 04:08 PM    RBC 5-10 04/22/2022 04:08 PM         Medications Reviewed:     Current Facility-Administered Medications   Medication Dose Route Frequency    amitriptyline (ELAVIL) tablet 25 mg  25 mg Oral QHS    aspirin delayed-release tablet 81 mg  81 mg Oral DAILY    benzonatate (TESSALON) capsule 100 mg  100 mg Oral TID PRN    gabapentin (NEURONTIN) capsule 200 mg  200 mg Oral BID    latanoprost (XALATAN) 0.005 % ophthalmic solution 1 Drop  1 Drop Left Eye QPM    levothyroxine (SYNTHROID) tablet 25 mcg  25 mcg Oral ACB    midodrine (PROAMATINE) tablet 10 mg  10 mg Oral TID WITH MEALS    pantoprazole (PROTONIX) tablet 40 mg  40 mg Oral ACB    pyridoxine (vitamin B6) (VITAMIN B-6) tablet 25 mg  25 mg Oral DAILY    NOREPINephrine (LEVOPHED) 8 mg in 5% dextrose 250mL (32 mcg/mL) infusion  0.5-16 mcg/min IntraVENous TITRATE    famotidine (PF) (PEPCID) 20 mg in 0.9% sodium chloride 10 mL injection  20 mg IntraVENous DAILY    0.9% sodium chloride infusion  75 mL/hr IntraVENous CONTINUOUS    PHENYLephrine (POORNIMA-SYNEPHRINE) 30 mg in 0.9% sodium chloride 250 mL infusion   mcg/min IntraVENous TITRATE    meropenem (MERREM) 500 mg in 0.9% sodium chloride (MBP/ADV) 50 mL MBP  0.5 g IntraVENous Q12H    bumetanide (BUMEX) injection 1 mg  1 mg IntraVENous Q12H PRN    sodium chloride (NS) flush 5-40 mL  5-40 mL IntraVENous Q8H    sodium chloride (NS) flush 5-40 mL  5-40 mL IntraVENous PRN    0.9% sodium chloride infusion 25 mL  25 mL IntraVENous PRN    acetaminophen (TYLENOL) tablet 650 mg  650 mg Oral Q6H PRN    Or    acetaminophen (TYLENOL) suppository 650 mg  650 mg Rectal Q6H PRN    bisacodyL (DULCOLAX) suppository 10 mg  10 mg Rectal DAILY PRN    promethazine (PHENERGAN) tablet 12.5 mg  12.5 mg Oral Q6H PRN    Or    ondansetron (ZOFRAN) injection 4 mg  4 mg IntraVENous Q6H PRN    heparin (porcine) injection 5,000 Units  5,000 Units SubCUTAneous Q8H     ______________________________________________________________________  EXPECTED LENGTH OF STAY: - - -  ACTUAL LENGTH OF STAY:          1                 Dayana Mckeon MD

## 2022-04-23 NOTE — PROGRESS NOTES
0045: TRANSFER - IN REPORT:    Verbal report received from Kari Alberto, [facility] (name) on Patrice Ellsworth  being received from [facility]  (unit) for routine progression of care      Report consisted of patients Situation, Background, Assessment and   Recommendations(SBAR). Information from the following report(s) SBAR, Kardex, Intake/Output, MAR, Recent Results and Cardiac Rhythm V Paced was reviewed with the receiving nurse. Opportunity for questions and clarification was provided. Assessment completed upon patients arrival to unit and care assumed. 0130: Pt arriving on the unit. Assessment completed, see doc flow sheet. Pt turned and repositioned. 1351: Nurse notified MD of pt's arrival on the unit. 0919: Nurse contacted by MD regarding transfer patient to ICU.    0400: Assessment completed, see doc flow sheet. 0700: Bedside and Verbal shift change report given to Mayank Chavez RN (oncoming nurse) by Luz Grijalva RN (offgoing nurse). Report included the following information SBAR, Kardex, ED Summary, Intake/Output, MAR, Recent Results and Cardiac Rhythm V-paced.

## 2022-04-23 NOTE — PROGRESS NOTES
Chart reviewed. Pt has a scanned DNR in our system on 4/12.  Will change code status to comply with this prior scanned DNR

## 2022-04-23 NOTE — CONSULTS
Tatyana Matt MD. [facility] [place]              Patient: Tomy Barksdale  : 1925      Today's Date: 2022          HISTORY OF PRESENT ILLNESS:     History of Present Illness:  Reason for consult:  CHF    Ms. Aj Goff is a 79 yo hx of chronic resp failure on 4L O2, chronic sCHF EF 35-40%, pacer hypotension, right renal mass, CKD 3, presented w/ suprapupic pain, UTI, septic shock. The patient was admitted 1 week ago for pancreatitis. She improved with IVF and pain meds. Dr. Janae Zaragoza [facility] the patient c/o suprapubic pain for 2 days, associated with fevers/chills and fatigue. She denied chest pain, cough, nausea, vomiting, diarrhea. In the ED, SBP ~80s, temp 101.7, WBC 6.7, lactate 2.2. U/A significant for a UTI. Abd CT showed improved peripancreatic stranding, but moderate pleural effusion. \"     She has a history of HFrEF (sees Dr. Harika Allen) and had been on Entresto and Toprol XL prior to 22 admission - at which time these were stopped and midodrine started for hypotension. Also on 4 L NC at home prior to this month. She says she has been stable from a cardiac standpoint - intermittent, brief CP, and chronic REDDY (on 4 L NC O2 at home). She says her breathing currently is fine. She has been hypotensive so has been receiving IVF and phenylephrine.          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    Stroke ([address]     Thromboembolus ([address]        Past Surgical History:   Procedure Laterality Date    HX CHOLECYSTECTOMY      HX HEENT      HX ORTHOPAEDIC      VASCULAR SURGERY PROCEDURE UNLIST           MEDICATIONS:     Current Facility-Administered Medications   Medication Dose Route Frequency Provider Last Rate Last Admin    amitriptyline (ELAVIL) tablet 25 mg  25 mg Oral QHS Martinez Biswas MD        aspirin delayed-release tablet 81 mg  81 mg Oral DAILY Laureano Biswas MD   81 mg at 04/23/22 0806    atorvastatin (LIPITOR) tablet 40 mg  40 mg Oral DAILY Martinez Biswas MD   40 mg at 04/23/22 0806    benzonatate (TESSALON) capsule 100 mg  100 mg Oral TID PRN Martinez Biswas MD        gabapentin (NEURONTIN) capsule 200 mg  200 mg Oral BID Martinez Biswas MD   200 mg at 04/23/22 0806    latanoprost (XALATAN) 0.005 % ophthalmic solution 1 Drop  1 Drop Left Eye QPM Martinez Biswas MD        levothyroxine (SYNTHROID) tablet 25 mcg  25 mcg Oral ACB Martinez Biswas MD   25 mcg at 04/23/22 0806    midodrine (PROAMATINE) tablet 10 mg  10 mg Oral TID WITH MEALS Martinez Biswas MD   10 mg at 04/23/22 1208    pantoprazole (PROTONIX) tablet 40 mg  40 mg Oral ACB Laureano Biswas MD   40 mg at 04/23/22 2796    pyridoxine (vitamin B6) (VITAMIN B-6) tablet 25 mg  25 mg Oral DAILY Martinez Biswas MD   25 mg at 04/23/22 7629    NOREPINephrine (LEVOPHED) 8 mg in 5% dextrose 250mL (32 mcg/mL) infusion  0.5-16 mcg/min IntraVENous TITRATE Satish Gonzalez MD   Held at 04/23/22 0223    famotidine (PF) (PEPCID) 20 mg in 0.9% sodium chloride 10 mL injection  20 mg IntraVENous DAILY Satish Gonzalez MD   20 mg at 04/23/22 0806    0.9% sodium chloride infusion  75 mL/hr IntraVENous CONTINUOUS Laureano Biswas MD 75 mL/hr at 04/23/22 0017 75 mL/hr at 04/23/22 0017    PHENYLephrine (POORNIMA-SYNEPHRINE) 30 mg in 0.9% sodium chloride 250 mL infusion   mcg/min IntraVENous TITRATE Satish Gonzalez MD 20 mL/hr at 04/23/22 1203 40 mcg/min at 04/23/22 1203    meropenem (MERREM) 500 mg in 0.9% sodium chloride (MBP/ADV) 50 mL MBP  0.5 g IntraVENous Q12H Martinez Biswas MD 16.7 mL/hr at 04/23/22 0539 500 mg at 04/23/22 0539    bumetanide (BUMEX) injection 1 mg  1 mg IntraVENous Q12H PRN Martinez Biswas MD        sodium chloride (NS) flush 5-40 mL  5-40 mL IntraVENous Q8H Martinez Biswas MD   10 mL at 04/23/22 0540    sodium chloride (NS) flush 5-40 mL  5-40 mL IntraVENous PRN Martinez Biswas MD        0.9% sodium chloride infusion 25 mL  25 mL IntraVENous PRN Martinez Biswas MD        acetaminophen (TYLENOL) tablet 650 mg  650 mg Oral Q6H PRN Martinez Biswas MD        Or    acetaminophen (TYLENOL) suppository 650 mg  650 mg Rectal Q6H PRN Martinez Biswas MD        bisacodyL (DULCOLAX) suppository 10 mg  10 mg Rectal DAILY PRN Feliciano Biswas MD        promethazine (PHENERGAN) tablet 12.5 mg  12.5 mg Oral Q6H PRN Martinez Biswas MD        Or    ondansetron (ZOFRAN) injection 4 mg  4 mg IntraVENous Q6H PRN Martinez Biswas MD        heparin (porcine) injection 5,000 Units  5,000 Units SubCUTAneous Q8H Do, Feliciano Lorenzo MD   5,000 Units at 04/23/22 2500       Allergies   Allergen Reactions    Cephalexin Unknown (comments)    Dilaudid [Hydromorphone (Bulk)] Rash, Nausea and Vomiting and Swelling    Fenofibrate Nausea Only    Gemfibrozil Unknown (comments)    Iodine Rash, Nausea and Vomiting and Swelling    Oxycodone Unknown (comments)    Pcn [Penicillins] Rash, Nausea and Vomiting, Swelling and Contact Dermatitis    Prilosec [Omeprazole] Nausea Only    Shellfish Derived Swelling    Sulfa (Sulfonamide Antibiotics) Rash, Nausea and Vomiting and Swelling           SOCIAL HISTORY:     Social History     Tobacco Use    Smoking status: Never Smoker    Smokeless tobacco: Never Used   Substance Use Topics    Alcohol use: No    Drug use: No         FAMILY HISTORY:     No family history on file.  n/c         REVIEW OF SYMPTOMS:     Review of Symptoms:  Constitutional: +  fever, chills  HEENT: Negative for nosebleeds, tinnitus, and vision changes. Respiratory: Negative for cough, wheezing  Cardiovascular: Negative for syncope, and PND.    Gastrointestinal: Negative for  diarrhea, melena. Genitourinary: Negative for dysuria  Musculoskeletal: Negative for myalgias. Skin: Negative for rash  Heme: No problems bleeding. Neurological: Negative for speech change and focal weakness. PHYSICAL EXAM:     Physical Exam:  Visit Vitals  BP (!) 122/59   Pulse (!) 101   Temp 97.5 °F (36.4 °C)   Resp 17   Ht 5' (1.524 m)   Wt 126 lb 8.7 oz (57.4 kg)   LMP 11/16/1975   SpO2 100%   BMI 24.71 kg/m²     Patient NAD, stated age   [de-identified]:  Hearing intact, non-icteric, normocephalic, atraumatic. Neck Exam: Supple  Lung Exam: Clear ant   Cardiac Exam: Regular rate and rhythm with no murmur or rub  Abdomen: Soft, non-tender  Extremities: Moves all ext well. No lower extremity edema. MSKTL: Overall good ROM ext  Skin: No significant rashes  Psych: Appropriate affect  Neuro - Grossly intact      LABS / OTHER STUDIES reviewed:       Recent Results (from the past 24 hour(s))   EKG, 12 LEAD, INITIAL    Collection Time: 04/22/22  2:59 PM   Result Value Ref Range    Ventricular Rate 73 BPM    Atrial Rate 73 BPM    P-R Interval 200 ms    QRS Duration 136 ms    Q-T Interval 440 ms    QTC Calculation (Bezet) 484 ms    Calculated P Axis 79 degrees    Calculated R Axis -66 degrees    Calculated T Axis 81 degrees    Diagnosis       Atrial-sensed ventricular-paced rhythm  Abnormal ECG  When compared with ECG of 09-APR-2022 17:47,  Vent.  rate has decreased BY  11 BPM  Confirmed by Lucio Duke MD., [place] ([id number]) on 4/22/2022 6:41:05 PM     CBC WITH AUTOMATED DIFF    Collection Time: 04/22/22  3:20 PM   Result Value Ref Range    WBC 6.7 3.6 - 11.0 K/uL    RBC 2.90 (L) 3.80 - 5.20 M/uL    HGB 9.8 (L) 11.5 - 16.0 g/dL    HCT 31.6 (L) 35.0 - 47.0 %    .0 (H) 80.0 - 99.0 FL    MCH 33.8 26.0 - 34.0 PG    MCHC 31.0 30.0 - 36.5 g/dL    RDW 12.2 11.5 - 14.5 %    PLATELET 112 640 - 045 K/uL    MPV 10.6 8.9 - 12.9 FL    NRBC 0.0 0  WBC    ABSOLUTE NRBC 0.00 0.00 - 0.01 K/uL    NEUTROPHILS 90 (H) 32 - 75 %    LYMPHOCYTES 3 (L) 12 - 49 %    MONOCYTES 6 5 - 13 %    EOSINOPHILS 0 0 - 7 %    BASOPHILS 0 0 - 1 %    IMMATURE GRANULOCYTES 1 (H) 0.0 - 0.5 %    ABS. NEUTROPHILS 6.0 1.8 - 8.0 K/UL    ABS. LYMPHOCYTES 0.2 (L) 0.8 - 3.5 K/UL    ABS. MONOCYTES 0.4 0.0 - 1.0 K/UL    ABS. EOSINOPHILS 0.0 0.0 - 0.4 K/UL    ABS. BASOPHILS 0.0 0.0 - 0.1 K/UL    ABS. IMM. GRANS. 0.1 (H) 0.00 - 0.04 K/UL    DF SMEAR SCANNED      RBC COMMENTS MACROCYTOSIS  1+       METABOLIC PANEL, COMPREHENSIVE    Collection Time: 04/22/22  3:20 PM   Result Value Ref Range    Sodium 142 136 - 145 mmol/L    Potassium 3.6 3.5 - 5.1 mmol/L    Chloride 110 (H) 97 - 108 mmol/L    CO2 25 21 - 32 mmol/L    Anion gap 7 5 - 15 mmol/L    Glucose 88 65 - 100 mg/dL    BUN 21 (H) 6 - 20 MG/DL    Creatinine 1.43 (H) 0.55 - 1.02 MG/DL    BUN/Creatinine ratio 15 12 - 20      GFR est AA 41 (L) >60 ml/min/1.73m2    GFR est non-AA 34 (L) >60 ml/min/1.73m2    Calcium 8.8 8.5 - 10.1 MG/DL    Bilirubin, total 1.7 (H) 0.2 - 1.0 MG/DL    ALT (SGPT) 222 (H) 12 - 78 U/L    AST (SGOT) 1,042 (H) 15 - 37 U/L    Alk. phosphatase 865 (H) 45 - 117 U/L    Protein, total 5.1 (L) 6.4 - 8.2 g/dL    Albumin 2.0 (L) 3.5 - 5.0 g/dL    Globulin 3.1 2.0 - 4.0 g/dL    A-G Ratio 0.6 (L) 1.1 - 2.2     TROPONIN-HIGH SENSITIVITY    Collection Time: 04/22/22  3:20 PM   Result Value Ref Range    Troponin-High Sensitivity 47 0 - 51 ng/L   NT-PRO BNP    Collection Time: 04/22/22  3:20 PM   Result Value Ref Range    NT pro-BNP 10,917 (H) <450 PG/ML   SAMPLES BEING HELD    Collection Time: 04/22/22  3:20 PM   Result Value Ref Range    SAMPLES BEING HELD 1BLUE,1RED,1SST     COMMENT        Add-on orders for these samples will be processed based on acceptable specimen integrity and analyte stability, which may vary by analyte.    LIPASE    Collection Time: 04/22/22  3:20 PM   Result Value Ref Range    Lipase 284 73 - 393 U/L   CULTURE, BLOOD, PAIRED    Collection Time: 04/22/22  3:52 PM    Specimen: Blood   Result Value Ref Range    Special Requests: NO SPECIAL REQUESTS      Culture result: (A)       GRAM NEGATIVE RODS GROWING IN ALL 4 BOTTLES DRAWN SITES=LAC AND RAC   LACTIC ACID    Collection Time: 04/22/22  3:52 PM   Result Value Ref Range    Lactic acid 2.2 (HH) 0.4 - 2.0 MMOL/L   URINALYSIS W/MICROSCOPIC    Collection Time: 04/22/22  4:08 PM   Result Value Ref Range    Color YELLOW/STRAW      Appearance TURBID (A) CLEAR      Specific gravity 1.010 1.003 - 1.030      pH (UA) 6.5 5.0 - 8.0      Protein 30 (A) NEG mg/dL    Glucose Negative NEG mg/dL    Ketone Negative NEG mg/dL    Bilirubin Negative NEG      Blood MODERATE (A) NEG      Urobilinogen 2.0 (H) 0.2 - 1.0 EU/dL    Nitrites Negative NEG      Leukocyte Esterase LARGE (A) NEG      WBC >100 (H) 0 - 4 /hpf    RBC 5-10 0 - 5 /hpf    Epithelial cells MODERATE (A) FEW /lpf    Bacteria 4+ (A) NEG /hpf    Yeast PRESENT (A) NEG     URINE CULTURE HOLD SAMPLE    Collection Time: 04/22/22  4:08 PM    Specimen: Serum; Urine   Result Value Ref Range    Urine culture hold        Urine on hold in Microbiology dept for 2 days. If unpreserved urine is submitted, it cannot be used for addtional testing after 24 hours, recollection will be required. SAMPLES BEING HELD    Collection Time: 04/22/22  5:25 PM   Result Value Ref Range    SAMPLES BEING HELD  1GRAY TOP     COMMENT        Add-on orders for these samples will be processed based on acceptable specimen integrity and analyte stability, which may vary by analyte.    LACTIC ACID    Collection Time: 04/22/22  8:12 PM   Result Value Ref Range    Lactic acid 1.2 0.4 - 2.0 MMOL/L   LACTIC ACID    Collection Time: 04/23/22 12:55 AM   Result Value Ref Range    Lactic acid 0.7 0.4 - 2.0 MMOL/L   METABOLIC PANEL, BASIC    Collection Time: 04/23/22 12:55 AM   Result Value Ref Range    Sodium 144 136 - 145 mmol/L    Potassium 3.8 3.5 - 5.1 mmol/L    Chloride 114 (H) 97 - 108 mmol/L    CO2 23 21 - 32 mmol/L    Anion gap 7 5 - 15 mmol/L    Glucose 97 65 - 100 mg/dL    BUN 22 (H) 6 - 20 MG/DL    Creatinine 1.39 (H) 0.55 - 1.02 MG/DL    BUN/Creatinine ratio 16 12 - 20      GFR est AA 43 (L) >60 ml/min/1.73m2    GFR est non-AA 35 (L) >60 ml/min/1.73m2    Calcium 7.9 (L) 8.5 - 10.1 MG/DL   HEPATIC FUNCTION PANEL    Collection Time: 04/23/22 12:55 AM   Result Value Ref Range    Protein, total 4.4 (L) 6.4 - 8.2 g/dL    Albumin 1.8 (L) 3.5 - 5.0 g/dL    Globulin 2.6 2.0 - 4.0 g/dL    A-G Ratio 0.7 (L) 1.1 - 2.2      Bilirubin, total 1.8 (H) 0.2 - 1.0 MG/DL    Bilirubin, direct 1.4 (H) 0.0 - 0.2 MG/DL    Alk.  phosphatase 793 (H) 45 - 117 U/L    AST (SGOT) 768 (H) 15 - 37 U/L    ALT (SGPT) 252 (H) 12 - 78 U/L   MAGNESIUM    Collection Time: 04/23/22 12:55 AM   Result Value Ref Range    Magnesium 1.2 (L) 1.6 - 2.4 mg/dL   CBC W/O DIFF    Collection Time: 04/23/22 12:55 AM   Result Value Ref Range    WBC 18.5 (H) 3.6 - 11.0 K/uL    RBC 2.73 (L) 3.80 - 5.20 M/uL    HGB 9.2 (L) 11.5 - 16.0 g/dL    HCT 30.1 (L) 35.0 - 47.0 %    .3 (H) 80.0 - 99.0 FL    MCH 33.7 26.0 - 34.0 PG    MCHC 30.6 30.0 - 36.5 g/dL    RDW 12.3 11.5 - 14.5 %    PLATELET 367 (H) 471 - 400 K/uL    MPV 10.2 8.9 - 12.9 FL    NRBC 0.0 0  WBC    ABSOLUTE NRBC 0.00 0.00 - 0.01 K/uL   PHOSPHORUS    Collection Time: 04/23/22 12:55 AM   Result Value Ref Range    Phosphorus 3.5 2.6 - 4.7 MG/DL   HEMOGLOBIN A1C WITH EAG    Collection Time: 04/23/22 12:55 AM   Result Value Ref Range    Hemoglobin A1c 5.1 4.0 - 5.6 %    Est. average glucose 100 mg/dL   TSH 3RD GENERATION    Collection Time: 04/23/22 12:55 AM   Result Value Ref Range    TSH 1.57 0.36 - 3.74 uIU/mL   BLOOD GAS, VENOUS    Collection Time: 04/23/22 10:27 AM   Result Value Ref Range    VENOUS PH 7.33 7.32 - 7.42      VENOUS PCO2 42.0 41 - 51 mmHg    VENOUS PO2 36 25 - 40 mmHg    VENOUS BICARBONATE 22 (L) 23 - 28 mmol/L    VENOUS BASE DEFICIT 4.2 mmol/L    VENOUS O2 SATURATION 65 65 - 88 %    O2 METHOD NASAL CANNULA      O2 FLOW RATE 2.00 L/min    Sample source VENOUS BLOOD      SITE OTHER           CARDIAC DIAGNOSTICS:     Cardiac Evaluation Includes:  I reviewed the results below. ECHO 4/10/22    Left Ventricle: Left ventricle size is normal. Normal wall thickness. See diagram for wall motion findings. The EF by visual approximation is 35 - 40%. visualization ot the endocardium is difficult.   Right Ventricle: Right ventricle is mildly dilated. Lead present in the right ventricle.   Aortic Valve: Tricuspid valve. Moderately calcified cusp.   Mitral Valve: Mildly thickened leaflet. Moderately calcified leaflet. Severe annular calcification of the mitral valve. Calcified subvalvular apparatus. Mild transvalvular regurgitation.   Tricuspid Valve: Mildly thickened leaflets. Mildly elevated RVSP.   Right Atrium: Right atrium is dilated. CXR 4/22/22 -  New mild to moderate bilateral pleural effusions          ASSESSMENT AND PLAN:     Assessment and Plan:    Ms. Brad Seymour is a 81 yo hx of chronic resp failure on 4L O2, chronic sCHF EF 35-40%, hypotension, right renal mass, CKD 3, presented w/ suprapupic pain, UTI, septic shock. Has pleural effusions on CT scan.      1) Septic shock/UTI on admission with SBP <90  - She is on pressors and antibiotics - management per critical care team / hospitalist     2) Acute on Chronic HFrEF  - sees Dr. Laurel Catherine   - She has a history of HFrEF and had been on Entresto and Toprol XL prior to 4/9/22 admission - at which time these were stopped and midodrine started for hypotension. Also on 4 L NC at home prior to this month. - she says she has been stable from a cardiac standpoint lately (despite elevated proBNP and pleural effusions) - is on 4 L NC O2 at home and says breathing has been stable. - Her CT scan shows pleural effusions ---> ideally we would diurese her, however in setting of septic shock (and stable resp status), it makes sense that she is getting IVF (cautiously)  and pressors.   ---> can use bumex PRN  - she had GDMT stopped a couple of weeks ago for hypotension ---> can try to slowly restart GDMT later when BP is stable. Liliana Ncie MD, [address]  [address]  Ph: [phone]   Ph [phone]

## 2022-04-23 NOTE — CONSULTS
Consult patient was done with the assistance of resident/nurse, telemetry video examination and remotely reviewing patient chart x-rays echocardiogram and lab work. I personally reviewed telemetry, vitals and ventilators waveform with the video communication. A 80year-old female with multiple medical problems as below admitted to hospital with complaining of suprapubic pain for 2 days associated with fever chills and generalized weakness. Patient found to have hypotension, started on Kyree-Synephrine on the floor. Blood pressure did not improved and required patient to transfer to critical care unit to start the patient on norepinephrine. Patient denies any chest pain no shortness of breath. Patient was consulted last night reviewed patient's medications lab x-ray finding with the nurse last night. Over the night patient's Kyree-Synephrine requirement improved and not required to start the patient on norepinephrine. Past medical history of coronary artery disease diabetes chronic kidney disease GERD hyperlipidemia osteoporosis stroke arthritis and recent admission for pancreatitis    Past surgical history of cholecystectomy vascular surgery and orthopedic    Social history never smoked no drink alcohol no substance abuse    Family history is noncontributory    Medication list reviewed and reconciled  Current Discharge Medication List          Constitutional:  : Denies Appetite Loss  Cardiovascular:  Denies: Chest Pain, Exertional Dyspnea, Irregular Heart Beat, Lightheadedness, Orthopnea, Palpitations, Syncope  Respiratory:  Denies: Apnea, Cough, Hemoptysis, Orthopnea, Pleuritic Pain, SOB, SOB with Exertion, Sputum Production, Wheezing  Gastrointestinal:  : Denies Abdominal Distention, loss of appetite, jaundice. Psychiatric:  Denies: Anxiety, Delusions, Depression, Sleeping Problems.   Hematologic:  Denies: Bleeding, Bruising, Hemorrhage   Neurologic: Denies Headache, Visual disturbance, diplopia. Musckuloskeletal: Denies joint pain, muscle pain. Genitourinay: Denies dysuria, Flank pain, hematuria. Integumentary: Denies rash, sloughing, itching. All other systems reviewed were negative. Visit Vitals  BP (!) 122/59   Pulse (!) 101   Temp 97.5 °F (36.4 °C)   Resp 17   Ht 5' (1.524 m)   Wt 57.4 kg (126 lb 8.7 oz)   SpO2 100%   BMI 24.71 kg/m²     Performed via video assessment. Gen: Patient is alert and oriented in NAD  HEENT: NC/AT, EOMI  Chest: Chest movement is equal bilaterally  Cardiac: Cardiac monitor reveals SR  Extremities: Extremities appear well perfused with no obvious edema  Neuro: Nonfocal    Blood culture positive for gram-negative rods    Chest x-ray reviewed consistent bilateral moderate pleural effusion without consolidation. VBG requested    Recent Results (from the past 24 hour(s))   EKG, 12 LEAD, INITIAL    Collection Time: 04/22/22  2:59 PM   Result Value Ref Range    Ventricular Rate 73 BPM    Atrial Rate 73 BPM    P-R Interval 200 ms    QRS Duration 136 ms    Q-T Interval 440 ms    QTC Calculation (Bezet) 484 ms    Calculated P Axis 79 degrees    Calculated R Axis -66 degrees    Calculated T Axis 81 degrees    Diagnosis       Atrial-sensed ventricular-paced rhythm  Abnormal ECG  When compared with ECG of 09-APR-2022 17:47,  Vent.  rate has decreased BY  11 BPM  Confirmed by Carlotta Mcnally MD., [facility] ([id number]) on 4/22/2022 6:41:05 PM     CBC WITH AUTOMATED DIFF    Collection Time: 04/22/22  3:20 PM   Result Value Ref Range    WBC 6.7 3.6 - 11.0 K/uL    RBC 2.90 (L) 3.80 - 5.20 M/uL    HGB 9.8 (L) 11.5 - 16.0 g/dL    HCT 31.6 (L) 35.0 - 47.0 %    .0 (H) 80.0 - 99.0 FL    MCH 33.8 26.0 - 34.0 PG    MCHC 31.0 30.0 - 36.5 g/dL    RDW 12.2 11.5 - 14.5 %    PLATELET 940 091 - 662 K/uL    MPV 10.6 8.9 - 12.9 FL    NRBC 0.0 0  WBC    ABSOLUTE NRBC 0.00 0.00 - 0.01 K/uL    NEUTROPHILS 90 (H) 32 - 75 %    LYMPHOCYTES 3 (L) 12 - 49 %    MONOCYTES 6 5 - 13 %    EOSINOPHILS 0 0 - 7 %    BASOPHILS 0 0 - 1 %    IMMATURE GRANULOCYTES 1 (H) 0.0 - 0.5 %    ABS. NEUTROPHILS 6.0 1.8 - 8.0 K/UL    ABS. LYMPHOCYTES 0.2 (L) 0.8 - 3.5 K/UL    ABS. MONOCYTES 0.4 0.0 - 1.0 K/UL    ABS. EOSINOPHILS 0.0 0.0 - 0.4 K/UL    ABS. BASOPHILS 0.0 0.0 - 0.1 K/UL    ABS. IMM. GRANS. 0.1 (H) 0.00 - 0.04 K/UL    DF SMEAR SCANNED      RBC COMMENTS MACROCYTOSIS  1+       METABOLIC PANEL, COMPREHENSIVE    Collection Time: 04/22/22  3:20 PM   Result Value Ref Range    Sodium 142 136 - 145 mmol/L    Potassium 3.6 3.5 - 5.1 mmol/L    Chloride 110 (H) 97 - 108 mmol/L    CO2 25 21 - 32 mmol/L    Anion gap 7 5 - 15 mmol/L    Glucose 88 65 - 100 mg/dL    BUN 21 (H) 6 - 20 MG/DL    Creatinine 1.43 (H) 0.55 - 1.02 MG/DL    BUN/Creatinine ratio 15 12 - 20      GFR est AA 41 (L) >60 ml/min/1.73m2    GFR est non-AA 34 (L) >60 ml/min/1.73m2    Calcium 8.8 8.5 - 10.1 MG/DL    Bilirubin, total 1.7 (H) 0.2 - 1.0 MG/DL    ALT (SGPT) 222 (H) 12 - 78 U/L    AST (SGOT) 1,042 (H) 15 - 37 U/L    Alk. phosphatase 865 (H) 45 - 117 U/L    Protein, total 5.1 (L) 6.4 - 8.2 g/dL    Albumin 2.0 (L) 3.5 - 5.0 g/dL    Globulin 3.1 2.0 - 4.0 g/dL    A-G Ratio 0.6 (L) 1.1 - 2.2     TROPONIN-HIGH SENSITIVITY    Collection Time: 04/22/22  3:20 PM   Result Value Ref Range    Troponin-High Sensitivity 47 0 - 51 ng/L   NT-PRO BNP    Collection Time: 04/22/22  3:20 PM   Result Value Ref Range    NT pro-BNP 10,917 (H) <450 PG/ML   SAMPLES BEING HELD    Collection Time: 04/22/22  3:20 PM   Result Value Ref Range    SAMPLES BEING HELD 1BLUE,1RED,1SST     COMMENT        Add-on orders for these samples will be processed based on acceptable specimen integrity and analyte stability, which may vary by analyte.    LIPASE    Collection Time: 04/22/22  3:20 PM   Result Value Ref Range    Lipase 284 73 - 393 U/L   CULTURE, BLOOD, PAIRED    Collection Time: 04/22/22  3:52 PM    Specimen: Blood   Result Value Ref Range    Special Requests: NO SPECIAL REQUESTS      Culture result: (A)       GRAM NEGATIVE RODS GROWING IN ALL 4 BOTTLES DRAWN SITES=LAC AND RAC   LACTIC ACID    Collection Time: 04/22/22  3:52 PM   Result Value Ref Range    Lactic acid 2.2 (HH) 0.4 - 2.0 MMOL/L   URINALYSIS W/MICROSCOPIC    Collection Time: 04/22/22  4:08 PM   Result Value Ref Range    Color YELLOW/STRAW      Appearance TURBID (A) CLEAR      Specific gravity 1.010 1.003 - 1.030      pH (UA) 6.5 5.0 - 8.0      Protein 30 (A) NEG mg/dL    Glucose Negative NEG mg/dL    Ketone Negative NEG mg/dL    Bilirubin Negative NEG      Blood MODERATE (A) NEG      Urobilinogen 2.0 (H) 0.2 - 1.0 EU/dL    Nitrites Negative NEG      Leukocyte Esterase LARGE (A) NEG      WBC >100 (H) 0 - 4 /hpf    RBC 5-10 0 - 5 /hpf    Epithelial cells MODERATE (A) FEW /lpf    Bacteria 4+ (A) NEG /hpf    Yeast PRESENT (A) NEG     URINE CULTURE HOLD SAMPLE    Collection Time: 04/22/22  4:08 PM    Specimen: Serum; Urine   Result Value Ref Range    Urine culture hold        Urine on hold in Microbiology dept for 2 days. If unpreserved urine is submitted, it cannot be used for addtional testing after 24 hours, recollection will be required. CULTURE, URINE    Collection Time: 04/22/22  4:08 PM    Specimen: Clean catch; Urine   Result Value Ref Range    Special Requests: NO SPECIAL REQUESTS      [place] Count >100,000  COLONIES/mL        Culture result: GRAM NEGATIVE RODS (A)     SAMPLES BEING HELD    Collection Time: 04/22/22  5:25 PM   Result Value Ref Range    SAMPLES BEING HELD  1GRAY TOP     COMMENT        Add-on orders for these samples will be processed based on acceptable specimen integrity and analyte stability, which may vary by analyte.    LACTIC ACID    Collection Time: 04/22/22  8:12 PM   Result Value Ref Range    Lactic acid 1.2 0.4 - 2.0 MMOL/L   LACTIC ACID    Collection Time: 04/23/22 12:55 AM   Result Value Ref Range    Lactic acid 0.7 0.4 - 2.0 MMOL/L   METABOLIC PANEL, BASIC    Collection Time: 04/23/22 12:55 AM   Result Value Ref Range    Sodium 144 136 - 145 mmol/L    Potassium 3.8 3.5 - 5.1 mmol/L    Chloride 114 (H) 97 - 108 mmol/L    CO2 23 21 - 32 mmol/L    Anion gap 7 5 - 15 mmol/L    Glucose 97 65 - 100 mg/dL    BUN 22 (H) 6 - 20 MG/DL    Creatinine 1.39 (H) 0.55 - 1.02 MG/DL    BUN/Creatinine ratio 16 12 - 20      GFR est AA 43 (L) >60 ml/min/1.73m2    GFR est non-AA 35 (L) >60 ml/min/1.73m2    Calcium 7.9 (L) 8.5 - 10.1 MG/DL   HEPATIC FUNCTION PANEL    Collection Time: 04/23/22 12:55 AM   Result Value Ref Range    Protein, total 4.4 (L) 6.4 - 8.2 g/dL    Albumin 1.8 (L) 3.5 - 5.0 g/dL    Globulin 2.6 2.0 - 4.0 g/dL    A-G Ratio 0.7 (L) 1.1 - 2.2      Bilirubin, total 1.8 (H) 0.2 - 1.0 MG/DL    Bilirubin, direct 1.4 (H) 0.0 - 0.2 MG/DL    Alk.  phosphatase 793 (H) 45 - 117 U/L    AST (SGOT) 768 (H) 15 - 37 U/L    ALT (SGPT) 252 (H) 12 - 78 U/L   MAGNESIUM    Collection Time: 04/23/22 12:55 AM   Result Value Ref Range    Magnesium 1.2 (L) 1.6 - 2.4 mg/dL   CBC W/O DIFF    Collection Time: 04/23/22 12:55 AM   Result Value Ref Range    WBC 18.5 (H) 3.6 - 11.0 K/uL    RBC 2.73 (L) 3.80 - 5.20 M/uL    HGB 9.2 (L) 11.5 - 16.0 g/dL    HCT 30.1 (L) 35.0 - 47.0 %    .3 (H) 80.0 - 99.0 FL    MCH 33.7 26.0 - 34.0 PG    MCHC 30.6 30.0 - 36.5 g/dL    RDW 12.3 11.5 - 14.5 %    PLATELET 817 (H) 617 - 400 K/uL    MPV 10.2 8.9 - 12.9 FL    NRBC 0.0 0  WBC    ABSOLUTE NRBC 0.00 0.00 - 0.01 K/uL   PHOSPHORUS    Collection Time: 04/23/22 12:55 AM   Result Value Ref Range    Phosphorus 3.5 2.6 - 4.7 MG/DL   HEMOGLOBIN A1C WITH EAG    Collection Time: 04/23/22 12:55 AM   Result Value Ref Range    Hemoglobin A1c 5.1 4.0 - 5.6 %    Est. average glucose 100 mg/dL   TSH 3RD GENERATION    Collection Time: 04/23/22 12:55 AM   Result Value Ref Range    TSH 1.57 0.36 - 3.74 uIU/mL   BLOOD GAS, VENOUS    Collection Time: 04/23/22 10:27 AM   Result Value Ref Range    VENOUS PH 7.33 7.32 - 7.42      VENOUS PCO2 42.0 41 - 51 mmHg    VENOUS PO2 36 25 - 40 mmHg    VENOUS BICARBONATE 22 (L) 23 - 28 mmol/L    VENOUS BASE DEFICIT 4.2 mmol/L    VENOUS O2 SATURATION 65 65 - 88 %    O2 METHOD NASAL CANNULA      O2 FLOW RATE 2.00 L/min    Sample source VENOUS BLOOD      SITE OTHER       Assessment  Septic shock  Gram-negative bacteremia  Bilateral pleural effusion  Elevated transaminas  Ischemic cardiomyopathy  CKD  Diabetes  Hyperlipidemia    Plan for today  We will check VBG to differentiate cardiogenic shock versus septic shock  Continue meropenem to cover gram-negative rods bacteremia  DC vancomycin  Continue Kyree-Synephrine to maintain mean arterial pressure more than 65  Continue midodrine  DVT prophylaxis with heparin  Continue outpatient medication with aspirin   I would hold atorvastatin because of elevated transaminase  We will check hepatitis panel  Continue Protonix  Continue levothyroxine  Hold diuretics today but patient will benefit with the diuretics once blood pressure improved    Thank you very much     These illnesses have required me to:   (1) perform high complexity decision making for assessment and support;   (2) assess the patient via video;   (3) personally review the medical record and laboratory and diagnostic imaging results;   (4) actively titrate high-alert medications;   (5) manage the ventilator and actively titrate oxygen;   (6) discuss this patient's case management with other healthcare providers; and (  7) apply and interpret advanced monitoring techniques. As a result of this, I personally spent  45  minutes providing critical care services exclusively to this patient. This was in exclusion of the time spent performing procedures or teaching.

## 2022-04-24 ENCOUNTER — APPOINTMENT (OUTPATIENT)
Dept: VASCULAR SURGERY | Age: 87
DRG: 871 | End: 2022-04-24
Attending: INTERNAL MEDICINE
Payer: MEDICARE

## 2022-04-24 LAB
CREAT SERPL-MCNC: 1 MG/DL (ref 0.55–1.02)
VANCOMYCIN SERPL-MCNC: 12.8 UG/ML

## 2022-04-24 PROCEDURE — 36415 COLL VENOUS BLD VENIPUNCTURE: CPT

## 2022-04-24 PROCEDURE — 97116 GAIT TRAINING THERAPY: CPT

## 2022-04-24 PROCEDURE — 80202 ASSAY OF VANCOMYCIN: CPT

## 2022-04-24 PROCEDURE — 99233 SBSQ HOSP IP/OBS HIGH 50: CPT | Performed by: SPECIALIST

## 2022-04-24 PROCEDURE — 74011250637 HC RX REV CODE- 250/637: Performed by: INTERNAL MEDICINE

## 2022-04-24 PROCEDURE — 82565 ASSAY OF CREATININE: CPT

## 2022-04-24 PROCEDURE — 97161 PT EVAL LOW COMPLEX 20 MIN: CPT

## 2022-04-24 PROCEDURE — 65270000046 HC RM TELEMETRY

## 2022-04-24 PROCEDURE — 74011000258 HC RX REV CODE- 258: Performed by: INTERNAL MEDICINE

## 2022-04-24 PROCEDURE — 74011000250 HC RX REV CODE- 250: Performed by: INTERNAL MEDICINE

## 2022-04-24 PROCEDURE — 97165 OT EVAL LOW COMPLEX 30 MIN: CPT

## 2022-04-24 PROCEDURE — 93971 EXTREMITY STUDY: CPT

## 2022-04-24 PROCEDURE — 74011250636 HC RX REV CODE- 250/636: Performed by: INTERNAL MEDICINE

## 2022-04-24 PROCEDURE — 77030020847 HC STATLOK BARD -A

## 2022-04-24 PROCEDURE — 97530 THERAPEUTIC ACTIVITIES: CPT

## 2022-04-24 RX ADMIN — MEROPENEM 500 MG: 500 INJECTION, POWDER, FOR SOLUTION INTRAVENOUS at 05:45

## 2022-04-24 RX ADMIN — SODIUM CHLORIDE, PRESERVATIVE FREE 10 ML: 5 INJECTION INTRAVENOUS at 05:45

## 2022-04-24 RX ADMIN — MIDODRINE HYDROCHLORIDE 10 MG: 5 TABLET ORAL at 07:34

## 2022-04-24 RX ADMIN — PANTOPRAZOLE SODIUM 40 MG: 40 TABLET, DELAYED RELEASE ORAL at 07:34

## 2022-04-24 RX ADMIN — HEPARIN SODIUM 5000 UNITS: 5000 INJECTION INTRAVENOUS; SUBCUTANEOUS at 13:53

## 2022-04-24 RX ADMIN — PYRIDOXINE HCL TAB 50 MG 25 MG: 50 TAB at 08:46

## 2022-04-24 RX ADMIN — HEPARIN SODIUM 5000 UNITS: 5000 INJECTION INTRAVENOUS; SUBCUTANEOUS at 21:57

## 2022-04-24 RX ADMIN — ASPIRIN 81 MG: 81 TABLET, COATED ORAL at 08:45

## 2022-04-24 RX ADMIN — MIDODRINE HYDROCHLORIDE 10 MG: 5 TABLET ORAL at 11:32

## 2022-04-24 RX ADMIN — HEPARIN SODIUM 5000 UNITS: 5000 INJECTION INTRAVENOUS; SUBCUTANEOUS at 05:45

## 2022-04-24 RX ADMIN — SODIUM CHLORIDE 75 ML/HR: 9 INJECTION, SOLUTION INTRAVENOUS at 03:37

## 2022-04-24 RX ADMIN — MIDODRINE HYDROCHLORIDE 10 MG: 5 TABLET ORAL at 17:21

## 2022-04-24 RX ADMIN — LATANOPROST 1 DROP: 50 SOLUTION OPHTHALMIC at 17:22

## 2022-04-24 RX ADMIN — AMITRIPTYLINE HYDROCHLORIDE 25 MG: 50 TABLET, FILM COATED ORAL at 21:56

## 2022-04-24 RX ADMIN — SODIUM CHLORIDE, PRESERVATIVE FREE 10 ML: 5 INJECTION INTRAVENOUS at 13:53

## 2022-04-24 RX ADMIN — GABAPENTIN 200 MG: 100 CAPSULE ORAL at 08:46

## 2022-04-24 RX ADMIN — MEROPENEM 1 G: 1 INJECTION, POWDER, FOR SOLUTION INTRAVENOUS at 17:21

## 2022-04-24 RX ADMIN — GABAPENTIN 200 MG: 100 CAPSULE ORAL at 17:21

## 2022-04-24 RX ADMIN — SODIUM CHLORIDE, PRESERVATIVE FREE 10 ML: 5 INJECTION INTRAVENOUS at 21:59

## 2022-04-24 RX ADMIN — LEVOTHYROXINE SODIUM 25 MCG: 0.03 TABLET ORAL at 07:35

## 2022-04-24 NOTE — PROGRESS NOTES
1900-Bedside and Verbal shift change report given to Luly Granda (oncoming nurse) by Syed Grullon RN (offgoing nurse). Report included the following information SBAR, Kardex, Intake/Output, MAR, Recent Results, Med Rec Status and Cardiac Rhythm V-Paced. 1930-Patient resting quietly in bed watching TV.     2000-Full assessment completed, see flow sheets. Patient refused to be turned and repositioned. Patient mentioned that she didn't receive a dinner tray. This RN offered some options and she ended up settling for crackers with peanut butter. Neuro=A&Ox4  Cardiac=V-paced  Resp=2L/NC  GI=Regular diet, poor appetite   =Draining billy  Skin=Jaudiced skin, RUE skin tear, sacral wound, bilateral non-blanchable red heels  Access-R IJ/TL, flush well with blood return     2200-Evening meds given. Patient turned and repositioned. 0000-Reassessment complete, no changes noted. Patient turned and repositioned. 0200-Patient resting quietly in bed with eyes closed. Patient turned and repositioned. 0400-Patient resting quietly in bed with eyes closed. Patient turned and repositioned. Morning labs drawn. 0600-Patient resting quietly in bed with eyes closed. Patient turned and repositioned. 0700-Bedside and Verbal shift change report given to Victor Hugo Aldana (oncoming nurse) by Joao Chavis RN (offgoing nurse). Report included the following information SBAR, Kardex, MAR and Accordion. Cardiac Rhythm V-paced. [No Acute Distress] : no acute distress [Well Nourished] : well nourished [Well Developed] : well developed [EOMI] : extraocular movements intact [Normal Outer Ear/Nose] : the outer ears and nose were normal in appearance [Normal Oropharynx] : the oropharynx was normal [Normal TMs] : both tympanic membranes were normal [Normal Nasal Mucosa] : the nasal mucosa was normal [Supple] : supple [No Respiratory Distress] : no respiratory distress  [No Accessory Muscle Use] : no accessory muscle use [Clear to Auscultation] : lungs were clear to auscultation bilaterally [Normal Rate] : normal rate  [Regular Rhythm] : with a regular rhythm [Normal S1, S2] : normal S1 and S2 [No Edema] : there was no peripheral edema [Soft] : abdomen soft [Non Tender] : non-tender [No HSM] : no HSM [Normal Bowel Sounds] : normal bowel sounds [No CVA Tenderness] : no CVA  tenderness [No Spinal Tenderness] : no spinal tenderness [No Rash] : no rash [Normal Gait] : normal gait [Deep Tendon Reflexes (DTR)] : deep tendon reflexes were 2+ and symmetric [Speech Grossly Normal] : speech grossly normal [Memory Grossly Normal] : memory grossly normal [Normal Affect] : the affect was normal [Alert and Oriented x3] : oriented to person, place, and time [Normal Mood] : the mood was normal

## 2022-04-24 NOTE — PROGRESS NOTES
Problem: Mobility Impaired (Adult and Pediatric)  Goal: *Acute Goals and Plan of Care (Insert Text)  Description: FUNCTIONAL STATUS PRIOR TO ADMISSION: Patient required minimal assistance for basic and instrumental ADLs. HOME SUPPORT PRIOR TO ADMISSION: The patient lived at SNF . Physical Therapy Goals  Initiated 4/24/2022  1. Patient will move from supine to sit and sit to supine , scoot up and down, and roll side to side in bed with minimal assistance/contact guard assist within 7 day(s). 2.  Patient will transfer from bed to chair and chair to bed with minimal assistance/contact guard assist using the least restrictive device within 7 day(s). 3.  Patient will perform sit to stand with minimal assistance/contact guard assist within 7 day(s). 4.  Patient will ambulate with minimal assistance/contact guard assist for 200 feet with the least restrictive device within 7 day(s). Outcome: Progressing Towards Goal   PHYSICAL THERAPY EVALUATION  Patient: Nathan Heller (33 y.o. female)  Date: 4/24/2022  Primary Diagnosis: Acute on chronic systolic (congestive) heart failure (HCC) [I50.23]        Precautions: fall       ASSESSMENT  Based on the objective data described below, the patient presents with difficulty with ambulation. Communicated with nurse cleared for therapy off pressor support. Patient supine on bed in ICU was in ICU few weeks ago. Rolled on the edge of bed mod assist x 2, supine to sit mod assist x 2, sit to stand min assist x 2. Patient very anxious of falling. Patient on ICU bed and she is short need min assist x 2 to assist down so she can touch the floor. Ambulate towards the recliner min assist x 2. OOB to chair as tolerated performed some active range of motion exercise on both LE all planes. Educate and instructed patient to continue active range of motion exercise on both legs while up on chair or on bed multiple times.  Recommend patient to be up on the chair at least 3 times a day every meal times as tolerated. Communicated with nurse yuri burt to monitor patient. Current Level of Function Impacting Discharge (mobility/balance): min assist x 2 with transfers and ambulation using a rolling walker    Functional Outcome Measure: The patient scored 10/100 on the barthel outcome measure   Other factors to consider for discharge: fall     Patient will benefit from skilled therapy intervention to address the above noted impairments. PLAN :  Recommendations and Planned Interventions: bed mobility training, transfer training, gait training, therapeutic exercises, neuromuscular re-education, orthotic/prosthetic training, patient and family training/education, and therapeutic activities      Frequency/Duration: Patient will be followed by physical therapy:  5 times a week to address goals. Recommendation for discharge: (in order for the patient to meet his/her long term goals)  Therapy up to 5 days/week in SNF setting    This discharge recommendation:  Has been made in collaboration with the attending provider and/or case management    IF patient discharges home will need the following DME: patient owns DME required for discharge         SUBJECTIVE:   Patient stated [address] 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    Pacemaker     Stroke ([facility] [address]     Thromboembolus ([facility] [address]      Past Surgical History:   Procedure Laterality Date    HX CHOLECYSTECTOMY      HX HEENT      HX ORTHOPAEDIC      VASCULAR SURGERY PROCEDURE UNLIST         Personal factors and/or comorbidities impacting plan of care:          EXAMINATION/PRESENTATION/DECISION MAKING:   Critical Behavior:  Neurologic State: Alert  Orientation Level: Oriented X4  Cognition: Follows commands     Hearing: Auditory  Auditory Impairment: Hard of hearing, bilateral    Range Of Motion:  AROM: Within functional limits           PROM: Within functional limits           Strength:    Strength: Generally decreased, functional                    Tone & Sensation:                                  Coordination:  Coordination: Within functional limits  Vision:      Functional Mobility:  Bed Mobility:  Rolling: Moderate assistance;Assist x2  Supine to Sit: Moderate assistance;Assist x2  Sit to Supine: Moderate assistance;Assist x2  Scooting: Moderate assistance;Assist x2  Transfers:  Sit to Stand: Minimum assistance;Assist x2  Stand to Sit: Minimum assistance;Assist x2  Stand Pivot Transfers: Minimum assistance;Assist x2     Bed to Chair: Minimum assistance;Assist x2              Balance:   Sitting: Intact; High guard  Standing: Impaired; With support  Standing - Static: Fair  Standing - Dynamic : Fair  Ambulation/Gait Training:  Distance (ft): 2 Feet (ft)  Assistive Device: Walker, rolling;Gait belt  Ambulation - Level of Assistance: Minimal assistance;Assist x2     Gait Description (WDL): Exceptions to WDL  Gait Abnormalities: Path deviations; Step to gait        Base of Support: Narrowed     Speed/Jenise: Fluctuations; Slow  Step Length: Right shortened;Left shortened                      Therapeutic Exercises:   Educate and instructed patient to continue active range of motion exercise on both legs while up on chair or on bed multiple times. Recommend patient to be up on the chair at least 3 times a day every meal times as tolerated.          Functional Measure:  Barthel Index:    Bathin  Bladder: 0 (billy)  Bowels: 5  Groomin  Dressin  Feedin  Mobility: 0  Stairs: 0  Toilet Use: 0  Transfer (Bed to Chair and Back): 5  Total: 15/100 The Barthel ADL Index: Guidelines  1. The index should be used as a record of what a patient does, not as a record of what a patient could do. 2. The main aim is to establish degree of independence from any help, physical or verbal, however minor and for whatever reason. 3. The need for supervision renders the patient not independent. 4. A patient's performance should be established using the best available evidence. Asking the patient, friends/relatives and nurses are the usual sources, but direct observation and common sense are also important. However direct testing is not needed. 5. Usually the patient's performance over the preceding 24-48 hours is important, but occasionally longer periods will be relevant. 6. Middle categories imply that the patient supplies over 50 per cent of the effort. 7. Use of aids to be independent is allowed. Score Interpretation (from [address] [address])    Independent   60-79 Minimally independent   40-59 Partially dependent   20-39 Very dependent   <20 Totally dependent     -Daya Medina., Barthel, D.W. (1965). Functional evaluation: the Barthel Index. [address] [facility] ([address] (1997). The Barthel activities of daily living index: self-reporting versus actual performance in the old (> or = 75 years). Journal of [address] [place] [address] 45(7), [address] [facility], ..., Isabel Grove., Nathanael Flores. (1999). Measuring the change in disability after inpatient rehabilitation; comparison of the responsiveness of the Barthel Index and Functional [place] Measure. Journal of Neurology, Neurosurgery, and Psychiatry, 66(4), 812-995. Andrea Jose N.J.A, [employer], & Jon Mahmood MJenniferA. (2004) Assessment of post-stroke quality of life in cost-effectiveness studies: The usefulness of the Barthel Index and the EuroQoL-5D.  Quality of Life Research, 15, 409-38           Physical Therapy Evaluation Charge Determination History Examination Presentation Decision-Making   LOW Complexity : Zero comorbidities / personal factors that will impact the outcome / POC LOW Complexity : 1-2 Standardized tests and measures addressing body structure, function, activity limitation and / or participation in recreation  LOW Complexity : Stable, uncomplicated  Other outcome measures barthel  LOW       Based on the above components, the patient evaluation is determined to be of the following complexity level: LOW     Pain Ratin/10    Activity Tolerance:   Good    After treatment patient left in no apparent distress:   Supine in bed, Heels elevated for pressure relief, and Call bell within reach    COMMUNICATION/EDUCATION:   The patients plan of care was discussed with: Occupational therapist and Registered nurse. Fall prevention education was provided and the patient/caregiver indicated understanding. Thank you for this referral.  Emelia Rubio, PT,WCC.    Time Calculation: 26 mins

## 2022-04-24 NOTE — PROGRESS NOTES
Problem: Self Care Deficits Care Plan (Adult)  Goal: *Acute Goals and Plan of Care (Insert Text)  Description: FUNCTIONAL STATUS PRIOR TO ADMISSION: Per chart at last admission to [facility] 2 weeks ago (eval on 4/11/2022): \"Patient was modified independent using a walker for functional mobility and short distance ambulation. She uses a wheelchair for longer distances and community ambulation. Patient needs occasional assistance for LB ADLs but otherwise independent with self care. \"  Pt reports using 4L O2 at baseline. After that admission, pt went to [facility] for rehab. HOME SUPPORT: The patient lived with  and son. This admission is received from SNF. Occupational Therapy Goals  Initiated 4/24/2022  1. Patient will perform grooming, standing at sink, with modified independence within 7 day(s). 2.  Patient will perform lower body dressing with minimal assistance/contact guard assist within 7 day(s). 3.  Patient will perform bathing with minimal assistance/contact guard assist within 7 day(s). 4.  Patient will perform toilet transfers with supervision/set-up within 7 day(s). 5.  Patient will perform all aspects of toileting with supervision/set-up within 7 day(s). 6.  Patient will participate in upper extremity therapeutic exercise/activities with supervision/set-up for 5 minutes within 7 day(s). 7.  Patient will utilize energy conservation techniques during functional activities with verbal cues within 7 day(s).    Outcome: Progressing Towards Goal     OCCUPATIONAL THERAPY EVALUATION  Patient: Nathen Lima (89 y.o. female)  Date: 4/24/2022  Primary Diagnosis: Acute on chronic systolic (congestive) heart failure (HCC) [I50.23]        Precautions: Fall       ASSESSMENT  Based on the objective data described below, the patient presents with increased anxiety and fear of falling, decreased strength, impaired standing balance, and decreased safety and independence with ADLs and mobility following admission for acute on chronic CHF, UTI, and sepsis. Pt with recent admission to [facility] ~10 days ago for PNA and pancreatitis and she was discharged to SNF at that time. Pt today is seen in ICU, is pleasant and agreeable to participate. She demonstrates significant UE ROM limitations (premorbid) and is significantly limited by anxiety and fear of falling. She is stable on RA but for comfort is left on 2L for activity and SpO2 stable throughout. She requires mod A x 2 for bed mobility and overall min A x 2 for SPT to chair. She is able to perform seated UB ADLs with set up but requires increased assistance for standing and LB portions of tasks. Recommend return to SNF pending continued progress in acute setting. Current Level of Function Impacting Discharge (ADLs/self-care): up to mod/max A for ADLs; min to mod A x 2 for mobility    Functional Outcome Measure: The patient scored Total: 15/100 on the Barthel Index outcome measure which is indicative of being severely impaired in basic self-care. Other factors to consider for discharge: recent admission; highly anxious and fearful of falling     Patient will benefit from skilled therapy intervention to address the above noted impairments. PLAN :  Recommendations and Planned Interventions: self care training, functional mobility training, therapeutic exercise, balance training, therapeutic activities, endurance activities, patient education, home safety training and family training/education    Frequency/Duration: Patient will be followed by occupational therapy 5 times a week to address goals.     Recommendation for discharge: (in order for the patient to meet his/her long term goals)  Therapy up to 5 days/week in SNF setting    This discharge recommendation:  Has been made in collaboration with the attending provider and/or case management    IF patient discharges home will need the following DME: TBD       SUBJECTIVE:   Patient stated Don't let me fall! 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    Pacemaker     Stroke [facility]     [facility]      Past Surgical History:   Procedure Laterality Date    HX CHOLECYSTECTOMY      HX HEENT      HX ORTHOPAEDIC      VASCULAR SURGERY PROCEDURE UNLIST         Expanded or extensive additional review of patient history:     Home Situation  Support Systems: Child(ramo)  Patient Expects to be Discharged to[de-identified] Skilled nursing facility    Hand dominance: Right    EXAMINATION OF PERFORMANCE DEFICITS:  Cognitive/Behavioral Status:  Neurologic State: Alert  Orientation Level: Oriented X4  Cognition: Appropriate decision making; Appropriate for age attention/concentration; Appropriate safety awareness; Follows commands  Perception: Appears intact  Perseveration: No perseveration noted  Safety/Judgement: Awareness of environment; Fall prevention;Good awareness of safety precautions; Home safety; Insight into deficits    Hearing: Auditory  Auditory Impairment: Hard of hearing, bilateral    Range of Motion:  AROM: Within functional limits  PROM: Within functional limits    Strength:  Strength: Generally decreased, functional    Coordination:  Coordination: Within functional limits  Fine Motor Skills-Upper: Left Intact; Right Intact    Gross Motor Skills-Upper: Left Intact; Right Intact    Balance:  Sitting: Intact; High guard  Standing: Impaired; With support  Standing - Static: Fair  Standing - Dynamic : Fair    Functional Mobility and Transfers for ADLs:  Bed Mobility:  Rolling:  Moderate assistance;Assist x2  Supine to Sit: Moderate assistance;Assist x2  Sit to Supine: Moderate assistance;Assist x2  Scooting: Moderate assistance;Assist x2    Transfers:  Sit to Stand: Minimum assistance;Assist x2  Stand to Sit: Minimum assistance;Assist x2  Bed to Chair: Minimum assistance;Assist x2  Toilet Transfer : Minimum assistance (infer based on observations)    ADL Assessment:  Feeding: Independent    Oral Facial Hygiene/Grooming: Setup;Supervision (seated)    Bathing: Moderate assistance    Upper Body Dressing: Setup;Minimum assistance    Lower Body Dressing: Moderate assistance;Maximum assistance    Toileting: Moderate assistance;Maximum assistance    ADL Intervention and task modifications:    Lower Body Dressing Assistance  Socks: Total assistance (dependent)  Leg Crossed Method Used: No  Position Performed: Supine  Cues: Don;Physical assistance     Cognitive Retraining  Safety/Judgement: Awareness of environment; Fall prevention;Good awareness of safety precautions; Home safety; Insight into deficits    Functional Measure:    Barthel Index:  Bathin  Bladder: 0 (billy)  Bowels: 5  Groomin  Dressin  Feedin  Mobility: 0  Stairs: 0  Toilet Use: 0  Transfer (Bed to Chair and Back): 5  Total: 15/100      The Barthel ADL Index: Guidelines  1. The index should be used as a record of what a patient does, not as a record of what a patient could do. 2. The main aim is to establish degree of independence from any help, physical or verbal, however minor and for whatever reason. 3. The need for supervision renders the patient not independent. 4. A patient's performance should be established using the best available evidence. Asking the patient, friends/relatives and nurses are the usual sources, but direct observation and common sense are also important. However direct testing is not needed.   5. Usually the patient's performance over the preceding 24-48 hours is important, but occasionally longer periods will be relevant. 6. Middle categories imply that the patient supplies over 50 per cent of the effort. 7. Use of aids to be independent is allowed. Score Interpretation (from [address])    Independent   60-79 Minimally independent   40-59 Partially dependent   20-39 Very dependent   <20 Totally dependent     -Daya Medina., Barthel, D.W. (1965). Functional evaluation: the Barthel Index. [address] ([address] (1997). The Barthel activities of daily living index: self-reporting versus actual performance in the old (> or = 75 years). Journal of [address] [place] [address] 45(7), [facility], MONIKA, Patric Gregory., [facility]. (1999). Measuring the change in disability after inpatient rehabilitation; comparison of the responsiveness of the Barthel Index and Functional [place] Measure. Journal of Neurology, Neurosurgery, and Psychiatry, 66(4), 832-278. Jared Webber, N.J.A, [employer], & Todd Nieto MJenniferA. (2004) Assessment of post-stroke quality of life in cost-effectiveness studies: The usefulness of the Barthel Index and the EuroQoL-5D. Quality of Life Research, 15, 628-17     Occupational Therapy Evaluation Charge Determination   History Examination Decision-Making   LOW Complexity : Brief history review  HIGH Complexity : 5 or more performance deficits relating to physical, cognitive , or psychosocial skils that result in activity limitations and / or participation restrictions MEDIUM Complexity : Patient may present with comorbidities that affect occupational performnce.  Miniml to moderate modification of tasks or assistance (eg, physical or verbal ) with assesment(s) is necessary to enable patient to complete evaluation       Based on the above components, the patient evaluation is determined to be of the following complexity level: LOW   Pain Rating:  Pt reporting minimal pain    Activity Tolerance:   Fair, SpO2 stable on RA and requires frequent rest breaks    After treatment patient left in no apparent distress:    Sitting in chair and Call bell within reach    COMMUNICATION/EDUCATION:   The patients plan of care was discussed with: Physical therapist and Registered nurse. Home safety education was provided and the patient/caregiver indicated understanding., Patient/family have participated as able in goal setting and plan of care. and Patient/family agree to work toward stated goals and plan of care. This patients plan of care is appropriate for delegation to [facility].     Thank you for this referral.  Maria De Jesus Pérez OT  Time Calculation: 22 mins

## 2022-04-24 NOTE — PROGRESS NOTES
4/24/2022  3:33 PM    Reason for Readmission:     Abdominal pain; septic shock  Assessment completed over the phone with pt's son, Modesta Davies    PMH: chronic resp failure (4L O2 provided by [employer]); CHF; hypotension; R renal mass; CKD3; GERD; DM    Patient recently admitted to [facility] 4/9-4/14/22 for acute pancreatitis. She discharged via ambulance to [address] and has been at their facility since discharge. Pt lives with her  and son at baseline. Son states that plan is for patient to return to [facility] after this admission, then return home. DME: chair lift; RW; O2 (4L, [employer]); WC         RUR Score/Risk Level:     17%, moderate    PCP: First and Last name:  Martín Calvillo MD   Name of Practice:    Are you a current patient: Yes/No: Yes   Approximate date of last visit:    Can you participate in a virtual visit with your PCP:     Is a Care Conference indicated:   Palliative has been consulted - goals of care conversation would be beneficial    Did you attend your follow up appointment (s): If not, why not:  Yes, patient followed by MD at [facility]       Resources/supports as identified by patient/family:   Strong family support from  and son       Top Challenges facing patient (as identified by patient/family and CM): Finances/Medication cost?     No concerns voiced  Transportation      No concerns voiced  Support system or lack thereof? Strong support   Living arrangements? No concerns voiced  Self-care/ADLs/Cognition? Plan for pt to return to [facility], per son       Current Advanced Directive/Advance Care Plan:  DNR           Plan for utilizing home health:   Plan for return to SNF             Transition of Care Plan:    Based on readmission, the patient's previous Plan of Care   has been evaluated and/or modified. The current Transition of Care Plan is:         1. Cards consult; ruchi gtt; IV antbx; palliative care consult  2.  PT/OT evals pending; son states that he would like pt to return to [facility] SNF on d/c  3. Outpatient f/u  4. Stretcher transport likely needed  5. CM to continue to follow    Care Management Interventions  PCP Verified by CM:  Yes (Amalia)  Palliative Care Criteria Met (RRAT>21 & CHF Dx)?: No  Mode of Transport at Discharge: BLS  Transition of Care Consult (CM Consult): Discharge Planning  MyChart Signup: No  Discharge Durable Medical Equipment: No  Physical Therapy Consult: Yes  Occupational Therapy Consult: Yes  Speech Therapy Consult: No  Support Systems: Child(ramo)  Confirm Follow Up Transport: Family  The Plan for Transition of Care is Related to the Following Treatment Goals : septic shock; abdominal pain  The Patient and/or Patient Representative was Provided with a Choice of Provider and Agrees with the Discharge Plan?: Yes  Freedom of Choice List was Provided with Basic Dialogue that Supports the Patient's Individualized Plan of Care/Goals, Treatment Preferences and Shares the Quality Data Associated with the Providers?: Yes  Discharge Location  Patient Expects to be Discharged to[de-identified] Skilled nursing facility    Readmission Assessment  Number of days since last admission?: 8-30 days (d/c'd 4/14)  Previous disposition: SNF  Who is being interviewed?: Caregiver (son, Angy Friends)  What was the patient's/caregiver's perception as to why they think they needed to return back to the hospital?: Other (Comment) (SNF MD advised readmission)  Did you visit your Primary Care Physician after you left the hospital, before you returned this time?: Yes  Did you see a specialist, such as Cardiac, Pulmonary, Orthopedic Physician, etc. after you left the hospital?: No  Who advised the patient to return to the hospital?: Physician  Does the patient report anything that got in the way of taking their medications?: No  In our efforts to provide the best possible care to you and others like you, can you think of anything that we could have done to help you after you left the hospital the first time, so that you might not have needed to return so soon?: Other (Comment) (pt's son believes that pt was discharged too soon, without all of her medical issues resolving on previous admission)    Tuyet Matute RN

## 2022-04-24 NOTE — PROGRESS NOTES
Sharyle Sheen, MD. [facility]              Patient: Rosalino Shearer  : 1925      Today's Date: 2022      CARDIOLOGY PROGRESS NOTE  S: She seems a little better today. Denies CP or SOB at rest.   O:      Review of Symptoms:  Constitutional: negative for chills  HEENT: Negative for nosebleeds, tinnitus, and vision changes. Respiratory: Negative for cough, wheezing  Cardiovascular: Negative for syncope  Gastrointestinal: Negative for  diarrhea, melena. Genitourinary: Negative for dysuria  Musculoskeletal: Negative for myalgias. Skin: Negative for rash  Heme: No problems bleeding. Neurological: Negative for speech change and focal weakness. Physical Exam:  Visit Vitals  /65 (BP 1 Location: Right upper arm, BP Patient Position: At rest)   Pulse (!) 107   Temp 98.1 °F (36.7 °C)   Resp 18   Ht 5' (1.524 m)   Wt 129 lb 13.6 oz (58.9 kg)   LMP 1975   SpO2 100%   BMI 25.36 kg/m²     Patient NAD, stated age   [de-identified]:  Hearing intact, non-icteric, normocephalic, atraumatic. Neck Exam: Supple  Lung Exam: Clear ant   Cardiac Exam: Regular rate and rhythm with no murmur or rub  Abdomen: Soft, non-tender  Extremities: Moves all ext well. No lower extremity edema.   MSKTL: Overall good ROM ext  Skin: No significant rashes  Psych: Appropriate affect  Neuro - Grossly intact      LABS / OTHER STUDIES reviewed:       Recent Results (from the past 24 hour(s))   GLUCOSE, POC    Collection Time: 22  4:00 PM   Result Value Ref Range    Glucose (POC) 82 65 - 117 mg/dL    Performed by Claritza Ojeda, MARTINEZ    Collection Time: 22  2:52 AM   Result Value Ref Range    Vancomycin, random 12.8 UG/ML   CREATININE    Collection Time: 22  2:52 AM   Result Value Ref Range    Creatinine 1.00 0.55 - 1.02 MG/DL    GFR est AA >60 >60 ml/min/1.73m2    GFR est non-AA 51 (L) >60 ml/min/1.73m2         CARDIAC DIAGNOSTICS:     Cardiac Evaluation Includes:  I reviewed the results below.       ECHO 4/10/22    Left Ventricle: Left ventricle size is normal. Normal wall thickness. See diagram for wall motion findings. The EF by visual approximation is 35 - 40%. visualization ot the endocardium is difficult.   Right Ventricle: Right ventricle is mildly dilated. Lead present in the right ventricle.   Aortic Valve: Tricuspid valve. Moderately calcified cusp.   Mitral Valve: Mildly thickened leaflet. Moderately calcified leaflet. Severe annular calcification of the mitral valve. Calcified subvalvular apparatus. Mild transvalvular regurgitation.   Tricuspid Valve: Mildly thickened leaflets. Mildly elevated RVSP.   Right Atrium: Right atrium is dilated. CXR 4/22/22 -  New mild to moderate bilateral pleural effusions          ASSESSMENT AND PLAN:     Assessment and Plan:    Ms. Uday Cueto is a 81 yo hx of chronic resp failure on 4L O2, chronic sCHF EF 35-40%, hypotension, right renal mass, CKD 3, presented w/ suprapupic pain, UTI, septic shock. Has pleural effusions on CT scan.      1) Septic shock/UTI on admission with SBP <90  - She has been pressors (phenylphrine) until this morning ---> if BP remains stable, would stop IVF given her history of CHF  - she is also on midodrine     2) Acute on Chronic HFrEF  - sees Dr. Haja Nevarez   - She has a history of HFrEF and had been on Entresto and Toprol XL prior to 4/9/22 admission - at which time these were stopped and midodrine started for hypotension. Also on 4 L NC at home prior to this month. - she says she has been stable from a cardiac standpoint lately (despite elevated proBNP and pleural effusions) - is on 4 L NC O2 at home and says breathing has been stable. - Her CT scan shows pleural effusions ---> ideally we would diurese her, however in setting of septic shock (and stable resp status), it makes sense that she is getting IVF (cautiously)  and pressors.   ---> can use bumex PRN ---> Will stop IVF if BP remains stable        3) EVERT - renal function better with hydration     4) Palliative Care Consult pending          Griselda Mowers, MD, [place] [address]  [address] [facility], [address]  [address]  [address], [facility] [address]. Riri Seo.  [place], [address] [place] [address]  Ph: [phone]   Ph [phone]

## 2022-04-24 NOTE — PROGRESS NOTES
[facility] [facility]  Hospitalist Group                                                                                          Hospitalist Progress Note  Marshall Tillman MD  Answering service: [phone] from in house phone        Date of Service:  2022  NAME:  Tomy Barksdale  :  1925  MRN:  [id number]      Admission Summary: \"The patient is a 79 yo hx of chronic resp failure on 4L O2, chronic sCHF EF 35-40%, hypotension, right renal mass, CKD 3, presented w/ suprapupic pain, UTI, septic shock, CHF. The patient was admitted 1 week ago for pancreatitis. She improved with IVF and pain meds. Today, the patient c/o suprapubic pain for 2 days, associated with fevers/chills and fatigue. She denied chest pain, cough, nausea, vomiting, diarrhea. In the ED, SBP ~80s, temp 101.7, WBC 6.7, lactate 2.2. U/A significant for a UTI. Abd CT showed improved peripancreatic stranding, but moderate pleural effusion\"    Interval history / Subjective:   2022. No acute events overnight. Saw patient this morning, awake alert and oriented, very pleasant and cooperative with physical examination, denies any fever, chills, nausea, vomiting. Believes that her sepsis was caused because she choked on one of her multivitamins. 2022. No acute events overnight. Saw patient this morning, awake alert and oriented in no apparent distress, receiving physical therapy, does not appear to be in any apparent distress, has been off of pressors for the last 4 hours, vitals appears to be within normal limits, has been followed by cardiologist, possible discharge tomorrow. Assessment & Plan:     1) Septic shock/UTI: 3/4 SIRS with SBP <90. Will monitor on step down. Check blood Cx, urine Cx, lactate. Given multiple allergies, will start IV Meropenem. Start Kyree gtt for hypotension. Be careful with IVF due to CHF.   Discussed with ED physician about central line if unable to obtain good peripheral IVs     2) Acute on chronic systolic (congestive) heart failure/pleural effusions: last echo with EF 35-40%. Pro BNP >10,000 on admission. BP too low for diuretics. Will use IV bumex prn for when BP can tolerate. Consult Cards     3) EVERT/CKD 3: due to hypoperfusion. Will cont IVF, pressors gtt. Monitor closely     4) Recent pancreatitis: resolved on CT. Will monitor closely     5) Chronic resp failure: on chronic 4L O2 due to CHF. Cont O2, incentive spirometry     6) R renal mass: likely RCC. Patient has refused w/u in the past     Code status: DNR  DVT prophylaxis: Lovenox    Care Plan discussed with: Patient/Family  Anticipated Disposition: Home w/Family  Anticipated Discharge: Less than 24 hours     Hospital Problems  Date Reviewed: 4/22/2022          Codes Class Noted POA    Acute on chronic systolic (congestive) heart failure (HCC) ICD-10-CM: I50.23  ICD-9-CM: 428.23, 428.0  4/22/2022 Unknown        * (Principal) Septic shock (HCC) ICD-10-CM: A41.9, R65.21  ICD-9-CM: 038.9, 785.52, 995.92  4/22/2022 Yes        EVERT (acute kidney injury) ([facility] [address] ICD-10-CM: N17.9  ICD-9-CM: 584.9  4/22/2022 Yes        UTI (urinary tract infection) ICD-10-CM: N39.0  ICD-9-CM: 599.0  4/22/2022 Yes                Review of Systems:   A comprehensive review of systems was negative except for that written in the HPI. Vital Signs:    Last 24hrs VS reviewed since prior progress note.  Most recent are:  Visit Vitals  /61 (BP 1 Location: Right upper arm, BP Patient Position: At rest)   Pulse (!) 114   Temp 98.2 °F (36.8 °C)   Resp 23   Ht 5' (1.524 m)   Wt 58.9 kg (129 lb 13.6 oz)   SpO2 100%   BMI 25.36 kg/m²         Intake/Output Summary (Last 24 hours) at 4/24/2022 1701  Last data filed at 4/24/2022 1600  Gross per 24 hour   Intake 1777.51 ml   Output 745 ml   Net 1032.51 ml        Physical Examination:     I had a face to face encounter with this patient and independently examined them on 4/24/2022 as outlined below:          Constitutional:  No acute distress, cooperative, pleasant    ENT:  Oral mucosa moist, oropharynx benign. Resp:  CTA bilaterally. No wheezing/rhonchi/rales. No accessory muscle use   CV:  Regular rhythm, normal rate, no murmurs, gallops, rubs    GI:  Soft, non distended, non tender. normoactive bowel sounds, no hepatosplenomegaly     Musculoskeletal:  No edema, warm, 2+ pulses throughout    Neurologic:  Moves all extremities. AAOx3, CN II-XII reviewed            Data Review:    Review and/or order of clinical lab test      Labs:     Recent Labs     04/23/22  0055 04/22/22  1520   WBC 18.5* 6.7   HGB 9.2* 9.8*   HCT 30.1* 31.6*   * 396     Recent Labs     04/24/22  0252 04/23/22 0055 04/22/22  1520   NA  --  144 142   K  --  3.8 3.6   CL  --  114* 110*   CO2  --  23 25   BUN  --  22* 21*   CREA 1.00 1.39* 1.43*   GLU  --  97 88   CA  --  7.9* 8.8   MG  --  1.2*  --    PHOS  --  3.5  --      Recent Labs     04/23/22  0055 04/22/22  1520   * 222*   * 865*   TBILI 1.8* 1.7*   TP 4.4* 5.1*   ALB 1.8* 2.0*   GLOB 2.6 3.1   LPSE  --  284     No results for input(s): INR, PTP, APTT, INREXT, INREXT in the last 72 hours. No results for input(s): FE, TIBC, PSAT, FERR in the last 72 hours. Lab Results   Component Value Date/Time    Folate 37.7 (H) 04/10/2022 03:46 AM      No results for input(s): PH, PCO2, PO2 in the last 72 hours. No results for input(s): CPK, CKNDX, TROIQ in the last 72 hours. No lab exists for component: CPKMB  Lab Results   Component Value Date/Time    Cholesterol, total <50 04/10/2022 03:46 AM    HDL Cholesterol 8 04/10/2022 03:46 AM    LDL, calculated  04/10/2022 03:46 AM     Unable to calculate LDL or VLDL due to low cholesterol.     Triglyceride 96 04/10/2022 03:46 AM    CHOL/HDL Ratio Cannot be calculated 04/10/2022 03:46 AM     Lab Results   Component Value Date/Time    Glucose (POC) 82 04/23/2022 04:00 PM    Glucose (POC) 207 (H) 04/11/2022 11:07 AM    Glucose (POC) 234 (H) 04/11/2022 07:59 AM    Glucose (POC) 59 (L) 04/11/2022 05:43 AM    Glucose (POC) 210 (H) 05/12/2009 04:35 PM     Lab Results   Component Value Date/Time    Color YELLOW/STRAW 04/22/2022 04:08 PM    Appearance TURBID (A) 04/22/2022 04:08 PM    Specific gravity 1.010 04/22/2022 04:08 PM    pH (UA) 6.5 04/22/2022 04:08 PM    Protein 30 (A) 04/22/2022 04:08 PM    Glucose Negative 04/22/2022 04:08 PM    Ketone Negative 04/22/2022 04:08 PM    Bilirubin Negative 04/22/2022 04:08 PM    Urobilinogen 2.0 (H) 04/22/2022 04:08 PM    Nitrites Negative 04/22/2022 04:08 PM    Leukocyte Esterase LARGE (A) 04/22/2022 04:08 PM    Epithelial cells MODERATE (A) 04/22/2022 04:08 PM    Bacteria 4+ (A) 04/22/2022 04:08 PM    WBC >100 (H) 04/22/2022 04:08 PM    RBC 5-10 04/22/2022 04:08 PM         Medications Reviewed:     Current Facility-Administered Medications   Medication Dose Route Frequency    meropenem (MERREM) 1 g in 0.9% sodium chloride (MBP/ADV) 50 mL MBP  1 g IntraVENous Q12H    amitriptyline (ELAVIL) tablet 25 mg  25 mg Oral QHS    aspirin delayed-release tablet 81 mg  81 mg Oral DAILY    benzonatate (TESSALON) capsule 100 mg  100 mg Oral TID PRN    gabapentin (NEURONTIN) capsule 200 mg  200 mg Oral BID    latanoprost (XALATAN) 0.005 % ophthalmic solution 1 Drop  1 Drop Left Eye QPM    levothyroxine (SYNTHROID) tablet 25 mcg  25 mcg Oral ACB    midodrine (PROAMATINE) tablet 10 mg  10 mg Oral TID WITH MEALS    pantoprazole (PROTONIX) tablet 40 mg  40 mg Oral ACB    pyridoxine (vitamin B6) (VITAMIN B-6) tablet 25 mg  25 mg Oral DAILY    metoprolol (LOPRESSOR) injection 1.25 mg  1.25 mg IntraVENous Q6H PRN    PHENYLephrine (POORNIMA-SYNEPHRINE) 30 mg in 0.9% sodium chloride 250 mL infusion   mcg/min IntraVENous TITRATE    bumetanide (BUMEX) injection 1 mg  1 mg IntraVENous Q12H PRN    sodium chloride (NS) flush 5-40 mL  5-40 mL IntraVENous Q8H    sodium chloride (NS) flush 5-40 mL 5-40 mL IntraVENous PRN    0.9% sodium chloride infusion 25 mL  25 mL IntraVENous PRN    acetaminophen (TYLENOL) tablet 650 mg  650 mg Oral Q6H PRN    Or    acetaminophen (TYLENOL) suppository 650 mg  650 mg Rectal Q6H PRN    bisacodyL (DULCOLAX) suppository 10 mg  10 mg Rectal DAILY PRN    promethazine (PHENERGAN) tablet 12.5 mg  12.5 mg Oral Q6H PRN    Or    ondansetron (ZOFRAN) injection 4 mg  4 mg IntraVENous Q6H PRN    heparin (porcine) injection 5,000 Units  5,000 Units SubCUTAneous Q8H     ______________________________________________________________________  EXPECTED LENGTH OF STAY: - - -  ACTUAL LENGTH OF STAY:          2                 Estuardo Monsivais MD

## 2022-04-24 NOTE — PROGRESS NOTES
[address] Dr Dosing Services: 04/24/2022    Consult for antibiotic dosing of Meropenem by Dr. Jose Garcia  Indication: UTI/sepsis/allergic to pcn and cephalosporin  Day of Therapy: 2    Non-Kinetic Antimicrobial Dosing Regimen:   Current Regimen:  500 mg EI every 12 hours  Adjustment to therapy: 1000 mg EI every 12 hours  Cultures 4/22 Blood: GNR 4/4 prelim  4/22 Urine: >100K GNR prelim   Serum Creatinine Lab Results   Component Value Date/Time    Creatinine 1.00 04/24/2022 02:52 AM    Creatinine (POC) 1.5 (H) 03/02/2016 12:08 PM         Creatinine Clearance Estimated Creatinine Clearance: 26.4 mL/min (based on SCr of 1 mg/dL). Temp Temp: 99.2 °F (37.3 °C)       WBC Lab Results   Component Value Date/Time    WBC 18.5 (H) 04/23/2022 12:55 AM        Procalcitonin No results found for: PCT     For Antifungals, Metronidazole and Nafcillin: Lab Results   Component Value Date/Time    ALT (SGPT) 252 (H) 04/23/2022 12:55 AM    AST (SGOT) 768 (H) 04/23/2022 12:55 AM    Alk.  phosphatase 793 (H) 04/23/2022 12:55 AM    Bilirubin, total 1.8 (H) 04/23/2022 12:55 AM        Pharmacist Hien Rosario

## 2022-04-25 ENCOUNTER — PATIENT OUTREACH (OUTPATIENT)
Dept: CASE MANAGEMENT | Age: 87
End: 2022-04-25

## 2022-04-25 ENCOUNTER — APPOINTMENT (OUTPATIENT)
Dept: GENERAL RADIOLOGY | Age: 87
DRG: 871 | End: 2022-04-25
Attending: SPECIALIST
Payer: MEDICARE

## 2022-04-25 LAB
ALBUMIN SERPL-MCNC: 1.3 G/DL (ref 3.5–5)
ALBUMIN/GLOB SERPL: 0.5 {RATIO} (ref 1.1–2.2)
ALP SERPL-CCNC: 418 U/L (ref 45–117)
ALT SERPL-CCNC: 74 U/L (ref 12–78)
ANION GAP SERPL CALC-SCNC: 5 MMOL/L (ref 5–15)
AST SERPL-CCNC: 77 U/L (ref 15–37)
BACTERIA SPEC CULT: ABNORMAL
BACTERIA SPEC CULT: ABNORMAL
BILIRUB SERPL-MCNC: 0.4 MG/DL (ref 0.2–1)
BNP SERPL-MCNC: ABNORMAL PG/ML
BUN SERPL-MCNC: 16 MG/DL (ref 6–20)
BUN/CREAT SERPL: 16 (ref 12–20)
CALCIUM SERPL-MCNC: 7.1 MG/DL (ref 8.5–10.1)
CC UR VC: ABNORMAL
CHLORIDE SERPL-SCNC: 120 MMOL/L (ref 97–108)
CO2 SERPL-SCNC: 23 MMOL/L (ref 21–32)
CREAT SERPL-MCNC: 0.97 MG/DL (ref 0.55–1.02)
ERYTHROCYTE [DISTWIDTH] IN BLOOD BY AUTOMATED COUNT: 12.6 % (ref 11.5–14.5)
GLOBULIN SER CALC-MCNC: 2.4 G/DL (ref 2–4)
GLUCOSE SERPL-MCNC: 81 MG/DL (ref 65–100)
HCT VFR BLD AUTO: 24.9 % (ref 35–47)
HGB BLD-MCNC: 7.7 G/DL (ref 11.5–16)
MAGNESIUM SERPL-MCNC: 1.3 MG/DL (ref 1.6–2.4)
MCH RBC QN AUTO: 33.6 PG (ref 26–34)
MCHC RBC AUTO-ENTMCNC: 30.9 G/DL (ref 30–36.5)
MCV RBC AUTO: 108.7 FL (ref 80–99)
NRBC # BLD: 0 K/UL (ref 0–0.01)
NRBC BLD-RTO: 0 PER 100 WBC
PLATELET # BLD AUTO: 253 K/UL (ref 150–400)
PMV BLD AUTO: 10.2 FL (ref 8.9–12.9)
POTASSIUM SERPL-SCNC: 3.4 MMOL/L (ref 3.5–5.1)
PROT SERPL-MCNC: 3.7 G/DL (ref 6.4–8.2)
RBC # BLD AUTO: 2.29 M/UL (ref 3.8–5.2)
SERVICE CMNT-IMP: ABNORMAL
SODIUM SERPL-SCNC: 148 MMOL/L (ref 136–145)
WBC # BLD AUTO: 6.8 K/UL (ref 3.6–11)

## 2022-04-25 PROCEDURE — 85027 COMPLETE CBC AUTOMATED: CPT

## 2022-04-25 PROCEDURE — 74011250637 HC RX REV CODE- 250/637: Performed by: INTERNAL MEDICINE

## 2022-04-25 PROCEDURE — 74011000250 HC RX REV CODE- 250: Performed by: INTERNAL MEDICINE

## 2022-04-25 PROCEDURE — 80053 COMPREHEN METABOLIC PANEL: CPT

## 2022-04-25 PROCEDURE — 99232 SBSQ HOSP IP/OBS MODERATE 35: CPT | Performed by: SPECIALIST

## 2022-04-25 PROCEDURE — 74011000258 HC RX REV CODE- 258: Performed by: INTERNAL MEDICINE

## 2022-04-25 PROCEDURE — 71045 X-RAY EXAM CHEST 1 VIEW: CPT

## 2022-04-25 PROCEDURE — 83735 ASSAY OF MAGNESIUM: CPT

## 2022-04-25 PROCEDURE — 74011250636 HC RX REV CODE- 250/636: Performed by: INTERNAL MEDICINE

## 2022-04-25 PROCEDURE — 83880 ASSAY OF NATRIURETIC PEPTIDE: CPT

## 2022-04-25 PROCEDURE — 99222 1ST HOSP IP/OBS MODERATE 55: CPT | Performed by: INTERNAL MEDICINE

## 2022-04-25 PROCEDURE — 65270000046 HC RM TELEMETRY

## 2022-04-25 PROCEDURE — 36415 COLL VENOUS BLD VENIPUNCTURE: CPT

## 2022-04-25 RX ADMIN — HEPARIN SODIUM 5000 UNITS: 5000 INJECTION INTRAVENOUS; SUBCUTANEOUS at 13:55

## 2022-04-25 RX ADMIN — SODIUM CHLORIDE, PRESERVATIVE FREE 10 ML: 5 INJECTION INTRAVENOUS at 21:44

## 2022-04-25 RX ADMIN — MEROPENEM 1 G: 1 INJECTION, POWDER, FOR SOLUTION INTRAVENOUS at 04:48

## 2022-04-25 RX ADMIN — MIDODRINE HYDROCHLORIDE 10 MG: 5 TABLET ORAL at 08:13

## 2022-04-25 RX ADMIN — ASPIRIN 81 MG: 81 TABLET, COATED ORAL at 08:13

## 2022-04-25 RX ADMIN — MEROPENEM 1 G: 1 INJECTION, POWDER, FOR SOLUTION INTRAVENOUS at 17:13

## 2022-04-25 RX ADMIN — SODIUM CHLORIDE, PRESERVATIVE FREE 10 ML: 5 INJECTION INTRAVENOUS at 06:19

## 2022-04-25 RX ADMIN — MIDODRINE HYDROCHLORIDE 10 MG: 5 TABLET ORAL at 12:34

## 2022-04-25 RX ADMIN — LATANOPROST 1 DROP: 50 SOLUTION OPHTHALMIC at 17:13

## 2022-04-25 RX ADMIN — GABAPENTIN 200 MG: 100 CAPSULE ORAL at 17:11

## 2022-04-25 RX ADMIN — AMITRIPTYLINE HYDROCHLORIDE 25 MG: 50 TABLET, FILM COATED ORAL at 21:44

## 2022-04-25 RX ADMIN — GABAPENTIN 200 MG: 100 CAPSULE ORAL at 08:13

## 2022-04-25 RX ADMIN — PYRIDOXINE HCL TAB 50 MG 25 MG: 50 TAB at 08:14

## 2022-04-25 RX ADMIN — HEPARIN SODIUM 5000 UNITS: 5000 INJECTION INTRAVENOUS; SUBCUTANEOUS at 05:43

## 2022-04-25 RX ADMIN — HEPARIN SODIUM 5000 UNITS: 5000 INJECTION INTRAVENOUS; SUBCUTANEOUS at 21:44

## 2022-04-25 RX ADMIN — MIDODRINE HYDROCHLORIDE 10 MG: 5 TABLET ORAL at 17:11

## 2022-04-25 RX ADMIN — SODIUM CHLORIDE, PRESERVATIVE FREE 10 ML: 5 INJECTION INTRAVENOUS at 13:55

## 2022-04-25 NOTE — PROGRESS NOTES
TRANSFER - IN REPORT:    Verbal report received from Archie Bello (name) on Charleen Holm (patient name) being transferred to [facility] (unit) for routine progression of care   Report consisted of patients Situation, Background, Assessment and   Recommendations(SBAR). Transition of Care Plan from Archie Bello (name)    RUR 17% (Score %) moderate   Is This a Readmission YES  Is this a Bundle NO    1. Admitted from [facility]; plan is to return  2. [address] home O2--4L baseline; now on 2L O2  3.  & son support  CARLOS Rocha [facility]

## 2022-04-25 NOTE — PROGRESS NOTES
0700 Bedside shift change report given to 4920 STACIA Rosen (oncoming nurse) by Leidy Encarnacion (offgoing nurse). Report included the following information SBAR, Kardex, ED Summary, Intake/Output, MAR and Recent Results. This patient was assisted with Intentional Toileting every 2 hours during this shift as appropriate. Documentation of ambulation and output reflected on Flowsheet as appropriate. Purposeful hourly rounding was completed using AIDET and 5Ps. Outcomes of PHR documented as they occurred. Bed alarm in use as appropriate. Dual Suction and ambubag in place. 200 Unable to complete med list pt states I don't really know my medications unless I see them and sometimes I don't know what they are for. Critera met for insert Yes  Reason for insert: Urinary obstruction  Date of insert: 4/22/22  Order is current: Yes  MD or RN driven: Provider  Removal Discussed No   Last CHG Bath: 4/25/22 1530   Witt Care Last Completed: Date/Time: 4/25/22 1530  Witt Care After Each Bowel Movement: Yes  Education documented every 24 hours Yes  Care Plan (Risk for UTI, Witt) Updated Every 24 hours Yes  Bag below Bladder Yes        Bag off Floor Yes      Sheet Clip used Yes          Tubing free of dependant loops Yes          Seal Intact Yes            Bag less than 1/2 full Yes              1930   Bedside shift change report given to Duran Pacheco. (oncoming nurse) by Layo Ruvalcaba RN (offgoing nurse). Report included the following information SBAR, Kardex, ED Summary, Intake/Output, MAR and Recent Results.

## 2022-04-25 NOTE — PROGRESS NOTES
CARDIOLOGY PROGRESS NOTE    Jaime Gaspar MD,  [address], [place], [address] [facility]  Phone [phone]; Fax [phone]  Mobile [phone]   Voice Mail [phone]        2022 10:08 PM       Admit Date:           2022  Admit Diagnosis:  Acute on chronic systolic (congestive) heart failure ([facility] [address] [I50.23]  :          1925   MRN:          [id number]      Primary care: Katherine Eric MD       ATTENTION:   This medical record was transcribed using an electronic medical records/speech recognition system. Although proofread, it may and can contain electronic, spelling and other errors. Corrections may be executed at a later time. Please feel free to contact us for any clarifications as needed.       Impression Plan/Recommendation   1. Acute on chronic HFrEF-CT pleural effusions  2. Septic shock/UTI  3. EVERT  4. Patient is a DNR  5. Shortness of breath  6. Anemia hemoglobin in the 7 range  7. Hypernatremia               1.  Followed by Dr. Anyi Davis with [facility]  2. Has been on Entresto and Toprol-XL stopped during this admission now on midodrine  3. On chronic O2 4 L at home  4. On phenylephrine until yesterday  5. We will check proBNP and chest x-ray  6. Possibly some of her tachycardia is related not only to her sepsis but her anemia could be delusional.         We discussed the expected course, resolution and complications of the diagnosis(es) in detail. Medication risks, benefits, costs, interactions, and alternatives were discussed as indicated. ICD-10-CM ICD-9-CM    1. Acute cystitis with hematuria  N30.01 595.0    2. Pleural effusion  J90 511.9    3. Elevated LFTs  R79.89 790.6    4.  Acute on chronic systolic (congestive) heart failure (HCC)  I50.23 428.23      428.0         0701 -  190  In: 240 [P.O.:240]  Out: 150 [Urine:150]    Last 3 Recorded Weights in this Encounter    22 0430 22 0440 22 0100 Weight: 126 lb 8.7 oz (57.4 kg) 129 lb 13.6 oz (58.9 kg) 121 lb 4.1 oz (55 kg)         04/23 1901 - 04/25 0700  In: 1651.9 [P.O.:120; I.V.:1531.9]  Out: 1500 [Urine:1500]    SUBJECTIVE           Ms. Fozia Longoria is a 79 yo hx of chronic resp failure on 4L O2, chronic sCHF EF 35-40%, hypotension, right renal mass, CKD 3, presented w/ suprapupic pain, UTI, septic shock. Has pleural effusions on CT scan. Over the last day from 4 24-4 25 she had about 1800 cc in. She does complain of left shoulder pain but it hurts when she moves her arm and this is a chronic situation she even has at home. Patrice Ellsworth reports That her only concern is that there can discharge her home. She says she is feeling better however. Heart rate is elevated around 100. .      Current Facility-Administered Medications   Medication Dose Route Frequency    meropenem (MERREM) 1 g in 0.9% sodium chloride (MBP/ADV) 50 mL MBP  1 g IntraVENous Q12H    amitriptyline (ELAVIL) tablet 25 mg  25 mg Oral QHS    aspirin delayed-release tablet 81 mg  81 mg Oral DAILY    benzonatate (TESSALON) capsule 100 mg  100 mg Oral TID PRN    gabapentin (NEURONTIN) capsule 200 mg  200 mg Oral BID    latanoprost (XALATAN) 0.005 % ophthalmic solution 1 Drop  1 Drop Left Eye QPM    levothyroxine (SYNTHROID) tablet 25 mcg  25 mcg Oral ACB    midodrine (PROAMATINE) tablet 10 mg  10 mg Oral TID WITH MEALS    pantoprazole (PROTONIX) tablet 40 mg  40 mg Oral ACB    pyridoxine (vitamin B6) (VITAMIN B-6) tablet 25 mg  25 mg Oral DAILY    metoprolol (LOPRESSOR) injection 1.25 mg  1.25 mg IntraVENous Q6H PRN    PHENYLephrine (POORNIMA-SYNEPHRINE) 30 mg in 0.9% sodium chloride 250 mL infusion   mcg/min IntraVENous TITRATE    bumetanide (BUMEX) injection 1 mg  1 mg IntraVENous Q12H PRN    sodium chloride (NS) flush 5-40 mL  5-40 mL IntraVENous Q8H    sodium chloride (NS) flush 5-40 mL  5-40 mL IntraVENous PRN    0.9% sodium chloride infusion 25 mL  25 mL IntraVENous PRN    acetaminophen (TYLENOL) tablet 650 mg  650 mg Oral Q6H PRN    Or    acetaminophen (TYLENOL) suppository 650 mg  650 mg Rectal Q6H PRN    bisacodyL (DULCOLAX) suppository 10 mg  10 mg Rectal DAILY PRN    promethazine (PHENERGAN) tablet 12.5 mg  12.5 mg Oral Q6H PRN    Or    ondansetron (ZOFRAN) injection 4 mg  4 mg IntraVENous Q6H PRN    heparin (porcine) injection 5,000 Units  5,000 Units SubCUTAneous Q8H      OBJECTIVE               Intake/Output Summary (Last 24 hours) at 4/25/2022 0908  Last data filed at 4/25/2022 0800  Gross per 24 hour   Intake 810 ml   Output 1300 ml   Net -490 ml       Review of Systems - History obtained from the patient AS PER  HPI        PHYSICAL EXAM        Visit Vitals  /65   Pulse (!) 112   Temp 98.6 °F (37 °C)   Resp 25   Ht 5' (1.524 m)   Wt 121 lb 4.1 oz (55 kg)   LMP 11/16/1975   SpO2 100%   BMI 23.68 kg/m²       Gen: Ill-appearing deconditioned in no acute distress  alert   HEENT:  Pink conjunctivae, Hearing grossly normal.  Resp: Clear anterior but poor effort  Card: Tachycardic and regular  Skin: No rashes or ulcers, no bruising  Neuro:  follows commands appropriately  Psych: alert, Nml Affect  LE: No edema       DATA REVIEW      No specialty comments available. Cardiac monitor: Paced rhythm        Laboratory and Imaging have been reviewed by me and are notable for  No results for input(s): CPK, CKMB, TROIQ in the last 72 hours. Recent Labs     04/25/22  0441 04/24/22  0252 04/23/22  0055 04/22/22  1520 04/22/22  1520   *  --  144  --  142   K 3.4*  --  3.8  --  3.6   CO2 23  --  23  --  25   BUN 16  --  22*  --  21*   CREA 0.97 1.00 1.39*   < > 1.43*   GLU 81  --  97  --  88   PHOS  --   --  3.5  --   --    MG 1.3*  --  1.2*  --   --    WBC 6.8  --  18.5*  --  6.7   HGB 7.7*  --  9.2*  --  9.8*   HCT 24.9*  --  30.1*  --  31.6*     --  418*  --  396    < > = values in this interval not displayed.              Tr Domingo MD

## 2022-04-25 NOTE — WOUND CARE
Wound Consult:  New consult Visit. Chart reviewed. Consulted for red sacrum. Spoke with patients nurse,Marisela millard RN at bedside for assessment. Patient is resting on a nicola bed with FRANCOIS and air box added mattress. Heels off loaded with pillows ate end of visit. Patient is awake, alert, cooperative; requires 2 assists to move side to side in bed. Destin score 16. Assessment:  POA Bilateral heels- pink, blanches  POA Buttocks- chafed , purplish/maroon discoloration/blanches/ appears chronic, painful. Treatment:  Heels elevated on pillows  Buttocks/sacrum- mepilex foam applied for protection  Wound Recommendations:  Buttocks/sacrum- cleanse with blue wipes and cover with mepilex foam for protection. Elevate heels on pillows  [place] bed with air added- done, please maintain. Mobility team  Skin Care / PI Prevention Recommendations:  1. Minimize friction/shear: minimize layers of linen/pads under patient. 2. Off load pressure/reposition:  turn and reposition approximately every 2 hours; float heels with pillows or use off loading heel boots; waffle cushion for sitting; position wedge. 3. Manage Moisture - keep skin folds dry; incontinence skin care with incontinence wipes; zinc guard barrier ointment; billy in use to help contain urine. 4. Continue to monitor nutrition, pain, and skin risk scale, and skin assessment. Plan:  Spoke with Dr. Sarah Membreno regarding findings and proposed orders for treatment. We will continue to reassess weekly and as needed. Please re-consult should concerns arise despite continued skin/PI prevention measures.   [address], Wound / [address] [facility] Healing Office [phone]

## 2022-04-25 NOTE — NURSE NAVIGATOR
Chart reviewed by Heart Failure Nurse Navigator. Heart Failure database completed. Patient sent to ED for chest and abdominal pain from [facility] on 4/22/22. She is admitted with sepsis, UTI, and acute on chronic HFrEF. She has been off GDMT due to hypotension and maintained with midodrine TID. She had recent admission 4/9/22 to 4/14/22 with acute pancreatitis, hypotension, and EVERT. Cardiology and Palliative care are consulted. EF:  Recent Echo 4/10/22 with EF 35 to 40% with normal LV size and wall thickness, mild MR, dilated RA, mildly dilated RV. ACEi/ARB/ARNi: Unable to tolerated due to hypotension    BB: Unable to tolerate due to hypotension    Aldosterone Antagonist: **    Obstructive Sleep Apnea Screening:   Referred to Sleep Medicine:     CRT Not indicated. NYHA Functional Class **. Heart Failure Teach Back in Patient Education. Heart Failure Avoiding Triggers on Discharge Instructions. Cardiologist: Dr Yunior Kuhn at [address] [place] [address]. Dr Maryann Keys is consulted. Post discharge follow up phone call to be made within 48-72 hours of discharge.

## 2022-04-25 NOTE — PROGRESS NOTES
Patient has right IJ. Patient reports that several RN's have tried to get PIV with ultrasound and unable to get a PIV. RN will leave RIJ in place for now.

## 2022-04-25 NOTE — PROGRESS NOTES
TRANSFER - OUT REPORT:    Verbal report given to Radha Lyons RN on coleen messer being transferred to [facility] for routine progression of care   Report consisted of patients Situation, Background, Assessment and   Recommendations(SBAR). Referral sent through cc link to [facility].      Dewey Alvarez serve

## 2022-04-26 LAB
ANION GAP SERPL CALC-SCNC: 7 MMOL/L (ref 5–15)
BACTERIA SPEC CULT: ABNORMAL
BUN SERPL-MCNC: 15 MG/DL (ref 6–20)
BUN/CREAT SERPL: 16 (ref 12–20)
CALCIUM SERPL-MCNC: 7.7 MG/DL (ref 8.5–10.1)
CHLORIDE SERPL-SCNC: 113 MMOL/L (ref 97–108)
CO2 SERPL-SCNC: 24 MMOL/L (ref 21–32)
COMMENT, HOLDF: NORMAL
CREAT SERPL-MCNC: 0.92 MG/DL (ref 0.55–1.02)
ERYTHROCYTE [DISTWIDTH] IN BLOOD BY AUTOMATED COUNT: 12.6 % (ref 11.5–14.5)
GLUCOSE SERPL-MCNC: 98 MG/DL (ref 65–100)
HCT VFR BLD AUTO: 30.1 % (ref 35–47)
HGB BLD-MCNC: 9.3 G/DL (ref 11.5–16)
MAGNESIUM SERPL-MCNC: 1.3 MG/DL (ref 1.6–2.4)
MCH RBC QN AUTO: 33.1 PG (ref 26–34)
MCHC RBC AUTO-ENTMCNC: 30.9 G/DL (ref 30–36.5)
MCV RBC AUTO: 107.1 FL (ref 80–99)
NRBC # BLD: 0 K/UL (ref 0–0.01)
NRBC BLD-RTO: 0 PER 100 WBC
PLATELET # BLD AUTO: 296 K/UL (ref 150–400)
PMV BLD AUTO: 10.9 FL (ref 8.9–12.9)
POTASSIUM SERPL-SCNC: 3.8 MMOL/L (ref 3.5–5.1)
RBC # BLD AUTO: 2.81 M/UL (ref 3.8–5.2)
SAMPLES BEING HELD,HOLD: NORMAL
SERVICE CMNT-IMP: ABNORMAL
SODIUM SERPL-SCNC: 144 MMOL/L (ref 136–145)
WBC # BLD AUTO: 6.3 K/UL (ref 3.6–11)

## 2022-04-26 PROCEDURE — 74011250636 HC RX REV CODE- 250/636: Performed by: INTERNAL MEDICINE

## 2022-04-26 PROCEDURE — 74011250637 HC RX REV CODE- 250/637: Performed by: INTERNAL MEDICINE

## 2022-04-26 PROCEDURE — 97535 SELF CARE MNGMENT TRAINING: CPT

## 2022-04-26 PROCEDURE — 94761 N-INVAS EAR/PLS OXIMETRY MLT: CPT

## 2022-04-26 PROCEDURE — 65270000029 HC RM PRIVATE

## 2022-04-26 PROCEDURE — APPSS30 APP SPLIT SHARED TIME 16-30 MINUTES: Performed by: NURSE PRACTITIONER

## 2022-04-26 PROCEDURE — 80048 BASIC METABOLIC PNL TOTAL CA: CPT

## 2022-04-26 PROCEDURE — 77010033678 HC OXYGEN DAILY

## 2022-04-26 PROCEDURE — 97530 THERAPEUTIC ACTIVITIES: CPT

## 2022-04-26 PROCEDURE — 99232 SBSQ HOSP IP/OBS MODERATE 35: CPT | Performed by: SPECIALIST

## 2022-04-26 PROCEDURE — 83735 ASSAY OF MAGNESIUM: CPT

## 2022-04-26 PROCEDURE — 85027 COMPLETE CBC AUTOMATED: CPT

## 2022-04-26 PROCEDURE — 36415 COLL VENOUS BLD VENIPUNCTURE: CPT

## 2022-04-26 PROCEDURE — 74011000250 HC RX REV CODE- 250: Performed by: INTERNAL MEDICINE

## 2022-04-26 PROCEDURE — 74011250637 HC RX REV CODE- 250/637: Performed by: NURSE PRACTITIONER

## 2022-04-26 PROCEDURE — 74011000258 HC RX REV CODE- 258: Performed by: INTERNAL MEDICINE

## 2022-04-26 PROCEDURE — 74011250636 HC RX REV CODE- 250/636: Performed by: NURSE PRACTITIONER

## 2022-04-26 RX ORDER — MAGNESIUM SULFATE HEPTAHYDRATE 40 MG/ML
2 INJECTION, SOLUTION INTRAVENOUS 2 TIMES DAILY
Status: DISCONTINUED | OUTPATIENT
Start: 2022-04-26 | End: 2022-04-27

## 2022-04-26 RX ORDER — FUROSEMIDE 10 MG/ML
20 INJECTION INTRAMUSCULAR; INTRAVENOUS 2 TIMES DAILY
Status: DISCONTINUED | OUTPATIENT
Start: 2022-04-26 | End: 2022-04-26

## 2022-04-26 RX ORDER — MIDODRINE HYDROCHLORIDE 5 MG/1
5 TABLET ORAL
Status: DISCONTINUED | OUTPATIENT
Start: 2022-04-26 | End: 2022-04-26

## 2022-04-26 RX ORDER — CARVEDILOL 3.12 MG/1
3.12 TABLET ORAL 2 TIMES DAILY WITH MEALS
Status: DISCONTINUED | OUTPATIENT
Start: 2022-04-26 | End: 2022-05-02 | Stop reason: HOSPADM

## 2022-04-26 RX ORDER — POTASSIUM CHLORIDE 750 MG/1
20 TABLET, FILM COATED, EXTENDED RELEASE ORAL DAILY
Status: DISCONTINUED | OUTPATIENT
Start: 2022-04-26 | End: 2022-04-29

## 2022-04-26 RX ORDER — LISINOPRIL 5 MG/1
2.5 TABLET ORAL DAILY
Status: DISCONTINUED | OUTPATIENT
Start: 2022-04-26 | End: 2022-04-28

## 2022-04-26 RX ORDER — BUMETANIDE 1 MG/1
2 TABLET ORAL 2 TIMES DAILY
Status: DISCONTINUED | OUTPATIENT
Start: 2022-04-26 | End: 2022-04-27

## 2022-04-26 RX ADMIN — MEROPENEM 1 G: 1 INJECTION, POWDER, FOR SOLUTION INTRAVENOUS at 05:33

## 2022-04-26 RX ADMIN — MIDODRINE HYDROCHLORIDE 10 MG: 5 TABLET ORAL at 07:58

## 2022-04-26 RX ADMIN — HEPARIN SODIUM 5000 UNITS: 5000 INJECTION INTRAVENOUS; SUBCUTANEOUS at 21:52

## 2022-04-26 RX ADMIN — MAGNESIUM SULFATE HEPTAHYDRATE 2 G: 40 INJECTION, SOLUTION INTRAVENOUS at 12:37

## 2022-04-26 RX ADMIN — LATANOPROST 1 DROP: 50 SOLUTION OPHTHALMIC at 18:26

## 2022-04-26 RX ADMIN — GABAPENTIN 200 MG: 100 CAPSULE ORAL at 08:00

## 2022-04-26 RX ADMIN — MIDODRINE HYDROCHLORIDE 10 MG: 5 TABLET ORAL at 16:52

## 2022-04-26 RX ADMIN — ASPIRIN 81 MG: 81 TABLET, COATED ORAL at 08:00

## 2022-04-26 RX ADMIN — MEROPENEM 1 G: 1 INJECTION, POWDER, FOR SOLUTION INTRAVENOUS at 16:52

## 2022-04-26 RX ADMIN — SODIUM CHLORIDE, PRESERVATIVE FREE 10 ML: 5 INJECTION INTRAVENOUS at 05:35

## 2022-04-26 RX ADMIN — MIDODRINE HYDROCHLORIDE 10 MG: 5 TABLET ORAL at 12:38

## 2022-04-26 RX ADMIN — LEVOTHYROXINE SODIUM 25 MCG: 0.03 TABLET ORAL at 05:34

## 2022-04-26 RX ADMIN — PANTOPRAZOLE SODIUM 40 MG: 40 TABLET, DELAYED RELEASE ORAL at 05:34

## 2022-04-26 RX ADMIN — MAGNESIUM SULFATE HEPTAHYDRATE 2 G: 40 INJECTION, SOLUTION INTRAVENOUS at 18:13

## 2022-04-26 RX ADMIN — GABAPENTIN 200 MG: 100 CAPSULE ORAL at 18:13

## 2022-04-26 RX ADMIN — AMITRIPTYLINE HYDROCHLORIDE 25 MG: 50 TABLET, FILM COATED ORAL at 21:52

## 2022-04-26 RX ADMIN — HEPARIN SODIUM 5000 UNITS: 5000 INJECTION INTRAVENOUS; SUBCUTANEOUS at 14:30

## 2022-04-26 RX ADMIN — SODIUM CHLORIDE, PRESERVATIVE FREE 10 ML: 5 INJECTION INTRAVENOUS at 21:52

## 2022-04-26 RX ADMIN — PYRIDOXINE HCL TAB 50 MG 25 MG: 50 TAB at 08:01

## 2022-04-26 RX ADMIN — CARVEDILOL 3.12 MG: 3.12 TABLET, FILM COATED ORAL at 07:58

## 2022-04-26 RX ADMIN — HEPARIN SODIUM 5000 UNITS: 5000 INJECTION INTRAVENOUS; SUBCUTANEOUS at 05:34

## 2022-04-26 RX ADMIN — SODIUM CHLORIDE, PRESERVATIVE FREE 10 ML: 5 INJECTION INTRAVENOUS at 14:30

## 2022-04-26 RX ADMIN — BUMETANIDE 2 MG: 1 TABLET ORAL at 08:00

## 2022-04-26 RX ADMIN — FUROSEMIDE 20 MG: 10 INJECTION, SOLUTION INTRAMUSCULAR; INTRAVENOUS at 06:45

## 2022-04-26 RX ADMIN — LISINOPRIL 2.5 MG: 5 TABLET ORAL at 07:58

## 2022-04-26 NOTE — PROGRESS NOTES
[address] [facility] Adult  Hospitalist Group                                                                                          Hospitalist Progress Note  Giuliano Street MD  Answering service: [phone] from in house phone        Date of Service:  2022  NAME:  Leslie Smith  :  1925  MRN:  [id number]      Admission Summary: \"The patient is a 81 yo hx of chronic resp failure on 4L O2, chronic sCHF EF 35-40%, hypotension, right renal mass, CKD 3, presented w/ suprapupic pain, UTI, septic shock, CHF. The patient was admitted 1 week ago for pancreatitis. She improved with IVF and pain meds. Today, the patient c/o suprapubic pain for 2 days, associated with fevers/chills and fatigue. She denied chest pain, cough, nausea, vomiting, diarrhea. In the ED, SBP ~80s, temp 101.7, WBC 6.7, lactate 2.2. U/A significant for a UTI. Abd CT showed improved peripancreatic stranding, but moderate pleural effusion\"    Interval history / Subjective:   2022. No acute events overnight. Saw patient this morning, awake alert and oriented, very pleasant and cooperative with physical examination, denies any fever, chills, nausea, vomiting. Believes that her sepsis was caused because she choked on one of her multivitamins. 2022. No acute events overnight. Saw patient this morning, awake alert and oriented in no apparent distress, receiving physical therapy, does not appear to be in any apparent distress, has been off of pressors for the last 4 hours, vitals appears to be within normal limits, has been followed by cardiologist, possible discharge tomorrow. 2022. No acute events overnight.   Patient this morning, awake alert and oriented, no apparent distress, stating that she is still not ready to be discharged to rehab, she feels weak and she will refill to participate in her rehab, still complaining of significant lower extremity dyspnea on exertion, her medication has been reconsulted and patient hopefully can be discharged home tomorrow, patient has a central line going which she will Remove however she adamantly refuses to remove it because stating that it took a hard time to get her central line. Assessment & Plan:     1) Septic shock/UTI: 3/4 SIRS with SBP <90. Will monitor on step down. Check blood Cx, urine Cx, lactate. Given multiple allergies, will start IV Meropenem. Start Kyree gtt for hypotension. Be careful with IVF due to CHF. Discussed with ED physician about central line if unable to obtain good peripheral IVs     2) Acute on chronic systolic (congestive) heart failure/pleural effusions: last echo with EF 35-40%. Pro BNP >10,000 on admission. BP too low for diuretics. Will use IV bumex prn for when BP can tolerate. Consult Cards     3) EVERT/CKD 3: due to hypoperfusion. Will cont IVF, pressors gtt. Monitor closely     4) Recent pancreatitis: resolved on CT. Will monitor closely     5) Chronic resp failure: on chronic 4L O2 due to CHF. Cont O2, incentive spirometry     6) R renal mass: likely RCC. Patient has refused w/u in the past     Code status: DNR  DVT prophylaxis: Lovenox    Care Plan discussed with: Patient/Family  Anticipated Disposition: Home w/Family  Anticipated Discharge: Less than 24 hours     Hospital Problems  Date Reviewed: 4/22/2022          Codes Class Noted POA    Acute on chronic systolic (congestive) heart failure (HCC) ICD-10-CM: I50.23  ICD-9-CM: 428.23, 428.0  4/22/2022 Unknown        * (Principal) Septic shock (HCC) ICD-10-CM: A41.9, R65.21  ICD-9-CM: 038.9, 785.52, 995.92  4/22/2022 Yes        EVERT (acute kidney injury) ([facility] ICD-10-CM: N17.9  ICD-9-CM: 584.9  4/22/2022 Yes        UTI (urinary tract infection) ICD-10-CM: N39.0  ICD-9-CM: 599.0  4/22/2022 Yes                Review of Systems:   A comprehensive review of systems was negative except for that written in the HPI.        Vital Signs:    Last 24hrs VS reviewed since prior progress note. Most recent are:  Visit Vitals  /63 (BP 1 Location: Right upper arm, BP Patient Position: At rest)   Pulse (!) 112   Temp 97.5 °F (36.4 °C)   Resp 20   Ht 5' (1.524 m)   Wt 58.1 kg (128 lb)   SpO2 99%   BMI 25.00 kg/m²         Intake/Output Summary (Last 24 hours) at 4/26/2022 1257  Last data filed at 4/26/2022 3574  Gross per 24 hour   Intake 290 ml   Output 1050 ml   Net -760 ml        Physical Examination:     I had a face to face encounter with this patient and independently examined them on 4/26/2022 as outlined below:          Constitutional:  No acute distress, cooperative, pleasant    ENT:  Oral mucosa moist, oropharynx benign. Resp:  CTA bilaterally. No wheezing/rhonchi/rales. No accessory muscle use   CV:  Regular rhythm, normal rate, no murmurs, gallops, rubs    GI:  Soft, non distended, non tender. normoactive bowel sounds, no hepatosplenomegaly     Musculoskeletal:  No edema, warm, 2+ pulses throughout    Neurologic:  Moves all extremities. AAOx3, CN II-XII reviewed            Data Review:    Review and/or order of clinical lab test      Labs:     Recent Labs     04/26/22  1015 04/25/22  0441   WBC 6.3 6.8   HGB 9.3* 7.7*   HCT 30.1* 24.9*    253     Recent Labs     04/26/22  1015 04/25/22  0441 04/24/22  0252    148*  --    K 3.8 3.4*  --    * 120*  --    CO2 24 23  --    BUN 15 16  --    CREA 0.92 0.97 1.00   GLU 98 81  --    CA 7.7* 7.1*  --    MG 1.3* 1.3*  --      Recent Labs     04/25/22  0441   ALT 74   *   TBILI 0.4   TP 3.7*   ALB 1.3*   GLOB 2.4     No results for input(s): INR, PTP, APTT, INREXT, INREXT in the last 72 hours. No results for input(s): FE, TIBC, PSAT, FERR in the last 72 hours. Lab Results   Component Value Date/Time    Folate 37.7 (H) 04/10/2022 03:46 AM      No results for input(s): PH, PCO2, PO2 in the last 72 hours. No results for input(s): CPK, CKNDX, TROIQ in the last 72 hours.     No lab exists for component: CPKMB  Lab Results   Component Value Date/Time    Cholesterol, total <50 04/10/2022 03:46 AM    HDL Cholesterol 8 04/10/2022 03:46 AM    LDL, calculated  04/10/2022 03:46 AM     Unable to calculate LDL or VLDL due to low cholesterol.     Triglyceride 96 04/10/2022 03:46 AM    CHOL/HDL Ratio Cannot be calculated 04/10/2022 03:46 AM     Lab Results   Component Value Date/Time    Glucose (POC) 82 04/23/2022 04:00 PM    Glucose (POC) 207 (H) 04/11/2022 11:07 AM    Glucose (POC) 234 (H) 04/11/2022 07:59 AM    Glucose (POC) 59 (L) 04/11/2022 05:43 AM    Glucose (POC) 210 (H) 05/12/2009 04:35 PM     Lab Results   Component Value Date/Time    Color YELLOW/STRAW 04/22/2022 04:08 PM    Appearance TURBID (A) 04/22/2022 04:08 PM    Specific gravity 1.010 04/22/2022 04:08 PM    pH (UA) 6.5 04/22/2022 04:08 PM    Protein 30 (A) 04/22/2022 04:08 PM    Glucose Negative 04/22/2022 04:08 PM    Ketone Negative 04/22/2022 04:08 PM    Bilirubin Negative 04/22/2022 04:08 PM    Urobilinogen 2.0 (H) 04/22/2022 04:08 PM    Nitrites Negative 04/22/2022 04:08 PM    Leukocyte Esterase LARGE (A) 04/22/2022 04:08 PM    Epithelial cells MODERATE (A) 04/22/2022 04:08 PM    Bacteria 4+ (A) 04/22/2022 04:08 PM    WBC >100 (H) 04/22/2022 04:08 PM    RBC 5-10 04/22/2022 04:08 PM         Medications Reviewed:     Current Facility-Administered Medications   Medication Dose Route Frequency    bumetanide (BUMEX) tablet 2 mg  2 mg Oral BID    carvediloL (COREG) tablet 3.125 mg  3.125 mg Oral BID WITH MEALS    [Held by provider] lisinopriL (PRINIVIL, ZESTRIL) tablet 2.5 mg  2.5 mg Oral DAILY    magnesium sulfate 2 g/50 ml IVPB (premix or compounded)  2 g IntraVENous BID    potassium chloride SR (KLOR-CON 10) tablet 20 mEq  20 mEq Oral DAILY    meropenem (MERREM) 1 g in 0.9% sodium chloride (MBP/ADV) 50 mL MBP  1 g IntraVENous Q12H    amitriptyline (ELAVIL) tablet 25 mg  25 mg Oral QHS    aspirin delayed-release tablet 81 mg  81 mg Oral DAILY    benzonatate (TESSALON) capsule 100 mg  100 mg Oral TID PRN    gabapentin (NEURONTIN) capsule 200 mg  200 mg Oral BID    latanoprost (XALATAN) 0.005 % ophthalmic solution 1 Drop  1 Drop Left Eye QPM    levothyroxine (SYNTHROID) tablet 25 mcg  25 mcg Oral ACB    midodrine (PROAMATINE) tablet 10 mg  10 mg Oral TID WITH MEALS    pantoprazole (PROTONIX) tablet 40 mg  40 mg Oral ACB    pyridoxine (vitamin B6) (VITAMIN B-6) tablet 25 mg  25 mg Oral DAILY    metoprolol (LOPRESSOR) injection 1.25 mg  1.25 mg IntraVENous Q6H PRN    sodium chloride (NS) flush 5-40 mL  5-40 mL IntraVENous Q8H    sodium chloride (NS) flush 5-40 mL  5-40 mL IntraVENous PRN    0.9% sodium chloride infusion 25 mL  25 mL IntraVENous PRN    acetaminophen (TYLENOL) tablet 650 mg  650 mg Oral Q6H PRN    Or    acetaminophen (TYLENOL) suppository 650 mg  650 mg Rectal Q6H PRN    bisacodyL (DULCOLAX) suppository 10 mg  10 mg Rectal DAILY PRN    promethazine (PHENERGAN) tablet 12.5 mg  12.5 mg Oral Q6H PRN    Or    ondansetron (ZOFRAN) injection 4 mg  4 mg IntraVENous Q6H PRN    heparin (porcine) injection 5,000 Units  5,000 Units SubCUTAneous Q8H     ______________________________________________________________________  EXPECTED LENGTH OF STAY: - - -  ACTUAL LENGTH OF STAY:          4                 Frida العراقي MD

## 2022-04-26 NOTE — PROGRESS NOTES
Problem: Infection - Risk of, Urinary Catheter-Associated Urinary Tract Infection  Goal: *Absence of infection signs and symptoms  Outcome: Progressing Towards Goal     Problem: Patient Education: Go to Patient Education Activity  Goal: Patient/Family Education  Outcome: Progressing Towards Goal     Problem: Infection - Risk of, Central Venous Catheter-Associated Bloodstream Infection  Goal: *Absence of infection signs and symptoms  Outcome: Progressing Towards Goal     Problem: Patient Education: Go to Patient Education Activity  Goal: Patient/Family Education  Outcome: Progressing Towards Goal     Problem: Falls - Risk of  Goal: *Absence of Falls  Description: Document Rei Fall Risk and appropriate interventions in the flowsheet. Outcome: Progressing Towards Goal  Note: Fall Risk Interventions:  Mobility Interventions: Bed/chair exit alarm    Mentation Interventions: Bed/chair exit alarm    Medication Interventions: Bed/chair exit alarm    Elimination Interventions: Bed/chair exit alarm,Call light in reach              Problem: Patient Education: Go to Patient Education Activity  Goal: Patient/Family Education  Outcome: Progressing Towards Goal     Problem: Pressure Injury - Risk of  Goal: *Prevention of pressure injury  Description: Document Destin Scale and appropriate interventions in the flowsheet. Outcome: Progressing Towards Goal  Note: Pressure Injury Interventions:  Sensory Interventions: Turn and reposition approx. every two hours (pillows and wedges if needed)    Moisture Interventions: Check for incontinence Q2 hours and as needed    Activity Interventions: PT/OT evaluation    Mobility Interventions: Turn and reposition approx.  every two hours(pillow and wedges)    Nutrition Interventions: Document food/fluid/supplement intake,Offer support with meals,snacks and hydration    Friction and Shear Interventions: Transferring/repositioning devices                Problem: Patient Education: Go to Patient Education Activity  Goal: Patient/Family Education  Outcome: Progressing Towards Goal     Problem: Patient Education: Go to Patient Education Activity  Goal: Patient/Family Education  Outcome: Progressing Towards Goal     Problem: Patient Education: Go to Patient Education Activity  Goal: Patient/Family Education  Outcome: Progressing Towards Goal     Problem: Falls - Risk of  Goal: *Absence of Falls  Description: Document [address] Fall Risk and appropriate interventions in the flowsheet.   Outcome: Progressing Towards Goal  Note: Fall Risk Interventions:  Mobility Interventions: Bed/chair exit alarm    Mentation Interventions: Bed/chair exit alarm    Medication Interventions: Bed/chair exit alarm    Elimination Interventions: Bed/chair exit alarm,Call light in reach              Problem: Patient Education: Go to Patient Education Activity  Goal: Patient/Family Education  Outcome: Progressing Towards Goal

## 2022-04-26 NOTE — PROGRESS NOTES
Visit to patient per request Mikki Garsia RN to gain peripheral access so central line can be removed. Patient adamantly refuses to be stuck for peripheral IV access at this time. Case management at bedside.

## 2022-04-26 NOTE — PROGRESS NOTES
Transition of Care Plan:  RUR-17%  1. Pt to return to [facility] to complete rehab  2. Pt on 2L O2 (supplied by [facility])  3. Wound care following  4. Cardiology following  5. PT/OT following  6. Stretcher transport likely  CARLOS Love

## 2022-04-26 NOTE — PROGRESS NOTES
Problem: Self Care Deficits Care Plan (Adult)  Goal: *Acute Goals and Plan of Care (Insert Text)  Description: FUNCTIONAL STATUS PRIOR TO ADMISSION: Per chart at last admission to [facility] 2 weeks ago (eval on 4/11/2022): \"Patient was modified independent using a walker for functional mobility and short distance ambulation. She uses a wheelchair for longer distances and community ambulation. Patient needs occasional assistance for LB ADLs but otherwise independent with self care. \"  Pt reports using 4L O2 at baseline. After that admission, pt went to [facility] for rehab. HOME SUPPORT: The patient lived with  and son. This admission is received from SNF. Occupational Therapy Goals  Initiated 4/24/2022  1. Patient will perform grooming, standing at sink, with modified independence within 7 day(s). 2.  Patient will perform lower body dressing with minimal assistance/contact guard assist within 7 day(s). 3.  Patient will perform bathing with minimal assistance/contact guard assist within 7 day(s). 4.  Patient will perform toilet transfers with supervision/set-up within 7 day(s). 5.  Patient will perform all aspects of toileting with supervision/set-up within 7 day(s). 6.  Patient will participate in upper extremity therapeutic exercise/activities with supervision/set-up for 5 minutes within 7 day(s). 7.  Patient will utilize energy conservation techniques during functional activities with verbal cues within 7 day(s). Outcome: Progressing Towards Goal     OCCUPATIONAL THERAPY TREATMENT  Patient: Ermelinda Esqueda (36 y.o. female)  Date: 4/26/2022  Diagnosis: Acute on chronic systolic (congestive) heart failure ([facility] [address] [I50.23] Septic shock ([facility] [address]       Precautions:    Chart, occupational therapy assessment, plan of care, and goals were reviewed. ASSESSMENT  Patient continues with skilled OT services and is slowly progressing towards goals.   Patient continues to present with impaired balance, generally decreased strength, fear of falling, and decreased activity tolerance. She is agreeable to participate with therapy and requires increased time between tasks due to increased anxiety and fear of falling. She demonstrates improved EOB sitting this session but is unable to complete transfer to chair using RW and A x 2. This OT offered SBA as PT completed max A stand pivot to chair. Once pt set up in chair, she participates in seated UB ADL tasks with good tolerance. Noted orthostatic BP after transfer (see below; MAP dropped 20 points); RN notified and aware. Pt denied feeling lightheaded or dizzy throughout and BP improves once seated in chair. Continue to recommend SNF at discharge. Music Therapist arrived at end of session. Pt position Pulse BP   04/26/22 1514 End of session; in chair (!) 128 102/78   04/26/22 1456 Sitting; post transfer (!) 123 (!) 97/43   04/26/22 1439 At rest; pre-activity (!) 115 96/75         Current Level of Function Impacting Discharge (ADLs): up to max A for ADLs; max A for SPT    Other factors to consider for discharge: high fall risk; poor tolerance; orthostatic BP         PLAN :  Patient continues to benefit from skilled intervention to address the above impairments. Continue treatment per established plan of care to address goals. Recommend with staff: Rosendo Honeycutt to chair with A x 2 (can do lateral transfer with drop arm chair)    Recommend next OT session: BSC t/f; standing tolerance; ADLs; UE HEP    Recommendation for discharge: (in order for the patient to meet his/her long term goals)  Therapy up to 5 days/week in SNF setting    This discharge recommendation:  Has been made in collaboration with the attending provider and/or case management    IF patient discharges home will need the following DME: TBD       SUBJECTIVE:   Patient stated I don't want to fall.     OBJECTIVE DATA SUMMARY:   Cognitive/Behavioral Status:  Neurologic State: Alert  Orientation Level: Oriented X4  Cognition: Appropriate for age attention/concentration; Appropriate safety awareness; Follows commands  Perception: Cues to maintain midline in sitting  Perseveration: No perseveration noted  Safety/Judgement: Awareness of environment    Functional Mobility and Transfers for ADLs:  Bed Mobility:  Rolling: Moderate assistance;Assist x2  Supine to Sit: Moderate assistance;Assist x2  Scooting: Moderate assistance;Assist x2    Transfers:  Sit to Stand: Moderate assistance;Assist x2;Minimum assistance (mod x2 -> min x2)    Balance:  Sitting: Intact; High guard  Standing: Impaired; With support  Standing - Static: Fair  Standing - Dynamic : Fair;Poor    ADL Intervention:  Grooming  Grooming Assistance: Set-up; Supervision (assist to open new ADL items)  Position Performed: Seated in chair  Washing Face: Set-up  Brushing Teeth: Set-up; Minimum assistance (A to open items - toothbrush, paste, and mouthwash)    Lower Body Dressing Assistance  Socks: Total assistance (dependent)  Leg Crossed Method Used: No  Position Performed: Seated edge of bed  Cues: Don;Physical assistance    Cognitive Retraining  Safety/Judgement: Awareness of environment    Pain:  Pt reporting minimal pain    Activity Tolerance:   Fair, Poor, desaturates with exertion and requires oxygen and requires frequent rest breaks    After treatment patient left in no apparent distress:   Sitting in chair, Call bell within reach, Bed / chair alarm activated and Music Therapist present    COMMUNICATION/COLLABORATION:   The patients plan of care was discussed with: Physical therapist and Registered nurse.      Jeramie Marte OT  Time Calculation: 46 mins

## 2022-04-26 NOTE — PROGRESS NOTES
Bedside and Verbal shift change report given to Kaela Horn RN (oncoming nurse) by Behzad Cedillo RN (offgoing nurse). Report included the following information SBAR, Kardex, Intake/Output, MAR, Accordion and Recent Results. This patient was assisted with Intentional Toileting every 2 hours during this shift as appropriate. Documentation of ambulation and output reflected on Flowsheet as appropriate. Purposeful hourly rounding was completed using AIDET and 5Ps. Outcomes of PHR documented as they occurred. Bed alarm in use as appropriate. Dual Suction and ambubag in place. Bedside and Verbal shift change report given to GERARDO MENDOZA (oncoming nurse) by Kaela Horn RN (offgoing nurse). Report included the following information SBAR, Kardex, Intake/Output, MAR, Accordion and Recent Results.

## 2022-04-26 NOTE — PROGRESS NOTES
Music Therapy Assessment  [address] [place] [address] [phone]     6/14/1925  80 y.o.  female    Patient Telephone Number: [phone] (home)   Quaker Affiliation: Sabianist   Language: English   Patient Active Problem List    Diagnosis Date Noted    Acute on chronic systolic (congestive) heart failure ([facility] [address] 04/22/2022    Septic shock ([facility] [address] 04/22/2022    [facility] 04/22/2022    UTI (urinary tract infection) 04/22/2022    Acute pancreatitis 04/09/2022    Back pain 04/17/2012    Stage III CKD - stable (5/2011) Secondary tHTN + Nephrosclerosis  - Dr. Lois Alexis (Nephrologist) 04/19/2011    Osteoporosis, post-menopausal - Bone Density 11/30/2010 12/10/2010    Stroke ([facility] 05/21/2010    High cholesterol 05/21/2010    DVT (deep venous thrombosis) ([facility] 05/21/2010    LBBB (left bundle branch block) 05/21/2010    Pancreatitis 05/21/2010        Date: 4/26/2022            Total Time (in minutes): 5          SFM 3 PROG CARE TELE 2    Mental Status:   [x] Alert [  ] Maxine Antoinette [  ]  Confused  [  ] Minimally responsive  [  ] Sleeping    Communication Status: [  ] Impaired Speech [  ] Nonverbal -N/A    Physical Status:   [  ] Oxygen in use  [  ] Hard of Hearing [  ] Vision Impaired  [  ] Ambulatory  [  ] Ambulatory with assistance [  ] Non-ambulatory -N/A    Music Preferences, Background: N/A: Please see Session Observations below. Clinical Problem addressed: N/A: Please see Session Observations below. Goal(s) met in session: N/A: Please see Session Observations below.   Physical/Pain management (Scale of 1-10):    Pre-session rating ___________    Post-session rating __________  [  ] Increased relaxation   [  ] Affected breathing patterns  [  ] Decreased muscle tension   [  ] Decreased agitation  [  ] Affected heart rate    [  ] Increased alertness     Emotional/Psychological:  [  ] Increased self-expression   [  ] Decreased aggressive behavior   [  ] Decreased feelings of stress  [  ] Discussed healthy coping skills     [  ] Improved mood    [  ] Decreased withdrawn behavior     Social:  [  ] Decreased feelings of isolation/loneliness [  ] Positive social interaction   [  ] Provided support and/or comfort for family/friends    Spiritual:  [  ] Spiritual support    [  ] Expressed peace  [  ] Expressed sweetie    [  ] Discussed beliefs    Techniques Utilized (Check all that apply): N/A: Please see Session Observations below. [  ] Procedural support MT [  ] Music for relaxation [  ] Patient preferred music  [  ] Laura analysis  [  ] Song choice  [  ] Music for validation  [  ] Entrainment  [  ] Movement to music [  ] Guided visualization  [  ] Mauro Woodard  [  ] Patient instrument playing [  ] Graine de Cadeaux writing  [  ] Tyron November along   [  ] Bisi Montaño  [  ] Sensory stimulation  [  ] Active Listening  [  ] Music for spiritual support [  ] Making of CDs as gifts    Session Observations:  F/up visit; Patient (pt) was alert sitting up in bed. Two Physical/Occupational Therapists were working with her so pt was unavailable for music therapy. Pt thanked this music therapist (MT) for following up after visit earlier in the day. Will follow as able.     FLORECITA Montiel (Music Therapist-Board Certified)  Spiritual Care Department  Referral-based service

## 2022-04-26 NOTE — PROGRESS NOTES
[address] visit. Mrs. Ximoara Berger attends . Let her know . Angela Earp at [address] [employer]. .  Found her the cable station for [place] today and the CARE station. She had a question about the phrase in the [address] where it is said 'lead us not into temptation' and the sentence structure refers back to [address]. Shared the translation from [place] as 'let us not fall into temptation\"  This makes more sense to her.   Prayer and communion offered.'    SATURNINO Chung, RN, ACSW, LCSW   Page:  [phone]

## 2022-04-26 NOTE — PROGRESS NOTES
Rounded on Zoroastrianism patients and provided Anointing of the Sick at request of patient.     Bell Bradshaw

## 2022-04-26 NOTE — PROGRESS NOTES
CARDIOLOGY PROGRESS NOTE    Jaime Michel MD,  [address], [place], [address] [facility]  Phone [phone]; Fax [phone]  Mobile [phone]   Voice Mail [phone]        2022 10:08 PM       Admit Date:           2022  Admit Diagnosis:  Acute on chronic systolic (congestive) heart failure ([facility] [address] [I50.23]  :          1925   MRN:          [id number]      Primary care: Emory Lesch, MD       ATTENTION:   This medical record was transcribed using an electronic medical records/speech recognition system. Although proofread, it may and can contain electronic, spelling and other errors. Corrections may be executed at a later time. Please feel free to contact us for any clarifications as needed.       Impression Plan/Recommendation   1. Acute on chronic HFrEF-CT pleural effusions  2. Septic shock/UTI  3. EVERT  4. Patient is a DNR  5. Shortness of breath  6. Anemia hemoglobin in the 7 range  7. Hypernatremia               1.  Followed by Dr. Devan Caro with [facility]  2. Has been on Entresto and Toprol-XL stopped during this admission, remains on midodrine 10 mg tid  3. Coreg and lisinopril started - would avoid lisinopril in case Entresto can be resumed at some point(will need 48-72 hr washout before resuming Entresto after ACE)  4. Hold lisinopril  5. On home O2 at [place] 4L  6. Chest xray w/ bilat pleural effusions - agree with bumex, pBNP up to 20K  7. Possibly some of her tachycardia is related not only to her sepsis but her anemia could be dilutional   8. Electrolyte imbalance - repeat labs today, replete as needed          We discussed the expected course, resolution and complications of the diagnosis(es) in detail. Medication risks, benefits, costs, interactions, and alternatives were discussed as indicated. ICD-10-CM ICD-9-CM    1. Acute cystitis with hematuria  N30.01 595.0    2. Pleural effusion  J90 511.9    3.  Elevated LFTs  R79.89 790.6 4. Acute on chronic systolic (congestive) heart failure (HCC)  I50.23 428.23      428.0        No intake/output data recorded. Last 3 Recorded Weights in this Encounter    04/24/22 0440 04/25/22 0100 04/26/22 0432   Weight: 58.9 kg (129 lb 13.6 oz) 55 kg (121 lb 4.1 oz) 58.1 kg (128 lb)         04/24 1901 - 04/26 0700  In: 650 [P.O.:600; I.V.:50]  Out: 2000 [Urine:2000]    SUBJECTIVE           Ms. Maite Bowens is a 79 yo hx of chronic resp failure on 4L O2, chronic sCHF EF 35-40%, hypotension, right renal mass, CKD 3, presented w/ suprapupic pain, UTI, septic shock. Has pleural effusions on CT scan. Over the last day from 4 24-4 25 she had about 1800 cc in.      Mayito Zamudio reports cont SOB but feeling little better today.        Current Facility-Administered Medications   Medication Dose Route Frequency    bumetanide (BUMEX) tablet 2 mg  2 mg Oral BID    carvediloL (COREG) tablet 3.125 mg  3.125 mg Oral BID WITH MEALS    lisinopriL (PRINIVIL, ZESTRIL) tablet 2.5 mg  2.5 mg Oral DAILY    meropenem (MERREM) 1 g in 0.9% sodium chloride (MBP/ADV) 50 mL MBP  1 g IntraVENous Q12H    amitriptyline (ELAVIL) tablet 25 mg  25 mg Oral QHS    aspirin delayed-release tablet 81 mg  81 mg Oral DAILY    benzonatate (TESSALON) capsule 100 mg  100 mg Oral TID PRN    gabapentin (NEURONTIN) capsule 200 mg  200 mg Oral BID    latanoprost (XALATAN) 0.005 % ophthalmic solution 1 Drop  1 Drop Left Eye QPM    levothyroxine (SYNTHROID) tablet 25 mcg  25 mcg Oral ACB    midodrine (PROAMATINE) tablet 10 mg  10 mg Oral TID WITH MEALS    pantoprazole (PROTONIX) tablet 40 mg  40 mg Oral ACB    pyridoxine (vitamin B6) (VITAMIN B-6) tablet 25 mg  25 mg Oral DAILY    metoprolol (LOPRESSOR) injection 1.25 mg  1.25 mg IntraVENous Q6H PRN    sodium chloride (NS) flush 5-40 mL  5-40 mL IntraVENous Q8H    sodium chloride (NS) flush 5-40 mL  5-40 mL IntraVENous PRN    0.9% sodium chloride infusion 25 mL  25 mL IntraVENous PRN    acetaminophen (TYLENOL) tablet 650 mg  650 mg Oral Q6H PRN    Or    acetaminophen (TYLENOL) suppository 650 mg  650 mg Rectal Q6H PRN    bisacodyL (DULCOLAX) suppository 10 mg  10 mg Rectal DAILY PRN    promethazine (PHENERGAN) tablet 12.5 mg  12.5 mg Oral Q6H PRN    Or    ondansetron (ZOFRAN) injection 4 mg  4 mg IntraVENous Q6H PRN    heparin (porcine) injection 5,000 Units  5,000 Units SubCUTAneous Q8H      OBJECTIVE               Intake/Output Summary (Last 24 hours) at 4/26/2022 0924  Last data filed at 4/26/2022 0658  Gross per 24 hour   Intake 290 ml   Output 1050 ml   Net -760 ml       Review of Systems - History obtained from the patient AS PER  HPI        PHYSICAL EXAM        Visit Vitals  /67 (BP 1 Location: Right upper arm, BP Patient Position: At rest)   Pulse (!) 108   Temp 97.8 °F (36.6 °C)   Resp 22   Ht 5' (1.524 m)   Wt 58.1 kg (128 lb)   LMP 11/16/1975   SpO2 100%   BMI 25.00 kg/m²       Gen: Ill-appearing deconditioned in no acute distress  alert   HEENT:  Pink conjunctivae, Hearing grossly normal.  Resp: Clear anterior but poor effort  Card: Tachycardic and regular  Skin: No rashes or ulcers, no bruising  Neuro:  follows commands appropriately  Psych: alert, Nml Affect  LE:  Mild edema, L arm edema        DATA REVIEW      No specialty comments available. Cardiac monitor: Paced rhythm        Laboratory and Imaging have been reviewed by me and are notable for  No results for input(s): CPK, CKMB, TROIQ in the last 72 hours.   Recent Labs     04/25/22  0441 04/24/22  0252   *  --    K 3.4*  --    CO2 23  --    BUN 16  --    CREA 0.97 1.00   GLU 81  --    MG 1.3*  --    WBC 6.8  --    HGB 7.7*  --    HCT 24.9*  --      --              Sandeep Bowers NP

## 2022-04-26 NOTE — PROGRESS NOTES
Music Therapy Assessment  [address] [place] [address] [phone]     6/14/1925  80 y.o.  female    Patient Telephone Number: [phone] (home)   [employer] Affiliation: Congregation   Language: English   Patient Active Problem List    Diagnosis Date Noted    Acute on chronic systolic (congestive) heart failure ([facility] [address] 04/22/2022    Septic shock ([facility] [address] 04/22/2022    EVERT (acute kidney injury) ([facility] [address] 04/22/2022    UTI (urinary tract infection) 04/22/2022    Acute pancreatitis 04/09/2022    Back pain 04/17/2012    Stage III CKD - stable (5/2011) Secondary tHTN + Nephrosclerosis  - Dr. Delmer Castro (Nephrologist) 04/19/2011    Osteoporosis, post-menopausal - Bone Density 11/30/2010 12/10/2010    Stroke ([facility] [address] 05/21/2010    High cholesterol 05/21/2010    [facility] 05/21/2010    LBBB (left bundle branch block) 05/21/2010    Pancreatitis 05/21/2010        Date: 4/26/2022            Total Time (in minutes): 35          SFM 3 PROG CARE TELE 2    Mental Status:   [x] Alert [  ] Olaf Hosteller [  ]  Confused  [  ] Minimally responsive  [  ] Sleeping    Communication Status: [  ] Impaired Speech [  ] Nonverbal -N/A    Physical Status:   [  ] Oxygen in use  [  ] Hard of Hearing [  ] Vision Impaired  [  ] Ambulatory  [  ] Ambulatory with assistance [  ] Non-ambulatory -N/A    Music Preferences, Background: Pt said she liked many genres of music. She said she liked Old Standards and songs that were popular when she was growing up, as well as some [employer] and songs from [employer]. Pt said she especially likes the musical [place]idd[place]. Clinical Problem addressed: Emotional support, positive social interaction. Goal(s) met in session:  Physical/Pain management (Scale of 1-10):    Pre-session rating: Pt denied pain. Post-session rating: Pt didn't report on pain.   [  ] Increased relaxation   [  ] Affected breathing patterns  [  ] Decreased muscle tension   [  ] Decreased agitation  [  ] Affected heart rate    [  ] Increased alertness     Emotional/Psychological:  [x] Increased self-expression   [  ] Decreased aggressive behavior   [  ] Decreased feelings of stress  [  ] Discussed healthy coping skills     [x] Improved mood    [  ] Decreased withdrawn behavior     Social:  [  ] Decreased feelings of isolation/loneliness [x] Positive social interaction   [  ] Provided support and/or comfort for family/friends    Spiritual:  [  ] Spiritual support    [  ] Expressed peace  [  ] Expressed sweetie    [  ] Discussed beliefs    Techniques Utilized (Check all that apply):   [  ] Procedural support MT [  ] Music for relaxation [x] Patient preferred music  [  ] Laura analysis  [  ] Song choice  [  ] Music for validation  [  ] Entrainment  [  ] Movement to music [  ] Guided visualization  [  ] Julian Franks  [  ] Patient instrument playing [  ] "[employer] writing  [  ] Jailene Oneal along   [  ] Christie Landis  [  ] Sensory stimulation  [x] Active Listening  [  ] Music for spiritual support [  ] Making of CDs as gifts    Session Observations:  F/up visit; Physical Therapist Miki Sicard was finishing her session with the patient (pt) and invited this music therapist (MT) to come in. Pt was sitting in a chair. She was open to having music therapy and she shared about her music preferences when MT asked her about these. Pt displayed a playful sense of humor throughout the session. MT sat across from the pt and pt chose to hear a soft, slow tempo song. MT sang and played The Way You Look Tonight with guitar. Pt increased emotional expression in response to the music as evidenced by (AEB) becoming tearful. When asked what brought her tears, pt said music has a way of opening her up, and then joked that she's an \"old, sentimental fool. \" She shared more about her music preferences, and in doing so, shared about two experiences of great enjoyment in seeing the musical Fiddler on the [place] performed live.  MT provided active listening, and then sang and played a song from this musical, [place], [place]. Pt was tearful during this song. She shared more about her medical history and feeling she missed warning signs when she was having a stroke. MT provided active listening. MT sang and played Cheek to [place]. Pt expressed enjoyment in the music and gratitude for the session.     Humberto Gallegos MT-BC (Music Therapist-Board Certified)  Spiritual Care Department  Referral-based service

## 2022-04-26 NOTE — PROGRESS NOTES
[facility]  Hospitalist Group                                                                                          Hospitalist Progress Note  Tom Al MD  Answering service: [phone] from in house phone        Date of Service:  2022  NAME:  Cristopher Seasr  :  1925  MRN:  [id number]      Admission Summary: \"The patient is a 79 yo hx of chronic resp failure on 4L O2, chronic sCHF EF 35-40%, hypotension, right renal mass, CKD 3, presented w/ suprapupic pain, UTI, septic shock, CHF. The patient was admitted 1 week ago for pancreatitis. She improved with IVF and pain meds. Today, the patient c/o suprapubic pain for 2 days, associated with fevers/chills and fatigue. She denied chest pain, cough, nausea, vomiting, diarrhea. In the ED, SBP ~80s, temp 101.7, WBC 6.7, lactate 2.2. U/A significant for a UTI. Abd CT showed improved peripancreatic stranding, but moderate pleural effusion\"    Interval history / Subjective:   2022. No acute events overnight. Saw patient this morning, awake alert and oriented, very pleasant and cooperative with physical examination, denies any fever, chills, nausea, vomiting. Believes that her sepsis was caused because she choked on one of her multivitamins. 2022. No acute events overnight. Saw patient this morning, awake alert and oriented in no apparent distress, receiving physical therapy, does not appear to be in any apparent distress, has been off of pressors for the last 4 hours, vitals appears to be within normal limits, has been followed by cardiologist, possible discharge tomorrow. 2022. No acute events overnight. Patient is still stating that she is not ready to be discharged to rehab as she is feels too weak to proceed with rehab activities. Assessment & Plan:     1) Septic shock/UTI: 3/4 SIRS with SBP <90. Will monitor on step down. Check blood Cx, urine Cx, lactate.   Given multiple allergies, will start IV Meropenem. Start Kyree gtt for hypotension. Be careful with IVF due to CHF. Discussed with ED physician about central line if unable to obtain good peripheral IVs     2) Acute on chronic systolic (congestive) heart failure/pleural effusions: last echo with EF 35-40%. Pro BNP >10,000 on admission. BP too low for diuretics. Will use IV bumex prn for when BP can tolerate. Consult Cards     3) EVERT/CKD 3: due to hypoperfusion. Will cont IVF, pressors gtt. Monitor closely     4) Recent pancreatitis: resolved on CT. Will monitor closely     5) Chronic resp failure: on chronic 4L O2 due to CHF. Cont O2, incentive spirometry     6) R renal mass: likely RCC. Patient has refused w/u in the past     Code status: DNR  DVT prophylaxis: Lovenox    Care Plan discussed with: Patient/Family  Anticipated Disposition: Home w/Family  Anticipated Discharge: Less than 24 hours     Hospital Problems  Date Reviewed: 4/22/2022          Codes Class Noted POA    Acute on chronic systolic (congestive) heart failure (HCC) ICD-10-CM: I50.23  ICD-9-CM: 428.23, 428.0  4/22/2022 Unknown        * (Principal) Septic shock (HCC) ICD-10-CM: A41.9, R65.21  ICD-9-CM: 038.9, 785.52, 995.92  4/22/2022 Yes        EVERT (acute kidney injury) ([facility] [address] ICD-10-CM: N17.9  ICD-9-CM: 584.9  4/22/2022 Yes        UTI (urinary tract infection) ICD-10-CM: N39.0  ICD-9-CM: 599.0  4/22/2022 Yes                Review of Systems:   A comprehensive review of systems was negative except for that written in the HPI. Vital Signs:    Last 24hrs VS reviewed since prior progress note.  Most recent are:  Visit Vitals  /63 (BP 1 Location: Right upper arm, BP Patient Position: At rest)   Pulse (!) 112   Temp 97.5 °F (36.4 °C)   Resp 20   Ht 5' (1.524 m)   Wt 58.1 kg (128 lb)   SpO2 99%   BMI 25.00 kg/m²         Intake/Output Summary (Last 24 hours) at 4/26/2022 1258  Last data filed at 4/26/2022 0658  Gross per 24 hour   Intake 290 ml   Output 1050 ml   Net -760 ml        Physical Examination:     I had a face to face encounter with this patient and independently examined them on 4/26/2022 as outlined below:          Constitutional:  No acute distress, cooperative, pleasant    ENT:  Oral mucosa moist, oropharynx benign. Resp:  CTA bilaterally. No wheezing/rhonchi/rales. No accessory muscle use   CV:  Regular rhythm, normal rate, no murmurs, gallops, rubs    GI:  Soft, non distended, non tender. normoactive bowel sounds, no hepatosplenomegaly     Musculoskeletal:  No edema, warm, 2+ pulses throughout    Neurologic:  Moves all extremities. AAOx3, CN II-XII reviewed            Data Review:    Review and/or order of clinical lab test      Labs:     Recent Labs     04/26/22  1015 04/25/22  0441   WBC 6.3 6.8   HGB 9.3* 7.7*   HCT 30.1* 24.9*    253     Recent Labs     04/26/22  1015 04/25/22  0441 04/24/22  0252    148*  --    K 3.8 3.4*  --    * 120*  --    CO2 24 23  --    BUN 15 16  --    CREA 0.92 0.97 1.00   GLU 98 81  --    CA 7.7* 7.1*  --    MG 1.3* 1.3*  --      Recent Labs     04/25/22  0441   ALT 74   *   TBILI 0.4   TP 3.7*   ALB 1.3*   GLOB 2.4     No results for input(s): INR, PTP, APTT, INREXT, INREXT in the last 72 hours. No results for input(s): FE, TIBC, PSAT, FERR in the last 72 hours. Lab Results   Component Value Date/Time    Folate 37.7 (H) 04/10/2022 03:46 AM      No results for input(s): PH, PCO2, PO2 in the last 72 hours. No results for input(s): CPK, CKNDX, TROIQ in the last 72 hours. No lab exists for component: CPKMB  Lab Results   Component Value Date/Time    Cholesterol, total <50 04/10/2022 03:46 AM    HDL Cholesterol 8 04/10/2022 03:46 AM    LDL, calculated  04/10/2022 03:46 AM     Unable to calculate LDL or VLDL due to low cholesterol.     Triglyceride 96 04/10/2022 03:46 AM    CHOL/HDL Ratio Cannot be calculated 04/10/2022 03:46 AM     Lab Results   Component Value Date/Time    Glucose (POC) 82 04/23/2022 04:00 PM    Glucose (POC) 207 (H) 04/11/2022 11:07 AM    Glucose (POC) 234 (H) 04/11/2022 07:59 AM    Glucose (POC) 59 (L) 04/11/2022 05:43 AM    Glucose (POC) 210 (H) 05/12/2009 04:35 PM     Lab Results   Component Value Date/Time    Color YELLOW/STRAW 04/22/2022 04:08 PM    Appearance TURBID (A) 04/22/2022 04:08 PM    Specific gravity 1.010 04/22/2022 04:08 PM    pH (UA) 6.5 04/22/2022 04:08 PM    Protein 30 (A) 04/22/2022 04:08 PM    Glucose Negative 04/22/2022 04:08 PM    Ketone Negative 04/22/2022 04:08 PM    Bilirubin Negative 04/22/2022 04:08 PM    Urobilinogen 2.0 (H) 04/22/2022 04:08 PM    Nitrites Negative 04/22/2022 04:08 PM    Leukocyte Esterase LARGE (A) 04/22/2022 04:08 PM    Epithelial cells MODERATE (A) 04/22/2022 04:08 PM    Bacteria 4+ (A) 04/22/2022 04:08 PM    WBC >100 (H) 04/22/2022 04:08 PM    RBC 5-10 04/22/2022 04:08 PM         Medications Reviewed:     Current Facility-Administered Medications   Medication Dose Route Frequency    bumetanide (BUMEX) tablet 2 mg  2 mg Oral BID    carvediloL (COREG) tablet 3.125 mg  3.125 mg Oral BID WITH MEALS    [Held by provider] lisinopriL (PRINIVIL, ZESTRIL) tablet 2.5 mg  2.5 mg Oral DAILY    magnesium sulfate 2 g/50 ml IVPB (premix or compounded)  2 g IntraVENous BID    potassium chloride SR (KLOR-CON 10) tablet 20 mEq  20 mEq Oral DAILY    meropenem (MERREM) 1 g in 0.9% sodium chloride (MBP/ADV) 50 mL MBP  1 g IntraVENous Q12H    amitriptyline (ELAVIL) tablet 25 mg  25 mg Oral QHS    aspirin delayed-release tablet 81 mg  81 mg Oral DAILY    benzonatate (TESSALON) capsule 100 mg  100 mg Oral TID PRN    gabapentin (NEURONTIN) capsule 200 mg  200 mg Oral BID    latanoprost (XALATAN) 0.005 % ophthalmic solution 1 Drop  1 Drop Left Eye QPM    levothyroxine (SYNTHROID) tablet 25 mcg  25 mcg Oral ACB    midodrine (PROAMATINE) tablet 10 mg  10 mg Oral TID WITH MEALS    pantoprazole (PROTONIX) tablet 40 mg  40 mg Oral ACB    pyridoxine (vitamin B6) (VITAMIN B-6) tablet 25 mg  25 mg Oral DAILY    metoprolol (LOPRESSOR) injection 1.25 mg  1.25 mg IntraVENous Q6H PRN    sodium chloride (NS) flush 5-40 mL  5-40 mL IntraVENous Q8H    sodium chloride (NS) flush 5-40 mL  5-40 mL IntraVENous PRN    0.9% sodium chloride infusion 25 mL  25 mL IntraVENous PRN    acetaminophen (TYLENOL) tablet 650 mg  650 mg Oral Q6H PRN    Or    acetaminophen (TYLENOL) suppository 650 mg  650 mg Rectal Q6H PRN    bisacodyL (DULCOLAX) suppository 10 mg  10 mg Rectal DAILY PRN    promethazine (PHENERGAN) tablet 12.5 mg  12.5 mg Oral Q6H PRN    Or    ondansetron (ZOFRAN) injection 4 mg  4 mg IntraVENous Q6H PRN    heparin (porcine) injection 5,000 Units  5,000 Units SubCUTAneous Q8H     ______________________________________________________________________  EXPECTED LENGTH OF STAY: - - -  ACTUAL LENGTH OF STAY:          4                 Frida العراقي MD

## 2022-04-26 NOTE — ROUTINE PROCESS
1900  Bedside shift change report given to Laura Layton 99 (oncoming nurse) by Jos Black RN (offgoing nurse). Report included the following information SBAR, Kardex, ED Summary, Intake/Output, and Recent Results. 0700  Bedside shift change report given to Dawson Lundberg (oncoming nurse) by Laura Layton 99 (offgoing nurse). Report included the following information SBAR, Kardex, ED Summary, Intake/Output, and Recent Results. This patient was assisted with Intentional Toileting every 2 hours during this shift as appropriate. Documentation of ambulation and output reflected on Flowsheet as appropriate. Purposeful hourly rounding was completed using AIDET and 5Ps. Outcomes of PHR documented as they occurred. Bed alarm in use as appropriate. Dual Suction and ambubag in place.

## 2022-04-26 NOTE — PROGRESS NOTES
0700: Bedside and Verbal shift change report given to Dawson Lundberg (oncoming nurse) by Julián Nance RN (offgoing nurse). Report included the following information SBAR, Kardex, Accordion and Cardiac Rhythm V Paced. This patient was assisted with Intentional Toileting every 2 hours during this shift as appropriate. Documentation of ambulation and output reflected on Flowsheet as appropriate. Purposeful hourly rounding was completed using AIDET and 5Ps. Outcomes of PHR documented as they occurred. Bed alarm in use as appropriate. Dual Suction and ambubag in place.

## 2022-04-26 NOTE — PROGRESS NOTES
Problem: Patient Education: Go to Patient Education Activity  Goal: Patient/Family Education  Outcome: Progressing Towards Goal     Problem: Sepsis: Day of Diagnosis  Goal: Off Pathway (Use only if patient is Off Pathway)  Outcome: Progressing Towards Goal  Goal: *Fluid resuscitation  Outcome: Progressing Towards Goal  Goal: *Paired blood cultures prior to first dose of antibiotic  Outcome: Progressing Towards Goal  Goal: *First dose of  appropriate antibiotic within 3 hours of arrival to ED, within 1 hour of arrival to ICU  Outcome: Progressing Towards Goal  Goal: *Lactic acid with first set of blood cultures  Outcome: Progressing Towards Goal  Goal: *Pneumococcal immunization (if eligible)  Outcome: Progressing Towards Goal  Goal: *Influenza immunization (if eligible)  Outcome: Progressing Towards Goal  Goal: Activity/Safety  Outcome: Progressing Towards Goal  Goal: Consults, if ordered  Outcome: Progressing Towards Goal  Goal: Diagnostic Test/Procedures  Outcome: Progressing Towards Goal  Goal: Nutrition/Diet  Outcome: Progressing Towards Goal  Goal: Discharge Planning  Outcome: Progressing Towards Goal  Goal: Medications  Outcome: Progressing Towards Goal  Goal: Respiratory  Outcome: Progressing Towards Goal     Problem: Sepsis: Day 3  Goal: Off Pathway (Use only if patient is Off Pathway)  Outcome: Progressing Towards Goal  Goal: *Oxygen saturation within defined limits  Outcome: Progressing Towards Goal  Goal: *Vital sign stability  Outcome: Progressing Towards Goal  Goal: *Tolerating diet  Outcome: Progressing Towards Goal  Goal: *Demonstrates progressive activity  Outcome: Progressing Towards Goal  Goal: Activity/Safety  Outcome: Progressing Towards Goal  Goal: Consults, if ordered  Outcome: Progressing Towards Goal  Goal: Diagnostic Test/Procedures  Outcome: Progressing Towards Goal  Goal: Nutrition/Diet  Outcome: Progressing Towards Goal  Goal: Discharge Planning  Outcome: Progressing Towards Goal  Goal: Medications  Outcome: Progressing Towards Goal  Goal: Respiratory  Outcome: Progressing Towards Goal  Goal: Treatments/Interventions/Procedures  Outcome: Progressing Towards Goal  Goal: Psychosocial  Outcome: Progressing Towards Goal     Problem: Sepsis: Discharge Outcomes  Goal: *Vital signs within defined limits  Outcome: Progressing Towards Goal  Goal: *Tolerating diet  Outcome: Progressing Towards Goal  Goal: *Verbalizes understanding and describes prescribed diet  Outcome: Progressing Towards Goal  Goal: *Demonstrates progressive activity  Outcome: Progressing Towards Goal  Goal: *Describes follow-up/return visits to physicians  Outcome: Progressing Towards Goal  Goal: *Verbalizes name, dosage, time, side effects, and number of days to continue medications  Outcome: Progressing Towards Goal  Goal: *Influenza immunization (Oct-Mar only)  Outcome: Progressing Towards Goal  Goal: *Pneumococcal immunization  Outcome: Progressing Towards Goal  Goal: *Lungs clear or at baseline  Outcome: Progressing Towards Goal  Goal: *Oxygen saturation returns to baseline or 90% or better on room air  Outcome: Progressing Towards Goal  Goal: *Glycemic control  Outcome: Progressing Towards Goal  Goal: *Absence of deep venous thrombosis signs and symptoms(Stroke Metric)  Outcome: Progressing Towards Goal  Goal: *Describes available resources and support systems  Outcome: Progressing Towards Goal  Goal: *Optimal pain control at patient's stated goal  Outcome: Progressing Towards Goal     Problem: Infection - Risk of, Urinary Catheter-Associated Urinary Tract Infection  Goal: *Absence of infection signs and symptoms  4/26/2022 0502 by Dayanara Nath RN  Outcome: Progressing Towards Goal  4/26/2022 0500 by Dayanara Nath RN  Outcome: Progressing Towards Goal  4/26/2022 0457 by Dayanara Nath RN  Outcome: Progressing Towards Goal     Problem: Patient Education: Go to Patient Education Activity  Goal: Patient/Family Education  4/26/2022 0502 by Dayanara Nath RN  Outcome: Progressing Towards Goal  4/26/2022 0500 by Dayanara Nath RN  Outcome: Progressing Towards Goal  4/26/2022 0457 by Dayanara Nath RN  Outcome: Progressing Towards Goal     Problem: Infection - Risk of, Central Venous Catheter-Associated Bloodstream Infection  Goal: *Absence of infection signs and symptoms  4/26/2022 0502 by Dayanara Nath RN  Outcome: Progressing Towards Goal  4/26/2022 0457 by Dayanara Nath RN  Outcome: Progressing Towards Goal     Problem: Patient Education: Go to Patient Education Activity  Goal: Patient/Family Education  4/26/2022 0502 by Dayanara Nath RN  Outcome: Progressing Towards Goal  4/26/2022 0500 by Dayanara Nath RN  Outcome: Progressing Towards Goal  4/26/2022 0457 by Dayanara Nath RN  Outcome: Progressing Towards Goal     Problem: Falls - Risk of  Goal: *Absence of Falls  Description: Document [place] Fall Risk and appropriate interventions in the flowsheet.   4/26/2022 0502 by Dayanara Nath RN  Outcome: Progressing Towards Goal  Note: Fall Risk Interventions:  Mobility Interventions: Bed/chair exit alarm    Mentation Interventions: Bed/chair exit alarm    Medication Interventions: Bed/chair exit alarm    Elimination Interventions: Bed/chair exit alarm,Call light in reach           4/26/2022 0500 by Dayanara Nath RN  Outcome: Progressing Towards Goal  Note: Fall Risk Interventions:  Mobility Interventions: Bed/chair exit alarm    Mentation Interventions: Bed/chair exit alarm    Medication Interventions: Bed/chair exit alarm    Elimination Interventions: Bed/chair exit alarm,Call light in reach           4/26/2022 0457 by Dayanara Nath RN  Outcome: Progressing Towards Goal  Note: Fall Risk Interventions:  Mobility Interventions: Bed/chair exit alarm    Mentation Interventions: Bed/chair exit alarm    Medication Interventions: Bed/chair exit alarm    Elimination Interventions: Bed/chair exit alarm,Call light in reach              Problem: Patient Education: Go to Patient Education Activity  Goal: Patient/Family Education  4/26/2022 0502 by Darling Rojo RN  Outcome: Progressing Towards Goal  4/26/2022 0500 by Darling Rojo RN  Outcome: Progressing Towards Goal  4/26/2022 0457 by Darling Rojo RN  Outcome: Progressing Towards Goal     Problem: Pressure Injury - Risk of  Goal: *Prevention of pressure injury  Description: Document [place] Scale and appropriate interventions in the flowsheet. 4/26/2022 0502 by Darling Rojo RN  Outcome: Progressing Towards Goal  Note: Pressure Injury Interventions:  Sensory Interventions: Turn and reposition approx. every two hours (pillows and wedges if needed)    Moisture Interventions: Check for incontinence Q2 hours and as needed    Activity Interventions: PT/OT evaluation    Mobility Interventions: Turn and reposition approx. every two hours(pillow and wedges)    Nutrition Interventions: Document food/fluid/supplement intake,Offer support with meals,snacks and hydration    Friction and Shear Interventions: Transferring/repositioning devices             4/26/2022 0500 by Darling Rojo RN  Outcome: Progressing Towards Goal  Note: Pressure Injury Interventions:  Sensory Interventions: Turn and reposition approx. every two hours (pillows and wedges if needed)    Moisture Interventions: Check for incontinence Q2 hours and as needed    Activity Interventions: PT/OT evaluation    Mobility Interventions: Turn and reposition approx. every two hours(pillow and wedges)    Nutrition Interventions: Document food/fluid/supplement intake,Offer support with meals,snacks and hydration    Friction and Shear Interventions: Transferring/repositioning devices             4/26/2022 0457 by Darling Rojo RN  Outcome: Progressing Towards Goal  Note: Pressure Injury Interventions:  Sensory Interventions: Turn and reposition approx.  every two hours (pillows and wedges if needed)    Moisture Interventions: Check for incontinence Q2 hours and as needed    Activity Interventions: PT/OT evaluation    Mobility Interventions: Turn and reposition approx. every two hours(pillow and wedges)    Nutrition Interventions: Document food/fluid/supplement intake,Offer support with meals,snacks and hydration    Friction and Shear Interventions: Transferring/repositioning devices                Problem: Patient Education: Go to Patient Education Activity  Goal: Patient/Family Education  4/26/2022 0502 by Josiah Betancourt RN  Outcome: Progressing Towards Goal  4/26/2022 0500 by Josiah Betancourt RN  Outcome: Progressing Towards Goal  4/26/2022 0457 by Josiah Betancourt RN  Outcome: Progressing Towards Goal     Problem: Patient Education: Go to Patient Education Activity  Goal: Patient/Family Education  4/26/2022 0502 by Josiah Betancourt RN  Outcome: Progressing Towards Goal  4/26/2022 0500 by Josiah Betancourt RN  Outcome: Progressing Towards Goal  4/26/2022 0457 by Josiah Betancourt RN  Outcome: Progressing Towards Goal     Problem: Patient Education: Go to Patient Education Activity  Goal: Patient/Family Education  4/26/2022 0502 by Josiah Betancourt RN  Outcome: Progressing Towards Goal  4/26/2022 0500 by Josiah Betancourt RN  Outcome: Progressing Towards Goal  4/26/2022 0457 by Josiah Betancourt RN  Outcome: Progressing Towards Goal     Problem: Falls - Risk of  Goal: *Absence of Falls  Description: Document [place] Fall Risk and appropriate interventions in the flowsheet.   4/26/2022 0502 by Josiah Betancourt RN  Outcome: Progressing Towards Goal  Note: Fall Risk Interventions:  Mobility Interventions: Bed/chair exit alarm    Mentation Interventions: Bed/chair exit alarm    Medication Interventions: Bed/chair exit alarm    Elimination Interventions: Bed/chair exit alarm,Call light in reach           4/26/2022 0500 by Josiah Betancourt RN  Outcome: Progressing Towards Goal  Note: Fall Risk Interventions:  Mobility Interventions: Bed/chair exit alarm    Mentation Interventions: Bed/chair exit alarm    Medication Interventions: Bed/chair exit alarm    Elimination Interventions: Bed/chair exit alarm,Call light in reach           4/26/2022 0457 by Wilfrido Zambrano RN  Outcome: Progressing Towards Goal  Note: Fall Risk Interventions:  Mobility Interventions: Bed/chair exit alarm    Mentation Interventions: Bed/chair exit alarm    Medication Interventions: Bed/chair exit alarm    Elimination Interventions: Bed/chair exit alarm,Call light in reach              Problem: Patient Education: Go to Patient Education Activity  Goal: Patient/Family Education  4/26/2022 0502 by Wilfrido Zambrano RN  Outcome: Progressing Towards Goal  4/26/2022 0457 by Wilfrido Zambrano RN  Outcome: Progressing Towards Goal

## 2022-04-26 NOTE — PROGRESS NOTES
Music Therapy Assessment  [address] [place] [address] [id number]     6/14/1925  80 y.o.  female    Patient Telephone Number: [phone] (home)   Buddhist Affiliation: Bahai   Language: English   Patient Active Problem List    Diagnosis Date Noted    Acute on chronic systolic (congestive) heart failure ([facility] 04/22/2022    Septic shock ([facility] 04/22/2022    [facility] 04/22/2022    UTI (urinary tract infection) 04/22/2022    Acute pancreatitis 04/09/2022    Back pain 04/17/2012    Stage III CKD - stable (5/2011) Secondary tHTN + Nephrosclerosis  - Dr. Sharron Macdonald (Nephrologist) 04/19/2011    Osteoporosis, post-menopausal - Bone Density 11/30/2010 12/10/2010    Stroke ([facility] 05/21/2010    High cholesterol 05/21/2010    DVT (deep venous thrombosis) ([facility] 05/21/2010    LBBB (left bundle branch block) 05/21/2010    Pancreatitis 05/21/2010        Date: 4/26/2022            Total Time (in minutes): 5          SFM 3 PROG CARE TELE 2    Mental Status:   [x] Alert [  ] Con Heather [  ]  Confused  [  ] Minimally responsive  [  ] Sleeping    Communication Status: [  ] Impaired Speech [  ] Nonverbal -N/A    Physical Status:   [x] Oxygen in use  [  ] Hard of Hearing [  ] Vision Impaired  [  ] Ambulatory  [  ] Ambulatory with assistance [  ] Non-ambulatory     Music Preferences, Background: N/A: Please see Session Observations below. Clinical Problem addressed: N/A: Please see Session Observations below.     Goal(s) met in session:  Physical/Pain management (Scale of 1-10):    Pre-session rating ___________    Post-session rating __________  [  ] Increased relaxation   [  ] Affected breathing patterns  [  ] Decreased muscle tension   [  ] Decreased agitation  [  ] Affected heart rate    [  ] Increased alertness     Emotional/Psychological:  [  ] Increased self-expression   [  ] Decreased aggressive behavior   [  ] Decreased feelings of stress  [  ] Discussed healthy coping skills [  ] Improved mood    [  ] Decreased withdrawn behavior     Social:  [  ] Decreased feelings of isolation/loneliness [x] Positive social interaction   [  ] Provided support and/or comfort for family/friends    Spiritual:  [  ] Spiritual support    [  ] Expressed peace  [  ] Expressed sweetie    [  ] Discussed beliefs    Techniques Utilized (Check all that apply):   [  ] Procedural support MT [  ] Music for relaxation [  ] Patient preferred music  [  ] Laura analysis  [  ] Song choice  [  ] Music for validation  [  ] Entrainment  [  ] Movement to music [  ] Guided visualization  [  ] Debria Moo  [  ] Patient instrument playing [  ] Cyvenio Biosystemsanh Long writing  [  ] Mihai Singleton along   [  ] Hellen Crape  [  ] Sensory stimulation  [x] Active Listening  [  ] Music for spiritual support [  ] Making of CDs as gifts    Session Observations:  Referral from Dr. Earl Block, Palliative. Patient (pt) was alert sitting up in bed. This music therapist (MT) introduced self and asked the pt how she was feeling. Pt reported still having some shortness of breath. She shared she had reservations about the news that she'd be discharged soon to rehab. MT provided active listening as pt spoke about this. Pt expressed being open to having music therapy, but requested it be later in the day because she'd been watching Mass on her television before the MT entered and she wanted to continue watching it. MT expressed understanding and will follow as able.     LAUREN ZhangBC (Music Therapist-Board Certified)  Spiritual Care Department  Referral-based service

## 2022-04-26 NOTE — ACP (ADVANCE CARE PLANNING)
Primary Decision Maker: Lala Amaya - Judd - [phone]  Advance Care Planning 4/26/2022   Patient's Healthcare Decision Maker is: Named in scanned ACP document   Confirm Advance Directive Yes, on file   Does the patient have other document types Do Not Resuscitate     Pt has AMD on file dated 4/11/2022 which appoints judd Neely as sole Medical POA. Pt has DDNR in place dated 4/14/2022.

## 2022-04-26 NOTE — ROUTINE PROCESS
Central line Type:  Central Line Insert Date:  Reason [address] Line Placed:  [facility] Date:  Biopatch in place? Yes No: yes  Tubing labeled and appropriate? Yes No: yes  Alcohol caps on all open ports?  Yes No: yes  Last CHG bath (time&date): 4/25/22 @ 1530  Reviewed with provider and central line must stay in for the following reasons: Limited Access     Critera met for insert Yes  Reason for insert: Urinary obstruction  Date of insert: 4/22/22  Order is current: Yes  MD or RN driven: Provider  Removal Discussed Yes  Last CHG Bath: 4/25/22   [place] Care Last Completed: Date/Time: 4/26/22 @ 3553  [place] Care After Each Bowel Movement: Yes  Education documented every 24 hours Yes  Care Plan (Risk for UTI, [place]) Updated Every 24 hours Yes  Bag below Bladder Yes        Bag off Floor Yes      Sheet Clip used Yes          Tubing free of dependant loops Yes          Seal Intact Yes            Bag less than 1/2 full Yes

## 2022-04-26 NOTE — PROGRESS NOTES
PICC team called to request help gaining peripheral access. Maria D Steele says she will pass this request along. MD sent perfect serve request to remove central line if peripheral access is gained. Primary RN advised of same. 1017: Spoke to Dr Lalo Coppola MD states pt refuses to have central line removed. MD says ok to remove central line if PIV access is attained.

## 2022-04-27 ENCOUNTER — APPOINTMENT (OUTPATIENT)
Dept: GENERAL RADIOLOGY | Age: 87
DRG: 871 | End: 2022-04-27
Attending: INTERNAL MEDICINE
Payer: MEDICARE

## 2022-04-27 LAB
ALBUMIN SERPL-MCNC: 1.5 G/DL (ref 3.5–5)
ALBUMIN/GLOB SERPL: 0.5 {RATIO} (ref 1.1–2.2)
ALP SERPL-CCNC: 579 U/L (ref 45–117)
ALT SERPL-CCNC: 47 U/L (ref 12–78)
ANION GAP SERPL CALC-SCNC: 7 MMOL/L (ref 5–15)
AST SERPL-CCNC: 48 U/L (ref 15–37)
BILIRUB SERPL-MCNC: 0.4 MG/DL (ref 0.2–1)
BUN SERPL-MCNC: 16 MG/DL (ref 6–20)
BUN/CREAT SERPL: 16 (ref 12–20)
CALCIUM SERPL-MCNC: 8.4 MG/DL (ref 8.5–10.1)
CHLORIDE SERPL-SCNC: 114 MMOL/L (ref 97–108)
CO2 SERPL-SCNC: 24 MMOL/L (ref 21–32)
CREAT SERPL-MCNC: 0.98 MG/DL (ref 0.55–1.02)
ERYTHROCYTE [DISTWIDTH] IN BLOOD BY AUTOMATED COUNT: 12.6 % (ref 11.5–14.5)
GLOBULIN SER CALC-MCNC: 2.8 G/DL (ref 2–4)
GLUCOSE SERPL-MCNC: 119 MG/DL (ref 65–100)
HCT VFR BLD AUTO: 28.2 % (ref 35–47)
HGB BLD-MCNC: 8.4 G/DL (ref 11.5–16)
MAGNESIUM SERPL-MCNC: 2.7 MG/DL (ref 1.6–2.4)
MCH RBC QN AUTO: 32.4 PG (ref 26–34)
MCHC RBC AUTO-ENTMCNC: 29.8 G/DL (ref 30–36.5)
MCV RBC AUTO: 108.9 FL (ref 80–99)
NRBC # BLD: 0 K/UL (ref 0–0.01)
NRBC BLD-RTO: 0 PER 100 WBC
PLATELET # BLD AUTO: 260 K/UL (ref 150–400)
PMV BLD AUTO: 10.4 FL (ref 8.9–12.9)
POTASSIUM SERPL-SCNC: 3.6 MMOL/L (ref 3.5–5.1)
PROT SERPL-MCNC: 4.3 G/DL (ref 6.4–8.2)
RBC # BLD AUTO: 2.59 M/UL (ref 3.8–5.2)
SODIUM SERPL-SCNC: 145 MMOL/L (ref 136–145)
WBC # BLD AUTO: 4.8 K/UL (ref 3.6–11)

## 2022-04-27 PROCEDURE — 83735 ASSAY OF MAGNESIUM: CPT

## 2022-04-27 PROCEDURE — 85027 COMPLETE CBC AUTOMATED: CPT

## 2022-04-27 PROCEDURE — 74011250637 HC RX REV CODE- 250/637: Performed by: NURSE PRACTITIONER

## 2022-04-27 PROCEDURE — 77010033678 HC OXYGEN DAILY

## 2022-04-27 PROCEDURE — 74011250637 HC RX REV CODE- 250/637: Performed by: INTERNAL MEDICINE

## 2022-04-27 PROCEDURE — 36415 COLL VENOUS BLD VENIPUNCTURE: CPT

## 2022-04-27 PROCEDURE — 74011000258 HC RX REV CODE- 258: Performed by: INTERNAL MEDICINE

## 2022-04-27 PROCEDURE — APPSS30 APP SPLIT SHARED TIME 16-30 MINUTES: Performed by: NURSE PRACTITIONER

## 2022-04-27 PROCEDURE — 71045 X-RAY EXAM CHEST 1 VIEW: CPT

## 2022-04-27 PROCEDURE — 99232 SBSQ HOSP IP/OBS MODERATE 35: CPT | Performed by: SPECIALIST

## 2022-04-27 PROCEDURE — 97530 THERAPEUTIC ACTIVITIES: CPT

## 2022-04-27 PROCEDURE — 97110 THERAPEUTIC EXERCISES: CPT

## 2022-04-27 PROCEDURE — 74011000250 HC RX REV CODE- 250: Performed by: INTERNAL MEDICINE

## 2022-04-27 PROCEDURE — 74011250636 HC RX REV CODE- 250/636: Performed by: INTERNAL MEDICINE

## 2022-04-27 PROCEDURE — 94761 N-INVAS EAR/PLS OXIMETRY MLT: CPT

## 2022-04-27 PROCEDURE — 65270000046 HC RM TELEMETRY

## 2022-04-27 PROCEDURE — 80053 COMPREHEN METABOLIC PANEL: CPT

## 2022-04-27 RX ORDER — SPIRONOLACTONE 25 MG/1
12.5 TABLET ORAL DAILY
Status: DISCONTINUED | OUTPATIENT
Start: 2022-04-27 | End: 2022-05-02 | Stop reason: HOSPADM

## 2022-04-27 RX ORDER — BUMETANIDE 1 MG/1
1 TABLET ORAL 2 TIMES DAILY
Status: DISCONTINUED | OUTPATIENT
Start: 2022-04-27 | End: 2022-04-28

## 2022-04-27 RX ADMIN — AMITRIPTYLINE HYDROCHLORIDE 25 MG: 50 TABLET, FILM COATED ORAL at 21:18

## 2022-04-27 RX ADMIN — PYRIDOXINE HCL TAB 50 MG 25 MG: 50 TAB at 08:07

## 2022-04-27 RX ADMIN — LATANOPROST 1 DROP: 50 SOLUTION OPHTHALMIC at 18:58

## 2022-04-27 RX ADMIN — MIDODRINE HYDROCHLORIDE 10 MG: 5 TABLET ORAL at 17:33

## 2022-04-27 RX ADMIN — LEVOTHYROXINE SODIUM 25 MCG: 0.03 TABLET ORAL at 06:28

## 2022-04-27 RX ADMIN — PANTOPRAZOLE SODIUM 40 MG: 40 TABLET, DELAYED RELEASE ORAL at 06:28

## 2022-04-27 RX ADMIN — MEROPENEM 1 G: 1 INJECTION, POWDER, FOR SOLUTION INTRAVENOUS at 17:34

## 2022-04-27 RX ADMIN — GABAPENTIN 200 MG: 100 CAPSULE ORAL at 08:06

## 2022-04-27 RX ADMIN — HEPARIN SODIUM 5000 UNITS: 5000 INJECTION INTRAVENOUS; SUBCUTANEOUS at 15:54

## 2022-04-27 RX ADMIN — HEPARIN SODIUM 5000 UNITS: 5000 INJECTION INTRAVENOUS; SUBCUTANEOUS at 21:18

## 2022-04-27 RX ADMIN — GABAPENTIN 200 MG: 100 CAPSULE ORAL at 17:33

## 2022-04-27 RX ADMIN — HEPARIN SODIUM 5000 UNITS: 5000 INJECTION INTRAVENOUS; SUBCUTANEOUS at 05:07

## 2022-04-27 RX ADMIN — MIDODRINE HYDROCHLORIDE 10 MG: 5 TABLET ORAL at 08:07

## 2022-04-27 RX ADMIN — BUMETANIDE 2 MG: 1 TABLET ORAL at 08:06

## 2022-04-27 RX ADMIN — MEROPENEM 1 G: 1 INJECTION, POWDER, FOR SOLUTION INTRAVENOUS at 05:07

## 2022-04-27 RX ADMIN — CARVEDILOL 3.12 MG: 3.12 TABLET, FILM COATED ORAL at 17:33

## 2022-04-27 RX ADMIN — MIDODRINE HYDROCHLORIDE 10 MG: 5 TABLET ORAL at 11:10

## 2022-04-27 RX ADMIN — SODIUM CHLORIDE, PRESERVATIVE FREE 10 ML: 5 INJECTION INTRAVENOUS at 21:22

## 2022-04-27 RX ADMIN — SODIUM CHLORIDE, PRESERVATIVE FREE 10 ML: 5 INJECTION INTRAVENOUS at 05:07

## 2022-04-27 RX ADMIN — BUMETANIDE 1 MG: 1 TABLET ORAL at 17:33

## 2022-04-27 RX ADMIN — POTASSIUM CHLORIDE 20 MEQ: 750 TABLET, EXTENDED RELEASE ORAL at 08:07

## 2022-04-27 RX ADMIN — CARVEDILOL 3.12 MG: 3.12 TABLET, FILM COATED ORAL at 08:07

## 2022-04-27 RX ADMIN — SODIUM CHLORIDE, PRESERVATIVE FREE 10 ML: 5 INJECTION INTRAVENOUS at 15:55

## 2022-04-27 RX ADMIN — SPIRONOLACTONE 12.5 MG: 25 TABLET ORAL at 11:55

## 2022-04-27 RX ADMIN — ASPIRIN 81 MG: 81 TABLET, COATED ORAL at 08:06

## 2022-04-27 NOTE — PROGRESS NOTES
[facility] Adult  Hospitalist Group                                                                                          Hospitalist Progress Note  Eun Davies MD  Answering service: [phone] from in house phone        Date of Service:  2022  NAME:  Ayleen Hart  :  1925  MRN:  [id number]      Admission Summary: \"The patient is a 79 yo hx of chronic resp failure on 4L O2, chronic sCHF EF 35-40%, hypotension, right renal mass, CKD 3, presented w/ suprapupic pain, UTI, septic shock, CHF. The patient was admitted 1 week ago for pancreatitis. She improved with IVF and pain meds. Today, the patient c/o suprapubic pain for 2 days, associated with fevers/chills and fatigue. She denied chest pain, cough, nausea, vomiting, diarrhea. In the ED, SBP ~80s, temp 101.7, WBC 6.7, lactate 2.2. U/A significant for a UTI. Abd CT showed improved peripancreatic stranding, but moderate pleural effusion\"    Interval history / Subjective:       2022. No acute events overnight. Saw patient this morning, awake alert and oriented, very pleasant and cooperative with physical examination, denies any fever, chills, nausea, vomiting. Believes that her sepsis was caused because she choked on one of her multivitamins. 2022. No acute events overnight. Saw patient this morning, awake alert and oriented in no apparent distress, receiving physical therapy, does not appear to be in any apparent distress, has been off of pressors for the last 4 hours, vitals appears to be within normal limits, has been followed by cardiologist, possible discharge tomorrow. 2022. No acute events overnight.   Patient this morning, awake alert and oriented, no apparent distress, stating that she is still not ready to be discharged to rehab, she feels weak and she will refill to participate in her rehab, still complaining of significant lower extremity dyspnea on exertion, her medication has been reconsulted and patient hopefully can be discharged home tomorrow, patient has a central line going which she will Remove however she adamantly refuses to remove it because stating that it took a hard time to get her central line. 4/27/2020. No acute events overnight. Saw patient this morning, awake alert and oriented, stating that she still does not feel ready to be going to back to rehab as she may not be able to participate in any rehab activity as she feels poor. Her blood pressure has been on the soft side and her BP medication has been reconciled. Cardiology on board. Recommendations noted. Assessment & Plan:     1) Septic shock/UTI: 3/4 SIRS with SBP <90. Will monitor on step down. Check blood Cx, urine Cx, lactate. Given multiple allergies, will start IV Meropenem. Start Kyree gtt for hypotension. Be careful with IVF due to CHF. Discussed with ED physician about central line if unable to obtain good peripheral IVs     2) Acute on chronic systolic (congestive) heart failure/pleural effusions: last echo with EF 35-40%. Pro BNP >10,000 on admission. BP too low for diuretics. Will use IV bumex prn for when BP can tolerate. Consult Cards     3) EVERT/CKD 3: due to hypoperfusion. Will cont IVF, pressors gtt. Monitor closely     4) Recent pancreatitis: resolved on CT. Will monitor closely     5) Chronic resp failure: on chronic 4L O2 due to CHF. Cont O2, incentive spirometry     6) R renal mass: likely RCC.  Patient has refused w/u in the past     Code status: DNR  DVT prophylaxis: Lovenox    Care Plan discussed with: Patient/Family  Anticipated Disposition: Home w/Family  Anticipated Discharge: Less than 24 hours     Hospital Problems  Date Reviewed: 4/22/2022          Codes Class Noted POA    Acute on chronic systolic (congestive) heart failure (HCC) ICD-10-CM: U12.18  ICD-9-CM: 428.23, 428.0  4/22/2022 Unknown        * (Principal) Septic shock (HCC) ICD-10-CM: A41.9, R65.21  ICD-9-CM: 038.9, 785.52, 995.92  4/22/2022 Yes        EVERT (acute kidney injury) [facility]) ICD-10-CM: N17.9  ICD-9-CM: 584.9  4/22/2022 Yes        UTI (urinary tract infection) ICD-10-CM: N39.0  ICD-9-CM: 599.0  4/22/2022 Yes                Review of Systems:   A comprehensive review of systems was negative except for that written in the HPI. Vital Signs:    Last 24hrs VS reviewed since prior progress note. Most recent are:  Visit Vitals  BP (!) 125/52   Pulse 80   Temp 97.9 °F (36.6 °C)   Resp 17   Ht 5' (1.524 m)   Wt 58.7 kg (129 lb 4.8 oz)   SpO2 95%   BMI 25.25 kg/m²         Intake/Output Summary (Last 24 hours) at 4/27/2022 1230  Last data filed at 4/27/2022 0940  Gross per 24 hour   Intake 945 ml   Output 2300 ml   Net -1355 ml        Physical Examination:     I had a face to face encounter with this patient and independently examined them on 4/27/2022 as outlined below:          Constitutional:  No acute distress, cooperative, pleasant    ENT:  Oral mucosa moist, oropharynx benign. Resp:  CTA bilaterally. No wheezing/rhonchi/rales. No accessory muscle use   CV:  Regular rhythm, normal rate, no murmurs, gallops, rubs    GI:  Soft, non distended, non tender. normoactive bowel sounds, no hepatosplenomegaly     Musculoskeletal:  No edema, warm, 2+ pulses throughout    Neurologic:  Moves all extremities.   AAOx3, CN II-XII reviewed            Data Review:    Review and/or order of clinical lab test      Labs:     Recent Labs     04/27/22 0221 04/26/22  1015   WBC 4.8 6.3   HGB 8.4* 9.3*   HCT 28.2* 30.1*    296     Recent Labs     04/27/22  0221 04/26/22  1015 04/25/22  0441    144 148*   K 3.6 3.8 3.4*   * 113* 120*   CO2 24 24 23   BUN 16 15 16   CREA 0.98 0.92 0.97   * 98 81   CA 8.4* 7.7* 7.1*   MG 2.7* 1.3* 1.3*     Recent Labs     04/27/22  0221 04/25/22  0441   ALT 47 74   * 418*   TBILI 0.4 0.4   TP 4.3* 3.7*   ALB 1.5* 1.3*   GLOB 2.8 2.4     No results for input(s): INR, PTP, APTT, INREXT, INREXT in the last 72 hours. No results for input(s): FE, TIBC, PSAT, FERR in the last 72 hours. Lab Results   Component Value Date/Time    Folate 37.7 (H) 04/10/2022 03:46 AM      No results for input(s): PH, PCO2, PO2 in the last 72 hours. No results for input(s): CPK, CKNDX, TROIQ in the last 72 hours. No lab exists for component: CPKMB  Lab Results   Component Value Date/Time    Cholesterol, total <50 04/10/2022 03:46 AM    HDL Cholesterol 8 04/10/2022 03:46 AM    LDL, calculated  04/10/2022 03:46 AM     Unable to calculate LDL or VLDL due to low cholesterol.     Triglyceride 96 04/10/2022 03:46 AM    CHOL/HDL Ratio Cannot be calculated 04/10/2022 03:46 AM     Lab Results   Component Value Date/Time    Glucose (POC) 82 04/23/2022 04:00 PM    Glucose (POC) 207 (H) 04/11/2022 11:07 AM    Glucose (POC) 234 (H) 04/11/2022 07:59 AM    Glucose (POC) 59 (L) 04/11/2022 05:43 AM    Glucose (POC) 210 (H) 05/12/2009 04:35 PM     Lab Results   Component Value Date/Time    Color YELLOW/STRAW 04/22/2022 04:08 PM    Appearance TURBID (A) 04/22/2022 04:08 PM    Specific gravity 1.010 04/22/2022 04:08 PM    pH (UA) 6.5 04/22/2022 04:08 PM    Protein 30 (A) 04/22/2022 04:08 PM    Glucose Negative 04/22/2022 04:08 PM    Ketone Negative 04/22/2022 04:08 PM    Bilirubin Negative 04/22/2022 04:08 PM    Urobilinogen 2.0 (H) 04/22/2022 04:08 PM    Nitrites Negative 04/22/2022 04:08 PM    Leukocyte Esterase LARGE (A) 04/22/2022 04:08 PM    Epithelial cells MODERATE (A) 04/22/2022 04:08 PM    Bacteria 4+ (A) 04/22/2022 04:08 PM    WBC >100 (H) 04/22/2022 04:08 PM    RBC 5-10 04/22/2022 04:08 PM         Medications Reviewed:     Current Facility-Administered Medications   Medication Dose Route Frequency    bumetanide (BUMEX) tablet 1 mg  1 mg Oral BID    spironolactone (ALDACTONE) tablet 12.5 mg  12.5 mg Oral DAILY    carvediloL (COREG) tablet 3.125 mg  3.125 mg Oral BID WITH MEALS    [Held by provider] lisinopriL (PRINIVIL, ZESTRIL) tablet 2.5 mg  2.5 mg Oral DAILY    potassium chloride SR (KLOR-CON 10) tablet 20 mEq  20 mEq Oral DAILY    meropenem (MERREM) 1 g in 0.9% sodium chloride (MBP/ADV) 50 mL MBP  1 g IntraVENous Q12H    amitriptyline (ELAVIL) tablet 25 mg  25 mg Oral QHS    aspirin delayed-release tablet 81 mg  81 mg Oral DAILY    benzonatate (TESSALON) capsule 100 mg  100 mg Oral TID PRN    gabapentin (NEURONTIN) capsule 200 mg  200 mg Oral BID    latanoprost (XALATAN) 0.005 % ophthalmic solution 1 Drop  1 Drop Left Eye QPM    levothyroxine (SYNTHROID) tablet 25 mcg  25 mcg Oral ACB    midodrine (PROAMATINE) tablet 10 mg  10 mg Oral TID WITH MEALS    pantoprazole (PROTONIX) tablet 40 mg  40 mg Oral ACB    pyridoxine (vitamin B6) (VITAMIN B-6) tablet 25 mg  25 mg Oral DAILY    metoprolol (LOPRESSOR) injection 1.25 mg  1.25 mg IntraVENous Q6H PRN    sodium chloride (NS) flush 5-40 mL  5-40 mL IntraVENous Q8H    sodium chloride (NS) flush 5-40 mL  5-40 mL IntraVENous PRN    0.9% sodium chloride infusion 25 mL  25 mL IntraVENous PRN    acetaminophen (TYLENOL) tablet 650 mg  650 mg Oral Q6H PRN    Or    acetaminophen (TYLENOL) suppository 650 mg  650 mg Rectal Q6H PRN    bisacodyL (DULCOLAX) suppository 10 mg  10 mg Rectal DAILY PRN    promethazine (PHENERGAN) tablet 12.5 mg  12.5 mg Oral Q6H PRN    Or    ondansetron (ZOFRAN) injection 4 mg  4 mg IntraVENous Q6H PRN    heparin (porcine) injection 5,000 Units  5,000 Units SubCUTAneous Q8H     ______________________________________________________________________  EXPECTED LENGTH OF STAY: 4d 19h  ACTUAL LENGTH OF STAY:          5                 Roque Guzman MD

## 2022-04-27 NOTE — PROGRESS NOTES
Provided pastoral care visit to [facility] 5 patient. Did not include sacramental care.     Joel Oden

## 2022-04-27 NOTE — PROGRESS NOTES
Bedside shift change report given to Raul CARRILLO  (oncoming nurse) by Manuel Mclean  (offgoing nurse). Report included the following information SBAR, Kardex, ED Summary, Accordion, Recent Results and Med Rec Status.

## 2022-04-27 NOTE — PROGRESS NOTES
Problem: Mobility Impaired (Adult and Pediatric)  Goal: *Acute Goals and Plan of Care (Insert Text)  Description: FUNCTIONAL STATUS PRIOR TO ADMISSION: Patient required minimal assistance for basic and instrumental ADLs. HOME SUPPORT PRIOR TO ADMISSION: The patient lived at SNF . Physical Therapy Goals  Initiated 4/24/2022  1. Patient will move from supine to sit and sit to supine , scoot up and down, and roll side to side in bed with minimal assistance/contact guard assist within 7 day(s). 2.  Patient will transfer from bed to chair and chair to bed with minimal assistance/contact guard assist using the least restrictive device within 7 day(s). 3.  Patient will perform sit to stand with minimal assistance/contact guard assist within 7 day(s). 4.  Patient will ambulate with minimal assistance/contact guard assist for 200 feet with the least restrictive device within 7 day(s). Outcome: Progressing Towards Goal  Note:   PHYSICAL THERAPY TREATMENT  Patient: Callie Lizarraga (14 y.o. female)  Date: 4/27/2022  Diagnosis: Acute on chronic systolic (congestive) heart failure (HCC) [I50.23] Septic shock (HCC)       Precautions:    Chart, physical therapy assessment, plan of care and goals were reviewed. ASSESSMENT  Patient continues with skilled PT services and is progressing towards goals. Patient today able to transfer to sit at edge of bed and then perform static standing and then short distance side-steps toward head of bed before returning to supine. Patient still requires max encouragement throughout for participation . Patient does experience a drop MAP (see below) with standing activities.        Current Level of Function Impacting Discharge (mobility/balance): MOD A x2 for bed mobility and sit-stand, increased verbal cuing throughout for encouragement    Other factors to consider for discharge:          PLAN :  Patient continues to benefit from skilled intervention to address the above impairments. Continue treatment per established plan of care. to address goals. Recommendation for discharge: (in order for the patient to meet his/her long term goals)  Therapy up to 5 days/week in SNF setting    This discharge recommendation:  Has been made in collaboration with the attending provider and/or case management    IF patient discharges home will need the following DME: to be determined (TBD)       SUBJECTIVE:   Patient stated oh I can't do that.     OBJECTIVE DATA SUMMARY:   Critical Behavior:  Neurologic State: Alert  Orientation Level: Oriented X4  Cognition: Follows commands  Safety/Judgement: Awareness of environment  Vitals    Blood pressure Heart rate(bpm) Oxygen saturation  Pre-activity 127/84   102  During activity 115/50   107   99% on 2L  Post activity 110/74   105   96% on 2L    Functional Mobility Training:  Bed Mobility:  Rolling: Assist x2; Moderate assistance  Supine to Sit: Assist x2; Additional time; Moderate assistance     Scooting: Moderate assistance;Assist x2        Transfers:  Sit to Stand: Minimum assistance;Assist x2  Stand to Sit: Minimum assistance;Assist x2        Bed to Chair: Minimum assistance;Assist x2                    Balance:  Sitting: Intact  Standing: Impaired; With support  Ambulation/Gait Training:  Distance (ft): 2 Feet (ft)     Ambulation - Level of Assistance: Minimal assistance;Assist x2                          Pain Rating:  None reported    Activity Tolerance:   Fair    After treatment patient left in no apparent distress:   Supine in bed, Call bell within reach, and Bed / chair alarm activated    COMMUNICATION/COLLABORATION:   The patients plan of care was discussed with: Registered nurseJennifer Bobo PT, DPT   Time Calculation: 25 mins

## 2022-04-27 NOTE — PROGRESS NOTES
Problem: Self Care Deficits Care Plan (Adult)  Goal: *Acute Goals and Plan of Care (Insert Text)  Description: FUNCTIONAL STATUS PRIOR TO ADMISSION: Per chart at last admission to [facility] 2 weeks ago (eval on 4/11/2022): \"Patient was modified independent using a walker for functional mobility and short distance ambulation. She uses a wheelchair for longer distances and community ambulation. Patient needs occasional assistance for LB ADLs but otherwise independent with self care. \"  Pt reports using 4L O2 at baseline. After that admission, pt went to [facility] for rehab. HOME SUPPORT: The patient lived with  and son. This admission is received from SNF. Occupational Therapy Goals  Initiated 4/24/2022  1. Patient will perform grooming, standing at sink, with modified independence within 7 day(s). 2.  Patient will perform lower body dressing with minimal assistance/contact guard assist within 7 day(s). 3.  Patient will perform bathing with minimal assistance/contact guard assist within 7 day(s). 4.  Patient will perform toilet transfers with supervision/set-up within 7 day(s). 5.  Patient will perform all aspects of toileting with supervision/set-up within 7 day(s). 6.  Patient will participate in upper extremity therapeutic exercise/activities with supervision/set-up for 5 minutes within 7 day(s). 7.  Patient will utilize energy conservation techniques during functional activities with verbal cues within 7 day(s). Outcome: Progressing Towards Goal     OCCUPATIONAL THERAPY TREATMENT  Patient: Bartolome Hartman (35 y.o. female)  Date: 4/27/2022  Diagnosis: Acute on chronic systolic (congestive) heart failure ([facility] [address] [I50.23] Septic shock ([facility] [address]       Precautions:    Chart, occupational therapy assessment, plan of care, and goals were reviewed. ASSESSMENT  Patient continues with skilled OT services and is slowly progressing towards goals.   Patient continues to present with increased anxiety and fear of falling, impaired standing balance, decreased strength and activity tolerance, and BP drop with activity (see below). Pt continues to benefit from increased time between tasks and encouragement to continue. She demonstrates improvement with standing at EOB with A x 2 with RW and is able to take small side steps in prep for ADL transfers. Pt educated on simple UE exercises this session and is returned to supine in prep for room transfer. BP:   Supine: 127/84  Standin/50  Post activity: 110/74     Current Level of Function Impacting Discharge (ADLs): A x 2 for mobility; set up to max A for ADLs    Other factors to consider for discharge: high fall risk; fear of falling         PLAN :  Patient continues to benefit from skilled intervention to address the above impairments. Continue treatment per established plan of care to address goals. Recommend with staff: BSC for toileting with A x 2 (or Elias Lowe); encourage participation in all ADLs    Recommend next OT session: BSC transfer; UB ADLs; standing tolerance    Recommendation for discharge: (in order for the patient to meet his/her long term goals)  Therapy up to 5 days/week in SNF setting    This discharge recommendation:  Has been made in collaboration with the attending provider and/or case management    IF patient discharges home will need the following DME: TBD       SUBJECTIVE:   Patient stated It's hard to step with my R leg.     OBJECTIVE DATA SUMMARY:   Cognitive/Behavioral Status:  Neurologic State: Alert; Appropriate for age  Orientation Level: Oriented X4  Cognition: Follows commands  Perception: Cues to maintain midline in standing  Perseveration: No perseveration noted  Safety/Judgement: Awareness of environment    Functional Mobility and Transfers for ADLs:  Bed Mobility:  Rolling: Assist x2; Moderate assistance  Supine to Sit: Assist x2; Additional time; Moderate assistance  Scooting:  Moderate assistance;Assist x2    Transfers:  Sit to Stand: Minimum assistance;Assist x2  Bed to Chair: Minimum assistance;Assist x2    Balance:  Sitting: Intact  Standing: Impaired; With support  Standing - Static: Constant support; Fair  Standing - Dynamic : Constant support;Fair;Poor    ADL Intervention:    Cognitive Retraining  Safety/Judgement: Awareness of environment    Therapeutic Exercises:   Pt instructed on and performed UE exercises in semi-supine at end of session for UE strengthening in prep for ADLs and ADL transfers. Pt requires increased cueing and visual/verbal prompts for form and pacing for duration of exercises. Instructed pt to perform exercises 2-3x/day with at least 5-10 reps of each. Pain:  Pt reporting minimal pain    Activity Tolerance:   Fair and requires rest breaks    After treatment patient left in no apparent distress:   Supine in bed, Heels elevated for pressure relief, Call bell within reach, Bed / chair alarm activated and Side rails x 3    COMMUNICATION/COLLABORATION:   The patients plan of care was discussed with: Physical therapist and Registered nurse.      Clotilde Goodwin OT  Time Calculation: 25 mins

## 2022-04-27 NOTE — PROGRESS NOTES
0700 Bedside shift change report given to [id number]. BEBETO Rosen (oncoming nurse) by Herve Love (offgoing nurse). Report included the following information SBAR, Kardex, ED Summary, Intake/Output, MAR and Recent Results. This patient was assisted with Intentional Toileting every 2 hours during this shift as appropriate. Documentation of ambulation and output reflected on Flowsheet as appropriate. Purposeful hourly rounding was completed using AIDET and 5Ps. Outcomes of PHR documented as they occurred. Bed alarm in use as appropriate. Dual Suction and ambubag in place. Required Witt Documentation:   Bedside Shift Report of Witt Catheter to include  and the following was verified:  Critera met for insert Yes  Reason for insert: Urinary obstruction  Date of insert: 4/22/22  Order is current: Yes  MD or RN driven: Provider  Removal Discussed No   Last CHG Bath: 4/27/22   Witt Care Last Completed: Date/Time: 4/27/22  Witt Care After Each Bowel Movement: Yes  Education documented every 24 hours Yes  Care Plan (Risk for UTI, Witt) Updated Every 24 hours Yes  Bag below Bladder Yes        Bag off Floor Yes      Sheet Clip used Yes          Tubing free of dependant loops Yes          Seal Intact Yes            Bag less than 1/2 full Yes            Required Central Line Documentation:   Central line Type:Triple lumen R neck  Central Line Insert Date: 4/22/22  Reason Central Line Placed: Limited access  [employer] Dressing Date: 4/24/22  Biopatch in place? Yes No: yes  Tubing labeled and appropriate? Yes No: yes  Alcohol caps on all open ports?  Yes No: yes  Last CHG bath (time&date): 4/27/22  Reviewed with provider and central line must stay in for the following reasons:       TRANSFER - OUT REPORT:    Verbal report given to John Acosta RN(name) on Ermelinda Esqueda  being transferred to [facility] floor(unit) for routine progression of care       Report consisted of patients Situation, Background, Assessment and Recommendations(SBAR). Information from the following report(s) SBAR, Kardex, ED Summary, Intake/Output, MAR and Recent Results was reviewed with the receiving nurse. Lines:   Triple Lumen 04/22/22 Anterior;Right Neck (Active)   Central Line Being Utilized Yes 04/27/22 0804   Criteria for Appropriate Use Limited/no vessel suitable for conventional peripheral access 04/27/22 0804   Site Assessment Clean, dry, & intact 04/27/22 0804   Infiltration Assessment 0 04/27/22 0804   Affected Extremity/Extremities Color distal to insertion site pink (or appropriate for race) 04/27/22 0804   Date of Last Dressing Change 04/24/22 04/27/22 0804   Dressing Status Clean, dry, & intact 04/27/22 0804   Dressing Type Disk with Chlorhexadine gluconate (CHG) 04/27/22 0804   Action Taken Open ports on tubing capped 04/27/22 0804   Proximal Hub Color/Line Status Blue;Flushed;Capped 04/27/22 0804   Positive Blood Return (Medial Site) Yes 04/27/22 0804   Medial Hub Color/Line Status Brown;Flushed;Capped 04/27/22 0804   Positive Blood Return (Lateral Site) Yes 04/27/22 0804   Distal Hub Color/Line Status White;Flushed;Capped 04/27/22 0804   Positive Blood Return (Site #3) Yes 04/27/22 0804   Alcohol Cap Used Yes 04/27/22 0804        Opportunity for questions and clarification was provided.       Patient transported with:   Facio

## 2022-04-27 NOTE — PROGRESS NOTES
4/27/2022  Case Management Progress Note    2:29 PM  Patient is 80year old female admitted 4/22 for septic shock  Patient's RUR is 17% yellow/moderate risk for readmission  Covid test: none this admission  Chart reviewed--patient just arrived from [facility]  Per previous CM notes, patient will be returning to [facility] at discharge to complete rehab. Referral is already active in [facility]. Blood pressures still a little soft today. Likely discharge tomorrow. Will continue to follow and assist with discharge planning as needed. Transition of Care Plan   1. Continue medical management/treatment  2. Return to [facility] and Rehab at discharge  3. Stretcher transport likely  4.  CM will continue to follow    KAT May

## 2022-04-27 NOTE — PROGRESS NOTES
Bedside shift change report given to Halie (oncoming nurse) by Matt Call (offgoing nurse). Report included the following information SBAR, Kardex, Intake/Output, MAR, Recent Results and Cardiac Rhythm V Paced. This patient was assisted with Intentional Toileting every 2 hours during this shift as appropriate. Documentation of ambulation and output reflected on Flowsheet as appropriate. Purposeful hourly rounding was completed using AIDET and 5Ps. Outcomes of PHR documented as they occurred. Bed alarm in use as appropriate. Dual Suction and ambubag in place. Problem: Sepsis: Day 4  Goal: Activity/Safety  Outcome: Progressing Towards Goal  Goal: Nutrition/Diet  Outcome: Progressing Towards Goal  Goal: Discharge Planning  Outcome: Progressing Towards Goal  Goal: Medications  Outcome: Progressing Towards Goal  Goal: Respiratory  Outcome: Progressing Towards Goal  Goal: *Oxygen saturation within defined limits  Outcome: Progressing Towards Goal  Goal: *Hemodynamically stable  Outcome: Progressing Towards Goal  Goal: *Vital signs within defined limits  Outcome: Progressing Towards Goal     Problem: Pressure Injury - Risk of  Goal: *Prevention of pressure injury  Description: Document Destin Scale and appropriate interventions in the flowsheet. Outcome: Progressing Towards Goal  Note: Pressure Injury Interventions:  Sensory Interventions: Assess changes in LOC,Check visual cues for pain,Float heels,Keep linens dry and wrinkle-free,Maintain/enhance activity level,Minimize linen layers,Monitor skin under medical devices,Turn and reposition approx.  every two hours (pillows and wedges if needed)    Moisture Interventions: Apply protective barrier, creams and emollients,Check for incontinence Q2 hours and as needed,Internal/External urinary devices,Minimize layers    Activity Interventions: Increase time out of bed,Pressure redistribution bed/mattress(bed type),PT/OT evaluation    Mobility Interventions: Float heels,HOB 30 degrees or less,Pressure redistribution bed/mattress (bed type),PT/OT evaluation,Turn and reposition approx.  every two hours(pillow and wedges)    Nutrition Interventions: Document food/fluid/supplement intake    Friction and Shear Interventions: Apply protective barrier, creams and emollients,Feet elevated on foot rest,HOB 30 degrees or less,Lift sheet,Lift team/patient mobility team,Minimize layers

## 2022-04-27 NOTE — PROGRESS NOTES
CARDIOLOGY PROGRESS NOTE    Jaime Currie MD,  [address], [place], [address] [facility]  Phone [phone]; Fax [phone]  Mobile [phone]   Voice Mail [phone]        2022 10:08 PM       Admit Date:           2022  Admit Diagnosis:  Acute on chronic systolic (congestive) heart failure ([facility] [address] [I50.23]  :          1925   MRN:          [id number]      Primary care: Torsten Arenas MD       ATTENTION:   This medical record was transcribed using an electronic medical records/speech recognition system. Although proofread, it may and can contain electronic, spelling and other errors. Corrections may be executed at a later time. Please feel free to contact us for any clarifications as needed.       Impression Plan/Recommendation   1. Acute on chronic HFrEF-CT pleural effusions  2. Septic shock/UTI  3. EVERT  4. Patient is a DNR  5. Shortness of breath  6. Anemia hemoglobin in the 7 range  7. Hypernatremia  8. Elevated Alk Phos                1.  Followed by Dr. Suri Madrigal with [facility]  2. Has been on Entresto and Toprol-XL stopped during prev admission, remains on midodrine 10 mg tid  3. Coreg and lisinopril started - would avoid lisinopril in case Entresto can be resumed at some point(will need 48-72 hr washout before resuming Entresto after ACE)  4. Hold lisinopril - BP labile   5. On home O2 at NC 4L  6. Chest xray w/ bilat pleural effusions - agree with bumex, pBNP up to 20K  7. Possibly some of her tachycardia is related not only to her sepsis but her anemia could be dilutional   8. Electrolyte imbalance - Mg repleted, pt refuses K+. Na improved         We discussed the expected course, resolution and complications of the diagnosis(es) in detail. Medication risks, benefits, costs, interactions, and alternatives were discussed as indicated. ICD-10-CM ICD-9-CM    1. Acute cystitis with hematuria  N30.01 595.0    2.  Pleural effusion  J90 511.9    3. Elevated LFTs  R79.89 790.6    4. Acute on chronic systolic (congestive) heart failure (HCC)  I50.23 428.23      428.0        No intake/output data recorded. Last 3 Recorded Weights in this Encounter    04/25/22 0100 04/26/22 0432 04/26/22 1935   Weight: 55 kg (121 lb 4.1 oz) 58.1 kg (128 lb) 58.7 kg (129 lb 4.8 oz)         04/25 1901 - 04/27 0700  In: 790 [P.O.:740; I.V.:50]  Out: 2700 [Urine:2700]    SUBJECTIVE           Ms. Gregg Spangler is a 81 yo hx of chronic resp failure on 4L O2, chronic sCHF EF 35-40%, hypotension, right renal mass, CKD 3, presented w/ suprapupic pain, UTI, septic shock. Has pleural effusions on CT scan. Over the last day from 4 24-4 25 she had about 1800 cc in.      Ayleen Hart reports cont dyspnea.        Current Facility-Administered Medications   Medication Dose Route Frequency    bumetanide (BUMEX) tablet 2 mg  2 mg Oral BID    carvediloL (COREG) tablet 3.125 mg  3.125 mg Oral BID WITH MEALS    [Held by provider] lisinopriL (PRINIVIL, ZESTRIL) tablet 2.5 mg  2.5 mg Oral DAILY    potassium chloride SR (KLOR-CON 10) tablet 20 mEq  20 mEq Oral DAILY    meropenem (MERREM) 1 g in 0.9% sodium chloride (MBP/ADV) 50 mL MBP  1 g IntraVENous Q12H    amitriptyline (ELAVIL) tablet 25 mg  25 mg Oral QHS    aspirin delayed-release tablet 81 mg  81 mg Oral DAILY    benzonatate (TESSALON) capsule 100 mg  100 mg Oral TID PRN    gabapentin (NEURONTIN) capsule 200 mg  200 mg Oral BID    latanoprost (XALATAN) 0.005 % ophthalmic solution 1 Drop  1 Drop Left Eye QPM    levothyroxine (SYNTHROID) tablet 25 mcg  25 mcg Oral ACB    midodrine (PROAMATINE) tablet 10 mg  10 mg Oral TID WITH MEALS    pantoprazole (PROTONIX) tablet 40 mg  40 mg Oral ACB    pyridoxine (vitamin B6) (VITAMIN B-6) tablet 25 mg  25 mg Oral DAILY    metoprolol (LOPRESSOR) injection 1.25 mg  1.25 mg IntraVENous Q6H PRN    sodium chloride (NS) flush 5-40 mL  5-40 mL IntraVENous Q8H    sodium chloride (NS) flush 5-40 mL  5-40 mL IntraVENous PRN    0.9% sodium chloride infusion 25 mL  25 mL IntraVENous PRN    acetaminophen (TYLENOL) tablet 650 mg  650 mg Oral Q6H PRN    Or    acetaminophen (TYLENOL) suppository 650 mg  650 mg Rectal Q6H PRN    bisacodyL (DULCOLAX) suppository 10 mg  10 mg Rectal DAILY PRN    promethazine (PHENERGAN) tablet 12.5 mg  12.5 mg Oral Q6H PRN    Or    ondansetron (ZOFRAN) injection 4 mg  4 mg IntraVENous Q6H PRN    heparin (porcine) injection 5,000 Units  5,000 Units SubCUTAneous Q8H      OBJECTIVE               Intake/Output Summary (Last 24 hours) at 4/27/2022 0830  Last data filed at 4/27/2022 0415  Gross per 24 hour   Intake 500 ml   Output 2300 ml   Net -1800 ml       Review of Systems - History obtained from the patient AS PER  HPI        PHYSICAL EXAM        Visit Vitals  /62 (BP 1 Location: Right upper arm, BP Patient Position: At rest)   Pulse 80   Temp 97.3 °F (36.3 °C)   Resp 17   Ht 5' (1.524 m)   Wt 58.7 kg (129 lb 4.8 oz)   LMP 11/16/1975   SpO2 95%   BMI 25.25 kg/m²       Gen: Ill-appearing deconditioned in no acute distress  alert   HEENT:  Pink conjunctivae, Hearing grossly normal.  Resp: Clear anterior but poor effort  Card: Regular  Skin: No rashes or ulcers, no bruising  Neuro:  follows commands appropriately  Psych: alert, Nml Affect  LE:  Mild/mod edema, L arm edema        DATA REVIEW      No specialty comments available. Cardiac monitor: Paced rhythm        Laboratory and Imaging have been reviewed by me and are notable for  No results for input(s): CPK, CKMB, TROIQ in the last 72 hours.   Recent Labs     04/27/22  0221 04/26/22  1015 04/25/22  0441    144 148*   K 3.6 3.8 3.4*   CO2 24 24 23   BUN 16 15 16   CREA 0.98 0.92 0.97   * 98 81   MG 2.7* 1.3* 1.3*   WBC 4.8 6.3 6.8   HGB 8.4* 9.3* 7.7*   HCT 28.2* 30.1* 24.9*    296 253             Jimmy Spivey, NP

## 2022-04-28 LAB
ALBUMIN SERPL-MCNC: 1.5 G/DL (ref 3.5–5)
ALBUMIN/GLOB SERPL: 0.5 {RATIO} (ref 1.1–2.2)
ALP SERPL-CCNC: 478 U/L (ref 45–117)
ALT SERPL-CCNC: 37 U/L (ref 12–78)
ANION GAP SERPL CALC-SCNC: 4 MMOL/L (ref 5–15)
AST SERPL-CCNC: 31 U/L (ref 15–37)
BILIRUB SERPL-MCNC: 0.5 MG/DL (ref 0.2–1)
BUN SERPL-MCNC: 16 MG/DL (ref 6–20)
BUN/CREAT SERPL: 16 (ref 12–20)
CALCIUM SERPL-MCNC: 8.8 MG/DL (ref 8.5–10.1)
CHLORIDE SERPL-SCNC: 112 MMOL/L (ref 97–108)
CO2 SERPL-SCNC: 27 MMOL/L (ref 21–32)
CREAT SERPL-MCNC: 1.01 MG/DL (ref 0.55–1.02)
ERYTHROCYTE [DISTWIDTH] IN BLOOD BY AUTOMATED COUNT: 12.9 % (ref 11.5–14.5)
GLOBULIN SER CALC-MCNC: 2.8 G/DL (ref 2–4)
GLUCOSE SERPL-MCNC: 109 MG/DL (ref 65–100)
HCT VFR BLD AUTO: 26.3 % (ref 35–47)
HGB BLD-MCNC: 8.1 G/DL (ref 11.5–16)
MAGNESIUM SERPL-MCNC: 2.1 MG/DL (ref 1.6–2.4)
MCH RBC QN AUTO: 32.7 PG (ref 26–34)
MCHC RBC AUTO-ENTMCNC: 30.8 G/DL (ref 30–36.5)
MCV RBC AUTO: 106 FL (ref 80–99)
NRBC # BLD: 0 K/UL (ref 0–0.01)
NRBC BLD-RTO: 0 PER 100 WBC
PLATELET # BLD AUTO: 267 K/UL (ref 150–400)
PMV BLD AUTO: 10.5 FL (ref 8.9–12.9)
POTASSIUM SERPL-SCNC: 3.7 MMOL/L (ref 3.5–5.1)
PROT SERPL-MCNC: 4.3 G/DL (ref 6.4–8.2)
RBC # BLD AUTO: 2.48 M/UL (ref 3.8–5.2)
SODIUM SERPL-SCNC: 143 MMOL/L (ref 136–145)
WBC # BLD AUTO: 5.9 K/UL (ref 3.6–11)

## 2022-04-28 PROCEDURE — 97530 THERAPEUTIC ACTIVITIES: CPT

## 2022-04-28 PROCEDURE — 36415 COLL VENOUS BLD VENIPUNCTURE: CPT

## 2022-04-28 PROCEDURE — 74011250637 HC RX REV CODE- 250/637: Performed by: NURSE PRACTITIONER

## 2022-04-28 PROCEDURE — 74011250637 HC RX REV CODE- 250/637: Performed by: INTERNAL MEDICINE

## 2022-04-28 PROCEDURE — APPSS45 APP SPLIT SHARED TIME 31-45 MINUTES: Performed by: NURSE PRACTITIONER

## 2022-04-28 PROCEDURE — 65270000046 HC RM TELEMETRY

## 2022-04-28 PROCEDURE — 77030011256 HC DRSG MEPILEX <16IN NO BORD MOLN -A

## 2022-04-28 PROCEDURE — 77010033678 HC OXYGEN DAILY

## 2022-04-28 PROCEDURE — 97116 GAIT TRAINING THERAPY: CPT

## 2022-04-28 PROCEDURE — 94761 N-INVAS EAR/PLS OXIMETRY MLT: CPT

## 2022-04-28 PROCEDURE — 74011250636 HC RX REV CODE- 250/636: Performed by: INTERNAL MEDICINE

## 2022-04-28 PROCEDURE — 36592 COLLECT BLOOD FROM PICC: CPT

## 2022-04-28 PROCEDURE — 74011000250 HC RX REV CODE- 250: Performed by: INTERNAL MEDICINE

## 2022-04-28 PROCEDURE — 99232 SBSQ HOSP IP/OBS MODERATE 35: CPT | Performed by: SPECIALIST

## 2022-04-28 PROCEDURE — 80053 COMPREHEN METABOLIC PANEL: CPT

## 2022-04-28 PROCEDURE — 85027 COMPLETE CBC AUTOMATED: CPT

## 2022-04-28 PROCEDURE — 74011000258 HC RX REV CODE- 258: Performed by: INTERNAL MEDICINE

## 2022-04-28 PROCEDURE — 83735 ASSAY OF MAGNESIUM: CPT

## 2022-04-28 RX ORDER — LISINOPRIL 5 MG/1
2.5 TABLET ORAL
Status: DISCONTINUED | OUTPATIENT
Start: 2022-04-28 | End: 2022-04-28

## 2022-04-28 RX ORDER — BUMETANIDE 1 MG/1
0.5 TABLET ORAL 2 TIMES DAILY
Status: DISCONTINUED | OUTPATIENT
Start: 2022-04-29 | End: 2022-05-02 | Stop reason: HOSPADM

## 2022-04-28 RX ADMIN — LEVOTHYROXINE SODIUM 25 MCG: 0.03 TABLET ORAL at 06:02

## 2022-04-28 RX ADMIN — MIDODRINE HYDROCHLORIDE 10 MG: 5 TABLET ORAL at 08:07

## 2022-04-28 RX ADMIN — MIDODRINE HYDROCHLORIDE 10 MG: 5 TABLET ORAL at 12:15

## 2022-04-28 RX ADMIN — MEROPENEM 1 G: 1 INJECTION, POWDER, FOR SOLUTION INTRAVENOUS at 06:01

## 2022-04-28 RX ADMIN — GABAPENTIN 200 MG: 100 CAPSULE ORAL at 08:07

## 2022-04-28 RX ADMIN — PANTOPRAZOLE SODIUM 40 MG: 40 TABLET, DELAYED RELEASE ORAL at 06:02

## 2022-04-28 RX ADMIN — SODIUM CHLORIDE, PRESERVATIVE FREE 10 ML: 5 INJECTION INTRAVENOUS at 06:02

## 2022-04-28 RX ADMIN — HEPARIN SODIUM 5000 UNITS: 5000 INJECTION INTRAVENOUS; SUBCUTANEOUS at 15:11

## 2022-04-28 RX ADMIN — BUMETANIDE 1 MG: 1 TABLET ORAL at 08:07

## 2022-04-28 RX ADMIN — LATANOPROST 1 DROP: 50 SOLUTION OPHTHALMIC at 17:08

## 2022-04-28 RX ADMIN — GABAPENTIN 200 MG: 100 CAPSULE ORAL at 17:08

## 2022-04-28 RX ADMIN — CARVEDILOL 3.12 MG: 3.12 TABLET, FILM COATED ORAL at 08:08

## 2022-04-28 RX ADMIN — SPIRONOLACTONE 12.5 MG: 25 TABLET ORAL at 08:07

## 2022-04-28 RX ADMIN — HEPARIN SODIUM 5000 UNITS: 5000 INJECTION INTRAVENOUS; SUBCUTANEOUS at 21:14

## 2022-04-28 RX ADMIN — BUMETANIDE 1 MG: 1 TABLET ORAL at 17:16

## 2022-04-28 RX ADMIN — HEPARIN SODIUM 5000 UNITS: 5000 INJECTION INTRAVENOUS; SUBCUTANEOUS at 06:01

## 2022-04-28 RX ADMIN — SODIUM CHLORIDE, PRESERVATIVE FREE 10 ML: 5 INJECTION INTRAVENOUS at 21:15

## 2022-04-28 RX ADMIN — SODIUM CHLORIDE, PRESERVATIVE FREE 10 ML: 5 INJECTION INTRAVENOUS at 15:14

## 2022-04-28 RX ADMIN — AMITRIPTYLINE HYDROCHLORIDE 25 MG: 50 TABLET, FILM COATED ORAL at 21:15

## 2022-04-28 RX ADMIN — MIDODRINE HYDROCHLORIDE 10 MG: 5 TABLET ORAL at 17:08

## 2022-04-28 RX ADMIN — ASPIRIN 81 MG: 81 TABLET, COATED ORAL at 08:07

## 2022-04-28 RX ADMIN — CARVEDILOL 3.12 MG: 3.12 TABLET, FILM COATED ORAL at 17:16

## 2022-04-28 RX ADMIN — PYRIDOXINE HCL TAB 50 MG 25 MG: 50 TAB at 08:07

## 2022-04-28 NOTE — PROGRESS NOTES
0945  Reached to MD regarding pts BP of 92/59. Held pts scheduled Entresto. No new orders at this time. 46  Reached out to MD regarding pts BP running soft today. Current BP is 112/68. Asked MD if is appropriate to give scheduled coreg and bumex. MD ordered to give scheduled Bumex and coreg unless MAP is less than 65.

## 2022-04-28 NOTE — PROGRESS NOTES
Physician Progress Note      Anthony Farias  [facility] #:                  [id number]  :                       1925  ADMIT DATE:       2022 2:52 PM  [address] [place] DATE:  RESPONDING  PROVIDER #:        Pedro Pablo Renae MD          QUERY TEXT:    Good Morning,  This patient admitted for Sepsis with septic shock. The medical record also notes a diagnosis of Anemia. If possible, please document in progress notes and discharge summary further specificity regarding the acuity and type of anemia:    The medical record reflects the following:  Risk Factors: Known CKD stage 3, Sepsis  Clinical Indicators: Cards consulted  and on , they documented \"Possibly some of her tachycardia is related not only to her sepsis but also to her anemia. HGb on admission 9.8, down to 7.7--and then up to 8.4 HR is in the 90-100s  Treatment: Monitor daily hgb/hct, pt has received IVF this admission, monitoring renal functions ,telemetry    Thank you  NIKOLAI De La RosaN, RN, [address] [facility], [address] [place] [address], [address] [place] [address]  Options provided:  -- Anemia due to acute blood loss  -- Anemia due to chronic blood loss  -- Anemia due to acute on chronic blood loss  -- Anemia due to iron deficiency  -- Anemia due to CKD  -- Dilutional anemia  -- Other - I will add my own diagnosis  -- Disagree - Not applicable / Not valid  -- Disagree - Clinically unable to determine / Unknown  -- Refer to Clinical Documentation Reviewer    PROVIDER RESPONSE TEXT:    Provider is clinically unable to determine a response to this query.     Query created by: Barb Rouse on 2022 9:05 AM      Electronically signed by:  Pedro Pablo Renae MD 2022 2:01 PM

## 2022-04-28 NOTE — PROGRESS NOTES
CARDIOLOGY PROGRESS NOTE    Jaime Michel MD,  [address], [place], [address] [facility]  Phone [phone]; Fax [phone]  Mobile [phone]   Voice Mail [phone]        2022 10:08 PM       Admit Date:           2022  Admit Diagnosis:  Acute on chronic systolic (congestive) heart failure ([address] [I50.23]  :          1925   MRN:          [id number]      Primary care: Emory Lesch, MD         Impression Plan/Recommendation   1. Acute on chronic HFrEF-CT pleural effusions  2. Septic shock/UTI: on abx   3. EVERT: resolved   4. Anemia hemoglobin  7 range  5. Hypernatremia: Na 143   6. Elevated Alk Phos : trending down                1.  Follows with Dr. Devan Caro with [facility]  2.  Start 1/2 dose entesto step 1 today  3 cont bumex BID, aldactone (refuses K supplements)  4 cont midodrine               We discussed the expected course, resolution and complications of the diagnosis(es) in detail. Medication risks, benefits, costs, interactions, and alternatives were discussed as indicated. ICD-10-CM ICD-9-CM    1. Acute cystitis with hematuria  N30.01 595.0    2. Pleural effusion  J90 511.9    3. Elevated LFTs  R79.89 790.6    4. Acute on chronic systolic (congestive) heart failure (HCC)  I50.23 428.23      428.0        No intake/output data recorded. Last 3 Recorded Weights in this Encounter    22 0100 22 0432 22 1935   Weight: 55 kg (121 lb 4.1 oz) 58.1 kg (128 lb) 58.7 kg (129 lb 4.8 oz)         1901 -  0700  In: 1185 [P.O.:860]  Out: 3450 [CXIVJ:1113]    SUBJECTIVE           Ms. Sejal Olivarez is a 81 yo hx of chronic resp failure on 4L O2, chronic sCHF EF 35-40%, hypotension, right renal mass, CKD 3, presented w/ suprapupic pain, UTI, septic shock. Has pleural effusions on CT scan.        Jacksanthosh Jain reports cont fatigue      Current Facility-Administered Medications   Medication Dose Route Frequency    lisinopriL (PRINIVIL, ZESTRIL) tablet 2.5 mg  2.5 mg Oral QHS    bumetanide (BUMEX) tablet 1 mg  1 mg Oral BID    spironolactone (ALDACTONE) tablet 12.5 mg  12.5 mg Oral DAILY    carvediloL (COREG) tablet 3.125 mg  3.125 mg Oral BID WITH MEALS    potassium chloride SR (KLOR-CON 10) tablet 20 mEq  20 mEq Oral DAILY    meropenem (MERREM) 1 g in 0.9% sodium chloride (MBP/ADV) 50 mL MBP  1 g IntraVENous Q12H    amitriptyline (ELAVIL) tablet 25 mg  25 mg Oral QHS    aspirin delayed-release tablet 81 mg  81 mg Oral DAILY    benzonatate (TESSALON) capsule 100 mg  100 mg Oral TID PRN    gabapentin (NEURONTIN) capsule 200 mg  200 mg Oral BID    latanoprost (XALATAN) 0.005 % ophthalmic solution 1 Drop  1 Drop Left Eye QPM    levothyroxine (SYNTHROID) tablet 25 mcg  25 mcg Oral ACB    midodrine (PROAMATINE) tablet 10 mg  10 mg Oral TID WITH MEALS    pantoprazole (PROTONIX) tablet 40 mg  40 mg Oral ACB    pyridoxine (vitamin B6) (VITAMIN B-6) tablet 25 mg  25 mg Oral DAILY    metoprolol (LOPRESSOR) injection 1.25 mg  1.25 mg IntraVENous Q6H PRN    sodium chloride (NS) flush 5-40 mL  5-40 mL IntraVENous Q8H    sodium chloride (NS) flush 5-40 mL  5-40 mL IntraVENous PRN    0.9% sodium chloride infusion 25 mL  25 mL IntraVENous PRN    acetaminophen (TYLENOL) tablet 650 mg  650 mg Oral Q6H PRN    Or    acetaminophen (TYLENOL) suppository 650 mg  650 mg Rectal Q6H PRN    bisacodyL (DULCOLAX) suppository 10 mg  10 mg Rectal DAILY PRN    promethazine (PHENERGAN) tablet 12.5 mg  12.5 mg Oral Q6H PRN    Or    ondansetron (ZOFRAN) injection 4 mg  4 mg IntraVENous Q6H PRN    heparin (porcine) injection 5,000 Units  5,000 Units SubCUTAneous Q8H      OBJECTIVE               Intake/Output Summary (Last 24 hours) at 4/28/2022 0806  Last data filed at 4/28/2022 0601  Gross per 24 hour   Intake 565 ml   Output 2550 ml   Net -1985 ml       Review of Systems - History obtained from the patient AS PER  HPI        PHYSICAL EXAM Visit Vitals  BP (!) 121/56 (BP 1 Location: Right lower arm, BP Patient Position: At rest)   Pulse 69   Temp 97.5 °F (36.4 °C)   Resp 18   Ht 5' (1.524 m)   Wt 58.7 kg (129 lb 4.8 oz)   LMP 11/16/1975   SpO2 100%   BMI 25.25 kg/m²       Gen:  deconditioned in no acute distress  alert   HEENT:  Pink conjunctivae, Hearing grossly normal.  Resp: Clear anterior but poor effort  Card: Regular  Skin: No rashes or ulcers, no bruising  Neuro:  follows commands appropriately  Psych: alert, Nml Affect  LE:  Mild/mod edema, L arm edema        DATA REVIEW      04/09/22    ECHO ADULT COMPLETE 04/10/2022 4/10/2022    Interpretation Summary    Left Ventricle: Left ventricle size is normal. Normal wall thickness. See diagram for wall motion findings. The EF by visual approximation is 35 - 40%. visualization ot the endocardium is difficult.   Right Ventricle: Right ventricle is mildly dilated. Lead present in the right ventricle.   Aortic Valve: Tricuspid valve. Moderately calcified cusp.   Mitral Valve: Mildly thickened leaflet. Moderately calcified leaflet. Severe annular calcification of the mitral valve. Calcified subvalvular apparatus. Mild transvalvular regurgitation.   Tricuspid Valve: Mildly thickened leaflets. Mildly elevated RVSP.   Right Atrium: Right atrium is dilated. Signed by: Harlan Martins MD on 4/10/2022  6:36 PM        Cardiac monitor: Paced rhythm        Laboratory and Imaging have been reviewed by me and are notable for  No results for input(s): CPK, CKMB, TROIQ in the last 72 hours.   Recent Labs     04/28/22  0130 04/27/22  0221 04/26/22  1015    145 144   K 3.7 3.6 3.8   CO2 27 24 24   BUN 16 16 15   CREA 1.01 0.98 0.92   * 119* 98   MG 2.1 2.7* 1.3*   WBC 5.9 4.8 6.3   HGB 8.1* 8.4* 9.3*   HCT 26.3* 28.2* 30.1*    260 296             Elyse Kwok NP

## 2022-04-28 NOTE — PROGRESS NOTES
[facility]  Hospitalist Group                                                                                          Hospitalist Progress Note  Catrina Cano MD  Answering service: [phone] from in house phone        Date of Service:  2022  NAME:  Ciera Esparza  :  1925  MRN:  [id number]      Admission Summary: \"The patient is a 79 yo hx of chronic resp failure on 4L O2, chronic sCHF EF 35-40%, hypotension, right renal mass, CKD 3, presented w/ suprapupic pain, UTI, septic shock, CHF. The patient was admitted 1 week ago for pancreatitis. She improved with IVF and pain meds. Today, the patient c/o suprapubic pain for 2 days, associated with fevers/chills and fatigue. She denied chest pain, cough, nausea, vomiting, diarrhea. In the ED, SBP ~80s, temp 101.7, WBC 6.7, lactate 2.2. U/A significant for a UTI. Abd CT showed improved peripancreatic stranding, but moderate pleural effusion\"    Interval history / Subjective:     2022. No acute events overnight. Saw patient this morning, awake alert and oriented, very pleasant and cooperative with physical examination, denies any fever, chills, nausea, vomiting. Believes that her sepsis was caused because she choked on one of her multivitamins. 2022. No acute events overnight. Saw patient this morning, awake alert and oriented in no apparent distress, receiving physical therapy, does not appear to be in any apparent distress, has been off of pressors for the last 4 hours, vitals appears to be within normal limits, has been followed by cardiologist, possible discharge tomorrow. 2022. No acute events overnight.   Patient this morning, awake alert and oriented, no apparent distress, stating that she is still not ready to be discharged to rehab, she feels weak and she will refill to participate in her rehab, still complaining of significant lower extremity dyspnea on exertion, her medication has been reconsulted and patient hopefully can be discharged home tomorrow, patient has a central line going which she will Remove however she adamantly refuses to remove it because stating that it took a hard time to get her central line. 4/27/2020. No acute events overnight. Saw patient this morning, awake alert and oriented, stating that she still does not feel ready to be going to back to rehab as she may not be able to participate in any rehab activity as she feels poor. Her blood pressure has been on the soft side and her BP medication has been reconciled. Cardiology on board. Recommendations noted. Assessment & Plan:     1) Septic shock/UTI: 3/4 SIRS with SBP <90. Will monitor on step down. Check blood Cx, urine Cx, lactate. Given multiple allergies, will start IV Meropenem. Start Kyree gtt for hypotension. Be careful with IVF due to CHF. Discussed with ED physician about central line if unable to obtain good peripheral IVs     2) Acute on chronic systolic (congestive) heart failure/pleural effusions: last echo with EF 35-40%. Pro BNP >10,000 on admission. BP too low for diuretics. Will use IV bumex prn for when BP can tolerate. Consult Cards     3) EVERT/CKD 3: due to hypoperfusion. Will cont IVF, pressors gtt. Monitor closely     4) Recent pancreatitis: resolved on CT. Will monitor closely     5) Chronic resp failure: on chronic 4L O2 due to CHF. Cont O2, incentive spirometry     6) R renal mass: likely RCC.  Patient has refused w/u in the past     Code status: DNR  DVT prophylaxis: Lovenox    Care Plan discussed with: Patient/Family  Anticipated Disposition: Home w/Family  Anticipated Discharge: Less than 24 hours     Hospital Problems  Date Reviewed: 4/22/2022          Codes Class Noted POA    Acute on chronic systolic (congestive) heart failure (HCC) ICD-10-CM: A59.09  ICD-9-CM: 428.23, 428.0  4/22/2022 Unknown        * (Principal) Septic shock ([facility] [address] ICD-10-CM: A41.9, R65.21  ICD-9-CM: 038.9, 785.52, 995.92  4/22/2022 Yes        EVERT (acute kidney injury) [facility]) ICD-10-CM: N17.9  ICD-9-CM: 584.9  4/22/2022 Yes        UTI (urinary tract infection) ICD-10-CM: N39.0  ICD-9-CM: 599.0  4/22/2022 Yes                Review of Systems:   A comprehensive review of systems was negative except for that written in the HPI. Vital Signs:    Last 24hrs VS reviewed since prior progress note. Most recent are:  Visit Vitals  /68 (BP 1 Location: Right arm, BP Patient Position: At rest)   Pulse 85   Temp 97.7 °F (36.5 °C)   Resp 16   Ht 5' (1.524 m)   Wt 58.7 kg (129 lb 4.8 oz)   SpO2 100%   BMI 25.25 kg/m²         Intake/Output Summary (Last 24 hours) at 4/28/2022 1753  Last data filed at 4/28/2022 1020  Gross per 24 hour   Intake 240 ml   Output 2150 ml   Net -1910 ml        Physical Examination:     I had a face to face encounter with this patient and independently examined them on 4/28/2022 as outlined below:          Constitutional:  No acute distress, cooperative, pleasant    ENT:  Oral mucosa moist, oropharynx benign. Resp:  CTA bilaterally. No wheezing/rhonchi/rales. No accessory muscle use   CV:  Regular rhythm, normal rate, no murmurs, gallops, rubs    GI:  Soft, non distended, non tender. normoactive bowel sounds, no hepatosplenomegaly     Musculoskeletal:  No edema, warm, 2+ pulses throughout    Neurologic:  Moves all extremities.   AAOx3, CN II-XII reviewed            Data Review:    Review and/or order of clinical lab test      Labs:     Recent Labs     04/28/22 0130 04/27/22 0221   WBC 5.9 4.8   HGB 8.1* 8.4*   HCT 26.3* 28.2*    260     Recent Labs     04/28/22  0130 04/27/22 0221 04/26/22  1015    145 144   K 3.7 3.6 3.8   * 114* 113*   CO2 27 24 24   BUN 16 16 15   CREA 1.01 0.98 0.92   * 119* 98   CA 8.8 8.4* 7.7*   MG 2.1 2.7* 1.3*     Recent Labs     04/28/22  0130 04/27/22  0221   ALT 37 47   * 579*   TBILI 0.5 0.4   TP 4.3* 4.3* ALB 1.5* 1.5*   GLOB 2.8 2.8     No results for input(s): INR, PTP, APTT, INREXT, INREXT in the last 72 hours. No results for input(s): FE, TIBC, PSAT, FERR in the last 72 hours. Lab Results   Component Value Date/Time    Folate 37.7 (H) 04/10/2022 03:46 AM      No results for input(s): PH, PCO2, PO2 in the last 72 hours. No results for input(s): CPK, CKNDX, TROIQ in the last 72 hours. No lab exists for component: CPKMB  Lab Results   Component Value Date/Time    Cholesterol, total <50 04/10/2022 03:46 AM    HDL Cholesterol 8 04/10/2022 03:46 AM    LDL, calculated  04/10/2022 03:46 AM     Unable to calculate LDL or VLDL due to low cholesterol.     Triglyceride 96 04/10/2022 03:46 AM    CHOL/HDL Ratio Cannot be calculated 04/10/2022 03:46 AM     Lab Results   Component Value Date/Time    Glucose (POC) 82 04/23/2022 04:00 PM    Glucose (POC) 207 (H) 04/11/2022 11:07 AM    Glucose (POC) 234 (H) 04/11/2022 07:59 AM    Glucose (POC) 59 (L) 04/11/2022 05:43 AM    Glucose (POC) 210 (H) 05/12/2009 04:35 PM     Lab Results   Component Value Date/Time    Color YELLOW/STRAW 04/22/2022 04:08 PM    Appearance TURBID (A) 04/22/2022 04:08 PM    Specific gravity 1.010 04/22/2022 04:08 PM    pH (UA) 6.5 04/22/2022 04:08 PM    Protein 30 (A) 04/22/2022 04:08 PM    Glucose Negative 04/22/2022 04:08 PM    Ketone Negative 04/22/2022 04:08 PM    Bilirubin Negative 04/22/2022 04:08 PM    Urobilinogen 2.0 (H) 04/22/2022 04:08 PM    Nitrites Negative 04/22/2022 04:08 PM    Leukocyte Esterase LARGE (A) 04/22/2022 04:08 PM    Epithelial cells MODERATE (A) 04/22/2022 04:08 PM    Bacteria 4+ (A) 04/22/2022 04:08 PM    WBC >100 (H) 04/22/2022 04:08 PM    RBC 5-10 04/22/2022 04:08 PM         Medications Reviewed:     Current Facility-Administered Medications   Medication Dose Route Frequency    sacubitriL-valsartan (ENTRESTO) 24-26 mg tablet 0.5 Tablet  0.5 Tablet Oral Q12H    bumetanide (BUMEX) tablet 1 mg  1 mg Oral BID    spironolactone (ALDACTONE) tablet 12.5 mg  12.5 mg Oral DAILY    carvediloL (COREG) tablet 3.125 mg  3.125 mg Oral BID WITH MEALS    potassium chloride SR (KLOR-CON 10) tablet 20 mEq  20 mEq Oral DAILY    amitriptyline (ELAVIL) tablet 25 mg  25 mg Oral QHS    aspirin delayed-release tablet 81 mg  81 mg Oral DAILY    benzonatate (TESSALON) capsule 100 mg  100 mg Oral TID PRN    gabapentin (NEURONTIN) capsule 200 mg  200 mg Oral BID    latanoprost (XALATAN) 0.005 % ophthalmic solution 1 Drop  1 Drop Left Eye QPM    levothyroxine (SYNTHROID) tablet 25 mcg  25 mcg Oral ACB    midodrine (PROAMATINE) tablet 10 mg  10 mg Oral TID WITH MEALS    pantoprazole (PROTONIX) tablet 40 mg  40 mg Oral ACB    pyridoxine (vitamin B6) (VITAMIN B-6) tablet 25 mg  25 mg Oral DAILY    metoprolol (LOPRESSOR) injection 1.25 mg  1.25 mg IntraVENous Q6H PRN    sodium chloride (NS) flush 5-40 mL  5-40 mL IntraVENous Q8H    sodium chloride (NS) flush 5-40 mL  5-40 mL IntraVENous PRN    0.9% sodium chloride infusion 25 mL  25 mL IntraVENous PRN    acetaminophen (TYLENOL) tablet 650 mg  650 mg Oral Q6H PRN    Or    acetaminophen (TYLENOL) suppository 650 mg  650 mg Rectal Q6H PRN    bisacodyL (DULCOLAX) suppository 10 mg  10 mg Rectal DAILY PRN    promethazine (PHENERGAN) tablet 12.5 mg  12.5 mg Oral Q6H PRN    Or    ondansetron (ZOFRAN) injection 4 mg  4 mg IntraVENous Q6H PRN    heparin (porcine) injection 5,000 Units  5,000 Units SubCUTAneous Q8H     ______________________________________________________________________  EXPECTED LENGTH OF STAY: 4d 19h  ACTUAL LENGTH OF STAY:          6                 Catrina Cano MD

## 2022-04-28 NOTE — PROGRESS NOTES
4/28/2022  Case Management Progress Note    9:45 AM  Patient is 80year old female admitted 4/22 for septic shock  Patient's RUR is 16% yellow/moderate risk for readmission  Covid test: none this admission  Chart reviewed  Patient is from [facility] and Rehab, plans to return there at discharge. Referral is still active in [facility]. She is mod A x2 for bed mobility per PT, will likely need stretcher transport at discharge. Will continue to follow and assist with discharge planning. Transition of Care Plan  1. Continue medical management/treatment  2. Back to [facility] to continue rehab at discharge  3. Likely will need stretcher transport   4.  CM will continue to follow    KAT Alonso

## 2022-04-28 NOTE — PROGRESS NOTES
[facility] visit. Mrs. Yoav Harding was sitting up in her chair. She had a novel on her tray. She is having difficulty reading because her glasses were left at the rehab she was in before coming back to [facility]. Offered her a pair of reading glasses but she said her glasses were prescription. She will wait. Prayer and communion offered.     SATURNINO Pablo, RN, ACSW, LCSW   Page:  [phone]

## 2022-04-28 NOTE — PROGRESS NOTES
Bedside and Verbal shift change report given to Celena Jackson RN (oncoming nurse) by Sol Hinkle RN (offgoing nurse). Report included the following information SBAR, ED Summary, Intake/Output, MAR and Recent Results.

## 2022-04-28 NOTE — PROGRESS NOTES
Bedside shift change report given to Raul CARRILLO  (oncoming nurse) by Jose Andrew  (offgoing nurse). Report included the following information SBAR, Kardex, ED Summary, MAR, Accordion, Recent Results and Med Rec Status.

## 2022-04-28 NOTE — PROGRESS NOTES
Problem: Self Care Deficits Care Plan (Adult)  Goal: *Acute Goals and Plan of Care (Insert Text)  Description: FUNCTIONAL STATUS PRIOR TO ADMISSION: Per chart at last admission to [facility] 2 weeks ago (eval on 4/11/2022): \"Patient was modified independent using a walker for functional mobility and short distance ambulation. She uses a wheelchair for longer distances and community ambulation. Patient needs occasional assistance for LB ADLs but otherwise independent with self care. \"  Pt reports using 4L O2 at baseline. After that admission, pt went to [facility] for rehab. HOME SUPPORT: The patient lived with  and son. This admission is received from SNF. Occupational Therapy Goals  Initiated 4/24/2022  1. Patient will perform grooming, standing at sink, with modified independence within 7 day(s). 2.  Patient will perform lower body dressing with minimal assistance/contact guard assist within 7 day(s). 3.  Patient will perform bathing with minimal assistance/contact guard assist within 7 day(s). 4.  Patient will perform toilet transfers with supervision/set-up within 7 day(s). 5.  Patient will perform all aspects of toileting with supervision/set-up within 7 day(s). 6.  Patient will participate in upper extremity therapeutic exercise/activities with supervision/set-up for 5 minutes within 7 day(s). 7.  Patient will utilize energy conservation techniques during functional activities with verbal cues within 7 day(s). Outcome: Progressing Towards Goal   OCCUPATIONAL THERAPY TREATMENT  Patient: Dell Corral (14 y.o. female)  Date: 4/28/2022  Diagnosis: Acute on chronic systolic (congestive) heart failure ([facility] [address] [I50.23] Septic shock ([facility] [address]       Precautions:    Chart, occupational therapy assessment, plan of care, and goals were reviewed. ASSESSMENT  Patient continues with skilled OT services and is progressing towards goals. Pt needs assist x 2 to  prep for ADL tasks.  She is limited with decreased shoulder ROM bilaterally. Pt needs cues for safely transferring to chair. Max assist LB bathe and dress. O2 sats on 3 L 97%, HR increase to 112 with activity. Current Level of Function Impacting Discharge (ADLs): max assist LB bathe and dress    Other factors to consider for discharge:          PLAN :  Patient continues to benefit from skilled intervention to address the above impairments. Continue treatment per established plan of care to address goals. Recommend with staff: out of bed for ADL's, there ex, there act, meals    Recommend next OT session: Cont towards goals    Recommendation for discharge: (in order for the patient to meet his/her long term goals)  Therapy up to 5 days/week in SNF setting    This discharge recommendation:  Has not yet been discussed the attending provider and/or case management    IF patient discharges home will need the following DME:        SUBJECTIVE:   Patient stated I don't feel like I got anywhere.     OBJECTIVE DATA SUMMARY:   Cognitive/Behavioral Status:  Neurologic State: Alert; Appropriate for age  Orientation Level: Oriented X4  Cognition: Follows commands             Functional Mobility and Transfers for ADLs:  Bed Mobility:     Mod/max assist x 2  Transfers: Moderate assist x 2       Balance:  Sitting: Intact  Standing: Impaired; With support    ADL Intervention:          Lower Body Dressing Assistance  Dressing Assistance: Maximum assistance  Socks: Maximum assistance       Activity Tolerance:   Fair    After treatment patient left in no apparent distress:   Sitting in chair and Call bell within reach    COMMUNICATION/COLLABORATION:   The patients plan of care was discussed with: Physical therapist, Occupational therapist, and Registered nurse.      WILLA Pittman  Time Calculation: 15 mins

## 2022-04-28 NOTE — PROGRESS NOTES
Problem: Mobility Impaired (Adult and Pediatric)  Goal: *Acute Goals and Plan of Care (Insert Text)  Description: FUNCTIONAL STATUS PRIOR TO ADMISSION: Patient required minimal assistance for basic and instrumental ADLs. HOME SUPPORT PRIOR TO ADMISSION: The patient lived at SNF . Physical Therapy Goals  Initiated 4/24/2022  1. Patient will move from supine to sit and sit to supine , scoot up and down, and roll side to side in bed with minimal assistance/contact guard assist within 7 day(s). 2.  Patient will transfer from bed to chair and chair to bed with minimal assistance/contact guard assist using the least restrictive device within 7 day(s). 3.  Patient will perform sit to stand with minimal assistance/contact guard assist within 7 day(s). 4.  Patient will ambulate with minimal assistance/contact guard assist for 200 feet with the least restrictive device within 7 day(s). Outcome: Progressing Towards Goal   PHYSICAL THERAPY TREATMENT  Patient: Maximiliano Love (63 y.o. female)  Date: 4/28/2022  Diagnosis: Acute on chronic systolic (congestive) heart failure (HCC) [I50.23] Septic shock (HCC)       Precautions:    Chart, physical therapy assessment, plan of care and goals were reviewed. ASSESSMENT  Patient continues with skilled PT services and is progressing towards goals. Patient demonstrating improved initiation of functional mobility tasks today but still requiring MOD A x 2 for bed mob and mIN A x 2 for transfers and gait. Exhibits difficulty unweighting RLE, poor tolerance to LLE SLS. Fatigues quickly and attempted to begin sitting prior to getting fully back to chair. Set up in chair with all needs in reach. Cont to recommend SNF rehab, patient at high risk for falls and  unable to provide physical assistance.      Current Level of Function Impacting Discharge (mobility/balance): MOD A x 2    Other factors to consider for discharge:  cannot assist         PLAN :  Patient continues to benefit from skilled intervention to address the above impairments. Continue treatment per established plan of care. to address goals. Recommendation for discharge: (in order for the patient to meet his/her long term goals)  Therapy up to 5 days/week in SNF setting    This discharge recommendation:  Has been made in collaboration with the attending provider and/or case management    IF patient discharges home will need the following DME: to be determined (TBD)       SUBJECTIVE:   Patient stated Are yall able to hold onto me if I fall? Jasmin Mendoza    OBJECTIVE DATA SUMMARY:   Critical Behavior:  Neurologic State: Alert,Appropriate for age  Orientation Level: Oriented X4  Cognition: Follows commands  Safety/Judgement: Awareness of environment  Functional Mobility Training:  Bed Mobility:     Supine to Sit: Moderate assistance;Assist x2     Scooting: Moderate assistance        Transfers:  Sit to Stand: Minimum assistance;Assist x2  Stand to Sit: Minimum assistance;Assist x2                 Sliding Board : Minimum assistance;Assist x2           Balance:  Sitting: Intact  Standing: Impaired; With support  Standing - Static: Fair  Standing - Dynamic : Fair  Ambulation/Gait Training:  Distance (ft): 3 Feet (ft)  Assistive Device: Gait belt;Walker, rolling  Ambulation - Level of Assistance: Minimal assistance;Assist x2        Gait Abnormalities: Decreased step clearance;Trunk sway increased        Base of Support: Narrowed     Speed/Jenise: Slow;Shuffled  Step Length: Right shortened;Left shortened                   Pain Rating:  None    Activity Tolerance:   Fair, SpO2 >94% on RA    After treatment patient left in no apparent distress:   Sitting in chair, Call bell within reach, Bed / chair alarm activated, and Caregiver / family present    COMMUNICATION/COLLABORATION:   The patients plan of care was discussed with: Occupational therapy assistant and Registered nurse.      Luis E Stewart, PT   Time Calculation: 23 mins

## 2022-04-29 LAB
ANION GAP SERPL CALC-SCNC: 4 MMOL/L (ref 5–15)
BNP SERPL-MCNC: ABNORMAL PG/ML
BUN SERPL-MCNC: 18 MG/DL (ref 6–20)
BUN/CREAT SERPL: 17 (ref 12–20)
CALCIUM SERPL-MCNC: 9.1 MG/DL (ref 8.5–10.1)
CHLORIDE SERPL-SCNC: 109 MMOL/L (ref 97–108)
CO2 SERPL-SCNC: 30 MMOL/L (ref 21–32)
CREAT SERPL-MCNC: 1.04 MG/DL (ref 0.55–1.02)
GLUCOSE SERPL-MCNC: 93 MG/DL (ref 65–100)
MAGNESIUM SERPL-MCNC: 1.7 MG/DL (ref 1.6–2.4)
POTASSIUM SERPL-SCNC: 3.4 MMOL/L (ref 3.5–5.1)
SODIUM SERPL-SCNC: 143 MMOL/L (ref 136–145)

## 2022-04-29 PROCEDURE — 97116 GAIT TRAINING THERAPY: CPT

## 2022-04-29 PROCEDURE — APPSS45 APP SPLIT SHARED TIME 31-45 MINUTES: Performed by: NURSE PRACTITIONER

## 2022-04-29 PROCEDURE — 74011250637 HC RX REV CODE- 250/637: Performed by: INTERNAL MEDICINE

## 2022-04-29 PROCEDURE — 94761 N-INVAS EAR/PLS OXIMETRY MLT: CPT

## 2022-04-29 PROCEDURE — 77030038269 HC DRN EXT URIN PURWCK BARD -A

## 2022-04-29 PROCEDURE — 97530 THERAPEUTIC ACTIVITIES: CPT

## 2022-04-29 PROCEDURE — 36592 COLLECT BLOOD FROM PICC: CPT

## 2022-04-29 PROCEDURE — 74011000250 HC RX REV CODE- 250: Performed by: INTERNAL MEDICINE

## 2022-04-29 PROCEDURE — 80048 BASIC METABOLIC PNL TOTAL CA: CPT

## 2022-04-29 PROCEDURE — 83735 ASSAY OF MAGNESIUM: CPT

## 2022-04-29 PROCEDURE — 65270000046 HC RM TELEMETRY

## 2022-04-29 PROCEDURE — 74011250637 HC RX REV CODE- 250/637: Performed by: NURSE PRACTITIONER

## 2022-04-29 PROCEDURE — 99232 SBSQ HOSP IP/OBS MODERATE 35: CPT | Performed by: SPECIALIST

## 2022-04-29 PROCEDURE — 74011250636 HC RX REV CODE- 250/636: Performed by: INTERNAL MEDICINE

## 2022-04-29 PROCEDURE — 36415 COLL VENOUS BLD VENIPUNCTURE: CPT

## 2022-04-29 PROCEDURE — 83880 ASSAY OF NATRIURETIC PEPTIDE: CPT

## 2022-04-29 PROCEDURE — 77010033678 HC OXYGEN DAILY

## 2022-04-29 RX ADMIN — MIDODRINE HYDROCHLORIDE 10 MG: 5 TABLET ORAL at 09:16

## 2022-04-29 RX ADMIN — SODIUM CHLORIDE, PRESERVATIVE FREE 10 ML: 5 INJECTION INTRAVENOUS at 06:14

## 2022-04-29 RX ADMIN — GABAPENTIN 200 MG: 100 CAPSULE ORAL at 09:16

## 2022-04-29 RX ADMIN — LEVOTHYROXINE SODIUM 25 MCG: 0.03 TABLET ORAL at 06:13

## 2022-04-29 RX ADMIN — BUMETANIDE 0.5 MG: 1 TABLET ORAL at 09:16

## 2022-04-29 RX ADMIN — ASPIRIN 81 MG: 81 TABLET, COATED ORAL at 09:16

## 2022-04-29 RX ADMIN — CARVEDILOL 3.12 MG: 3.12 TABLET, FILM COATED ORAL at 18:03

## 2022-04-29 RX ADMIN — PANTOPRAZOLE SODIUM 40 MG: 40 TABLET, DELAYED RELEASE ORAL at 06:13

## 2022-04-29 RX ADMIN — SODIUM CHLORIDE, PRESERVATIVE FREE 10 ML: 5 INJECTION INTRAVENOUS at 21:04

## 2022-04-29 RX ADMIN — MIDODRINE HYDROCHLORIDE 10 MG: 5 TABLET ORAL at 11:28

## 2022-04-29 RX ADMIN — CARVEDILOL 3.12 MG: 3.12 TABLET, FILM COATED ORAL at 09:16

## 2022-04-29 RX ADMIN — SACUBITRIL AND VALSARTAN 0.5 TABLET: 24; 26 TABLET, FILM COATED ORAL at 09:16

## 2022-04-29 RX ADMIN — SPIRONOLACTONE 12.5 MG: 25 TABLET ORAL at 09:16

## 2022-04-29 RX ADMIN — MIDODRINE HYDROCHLORIDE 10 MG: 5 TABLET ORAL at 18:03

## 2022-04-29 RX ADMIN — AMITRIPTYLINE HYDROCHLORIDE 25 MG: 50 TABLET, FILM COATED ORAL at 21:03

## 2022-04-29 RX ADMIN — HEPARIN SODIUM 5000 UNITS: 5000 INJECTION INTRAVENOUS; SUBCUTANEOUS at 06:13

## 2022-04-29 RX ADMIN — SACUBITRIL AND VALSARTAN 0.5 TABLET: 24; 26 TABLET, FILM COATED ORAL at 21:03

## 2022-04-29 RX ADMIN — BUMETANIDE 0.5 MG: 1 TABLET ORAL at 18:03

## 2022-04-29 RX ADMIN — POTASSIUM BICARBONATE 20 MEQ: 782 TABLET, EFFERVESCENT ORAL at 11:27

## 2022-04-29 RX ADMIN — SODIUM CHLORIDE, PRESERVATIVE FREE 10 ML: 5 INJECTION INTRAVENOUS at 15:07

## 2022-04-29 RX ADMIN — HEPARIN SODIUM 5000 UNITS: 5000 INJECTION INTRAVENOUS; SUBCUTANEOUS at 21:04

## 2022-04-29 RX ADMIN — HEPARIN SODIUM 5000 UNITS: 5000 INJECTION INTRAVENOUS; SUBCUTANEOUS at 15:06

## 2022-04-29 RX ADMIN — GABAPENTIN 200 MG: 100 CAPSULE ORAL at 18:03

## 2022-04-29 RX ADMIN — LATANOPROST 1 DROP: 50 SOLUTION OPHTHALMIC at 18:18

## 2022-04-29 NOTE — PROGRESS NOTES
[facility] Adult  Hospitalist Group                                                                                          Hospitalist Progress Note  Sharla Benitez MD  Answering service: [phone] from in house phone        Date of Service:  2022  NAME:  Bartolome Hartman  :  1925  MRN:  [id number]      Admission Summary: \"The patient is a 79 yo hx of chronic resp failure on 4L O2, chronic sCHF EF 35-40%, hypotension, right renal mass, CKD 3, presented w/ suprapupic pain, UTI, septic shock, CHF. The patient was admitted 1 week ago for pancreatitis. She improved with IVF and pain meds. Today, the patient c/o suprapubic pain for 2 days, associated with fevers/chills and fatigue. She denied chest pain, cough, nausea, vomiting, diarrhea. In the ED, SBP ~80s, temp 101.7, WBC 6.7, lactate 2.2. U/A significant for a UTI. Abd CT showed improved peripancreatic stranding, but moderate pleural effusion\"    Interval history / Subjective:     2022. No acute events overnight. Saw patient this morning, awake alert and oriented, very pleasant and cooperative with physical examination, denies any fever, chills, nausea, vomiting. Believes that her sepsis was caused because she choked on one of her multivitamins. 2022. No acute events overnight. Saw patient this morning, awake alert and oriented in no apparent distress, receiving physical therapy, does not appear to be in any apparent distress, has been off of pressors for the last 4 hours, vitals appears to be within normal limits, has been followed by cardiologist, possible discharge tomorrow. 2022. No acute events overnight.   Patient this morning, awake alert and oriented, no apparent distress, stating that she is still not ready to be discharged to rehab, she feels weak and she will refill to participate in her rehab, still complaining of significant lower extremity dyspnea on exertion, her medication has been reconsulted and patient hopefully can be discharged home tomorrow, patient has a central line going which she will Remove however she adamantly refuses to remove it because stating that it took a hard time to get her central line. 4/27/2020. No acute events overnight. Saw patient this morning, awake alert and oriented, stating that she still does not feel ready to be going to back to rehab as she may not be able to participate in any rehab activity as she feels poor. Her blood pressure has been on the soft side and her BP medication has been reconciled. Cardiology on board. Recommendations noted. 4/29/2022. No acute events overnight. Saw patient during rounds, awake alert and oriented no apparent distress, pleasant and cooperative with physical examination, vitals stable, blood pressure soft however asymptomatic, patient is saying that she is feeling much better but she does not want to be discharged until Monday because she is afraid that if she is discharged over the weekend the rehab facility they will not be able to taking care of her property. Patient medically ready to be discharged. Assessment & Plan:     1) Septic shock/UTI: 3/4 SIRS with SBP <90. Will monitor on step down. Check blood Cx, urine Cx, lactate. Given multiple allergies, will start IV Meropenem. Start Kyree gtt for hypotension. Be careful with IVF due to CHF. Discussed with ED physician about central line if unable to obtain good peripheral IVs     2) Acute on chronic systolic (congestive) heart failure/pleural effusions: last echo with EF 35-40%. Pro BNP >10,000 on admission. BP too low for diuretics. Will use IV bumex prn for when BP can tolerate. Consult Cards     3) EVERT/CKD 3: due to hypoperfusion. Will cont IVF, pressors gtt. Monitor closely     4) Recent pancreatitis: resolved on CT. Will monitor closely     5) Chronic resp failure: on chronic 4L O2 due to CHF.   Cont O2, incentive spirometry     6) R renal mass: likely RCC. Patient has refused w/u in the past     Code status: DNR  DVT prophylaxis: Lovenox    Care Plan discussed with: Patient/Family  Anticipated Disposition: Home w/Family  Anticipated Discharge: Less than 24 hours     Hospital Problems  Date Reviewed: 4/22/2022          Codes Class Noted POA    Acute on chronic systolic (congestive) heart failure (HCC) ICD-10-CM: I50.23  ICD-9-CM: 428.23, 428.0  4/22/2022 Unknown        * (Principal) Septic shock (HCC) ICD-10-CM: A41.9, R65.21  ICD-9-CM: 038.9, 785.52, 995.92  4/22/2022 Yes        EVERT (acute kidney injury) ([facility] [address] ICD-10-CM: N17.9  ICD-9-CM: 584.9  4/22/2022 Yes        UTI (urinary tract infection) ICD-10-CM: N39.0  ICD-9-CM: 599.0  4/22/2022 Yes                Review of Systems:   A comprehensive review of systems was negative except for that written in the HPI. Vital Signs:    Last 24hrs VS reviewed since prior progress note. Most recent are:  Visit Vitals  /74 (BP 1 Location: Left upper arm, BP Patient Position: At rest)   Pulse 71   Temp 98 °F (36.7 °C)   Resp 18   Ht 5' (1.524 m)   Wt 61 kg (134 lb 8 oz)   SpO2 99%   BMI 26.27 kg/m²         Intake/Output Summary (Last 24 hours) at 4/29/2022 1801  Last data filed at 4/29/2022 1134  Gross per 24 hour   Intake 240 ml   Output 1350 ml   Net -1110 ml        Physical Examination:     I had a face to face encounter with this patient and independently examined them on 4/29/2022 as outlined below:          Constitutional:  No acute distress, cooperative, pleasant    ENT:  Oral mucosa moist, oropharynx benign. Resp:  CTA bilaterally. No wheezing/rhonchi/rales. No accessory muscle use   CV:  Regular rhythm, normal rate, no murmurs, gallops, rubs    GI:  Soft, non distended, non tender. normoactive bowel sounds, no hepatosplenomegaly     Musculoskeletal:  No edema, warm, 2+ pulses throughout    Neurologic:  Moves all extremities.   AAOx3, CN II-XII reviewed            Data Review:    Review and/or order of clinical lab test      Labs:     Recent Labs     04/28/22  0130 04/27/22 0221   WBC 5.9 4.8   HGB 8.1* 8.4*   HCT 26.3* 28.2*    260     Recent Labs     04/29/22  0034 04/28/22  0130 04/27/22 0221    143 145   K 3.4* 3.7 3.6   * 112* 114*   CO2 30 27 24   BUN 18 16 16   CREA 1.04* 1.01 0.98   GLU 93 109* 119*   CA 9.1 8.8 8.4*   MG 1.7 2.1 2.7*     Recent Labs     04/28/22  0130 04/27/22 0221   ALT 37 47   * 579*   TBILI 0.5 0.4   TP 4.3* 4.3*   ALB 1.5* 1.5*   GLOB 2.8 2.8     No results for input(s): INR, PTP, APTT, INREXT, INREXT in the last 72 hours. No results for input(s): FE, TIBC, PSAT, FERR in the last 72 hours. Lab Results   Component Value Date/Time    Folate 37.7 (H) 04/10/2022 03:46 AM      No results for input(s): PH, PCO2, PO2 in the last 72 hours. No results for input(s): CPK, CKNDX, TROIQ in the last 72 hours. No lab exists for component: CPKMB  Lab Results   Component Value Date/Time    Cholesterol, total <50 04/10/2022 03:46 AM    HDL Cholesterol 8 04/10/2022 03:46 AM    LDL, calculated  04/10/2022 03:46 AM     Unable to calculate LDL or VLDL due to low cholesterol.     Triglyceride 96 04/10/2022 03:46 AM    CHOL/HDL Ratio Cannot be calculated 04/10/2022 03:46 AM     Lab Results   Component Value Date/Time    Glucose (POC) 82 04/23/2022 04:00 PM    Glucose (POC) 207 (H) 04/11/2022 11:07 AM    Glucose (POC) 234 (H) 04/11/2022 07:59 AM    Glucose (POC) 59 (L) 04/11/2022 05:43 AM    Glucose (POC) 210 (H) 05/12/2009 04:35 PM     Lab Results   Component Value Date/Time    Color YELLOW/STRAW 04/22/2022 04:08 PM    Appearance TURBID (A) 04/22/2022 04:08 PM    Specific gravity 1.010 04/22/2022 04:08 PM    pH (UA) 6.5 04/22/2022 04:08 PM    Protein 30 (A) 04/22/2022 04:08 PM    Glucose Negative 04/22/2022 04:08 PM    Ketone Negative 04/22/2022 04:08 PM    Bilirubin Negative 04/22/2022 04:08 PM    Urobilinogen 2.0 (H) 04/22/2022 04:08 PM    Nitrites Negative 04/22/2022 04:08 PM    Leukocyte Esterase LARGE (A) 04/22/2022 04:08 PM    Epithelial cells MODERATE (A) 04/22/2022 04:08 PM    Bacteria 4+ (A) 04/22/2022 04:08 PM    WBC >100 (H) 04/22/2022 04:08 PM    RBC 5-10 04/22/2022 04:08 PM         Medications Reviewed:     Current Facility-Administered Medications   Medication Dose Route Frequency    potassium bicarb-citric acid (EFFER-K) tablet 20 mEq  20 mEq Oral DAILY    sacubitriL-valsartan (ENTRESTO) 24-26 mg tablet 0.5 Tablet  0.5 Tablet Oral Q12H    bumetanide (BUMEX) tablet 0.5 mg  0.5 mg Oral BID    spironolactone (ALDACTONE) tablet 12.5 mg  12.5 mg Oral DAILY    carvediloL (COREG) tablet 3.125 mg  3.125 mg Oral BID WITH MEALS    amitriptyline (ELAVIL) tablet 25 mg  25 mg Oral QHS    aspirin delayed-release tablet 81 mg  81 mg Oral DAILY    benzonatate (TESSALON) capsule 100 mg  100 mg Oral TID PRN    gabapentin (NEURONTIN) capsule 200 mg  200 mg Oral BID    latanoprost (XALATAN) 0.005 % ophthalmic solution 1 Drop  1 Drop Left Eye QPM    levothyroxine (SYNTHROID) tablet 25 mcg  25 mcg Oral ACB    midodrine (PROAMATINE) tablet 10 mg  10 mg Oral TID WITH MEALS    pantoprazole (PROTONIX) tablet 40 mg  40 mg Oral ACB    pyridoxine (vitamin B6) (VITAMIN B-6) tablet 25 mg  25 mg Oral DAILY    metoprolol (LOPRESSOR) injection 1.25 mg  1.25 mg IntraVENous Q6H PRN    sodium chloride (NS) flush 5-40 mL  5-40 mL IntraVENous Q8H    sodium chloride (NS) flush 5-40 mL  5-40 mL IntraVENous PRN    0.9% sodium chloride infusion 25 mL  25 mL IntraVENous PRN    acetaminophen (TYLENOL) tablet 650 mg  650 mg Oral Q6H PRN    Or    acetaminophen (TYLENOL) suppository 650 mg  650 mg Rectal Q6H PRN    bisacodyL (DULCOLAX) suppository 10 mg  10 mg Rectal DAILY PRN    promethazine (PHENERGAN) tablet 12.5 mg  12.5 mg Oral Q6H PRN    Or    ondansetron (ZOFRAN) injection 4 mg  4 mg IntraVENous Q6H PRN    heparin (porcine) injection 5,000 Units  5,000 Units SubCUTAneous Q8H     ______________________________________________________________________  EXPECTED LENGTH OF STAY: 4d 19h  ACTUAL LENGTH OF STAY:          Dany Ware MD

## 2022-04-29 NOTE — PROGRESS NOTES
Comprehensive Nutrition Assessment    Type and Reason for Visit: Initial,RD nutrition re-screen/LOS    Nutrition Recommendations/Plan:   1. Continue regular diet as tolerated     Malnutrition Assessment:  Malnutrition Status: Moderate malnutrition (04/29/22 1138)    Context:  Acute illness     Findings of the 6 clinical characteristics of malnutrition:   Energy Intake:  75% or less of est energy req for 7 or more days  Weight Loss:  No significant weight loss     Body Fat Loss:  Mild body fat loss, Fat overlying ribs   Muscle Mass Loss:  Mild muscle mass loss, Clavicles (pectoralis & deltoids),Temples (temporalis)  Fluid Accumulation:   ,     Strength:  Not performed     Nutrition Assessment:    Pt is a 80year old female admitted with Acute on chronic systolic (congestive) heart failure (HCC) [I50.23]. She  has a past medical history of Anemia NEC, Arthritis, CAD (coronary artery disease), Chronic pain, Contact dermatitis and other eczema, due to unspecified cause, Diabetes ([facility] [address] Elevated serum creatinine (4/19/2011), GERD (gastroesophageal reflux disease), HFrEF (heart failure with reduced ejection fraction) ([facility] [address] Hypercholesterolemia, Osteoporosis, post-menopausal (12/10/2010), Pacemaker, Stroke ([facility] [address] and Thromboembolus ([facility] [address] Pt with  and son at bedside. States UBW ~115-120#, denies recent weight or appetite changes. No N/V/D. No pain with chewing/swallowing \"as long as [I] take my time\". Noted food allergies to shellfish and eggs. Patient has been calling down and ordering own means. States appetite is about baseline. Noted current weight is ~20# more than prior admission wt but 4/10/22 wt appears stated. No complaints at this time.  Declines protein supplement at this time    Wt Readings from Last 10 Encounters:   04/29/22 61 kg (134 lb 8 oz)   04/10/22 51.3 kg (113 lb)   05/22/17 62.3 kg (137 lb 6.4 oz)   09/29/15 58.6 kg (129 lb 1.6 oz)   09/05/15 58.1 kg (128 lb)   08/31/15 58.1 kg (128 lb) 05/15/12 57.2 kg (126 lb)   03/19/12 61.2 kg (135 lb)   03/06/12 63 kg (139 lb)   03/01/12 62.1 kg (137 lb)       Nutrition Related Findings:      Wound Type: Multiple (bruises)  Last Bowel Movement Date: 04/28/22  Stool Appearance: Soft  Abdominal Assessment: Intact  Appetite: Fair  Bowel Sounds: Active   Edema:LLE: Trace (4/29/2022  9:15 AM)  RLE: Trace (4/29/2022  9:15 AM)      Nutr. Labs:      Lab Results   Component Value Date/Time    GFR est AA 60 (L) 04/29/2022 12:34 AM    GFR est non-AA 49 (L) 04/29/2022 12:34 AM    Creatinine (POC) 1.5 (H) 03/02/2016 12:08 PM    Creatinine 1.04 (H) 04/29/2022 12:34 AM    BUN 18 04/29/2022 12:34 AM    Sodium 143 04/29/2022 12:34 AM    Potassium 3.4 (L) 04/29/2022 12:34 AM    Chloride 109 (H) 04/29/2022 12:34 AM    CO2 30 04/29/2022 12:34 AM       Lab Results   Component Value Date/Time    Glucose 93 04/29/2022 12:34 AM    Glucose (POC) 82 04/23/2022 04:00 PM       Lab Results   Component Value Date/Time    Hemoglobin A1c 5.1 04/23/2022 12:55 AM       Nutr. Meds:  Dulcolax, bumex, coreg, heparin, synthroid, lopressor, midodrine, zofran, protonix, effer-k, Vit B6, aldactone    Current Nutrition Intake & Therapies:  Average Meal Intake: 26-50%  Average Supplement Intake: None ordered  ADULT DIET Regular; no eggs, likes yogurt    Anthropometric Measures:  Height: 5' (152.4 cm)  Ideal Body Weight (IBW): 100 lbs (45 kg)  Admission Body Weight: 134 lb 7.7 oz  Current Body Wt:  61 kg (134 lb 7.7 oz), 134.5 % IBW. Bed scale  Current BMI (kg/m2): 26.3  Usual Body Weight: 54.4 kg (120 lb)  % Weight Change (Calculated): 12.1  Weight Adjustment: No adjustment                 BMI Category: Overweight (BMI 25.0-29. 9)    Estimated Daily Nutrient Needs:  Energy Requirements Based On: Formula  Weight Used for Energy Requirements: Current  Energy (kcal/day): 1106 (MSJ x 1.2)  Weight Used for Protein Requirements: Current  Protein (g/day): 61-73 (1.0-1.2)  Method Used for Fluid Requirements: 1 ml/kcal  Fluid (ml/day): 1106    Nutrition Diagnosis:   · Moderate malnutrition related to inadequate protein-energy intake (predicted) as evidenced by moderate loss of subcutaneous fat,mild muscle loss      Nutrition Interventions:   Food and/or Nutrient Delivery: Continue current diet  Nutrition Education/Counseling: No recommendations at this time  Coordination of Nutrition Care: Continue to monitor while inpatient,Interdisciplinary rounds       Goals:     Goals: by next RD assessment,PO intake 50% or greater       Nutrition Monitoring and Evaluation:   Behavioral-Environmental Outcomes: None identified  Food/Nutrient Intake Outcomes: Food and nutrient intake  Physical Signs/Symptoms Outcomes: Biochemical data,Weight    Discharge Planning:    Continue current diet     Marlena López MS, RD  Contact: [phone] or via [employer]

## 2022-04-29 NOTE — PROGRESS NOTES
Bedside and Verbal shift change report given to Roberto Solano RN (oncoming nurse) by Mari Bejarano RN (offgoing nurse). Report included the following information SBAR, Kardex, ED Summary, Procedure Summary, Intake/Output, MAR and Recent Results.

## 2022-04-29 NOTE — PROGRESS NOTES
CARDIOLOGY PROGRESS NOTE    Jaime Jerez MD,  [address], [place], [address] [facility]  Phone [phone]; Fax [phone]  Mobile [phone]   Voice Mail [phone]        2022 10:08 PM       Admit Date:           2022  Admit Diagnosis:  Acute on chronic systolic (congestive) heart failure ([facility] [address] [I50.23]  :          1925   MRN:          [id number]      Primary care: Moraima Romero MD         Impression Plan/Recommendation   1. Acute on chronic HFrEF-CT pleural effusions  2. UTI: on abx   3. VEERT: resolved   4. Anemia hemoglobin  7 range  5. Hypernatremia: Na 143   6. Elevated Alk Phos : trending down                1.  Follows with Dr. Lynnette Menezes with [facility]  2. Cont entresto, aldactone, BB, low dose bumex. 3  cont midodrine      Will sign off and see prn               We discussed the expected course, resolution and complications of the diagnosis(es) in detail. Medication risks, benefits, costs, interactions, and alternatives were discussed as indicated. ICD-10-CM ICD-9-CM    1. Acute cystitis with hematuria  N30.01 595.0    2. Pleural effusion  J90 511.9    3. Elevated LFTs  R79.89 790.6    4. Acute on chronic systolic (congestive) heart failure (HCC)  I50.23 428.23      428.0        No intake/output data recorded. Last 3 Recorded Weights in this Encounter    22 0100 22 0432 22 1935   Weight: 55 kg (121 lb 4.1 oz) 58.1 kg (128 lb) 58.7 kg (129 lb 4.8 oz)         1901 -  0700  In: 480 [P.O.:480]  Out: 3000 [Urine:3000]    SUBJECTIVE           Ms. Yadira Rodriguez is a 79 yo hx of chronic resp failure on 4L O2, chronic sCHF EF 35-40%, hypotension, right renal mass, CKD 3, presented w/ suprapupic pain, UTI, septic shock. Has pleural effusions on CT scan.        Critical access hospital reports cont fatigue      Current Facility-Administered Medications   Medication Dose Route Frequency    sacubitriL-valsartan (ENTRESTO) 24-26 mg tablet 0.5 Tablet  0.5 Tablet Oral Q12H    bumetanide (BUMEX) tablet 0.5 mg  0.5 mg Oral BID    spironolactone (ALDACTONE) tablet 12.5 mg  12.5 mg Oral DAILY    carvediloL (COREG) tablet 3.125 mg  3.125 mg Oral BID WITH MEALS    potassium chloride SR (KLOR-CON 10) tablet 20 mEq  20 mEq Oral DAILY    amitriptyline (ELAVIL) tablet 25 mg  25 mg Oral QHS    aspirin delayed-release tablet 81 mg  81 mg Oral DAILY    benzonatate (TESSALON) capsule 100 mg  100 mg Oral TID PRN    gabapentin (NEURONTIN) capsule 200 mg  200 mg Oral BID    latanoprost (XALATAN) 0.005 % ophthalmic solution 1 Drop  1 Drop Left Eye QPM    levothyroxine (SYNTHROID) tablet 25 mcg  25 mcg Oral ACB    midodrine (PROAMATINE) tablet 10 mg  10 mg Oral TID WITH MEALS    pantoprazole (PROTONIX) tablet 40 mg  40 mg Oral ACB    pyridoxine (vitamin B6) (VITAMIN B-6) tablet 25 mg  25 mg Oral DAILY    metoprolol (LOPRESSOR) injection 1.25 mg  1.25 mg IntraVENous Q6H PRN    sodium chloride (NS) flush 5-40 mL  5-40 mL IntraVENous Q8H    sodium chloride (NS) flush 5-40 mL  5-40 mL IntraVENous PRN    0.9% sodium chloride infusion 25 mL  25 mL IntraVENous PRN    acetaminophen (TYLENOL) tablet 650 mg  650 mg Oral Q6H PRN    Or    acetaminophen (TYLENOL) suppository 650 mg  650 mg Rectal Q6H PRN    bisacodyL (DULCOLAX) suppository 10 mg  10 mg Rectal DAILY PRN    promethazine (PHENERGAN) tablet 12.5 mg  12.5 mg Oral Q6H PRN    Or    ondansetron (ZOFRAN) injection 4 mg  4 mg IntraVENous Q6H PRN    heparin (porcine) injection 5,000 Units  5,000 Units SubCUTAneous Q8H      OBJECTIVE               Intake/Output Summary (Last 24 hours) at 4/29/2022 0810  Last data filed at 4/29/2022 4503  Gross per 24 hour   Intake 240 ml   Output 1350 ml   Net -1110 ml       Review of Systems - History obtained from the patient AS PER  HPI        PHYSICAL EXAM        Visit Vitals  BP (!) 121/56 (BP 1 Location: Right lower arm, BP Patient Position: At rest)   Pulse 93   Temp 97.8 °F (36.6 °C)   Resp 18   Ht 5' (1.524 m)   Wt 58.7 kg (129 lb 4.8 oz)   LMP 11/16/1975   SpO2 98%   BMI 25.25 kg/m²       Gen:  deconditioned in no acute distress  alert   HEENT:  Pink conjunctivae, Hearing grossly normal.  Resp: Clear anterior but poor effort  Card: Regular  Skin: No rashes or ulcers, no bruising  Neuro:  follows commands appropriately  Psych: alert, Nml Affect  LE:  Mild/mod edema, L arm edema        DATA REVIEW      04/09/22    ECHO ADULT COMPLETE 04/10/2022 4/10/2022    Interpretation Summary    Left Ventricle: Left ventricle size is normal. Normal wall thickness. See diagram for wall motion findings. The EF by visual approximation is 35 - 40%. visualization ot the endocardium is difficult.   Right Ventricle: Right ventricle is mildly dilated. Lead present in the right ventricle.   Aortic Valve: Tricuspid valve. Moderately calcified cusp.   Mitral Valve: Mildly thickened leaflet. Moderately calcified leaflet. Severe annular calcification of the mitral valve. Calcified subvalvular apparatus. Mild transvalvular regurgitation.   Tricuspid Valve: Mildly thickened leaflets. Mildly elevated RVSP.   Right Atrium: Right atrium is dilated. Signed by: Brookie Sicard, MD on 4/10/2022  6:36 PM        Cardiac monitor: Paced rhythm        Laboratory and Imaging have been reviewed by me and are notable for  No results for input(s): CPK, CKMB, TROIQ in the last 72 hours. Recent Labs     04/29/22  0034 04/28/22  0130 04/27/22  0221 04/26/22  1015 04/26/22  1015    143 145   < > 144   K 3.4* 3.7 3.6   < > 3.8   CO2 30 27 24   < > 24   BUN 18 16 16   < > 15   CREA 1.04* 1.01 0.98   < > 0.92   GLU 93 109* 119*   < > 98   MG 1.7 2.1 2.7*   < > 1.3*   WBC  --  5.9 4.8  --  6.3   HGB  --  8.1* 8.4*  --  9.3*   HCT  --  26.3* 28.2*  --  30.1*   PLT  --  267 260  --  296    < > = values in this interval not displayed.              Carmita Dawson, NP

## 2022-04-29 NOTE — PROGRESS NOTES
Problem: Self Care Deficits Care Plan (Adult)  Goal: *Acute Goals and Plan of Care (Insert Text)  Description: FUNCTIONAL STATUS PRIOR TO ADMISSION: Per chart at last admission to [facility] 2 weeks ago (eval on 4/11/2022): \"Patient was modified independent using a walker for functional mobility and short distance ambulation. She uses a wheelchair for longer distances and community ambulation. Patient needs occasional assistance for LB ADLs but otherwise independent with self care. \"  Pt reports using 4L O2 at baseline. After that admission, pt went to [facility] for rehab. HOME SUPPORT: The patient lived with  and son. This admission is received from SNF. Occupational Therapy Goals  Initiated 4/24/2022  1. Patient will perform grooming, standing at sink, with modified independence within 7 day(s). 2.  Patient will perform lower body dressing with minimal assistance/contact guard assist within 7 day(s). 3.  Patient will perform bathing with minimal assistance/contact guard assist within 7 day(s). 4.  Patient will perform toilet transfers with supervision/set-up within 7 day(s). 5.  Patient will perform all aspects of toileting with supervision/set-up within 7 day(s). 6.  Patient will participate in upper extremity therapeutic exercise/activities with supervision/set-up for 5 minutes within 7 day(s). 7.  Patient will utilize energy conservation techniques during functional activities with verbal cues within 7 day(s). Outcome: Progressing Towards Goal   OCCUPATIONAL THERAPY TREATMENT  Patient: Basil James (89 y.o. female)  Date: 4/29/2022  Diagnosis: Acute on chronic systolic (congestive) heart failure ([facility] [address] [I50.23] Septic shock ([facility] [address]       Precautions:    Chart, occupational therapy assessment, plan of care, and goals were reviewed. ASSESSMENT  Patient continues with skilled OT services and is progressing towards goals.   Patient received supine in bed with HOB elevated, completing lunch. She is agreeable to OOB to chair. Patient requires mod assist x 2 to come to EOB . Once seated, patient with attempt to don socks. She is unable and requires total assistance to perform. Sit to stand with min assist and transfer to chair with min x 2. Patient reporting difficulty withR foot. Verbal cues for hand placement and best technique. Patient left in NAD in chair. Current Level of Function Impacting Discharge (ADLs): min x 2,  up to total assist for LE ADLs    Other factors to consider for discharge:          PLAN :  Patient continues to benefit from skilled intervention to address the above impairments. Continue treatment per established plan of care to address goals. Recommend with staff: OOB to chair     Recommend next OT session: continue goals    Recommendation for discharge: (in order for the patient to meet his/her long term goals)  Therapy up to 5 days/week in SNF setting    This discharge recommendation:  Has been made in collaboration with the attending provider and/or case management    IF patient discharges home will need the following DME: TBD       SUBJECTIVE:   Patient stated I met him when I was 15 yrs old.     OBJECTIVE DATA SUMMARY:   Cognitive/Behavioral Status:  Neurologic State: Alert  Orientation Level: Oriented X4  Cognition: Follows commands             Functional Mobility and Transfers for ADLs:  Bed Mobility:  Supine to Sit: Moderate assistance;Assist x2  Scooting: Moderate assistance    Transfers:  Sit to Stand: Minimum assistance;Assist x1     Bed to Chair: Minimum assistance;Assist x1    Balance:  Sitting: Intact  Standing: Impaired; With support  Standing - Static: Good;Constant support  Standing - Dynamic : Fair;Constant support    ADL Intervention:                           Lower Body Dressing Assistance  Socks:  Total assistance (dependent)  Leg Crossed Method Used: No  Position Performed: Seated edge of bed  Cues: Physical assistance Therapeutic Exercises:       Pain:  None reported     Activity Tolerance:   Good    After treatment patient left in no apparent distress:   Sitting in chair and Call bell within reach    COMMUNICATION/COLLABORATION:   The patients plan of care was discussed with: Physical therapist and Registered nurse.      Natali Powell, OTR/L  Time Calculation: 34 mins

## 2022-04-29 NOTE — PROGRESS NOTES
0930: Pt unable to swallow potassium pills. Sent message to Dr. Stephanie Rivera requesting a change to effer-k. Per Dr. Stephanie Rivera OK to change potassium to effer-k tablets. 1100: Per Dr. Stephanie billy.

## 2022-04-29 NOTE — PROGRESS NOTES
1730-report received from off going Rn and care resumed for rakesh YI completed and whiteboard updated,introductions made, pt appears to be resting in bed at this time, denies pain, will continue to monitor and act accordingly  1800-pt cleaned and repositioned in bed  1915-report given to oncoming RN and all questions answered

## 2022-04-29 NOTE — PROGRESS NOTES
4/29/2022  Case Management Progress Note    3:23 PM  Spoke with Michele Smith at [facility], she will call patient this afternoon. KAT Pardo    2:31 PM  Left a voicemail for Michele Smith in admissions @ [address], MSW    10:09 AM  Patient is 80year old female admitted 4/22 for septic shock  Patient's RUR is 16% yellow/moderate risk for readmission  Covid test: none this admission  Chart reviewed  Plan remains for patient to return to [place] [id number] when ready for discharge--called yesterday and they had a bed however patient not discharged. Will continue to follow and assist with discharge planning. Transition of Care Plan   1. Continue medical management/treatment  2. To [facility] when ready   3. Stretcher transport likely  4.  CM will continue to follow    KAT Pardo

## 2022-04-29 NOTE — PROGRESS NOTES
Problem: Mobility Impaired (Adult and Pediatric)  Goal: *Acute Goals and Plan of Care (Insert Text)  Description: FUNCTIONAL STATUS PRIOR TO ADMISSION: Patient required minimal assistance for basic and instrumental ADLs. HOME SUPPORT PRIOR TO ADMISSION: The patient lived at SNF . Physical Therapy Goals  Initiated 4/24/2022  1. Patient will move from supine to sit and sit to supine , scoot up and down, and roll side to side in bed with minimal assistance/contact guard assist within 7 day(s). 2.  Patient will transfer from bed to chair and chair to bed with minimal assistance/contact guard assist using the least restrictive device within 7 day(s). 3.  Patient will perform sit to stand with minimal assistance/contact guard assist within 7 day(s). 4.  Patient will ambulate with minimal assistance/contact guard assist for 200 feet with the least restrictive device within 7 day(s). Outcome: Progressing Towards Goal   PHYSICAL THERAPY TREATMENT  Patient: Nathan Heller (72 y.o. female)  Date: 4/29/2022  Diagnosis: Acute on chronic systolic (congestive) heart failure (HCC) [I50.23] Septic shock (HCC)       Precautions:    Chart, physical therapy assessment, plan of care and goals were reviewed. ASSESSMENT  Patient continues with skilled PT services and is progressing towards goals. Patient demonstrates improved gait tolerance with less reliance on therapist for support and guidance of RW. 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 Exhibits improved standing balance today during transfer and gait but still was anxious to sit earlier than appropriate for distance from chair, improved from yesterday still. Cont to recommend SNF rehab, progressing but slow given comorbidities, orthopedic issues and advanced age.       Current Level of Function Impacting Discharge (mobility/balance): MIN A x 1-2 for gait and transfers (bed to chair only), MOD A x 2 for bed mob    Other factors to consider for discharge:  unable to assist patient         PLAN :  Patient continues to benefit from skilled intervention to address the above impairments. Continue treatment per established plan of care. to address goals. Recommendation for discharge: (in order for the patient to meet his/her long term goals)  Therapy up to 5 days/week in SNF setting    This discharge recommendation:  Has been made in collaboration with the attending provider and/or case management    IF patient discharges home will need the following DME: to be determined (TBD)       SUBJECTIVE:   Patient stated Do you think I look that good for my age? Lawerence Side    OBJECTIVE DATA SUMMARY:   Critical Behavior:  Neurologic State: Alert  Orientation Level: Oriented X4  Cognition: Follows commands  Safety/Judgement: Awareness of environment  Functional Mobility Training:  Bed Mobility:     Supine to Sit: Moderate assistance;Assist x2     Scooting: Moderate assistance        Transfers:  Sit to Stand: Minimum assistance;Assist x1  Stand to Sit: Minimum assistance;Assist x1        Bed to Chair: Minimum assistance;Assist x1                    Balance:  Sitting: Intact  Standing: Impaired; With support  Standing - Static: Good;Constant support  Standing - Dynamic : Fair;Constant support  Ambulation/Gait Training:  Distance (ft): 3 Feet (ft)  Assistive Device: Gait belt;Walker, rolling  Ambulation - Level of Assistance: Minimal assistance;Assist x2        Gait Abnormalities: Decreased step clearance        Base of Support: Narrowed     Speed/Jenise: Slow;Shuffled  Step Length: Right shortened;Left shortened                      Pain Rating:  None reported    Activity Tolerance:   Fair, desaturates with exertion and requires oxygen, and requires rest breaks    After treatment patient left in no apparent distress:   Sitting in chair, Call bell within reach, and Bed / chair alarm activated    COMMUNICATION/COLLABORATION:   The patients plan of care was discussed with: Occupational therapist and Registered nurse.      [facility] [place] [facility]   Time Calculation: 24 mins

## 2022-04-30 PROCEDURE — 74011250637 HC RX REV CODE- 250/637: Performed by: INTERNAL MEDICINE

## 2022-04-30 PROCEDURE — 74011250636 HC RX REV CODE- 250/636: Performed by: INTERNAL MEDICINE

## 2022-04-30 PROCEDURE — 65270000046 HC RM TELEMETRY

## 2022-04-30 PROCEDURE — 94761 N-INVAS EAR/PLS OXIMETRY MLT: CPT

## 2022-04-30 PROCEDURE — 77010033678 HC OXYGEN DAILY

## 2022-04-30 PROCEDURE — 74011250637 HC RX REV CODE- 250/637: Performed by: NURSE PRACTITIONER

## 2022-04-30 PROCEDURE — 77030038269 HC DRN EXT URIN PURWCK BARD -A

## 2022-04-30 RX ADMIN — MIDODRINE HYDROCHLORIDE 10 MG: 5 TABLET ORAL at 13:23

## 2022-04-30 RX ADMIN — MIDODRINE HYDROCHLORIDE 10 MG: 5 TABLET ORAL at 17:00

## 2022-04-30 RX ADMIN — ASPIRIN 81 MG: 81 TABLET, COATED ORAL at 08:24

## 2022-04-30 RX ADMIN — AMITRIPTYLINE HYDROCHLORIDE 25 MG: 50 TABLET, FILM COATED ORAL at 20:35

## 2022-04-30 RX ADMIN — SACUBITRIL AND VALSARTAN 0.5 TABLET: 24; 26 TABLET, FILM COATED ORAL at 08:38

## 2022-04-30 RX ADMIN — BUMETANIDE 0.5 MG: 1 TABLET ORAL at 17:00

## 2022-04-30 RX ADMIN — SPIRONOLACTONE 12.5 MG: 25 TABLET ORAL at 08:23

## 2022-04-30 RX ADMIN — SACUBITRIL AND VALSARTAN 0.5 TABLET: 24; 26 TABLET, FILM COATED ORAL at 20:34

## 2022-04-30 RX ADMIN — BUMETANIDE 0.5 MG: 1 TABLET ORAL at 08:23

## 2022-04-30 RX ADMIN — CARVEDILOL 3.12 MG: 3.12 TABLET, FILM COATED ORAL at 17:00

## 2022-04-30 RX ADMIN — GABAPENTIN 200 MG: 100 CAPSULE ORAL at 17:00

## 2022-04-30 RX ADMIN — MIDODRINE HYDROCHLORIDE 10 MG: 5 TABLET ORAL at 08:24

## 2022-04-30 RX ADMIN — POTASSIUM BICARBONATE 20 MEQ: 782 TABLET, EFFERVESCENT ORAL at 08:23

## 2022-04-30 RX ADMIN — HEPARIN SODIUM 5000 UNITS: 5000 INJECTION INTRAVENOUS; SUBCUTANEOUS at 05:57

## 2022-04-30 RX ADMIN — PANTOPRAZOLE SODIUM 40 MG: 40 TABLET, DELAYED RELEASE ORAL at 08:23

## 2022-04-30 RX ADMIN — GABAPENTIN 200 MG: 100 CAPSULE ORAL at 08:23

## 2022-04-30 RX ADMIN — HEPARIN SODIUM 5000 UNITS: 5000 INJECTION INTRAVENOUS; SUBCUTANEOUS at 13:25

## 2022-04-30 RX ADMIN — LEVOTHYROXINE SODIUM 25 MCG: 0.03 TABLET ORAL at 08:24

## 2022-04-30 RX ADMIN — PYRIDOXINE HCL TAB 50 MG 25 MG: 50 TAB at 08:23

## 2022-04-30 RX ADMIN — LATANOPROST 1 DROP: 50 SOLUTION OPHTHALMIC at 17:01

## 2022-04-30 RX ADMIN — CARVEDILOL 3.12 MG: 3.12 TABLET, FILM COATED ORAL at 08:24

## 2022-04-30 RX ADMIN — HEPARIN SODIUM 5000 UNITS: 5000 INJECTION INTRAVENOUS; SUBCUTANEOUS at 20:36

## 2022-04-30 NOTE — ROUTINE PROCESS
Bedside and Verbal shift change report given to Lucy Moran RN (oncoming nurse) by Izabel Page RN (offgoing nurse). Report included the following information SBAR, Kardex, ED Summary and MAR.

## 2022-04-30 NOTE — PROGRESS NOTES
[facility]  Hospitalist Group                                                                                          Hospitalist Progress Note  Catrina Cano MD  Answering service: [phone] from in house phone        Date of Service:  2022  NAME:  Ciera Esparza  :  1925  MRN:  [id number]      Admission Summary: \"The patient is a 81 yo hx of chronic resp failure on 4L O2, chronic sCHF EF 35-40%, hypotension, right renal mass, CKD 3, presented w/ suprapupic pain, UTI, septic shock, CHF. The patient was admitted 1 week ago for pancreatitis. She improved with IVF and pain meds. Today, the patient c/o suprapubic pain for 2 days, associated with fevers/chills and fatigue. She denied chest pain, cough, nausea, vomiting, diarrhea. In the ED, SBP ~80s, temp 101.7, WBC 6.7, lactate 2.2. U/A significant for a UTI. Abd CT showed improved peripancreatic stranding, but moderate pleural effusion\"    Interval history / Subjective:     2022. No acute events overnight. Saw patient this morning, awake alert and oriented, very pleasant and cooperative with physical examination, denies any fever, chills, nausea, vomiting. Believes that her sepsis was caused because she choked on one of her multivitamins. 2022. No acute events overnight. Saw patient this morning, awake alert and oriented in no apparent distress, receiving physical therapy, does not appear to be in any apparent distress, has been off of pressors for the last 4 hours, vitals appears to be within normal limits, has been followed by cardiologist, possible discharge tomorrow. 2022. No acute events overnight.   Patient this morning, awake alert and oriented, no apparent distress, stating that she is still not ready to be discharged to rehab, she feels weak and she will refill to participate in her rehab, still complaining of significant lower extremity dyspnea on exertion, her medication has been reconsulted and patient hopefully can be discharged home tomorrow, patient has a central line going which she will Remove however she adamantly refuses to remove it because stating that it took a hard time to get her central line. 4/27/2020. No acute events overnight. Saw patient this morning, awake alert and oriented, stating that she still does not feel ready to be going to back to rehab as she may not be able to participate in any rehab activity as she feels poor. Her blood pressure has been on the soft side and her BP medication has been reconciled. Cardiology on board. Recommendations noted. 4/29/2022. No acute events overnight. Saw patient during rounds, awake alert and oriented no apparent distress, pleasant and cooperative with physical examination, vitals stable, blood pressure soft however asymptomatic, patient is saying that she is feeling much better but she does not want to be discharged until Monday because she is afraid that if she is discharged over the weekend the rehab facility they will not be able to taking care of her property. Patient medically ready to be discharged. 4/30/2022. No acute events overnight. Awake alert and oriented, in no apparent distress, vitals stable, medically cleared to be discharged however does not want to be discharged until Monday. Assessment & Plan:     1) Septic shock/UTI: 3/4 SIRS with SBP <90. Will monitor on step down. Check blood Cx, urine Cx, lactate. Given multiple allergies, will start IV Meropenem. Start Kyree gtt for hypotension. Be careful with IVF due to CHF. Discussed with ED physician about central line if unable to obtain good peripheral IVs     2) Acute on chronic systolic (congestive) heart failure/pleural effusions: last echo with EF 35-40%. Pro BNP >10,000 on admission. BP too low for diuretics. Will use IV bumex prn for when BP can tolerate. Consult Cards     3) EVERT/CKD 3: due to hypoperfusion.   Will cont IVF, pressors gtt. Monitor closely     4) Recent pancreatitis: resolved on CT. Will monitor closely     5) Chronic resp failure: on chronic 4L O2 due to CHF. Cont O2, incentive spirometry     6) R renal mass: likely RCC. Patient has refused w/u in the past     Code status: DNR  DVT prophylaxis: Lovenox    Care Plan discussed with: Patient/Family  Anticipated Disposition: Home w/Family  Anticipated Discharge: Less than 24 hours     Hospital Problems  Date Reviewed: 4/22/2022          Codes Class Noted POA    Acute on chronic systolic (congestive) heart failure (HCC) ICD-10-CM: I50.23  ICD-9-CM: 428.23, 428.0  4/22/2022 Unknown        * (Principal) Septic shock (HCC) ICD-10-CM: A41.9, R65.21  ICD-9-CM: 038.9, 785.52, 995.92  4/22/2022 Yes        EVERT (acute kidney injury) ([facility] [address] ICD-10-CM: N17.9  ICD-9-CM: 584.9  4/22/2022 Yes        UTI (urinary tract infection) ICD-10-CM: N39.0  ICD-9-CM: 599.0  4/22/2022 Yes                Review of Systems:   A comprehensive review of systems was negative except for that written in the HPI. Vital Signs:    Last 24hrs VS reviewed since prior progress note. Most recent are:  Visit Vitals  /60 (BP 1 Location: Right upper arm, BP Patient Position: At rest)   Pulse 90   Temp 97.8 °F (36.6 °C)   Resp 20   Ht 5' (1.524 m)   Wt 58.8 kg (129 lb 10.4 oz)   SpO2 100%   BMI 25.32 kg/m²         Intake/Output Summary (Last 24 hours) at 4/30/2022 1447  Last data filed at 4/30/2022 1418  Gross per 24 hour   Intake 940 ml   Output --   Net 940 ml        Physical Examination:     I had a face to face encounter with this patient and independently examined them on 4/30/2022 as outlined below:          Constitutional:  No acute distress, cooperative, pleasant    ENT:  Oral mucosa moist, oropharynx benign. Resp:  CTA bilaterally. No wheezing/rhonchi/rales. No accessory muscle use   CV:  Regular rhythm, normal rate, no murmurs, gallops, rubs    GI:  Soft, non distended, non tender. normoactive bowel sounds, no hepatosplenomegaly     Musculoskeletal:  No edema, warm, 2+ pulses throughout    Neurologic:  Moves all extremities. AAOx3, CN II-XII reviewed            Data Review:    Review and/or order of clinical lab test      Labs:     Recent Labs     04/28/22  0130   WBC 5.9   HGB 8.1*   HCT 26.3*        Recent Labs     04/29/22  0034 04/28/22  0130    143   K 3.4* 3.7   * 112*   CO2 30 27   BUN 18 16   CREA 1.04* 1.01   GLU 93 109*   CA 9.1 8.8   MG 1.7 2.1     Recent Labs     04/28/22  0130   ALT 37   *   TBILI 0.5   TP 4.3*   ALB 1.5*   GLOB 2.8     No results for input(s): INR, PTP, APTT, INREXT, INREXT in the last 72 hours. No results for input(s): FE, TIBC, PSAT, FERR in the last 72 hours. Lab Results   Component Value Date/Time    Folate 37.7 (H) 04/10/2022 03:46 AM      No results for input(s): PH, PCO2, PO2 in the last 72 hours. No results for input(s): CPK, CKNDX, TROIQ in the last 72 hours. No lab exists for component: CPKMB  Lab Results   Component Value Date/Time    Cholesterol, total <50 04/10/2022 03:46 AM    HDL Cholesterol 8 04/10/2022 03:46 AM    LDL, calculated  04/10/2022 03:46 AM     Unable to calculate LDL or VLDL due to low cholesterol.     Triglyceride 96 04/10/2022 03:46 AM    CHOL/HDL Ratio Cannot be calculated 04/10/2022 03:46 AM     Lab Results   Component Value Date/Time    Glucose (POC) 82 04/23/2022 04:00 PM    Glucose (POC) 207 (H) 04/11/2022 11:07 AM    Glucose (POC) 234 (H) 04/11/2022 07:59 AM    Glucose (POC) 59 (L) 04/11/2022 05:43 AM    Glucose (POC) 210 (H) 05/12/2009 04:35 PM     Lab Results   Component Value Date/Time    Color YELLOW/STRAW 04/22/2022 04:08 PM    Appearance TURBID (A) 04/22/2022 04:08 PM    Specific gravity 1.010 04/22/2022 04:08 PM    pH (UA) 6.5 04/22/2022 04:08 PM    Protein 30 (A) 04/22/2022 04:08 PM    Glucose Negative 04/22/2022 04:08 PM    Ketone Negative 04/22/2022 04:08 PM    Bilirubin Negative 04/22/2022 04:08 PM    Urobilinogen 2.0 (H) 04/22/2022 04:08 PM    Nitrites Negative 04/22/2022 04:08 PM    Leukocyte Esterase LARGE (A) 04/22/2022 04:08 PM    Epithelial cells MODERATE (A) 04/22/2022 04:08 PM    Bacteria 4+ (A) 04/22/2022 04:08 PM    WBC >100 (H) 04/22/2022 04:08 PM    RBC 5-10 04/22/2022 04:08 PM         Medications Reviewed:     Current Facility-Administered Medications   Medication Dose Route Frequency    potassium bicarb-citric acid (EFFER-K) tablet 20 mEq  20 mEq Oral DAILY    sacubitriL-valsartan (ENTRESTO) 24-26 mg tablet 0.5 Tablet  0.5 Tablet Oral Q12H    bumetanide (BUMEX) tablet 0.5 mg  0.5 mg Oral BID    spironolactone (ALDACTONE) tablet 12.5 mg  12.5 mg Oral DAILY    carvediloL (COREG) tablet 3.125 mg  3.125 mg Oral BID WITH MEALS    amitriptyline (ELAVIL) tablet 25 mg  25 mg Oral QHS    aspirin delayed-release tablet 81 mg  81 mg Oral DAILY    benzonatate (TESSALON) capsule 100 mg  100 mg Oral TID PRN    gabapentin (NEURONTIN) capsule 200 mg  200 mg Oral BID    latanoprost (XALATAN) 0.005 % ophthalmic solution 1 Drop  1 Drop Left Eye QPM    levothyroxine (SYNTHROID) tablet 25 mcg  25 mcg Oral ACB    midodrine (PROAMATINE) tablet 10 mg  10 mg Oral TID WITH MEALS    pantoprazole (PROTONIX) tablet 40 mg  40 mg Oral ACB    pyridoxine (vitamin B6) (VITAMIN B-6) tablet 25 mg  25 mg Oral DAILY    metoprolol (LOPRESSOR) injection 1.25 mg  1.25 mg IntraVENous Q6H PRN    sodium chloride (NS) flush 5-40 mL  5-40 mL IntraVENous Q8H    sodium chloride (NS) flush 5-40 mL  5-40 mL IntraVENous PRN    0.9% sodium chloride infusion 25 mL  25 mL IntraVENous PRN    acetaminophen (TYLENOL) tablet 650 mg  650 mg Oral Q6H PRN    Or    acetaminophen (TYLENOL) suppository 650 mg  650 mg Rectal Q6H PRN    bisacodyL (DULCOLAX) suppository 10 mg  10 mg Rectal DAILY PRN    promethazine (PHENERGAN) tablet 12.5 mg  12.5 mg Oral Q6H PRN    Or    ondansetron (ZOFRAN) injection 4 mg  4 mg IntraVENous Q6H PRN    heparin (porcine) injection 5,000 Units  5,000 Units SubCUTAneous Q8H     ______________________________________________________________________  EXPECTED LENGTH OF STAY: 4d 19h  ACTUAL LENGTH OF STAY:          Sary Latham MD

## 2022-05-01 LAB
COVID-19 RAPID TEST, COVR: NOT DETECTED
SOURCE, COVRS: NORMAL

## 2022-05-01 PROCEDURE — 74011250637 HC RX REV CODE- 250/637: Performed by: INTERNAL MEDICINE

## 2022-05-01 PROCEDURE — 74011250636 HC RX REV CODE- 250/636: Performed by: INTERNAL MEDICINE

## 2022-05-01 PROCEDURE — 77010033678 HC OXYGEN DAILY

## 2022-05-01 PROCEDURE — 65270000046 HC RM TELEMETRY

## 2022-05-01 PROCEDURE — 94761 N-INVAS EAR/PLS OXIMETRY MLT: CPT

## 2022-05-01 PROCEDURE — 77030038269 HC DRN EXT URIN PURWCK BARD -A

## 2022-05-01 PROCEDURE — 51798 US URINE CAPACITY MEASURE: CPT

## 2022-05-01 PROCEDURE — 87635 SARS-COV-2 COVID-19 AMP PRB: CPT

## 2022-05-01 PROCEDURE — 74011250637 HC RX REV CODE- 250/637: Performed by: NURSE PRACTITIONER

## 2022-05-01 RX ADMIN — HEPARIN SODIUM 5000 UNITS: 5000 INJECTION INTRAVENOUS; SUBCUTANEOUS at 21:00

## 2022-05-01 RX ADMIN — AMITRIPTYLINE HYDROCHLORIDE 25 MG: 50 TABLET, FILM COATED ORAL at 21:00

## 2022-05-01 RX ADMIN — HEPARIN SODIUM 5000 UNITS: 5000 INJECTION INTRAVENOUS; SUBCUTANEOUS at 05:38

## 2022-05-01 RX ADMIN — MIDODRINE HYDROCHLORIDE 10 MG: 5 TABLET ORAL at 08:42

## 2022-05-01 RX ADMIN — ASPIRIN 81 MG: 81 TABLET, COATED ORAL at 08:43

## 2022-05-01 RX ADMIN — MIDODRINE HYDROCHLORIDE 10 MG: 5 TABLET ORAL at 17:35

## 2022-05-01 RX ADMIN — SACUBITRIL AND VALSARTAN 0.5 TABLET: 24; 26 TABLET, FILM COATED ORAL at 08:42

## 2022-05-01 RX ADMIN — CARVEDILOL 3.12 MG: 3.12 TABLET, FILM COATED ORAL at 08:42

## 2022-05-01 RX ADMIN — POTASSIUM BICARBONATE 20 MEQ: 782 TABLET, EFFERVESCENT ORAL at 08:41

## 2022-05-01 RX ADMIN — SPIRONOLACTONE 12.5 MG: 25 TABLET ORAL at 08:42

## 2022-05-01 RX ADMIN — GABAPENTIN 200 MG: 100 CAPSULE ORAL at 08:42

## 2022-05-01 RX ADMIN — BUMETANIDE 0.5 MG: 1 TABLET ORAL at 17:35

## 2022-05-01 RX ADMIN — BUMETANIDE 0.5 MG: 1 TABLET ORAL at 08:42

## 2022-05-01 RX ADMIN — CARVEDILOL 3.12 MG: 3.12 TABLET, FILM COATED ORAL at 17:35

## 2022-05-01 RX ADMIN — LATANOPROST 1 DROP: 50 SOLUTION OPHTHALMIC at 17:37

## 2022-05-01 RX ADMIN — PANTOPRAZOLE SODIUM 40 MG: 40 TABLET, DELAYED RELEASE ORAL at 05:49

## 2022-05-01 RX ADMIN — HEPARIN SODIUM 5000 UNITS: 5000 INJECTION INTRAVENOUS; SUBCUTANEOUS at 13:20

## 2022-05-01 RX ADMIN — ACETAMINOPHEN 650 MG: 325 TABLET ORAL at 15:21

## 2022-05-01 RX ADMIN — PYRIDOXINE HCL TAB 50 MG 25 MG: 50 TAB at 08:42

## 2022-05-01 RX ADMIN — SACUBITRIL AND VALSARTAN 0.5 TABLET: 24; 26 TABLET, FILM COATED ORAL at 20:55

## 2022-05-01 RX ADMIN — LEVOTHYROXINE SODIUM 25 MCG: 0.03 TABLET ORAL at 05:49

## 2022-05-01 RX ADMIN — MIDODRINE HYDROCHLORIDE 10 MG: 5 TABLET ORAL at 13:20

## 2022-05-01 RX ADMIN — GABAPENTIN 200 MG: 100 CAPSULE ORAL at 17:35

## 2022-05-01 NOTE — ROUTINE PROCESS
Bedside and Verbal shift change report given to Noman Solitario RN (oncoming nurse) by Reddy Little RN (offgoing nurse).  Report included the following information SBAR, Kardex, ED Summary and MAR.

## 2022-05-01 NOTE — PROGRESS NOTES
5/1/22  2:16 PM    Patient will be ready for dc tomorrow. Discussed with patient and called [facility] and rehab and spoke to Abril Castro with admissions. She said they will be able to accept and patient will need a rapid covid test. CM called the unit to ask for a rapid covid test. CM called and spoke to the family and they would like [facility] set up. CM requesting 11:00AM and informed MD of the plan for tomorrow.      [facility]

## 2022-05-01 NOTE — PROGRESS NOTES
[facility] Adult  Hospitalist Group                                                                                          Hospitalist Progress Note  Leonie Brian MD  Answering service: [phone] from in house phone        Date of Service:  2022  NAME:  Mayito Zamudio  :  1925  MRN:  [id number]      Admission Summary: \"The patient is a 79 yo hx of chronic resp failure on 4L O2, chronic sCHF EF 35-40%, hypotension, right renal mass, CKD 3, presented w/ suprapupic pain, UTI, septic shock, CHF. The patient was admitted 1 week ago for pancreatitis. She improved with IVF and pain meds. Today, the patient c/o suprapubic pain for 2 days, associated with fevers/chills and fatigue. She denied chest pain, cough, nausea, vomiting, diarrhea. In the ED, SBP ~80s, temp 101.7, WBC 6.7, lactate 2.2. U/A significant for a UTI. Abd CT showed improved peripancreatic stranding, but moderate pleural effusion\"    Interval history / Subjective:     2022. No acute events overnight. Saw patient this morning, awake alert and oriented, very pleasant and cooperative with physical examination, denies any fever, chills, nausea, vomiting. Believes that her sepsis was caused because she choked on one of her multivitamins. 2022. No acute events overnight. Saw patient this morning, awake alert and oriented in no apparent distress, receiving physical therapy, does not appear to be in any apparent distress, has been off of pressors for the last 4 hours, vitals appears to be within normal limits, has been followed by cardiologist, possible discharge tomorrow. 2022. No acute events overnight.   Patient this morning, awake alert and oriented, no apparent distress, stating that she is still not ready to be discharged to rehab, she feels weak and she will refill to participate in her rehab, still complaining of significant lower extremity dyspnea on exertion, her medication has been reconsulted and patient hopefully can be discharged home tomorrow, patient has a central line going which she will Remove however she adamantly refuses to remove it because stating that it took a hard time to get her central line. 4/27/2020. No acute events overnight. Saw patient this morning, awake alert and oriented, stating that she still does not feel ready to be going to back to rehab as she may not be able to participate in any rehab activity as she feels poor. Her blood pressure has been on the soft side and her BP medication has been reconciled. Cardiology on board. Recommendations noted. 4/29/2022. No acute events overnight. Saw patient during rounds, awake alert and oriented no apparent distress, pleasant and cooperative with physical examination, vitals stable, blood pressure soft however asymptomatic, patient is saying that she is feeling much better but she does not want to be discharged until Monday because she is afraid that if she is discharged over the weekend the rehab facility they will not be able to taking care of her property. Patient medically ready to be discharged. 4/30/2022. No acute events overnight. Awake alert and oriented, in no apparent distress, vitals stable, medically cleared to be discharged however does not want to be discharged until Monday. 5/1/2021. No acute events overnight. Patient this morning, still refusing to be discharged today, prefers to be discharged tomorrow. Denies any fever, chills, nausea, vomiting. Awake alert and oriented, pleasant and cooperative with physical examination. Assessment & Plan:     1) Septic shock/UTI: 3/4 SIRS with SBP <90. Will monitor on step down. Check blood Cx, urine Cx, lactate. Given multiple allergies, will start IV Meropenem. Start Kyree gtt for hypotension. Be careful with IVF due to CHF.   Discussed with ED physician about central line if unable to obtain good peripheral IVs     2) Acute on chronic systolic (congestive) heart failure/pleural effusions: last echo with EF 35-40%. Pro BNP >10,000 on admission. BP too low for diuretics. Will use IV bumex prn for when BP can tolerate. Consult Cards     3) EVERT/CKD 3: due to hypoperfusion. Will cont IVF, pressors gtt. Monitor closely     4) Recent pancreatitis: resolved on CT. Will monitor closely     5) Chronic resp failure: on chronic 4L O2 due to CHF. Cont O2, incentive spirometry     6) R renal mass: likely RCC. Patient has refused w/u in the past     Code status: DNR  DVT prophylaxis: Lovenox    Care Plan discussed with: Patient/Family  Anticipated Disposition: Home w/Family  Anticipated Discharge: Less than 24 hours     Hospital Problems  Date Reviewed: 4/22/2022          Codes Class Noted POA    Acute on chronic systolic (congestive) heart failure (HCC) ICD-10-CM: I50.23  ICD-9-CM: 428.23, 428.0  4/22/2022 Unknown        * (Principal) Septic shock (HCC) ICD-10-CM: A41.9, R65.21  ICD-9-CM: 038.9, 785.52, 995.92  4/22/2022 Yes        EVERT (acute kidney injury) ([facility] [address] ICD-10-CM: N17.9  ICD-9-CM: 584.9  4/22/2022 Yes        UTI (urinary tract infection) ICD-10-CM: N39.0  ICD-9-CM: 599.0  4/22/2022 Yes                Review of Systems:   A comprehensive review of systems was negative except for that written in the HPI. Vital Signs:    Last 24hrs VS reviewed since prior progress note.  Most recent are:  Visit Vitals  /61 (BP 1 Location: Right lower arm, BP Patient Position: At rest)   Pulse 82   Temp 97.7 °F (36.5 °C)   Resp 18   Ht 5' (1.524 m)   Wt 58.8 kg (129 lb 10.4 oz)   SpO2 99%   BMI 25.32 kg/m²         Intake/Output Summary (Last 24 hours) at 5/1/2022 1405  Last data filed at 5/1/2022 1049  Gross per 24 hour   Intake 600 ml   Output 700 ml   Net -100 ml        Physical Examination:     I had a face to face encounter with this patient and independently examined them on 5/1/2022 as outlined below:          Constitutional:  No acute distress, cooperative, pleasant    ENT:  Oral mucosa moist, oropharynx benign. Resp:  CTA bilaterally. No wheezing/rhonchi/rales. No accessory muscle use   CV:  Regular rhythm, normal rate, no murmurs, gallops, rubs    GI:  Soft, non distended, non tender. normoactive bowel sounds, no hepatosplenomegaly     Musculoskeletal:  No edema, warm, 2+ pulses throughout    Neurologic:  Moves all extremities. AAOx3, CN II-XII reviewed            Data Review:    Review and/or order of clinical lab test      Labs:     No results for input(s): WBC, HGB, HCT, PLT, HGBEXT, HCTEXT, PLTEXT, HGBEXT, HCTEXT, PLTEXT in the last 72 hours. Recent Labs     04/29/22  0034      K 3.4*   *   CO2 30   BUN 18   CREA 1.04*   GLU 93   CA 9.1   MG 1.7     No results for input(s): ALT, AP, TBIL, TBILI, TP, ALB, GLOB, GGT, AML, LPSE in the last 72 hours. No lab exists for component: SGOT, GPT, AMYP, HLPSE  No results for input(s): INR, PTP, APTT, INREXT, INREXT in the last 72 hours. No results for input(s): FE, TIBC, PSAT, FERR in the last 72 hours. Lab Results   Component Value Date/Time    Folate 37.7 (H) 04/10/2022 03:46 AM      No results for input(s): PH, PCO2, PO2 in the last 72 hours. No results for input(s): CPK, CKNDX, TROIQ in the last 72 hours. No lab exists for component: CPKMB  Lab Results   Component Value Date/Time    Cholesterol, total <50 04/10/2022 03:46 AM    HDL Cholesterol 8 04/10/2022 03:46 AM    LDL, calculated  04/10/2022 03:46 AM     Unable to calculate LDL or VLDL due to low cholesterol.     Triglyceride 96 04/10/2022 03:46 AM    CHOL/HDL Ratio Cannot be calculated 04/10/2022 03:46 AM     Lab Results   Component Value Date/Time    Glucose (POC) 82 04/23/2022 04:00 PM    Glucose (POC) 207 (H) 04/11/2022 11:07 AM    Glucose (POC) 234 (H) 04/11/2022 07:59 AM    Glucose (POC) 59 (L) 04/11/2022 05:43 AM    Glucose (POC) 210 (H) 05/12/2009 04:35 PM     Lab Results   Component Value Date/Time    Color YELLOW/STRAW 04/22/2022 04:08 PM    Appearance TURBID (A) 04/22/2022 04:08 PM    Specific gravity 1.010 04/22/2022 04:08 PM    pH (UA) 6.5 04/22/2022 04:08 PM    Protein 30 (A) 04/22/2022 04:08 PM    Glucose Negative 04/22/2022 04:08 PM    Ketone Negative 04/22/2022 04:08 PM    Bilirubin Negative 04/22/2022 04:08 PM    Urobilinogen 2.0 (H) 04/22/2022 04:08 PM    Nitrites Negative 04/22/2022 04:08 PM    Leukocyte Esterase LARGE (A) 04/22/2022 04:08 PM    Epithelial cells MODERATE (A) 04/22/2022 04:08 PM    Bacteria 4+ (A) 04/22/2022 04:08 PM    WBC >100 (H) 04/22/2022 04:08 PM    RBC 5-10 04/22/2022 04:08 PM         Medications Reviewed:     Current Facility-Administered Medications   Medication Dose Route Frequency    potassium bicarb-citric acid (EFFER-K) tablet 20 mEq  20 mEq Oral DAILY    sacubitriL-valsartan (ENTRESTO) 24-26 mg tablet 0.5 Tablet  0.5 Tablet Oral Q12H    bumetanide (BUMEX) tablet 0.5 mg  0.5 mg Oral BID    spironolactone (ALDACTONE) tablet 12.5 mg  12.5 mg Oral DAILY    carvediloL (COREG) tablet 3.125 mg  3.125 mg Oral BID WITH MEALS    amitriptyline (ELAVIL) tablet 25 mg  25 mg Oral QHS    aspirin delayed-release tablet 81 mg  81 mg Oral DAILY    benzonatate (TESSALON) capsule 100 mg  100 mg Oral TID PRN    gabapentin (NEURONTIN) capsule 200 mg  200 mg Oral BID    latanoprost (XALATAN) 0.005 % ophthalmic solution 1 Drop  1 Drop Left Eye QPM    levothyroxine (SYNTHROID) tablet 25 mcg  25 mcg Oral ACB    midodrine (PROAMATINE) tablet 10 mg  10 mg Oral TID WITH MEALS    pantoprazole (PROTONIX) tablet 40 mg  40 mg Oral ACB    pyridoxine (vitamin B6) (VITAMIN B-6) tablet 25 mg  25 mg Oral DAILY    metoprolol (LOPRESSOR) injection 1.25 mg  1.25 mg IntraVENous Q6H PRN    [Held by provider] sodium chloride (NS) flush 5-40 mL  5-40 mL IntraVENous Q8H    sodium chloride (NS) flush 5-40 mL  5-40 mL IntraVENous PRN    0.9% sodium chloride infusion 25 mL  25 mL IntraVENous PRN    acetaminophen (TYLENOL) tablet 650 mg  650 mg Oral Q6H PRN    Or    acetaminophen (TYLENOL) suppository 650 mg  650 mg Rectal Q6H PRN    bisacodyL (DULCOLAX) suppository 10 mg  10 mg Rectal DAILY PRN    promethazine (PHENERGAN) tablet 12.5 mg  12.5 mg Oral Q6H PRN    Or    ondansetron (ZOFRAN) injection 4 mg  4 mg IntraVENous Q6H PRN    heparin (porcine) injection 5,000 Units  5,000 Units SubCUTAneous Q8H     ______________________________________________________________________  EXPECTED LENGTH OF STAY: 4d 19h  ACTUAL LENGTH OF STAY:          9                 Frida العراقي MD

## 2022-05-01 NOTE — PROGRESS NOTES
5/1/2022  2:57 PM  Case management note    [facility]   Transportation set up @ 11:00am.  Packet to chart  [facility]

## 2022-05-01 NOTE — PROGRESS NOTES
Bedside shift change report given to Alaina Joiner (oncoming nurse) by Daniel Jung (offgoing nurse). Report included the following information SBAR, Kardex, Intake/Output, MAR and Recent Results.

## 2022-05-02 VITALS
TEMPERATURE: 97 F | OXYGEN SATURATION: 93 % | RESPIRATION RATE: 17 BRPM | HEIGHT: 60 IN | WEIGHT: 119.05 LBS | HEART RATE: 73 BPM | BODY MASS INDEX: 23.37 KG/M2 | DIASTOLIC BLOOD PRESSURE: 70 MMHG | SYSTOLIC BLOOD PRESSURE: 119 MMHG

## 2022-05-02 LAB — MAGNESIUM SERPL-MCNC: 1.6 MG/DL (ref 1.6–2.4)

## 2022-05-02 PROCEDURE — 83735 ASSAY OF MAGNESIUM: CPT

## 2022-05-02 PROCEDURE — 77010033678 HC OXYGEN DAILY

## 2022-05-02 PROCEDURE — 36415 COLL VENOUS BLD VENIPUNCTURE: CPT

## 2022-05-02 PROCEDURE — 74011250637 HC RX REV CODE- 250/637: Performed by: INTERNAL MEDICINE

## 2022-05-02 PROCEDURE — 94761 N-INVAS EAR/PLS OXIMETRY MLT: CPT

## 2022-05-02 PROCEDURE — 74011250637 HC RX REV CODE- 250/637: Performed by: NURSE PRACTITIONER

## 2022-05-02 PROCEDURE — 74011250636 HC RX REV CODE- 250/636: Performed by: INTERNAL MEDICINE

## 2022-05-02 PROCEDURE — 77030038269 HC DRN EXT URIN PURWCK BARD -A

## 2022-05-02 RX ORDER — BUMETANIDE 0.5 MG/1
0.5 TABLET ORAL 2 TIMES DAILY
Qty: 30 TABLET | Refills: 0 | Status: SHIPPED | OUTPATIENT
Start: 2022-05-02 | End: 2022-05-02

## 2022-05-02 RX ORDER — ASPIRIN 81 MG/1
81 TABLET ORAL DAILY
Qty: 30 TABLET | Refills: 0 | Status: SHIPPED | OUTPATIENT
Start: 2022-05-02 | End: 2022-06-01

## 2022-05-02 RX ORDER — PYRIDOXINE HCL (VITAMIN B6) 25 MG
25 TABLET ORAL DAILY
Qty: 30 TABLET | Refills: 0 | Status: SHIPPED | OUTPATIENT
Start: 2022-05-02 | End: 2022-09-06

## 2022-05-02 RX ORDER — LATANOPROST 50 UG/ML
1 SOLUTION/ DROPS OPHTHALMIC EVERY EVENING
Qty: 1 EACH | Refills: 0 | Status: SHIPPED | OUTPATIENT
Start: 2022-05-02 | End: 2022-06-21

## 2022-05-02 RX ORDER — CARVEDILOL 3.12 MG/1
3.12 TABLET ORAL 2 TIMES DAILY WITH MEALS
Qty: 60 TABLET | Refills: 0 | Status: SHIPPED | OUTPATIENT
Start: 2022-05-02 | End: 2022-06-01

## 2022-05-02 RX ORDER — ATORVASTATIN CALCIUM 40 MG/1
40 TABLET, FILM COATED ORAL DAILY
Qty: 30 TABLET | Refills: 0 | Status: SHIPPED | OUTPATIENT
Start: 2022-05-02 | End: 2022-06-01

## 2022-05-02 RX ORDER — CARVEDILOL 3.12 MG/1
3.12 TABLET ORAL 2 TIMES DAILY WITH MEALS
Qty: 60 TABLET | Refills: 0 | Status: SHIPPED | OUTPATIENT
Start: 2022-05-02 | End: 2022-05-02

## 2022-05-02 RX ORDER — MIDODRINE HYDROCHLORIDE 10 MG/1
10 TABLET ORAL
Qty: 90 TABLET | Refills: 0 | Status: SHIPPED | OUTPATIENT
Start: 2022-05-02 | End: 2022-06-01

## 2022-05-02 RX ORDER — DEXTROMETHORPHAN HYDROBROMIDE, GUAIFENESIN 5; 100 MG/5ML; MG/5ML
650 LIQUID ORAL
Qty: 30 TABLET | Refills: 0 | Status: SHIPPED | OUTPATIENT
Start: 2022-05-02

## 2022-05-02 RX ORDER — BUMETANIDE 0.5 MG/1
0.5 TABLET ORAL 2 TIMES DAILY
Qty: 30 TABLET | Refills: 0 | Status: SHIPPED | OUTPATIENT
Start: 2022-05-02 | End: 2022-07-18

## 2022-05-02 RX ORDER — ONDANSETRON 4 MG/1
4 TABLET, ORALLY DISINTEGRATING ORAL
Qty: 30 TABLET | Refills: 0 | Status: SHIPPED | OUTPATIENT
Start: 2022-05-02 | End: 2022-06-01

## 2022-05-02 RX ORDER — LEVOTHYROXINE SODIUM 25 UG/1
25 TABLET ORAL
Qty: 30 TABLET | Refills: 0 | Status: SHIPPED | OUTPATIENT
Start: 2022-05-02 | End: 2022-06-01

## 2022-05-02 RX ORDER — BENZONATATE 100 MG/1
100 CAPSULE ORAL
Qty: 90 CAPSULE | Refills: 0 | Status: SHIPPED | OUTPATIENT
Start: 2022-05-02 | End: 2022-06-01

## 2022-05-02 RX ORDER — GABAPENTIN 100 MG/1
200 CAPSULE ORAL 3 TIMES DAILY
Qty: 15 CAPSULE | Refills: 0 | Status: SHIPPED | OUTPATIENT
Start: 2022-05-02 | End: 2022-05-05

## 2022-05-02 RX ORDER — PANTOPRAZOLE SODIUM 40 MG/1
40 TABLET, DELAYED RELEASE ORAL DAILY
Qty: 30 TABLET | Refills: 0 | Status: SHIPPED | OUTPATIENT
Start: 2022-05-02 | End: 2022-06-01

## 2022-05-02 RX ORDER — SPIRONOLACTONE 25 MG/1
12.5 TABLET ORAL DAILY
Qty: 15 TABLET | Refills: 0 | Status: SHIPPED | OUTPATIENT
Start: 2022-05-03 | End: 2022-06-02

## 2022-05-02 RX ORDER — POTASSIUM CHLORIDE 1.5 G/1.77G
20 POWDER, FOR SOLUTION ORAL DAILY
Qty: 30 PACKET | Refills: 0 | Status: SHIPPED | OUTPATIENT
Start: 2022-05-02 | End: 2022-05-02 | Stop reason: SDUPTHER

## 2022-05-02 RX ORDER — POTASSIUM CHLORIDE 1.5 G/1.77G
20 POWDER, FOR SOLUTION ORAL DAILY
Qty: 30 PACKET | Refills: 0 | Status: SHIPPED | OUTPATIENT
Start: 2022-05-02 | End: 2022-06-01

## 2022-05-02 RX ORDER — FERROUS SULFATE 137(45) MG
142 TABLET, EXTENDED RELEASE ORAL
Qty: 30 TABLET | Refills: 0 | Status: SHIPPED | OUTPATIENT
Start: 2022-05-02 | End: 2022-06-01

## 2022-05-02 RX ORDER — AMITRIPTYLINE HYDROCHLORIDE 25 MG/1
25 TABLET, FILM COATED ORAL
Qty: 30 TABLET | Refills: 0 | Status: SHIPPED | OUTPATIENT
Start: 2022-05-02 | End: 2022-09-06

## 2022-05-02 RX ORDER — SPIRONOLACTONE 25 MG/1
12.5 TABLET ORAL DAILY
Qty: 15 TABLET | Refills: 0 | Status: SHIPPED | OUTPATIENT
Start: 2022-05-03 | End: 2022-05-02

## 2022-05-02 RX ADMIN — HEPARIN SODIUM 5000 UNITS: 5000 INJECTION INTRAVENOUS; SUBCUTANEOUS at 06:38

## 2022-05-02 RX ADMIN — POTASSIUM BICARBONATE 20 MEQ: 782 TABLET, EFFERVESCENT ORAL at 09:48

## 2022-05-02 RX ADMIN — ASPIRIN 81 MG: 81 TABLET, COATED ORAL at 09:48

## 2022-05-02 RX ADMIN — ACETAMINOPHEN 650 MG: 325 TABLET ORAL at 06:41

## 2022-05-02 RX ADMIN — GABAPENTIN 200 MG: 100 CAPSULE ORAL at 09:47

## 2022-05-02 RX ADMIN — LEVOTHYROXINE SODIUM 25 MCG: 0.03 TABLET ORAL at 06:38

## 2022-05-02 RX ADMIN — PANTOPRAZOLE SODIUM 40 MG: 40 TABLET, DELAYED RELEASE ORAL at 06:38

## 2022-05-02 RX ADMIN — BUMETANIDE 0.5 MG: 1 TABLET ORAL at 09:47

## 2022-05-02 RX ADMIN — SPIRONOLACTONE 12.5 MG: 25 TABLET ORAL at 09:47

## 2022-05-02 RX ADMIN — SACUBITRIL AND VALSARTAN 0.5 TABLET: 24; 26 TABLET, FILM COATED ORAL at 09:48

## 2022-05-02 RX ADMIN — CARVEDILOL 3.12 MG: 3.12 TABLET, FILM COATED ORAL at 09:47

## 2022-05-02 RX ADMIN — MIDODRINE HYDROCHLORIDE 10 MG: 5 TABLET ORAL at 09:47

## 2022-05-02 RX ADMIN — PYRIDOXINE HCL TAB 50 MG 25 MG: 50 TAB at 09:48

## 2022-05-02 NOTE — PROGRESS NOTES
Pt has no PIV at this time. Pt given and gone over discharge instructions with opportunity to ask questions. Pt states that pharmacy on paperwork is incorrect. Pharmacies on floor changed preferred pharmacy in the computer. Dr Chandan Rhodes made aware.

## 2022-05-02 NOTE — PROGRESS NOTES
Problem: Sepsis: Discharge Outcomes  Goal: *Tolerating diet  Outcome: Progressing Towards Goal  Goal: *Oxygen saturation returns to baseline or 90% or better on room air  Outcome: Progressing Towards Goal  Goal: *Optimal pain control at patient's stated goal  Outcome: Progressing Towards Goal

## 2022-05-02 NOTE — DISCHARGE SUMMARY
.     Discharge Summary       PATIENT ID: Garret Wadsworth  MRN: [id number]   YOB: 1925    DATE OF ADMISSION: 4/22/2022  2:52 PM    DATE OF DISCHARGE: 5/02/2022  PRIMARY CARE PROVIDER: Karen Park MD     ATTENDING PHYSICIAN: Greyson Arnett MD  DISCHARGING PROVIDER: Greyson Arnett MD    To contact this individual call [phone] and ask the  to page. If unavailable ask to be transferred the Adult Hospitalist Department. CONSULTATIONS: IP CONSULT TO CARDIOLOGY  IP CONSULT TO PALLIATIVE CARE - PROVIDER  IP CONSULT TO INTENSIVIST    PROCEDURES/SURGERIES: * No surgery found *    ADMITTING [address] [place] [address] COURSE:   \"The patient is a 79 yo hx of chronic resp failure on 4L O2, chronic sCHF EF 35-40%, hypotension, right renal mass, CKD 3, presented w/ suprapupic pain, UTI, septic shock, CHF.  The patient was admitted 1 week ago for pancreatitis.  She improved with IVF and pain meds.  Today, the patient c/o suprapubic pain for 2 days, associated with fevers/chills and fatigue.  She denied chest pain, cough, nausea, vomiting, diarrhea.  In the ED, SBP ~80s, temp 101.7, WBC 6.7, lactate 2.2.  U/A significant for a UTI.   Abd CT showed improved peripancreatic stranding, but moderate pleural effusion\"    DISCHARGE DIAGNOSES / PLAN:      1) Septic shock/UTI: 3/4 SIRS with SBP <90.  Will monitor on step down.  Check blood Cx, urine Cx, lactate.  Given multiple allergies, will start IV Meropenem.  Start Kyree gtt for hypotension.  Be careful with IVF due to CHF.  Discussed with ED physician about central line if unable to obtain good peripheral IVs     2) Acute on chronic systolic (congestive) heart failure/pleural effusions: last echo with EF 35-40%.  Pro BNP >10,000 on admission.  BP too low for diuretics.  Will use IV bumex prn for when BP can tolerate.  Consult Cards     3) EVERT/CKD 3: due to hypoperfusion.  Will cont IVF, pressors gtt.  Monitor closely     4) Recent pancreatitis: resolved on CT.  Will monitor closely     5) Chronic resp failure: on chronic 4L O2 due to CHF.  Cont O2, incentive spirometry     6) R renal mass: likely RCC. Patient has refused w/u in the past           PENDING TEST RESULTS:   At the time of discharge the following test results are still pendin    FOLLOW UP APPOINTMENTS:    Follow-up Information     Follow up With Specialties Details Why Contact Info    Diamond Aiken MD Cardiology Schedule an appointment as soon as possible for a visit      [address] [place] [address]  [phone]    Pedro Castro MD Family Medicine In 1 week  [address] [phone]             ADDITIONAL CARE RECOMMENDATIONS: 0      DISCHARGE MEDICATIONS:  Current Discharge Medication List      START taking these medications    Details   bumetanide (BUMEX) 0.5 mg tablet Take 1 Tablet by mouth two (2) times a day. Qty: 30 Tablet, Refills: 0  Start date: 2022      carvediloL (COREG) 3.125 mg tablet Take 1 Tablet by mouth two (2) times daily (with meals) for 30 days. Qty: 60 Tablet, Refills: 0  Start date: 2022, End date: 2022      sacubitril-valsartan (ENTRESTO) 24 mg/26 mg tablet Take 0.5 Tablets by mouth every twelve (12) hours for 30 days. Qty: 30 Tablet, Refills: 0  Start date: 2022, End date: 2022      spironolactone (ALDACTONE) 25 mg tablet Take 0.5 Tablets by mouth daily for 30 days. Qty: 15 Tablet, Refills: 0  Start date: 5/3/2022, End date: 2022      potassium chloride (KLOR-CON) 20 mEq pack Take 1 Packet by mouth daily. Qty: 30 Packet, Refills: 0  Start date: 2022         CONTINUE these medications which have NOT CHANGED    Details   midodrine (PROAMATINE) 10 mg tablet Take 1 Tablet by mouth three (3) times daily (with meals) for 30 days. Qty: 90 Tablet, Refills: 0      gabapentin (NEURONTIN) 100 mg capsule Take 2 Capsules by mouth two (2) times a day. Max Daily Amount: 400 mg. Qty: 20 Capsule, Refills: 0    Associated Diagnoses: Chronic bilateral low back pain with bilateral sciatica      ferrous sulfate (Slow Fe) 142 mg (45 mg iron) ER tablet Take 142 mg by mouth Daily (before breakfast). aspirin delayed-release 81 mg tablet Take 81 mg by mouth daily. pantoprazole (PROTONIX) 40 mg tablet Take 40 mg by mouth daily. amitriptyline (ELAVIL) 25 mg tablet Take 25 mg by mouth nightly. ondansetron (ZOFRAN ODT) 4 mg disintegrating tablet Take 4 mg by mouth every six (6) hours as needed for Nausea or Vomiting.      benzonatate (TESSALON) 100 mg capsule Take 100 mg by mouth three (3) times daily as needed for Cough. levothyroxine (SYNTHROID) 25 mcg tablet Take 25 mcg by mouth Daily (before breakfast). Associated Diagnoses: Gait disturbance      atorvastatin (LIPITOR) 40 mg tablet Take 40 mg by mouth daily. acetaminophen (TYLENOL ARTHRITIS PAIN) 650 mg CR tablet Take 650 mg by mouth every six (6) hours as needed for Pain.      pyridoxine (VITAMIN B-6) 25 mg tablet Take 25 mg by mouth daily. MV-Min-Folic Acid-Lutein (CENTRUM SILVER) 500-250 mcg Chew Take 1 Tablet by mouth daily. latanoprost (XALATAN) 0.005 % ophthalmic solution Administer 1 Drop to left eye every evening. one drop in left eye in the evening               NOTIFY YOUR PHYSICIAN FOR ANY OF THE FOLLOWING:   Fever over 101 degrees for 24 hours. Chest pain, shortness of breath, fever, chills, nausea, vomiting, diarrhea, change in mentation, falling, weakness, bleeding. Severe pain or pain not relieved by medications. Or, any other signs or symptoms that you may have questions about.     DISPOSITION:    Home With:   OT  PT  HH  RN       Long term SNF/Inpatient Rehab    Independent/assisted living    Hospice    Other:       PATIENT CONDITION AT DISCHARGE:     Functional status    Poor     Deconditioned     Independent      Cognition     Lucid     Forgetful     Dementia Catheters/lines (plus indication)    Witt     PICC     PEG     None      Code status     Full code     DNR      PHYSICAL EXAMINATION AT DISCHARGE:   Refer to Progress Note       CHRONIC MEDICAL DIAGNOSES:  Problem List as of 5/2/2022 Date Reviewed: 4/22/2022          Codes Class Noted - Resolved    Acute on chronic systolic (congestive) heart failure ([facility] ICD-10-CM: I50.23  ICD-9-CM: 428.23, 428.0  4/22/2022 - Present        * (Principal) Septic shock (HCC) ICD-10-CM: A41.9, R65.21  ICD-9-CM: 038.9, 785.52, 995.92  4/22/2022 - Present        EVERT (acute kidney injury) ([facility] ICD-10-CM: N17.9  ICD-9-CM: 584.9  4/22/2022 - Present        UTI (urinary tract infection) ICD-10-CM: N39.0  ICD-9-CM: 599.0  4/22/2022 - Present        Acute pancreatitis ICD-10-CM: K85.90  ICD-9-CM: 577.0  4/9/2022 - Present        Back pain ICD-10-CM: M54.9  ICD-9-CM: 724.5  4/17/2012 - Present    Overview Addendum 4/17/2012  1:48 PM by Eliseo Rendon Dr. [employer]  MRI 3/15/2012:  1. Degenerative grade 1 anterolisthesis of L4 on L5.  2. Mild central spinal canal stenosis of L2-L3 through L4-L5.                 Stage III CKD - stable (5/2011) Secondary tHTN + Nephrosclerosis  - Dr. Bethany Alejandro (Nephrologist) ICD-10-CM: R79.89  ICD-9-CM: 790.99  4/19/2011 - Present        Osteoporosis, post-menopausal - Bone Density 11/30/2010 ICD-10-CM: M81.0  ICD-9-CM: 733.01  12/10/2010 - Present        Stroke ([facility] ICD-10-CM: I63.9  ICD-9-CM: 434.91  5/21/2010 - Present        High cholesterol ICD-10-CM: E78.00  ICD-9-CM: 272.0  5/21/2010 - Present    Overview Signed 12/13/2011 12:17 PM by Eliseo Rendon     LDL goal < 70             DVT (deep venous thrombosis) ([facility] [address] ICD-10-CM: I82.409  ICD-9-CM: 453.40  5/21/2010 - Present    Overview Signed 3/1/2012 11:32 AM by Eliseo Rendon     Coumadin for life  Several DVTs  Most recent one 2004             LBBB (left bundle branch block) ICD-10-CM: I44.7  ICD-9-CM: 426.3  5/21/2010 - Present        Pancreatitis ICD-10-CM: K85.90  ICD-9-CM: 070.1  5/21/2010 - Present              Greater than 35   minutes were spent with the patient on counseling and coordination of care    Signed:   Huber Johnson MD  5/2/2022  11:00 AM

## 2022-05-02 NOTE — PALLIATIVE CARE DISCHARGE
The Palliative Medicine team was consulted as part of your / your loved one's care in the hospital. Our team is a supportive service; we strive to relieve suffering and improve quality of life. You identified the following goal(s) as your main focus for healthcare: Patient/Health Care Proxy Stated Goals: Rehabilitation    We reviewed advance care planning information, which includes the following:  Advance Care Planning 4/26/2022   Patient's Healthcare Decision Maker is: Named in scanned ACP document   Confirm Advance Directive Yes, on file   Does the patient have other document types Do Not Resuscitate       We reviewed / discussed your code status as: DNR     Full Code means perform CPR in the event of cardiac arrest     [facility] means do NOT perform CPR in the event of cardiac arrest     Partial Code means you have specific preferences, please discuss with your health care team     No Order means this issue was not addressed / resolved during your stay    You have a Durable Do Not Resuscitate Order in place, which should travel with you. When you are in a facility, this form should be placed on your chart. Once you are home, it is recommended that the [facility] form be placed in a visible location such as on the refrigerator or bedroom door. Because of the importance of this information, we are providing you with a printed copy to share with other healthcare providers after this hospitalization is complete. If any of the above information is incomplete or incorrect, please contact the Palliative Medicine team at [phone].

## 2022-05-02 NOTE — PROGRESS NOTES
5/2/2022  Transition of Care Plan to SNF/Rehab    SNF/Rehab Transition:  Patient has been accepted to [facility] and Rehab and meets criteria for admission. Patient will transported by [facility]* and expected to leave at 11am.    Communication to Patient/Family:  Met with patient and son (identified care giver) and they are agreeable to the transition plan. Communication to SNF/Rehab:  Bedside RN, Mallika Sharma, has been notified to update the transition plan to the facility and call report (phone number [phone]). Discharge information has been updated on the AVS.       Nursing Please include all hard scripts for controlled substances, med rec and dc summary, and AVS in packet. Reviewed and confirmed with facility, Andrzej Martin, can manage the patient care needs for the following:     SNF/Rehab Transition:  Patient to follow-up with Home Health: none at this time  PCP/Specialist: Patient will follow up with MD at facility    Reviewed and confirmed with facility, Jonathan* they can manage the patient care needs for the following:     Lisa Cheek with (X) only those applicable:    Medication:  [x]  Medications will be available at the facility  []  IV Antibiotics **  []  Controlled Substance - hard copy to be sent with patient   []  Weekly Labs   Documents:  [] Hard RX  [] MAR  [] Kardex  [x] AVS  []Transfer Summary  []Discharge   Equipment:  []  CPAP/BiPAP  []  Wound Vacuum  []  Witt or Urinary Device  []  PICC/Central Line  []  Nebulizer  []  Ventilator   Treatment:  []Isolation (for MRSA, VRE, etc.)  []Surgical Drain Management  []Tracheostomy Care  []Dressing Changes  []Dialysis with transportation and chair time.   []PEG Care  []Oxygen  []Daily Weights for Heart Failure   Dietary:  []Any diet limitations  []Tube Feedings   []Total Parenteral Management (TPN)   Eligible for Medicaid Long Term Services and Supports  Yes:  [] Eligible for medical assistance or will become eligible within 180 days and UAI completed. [] Provider/Patient and/or support system has requested screening. [] UAI copy provided to patient or responsible party. [] UAI unavailable at discharge will send once processed to SNF provider. [] UAI unavailable at discharged mailed to patient  No:   [] Private pay and is not financially eligible for Medicaid within the next 180 days. [] Reside out-of-state.   [] A residents of a state owned/operated facility that is licensed  by [address] [employer] [facility] or [facility]  [] Enrollment in [facility] hospice services  [] [address] [place] [address]  [] Patient /Family declines to have screening completed or provide financial information for screening     Financial Resources:  Medicaid    [] Initiated and application pending   [] Full coverage     Advanced Care Plan:  []Surrogate Decision Maker of Care  []POA  [x]Communicated Code Status DNR* (DDNR\", \"Full\")    KAT Levine

## 2022-05-02 NOTE — PROGRESS NOTES
Bedside and Verbal shift change report given to [facility]. And Concord Pretty (oncoming nurse) by Yanet Watkins (offgoing nurse). Report included the following information SBAR, Kardex, Intake/Output and Recent Results.

## 2022-05-03 ENCOUNTER — PATIENT OUTREACH (OUTPATIENT)
Dept: CASE MANAGEMENT | Age: 87
End: 2022-05-03

## 2022-05-12 ENCOUNTER — PATIENT OUTREACH (OUTPATIENT)
Dept: CASE MANAGEMENT | Age: 87
End: 2022-05-12

## 2022-05-12 NOTE — PROGRESS NOTES
Post Acute Facility Update     *The information contained in this note was received during a weekly care coordination call with the post acute facility*    Current Facility: [address] [place] [address] Micky Leiva (SNF)  Anticipated Discharge Date: D    Facility Nursing Update: Patient having issues with BP  Adjusting medications to stabilize. Has hx of HF. Please note, Patient previously admitted to [facility] on 4/14 and discharged to [facility] on 4/22-5/2 for sepptic shock. Patient re-admitted to [facility] on 5/2 to continue skilled care and therapy services. Facility Social Work Update: Plan to return to home with spouse and son. Need to be more independent prior to d/c. Bundle Program Status: none  Upper Extremity Assistance: Moderate Assistance   Lower Extremity Assistance: Maximum Assistance  Bed Mobility: Moderate Assistance   Transfers: Maximum Assistance  Ambulation:   How far (in feet) is the patient ambulating?  Not walking   Device Used:   Barriers to Discharge: HARRISON Farmer MSW  [facility]

## 2022-05-17 ENCOUNTER — PATIENT OUTREACH (OUTPATIENT)
Dept: CASE MANAGEMENT | Age: 87
End: 2022-05-17

## 2022-05-17 NOTE — PROGRESS NOTES
Outgoing call made to [facility] and Rehabilitation. Spoke with [employer]. Confirmed currently admitted to facility. Transition of care outreach postponed for 7 days due to patient's discharge to SNF.

## 2022-05-22 PROBLEM — N39.0 UTI (URINARY TRACT INFECTION): Status: RESOLVED | Noted: 2022-04-22 | Resolved: 2022-05-22

## 2022-05-24 ENCOUNTER — PATIENT OUTREACH (OUTPATIENT)
Dept: CASE MANAGEMENT | Age: 87
End: 2022-05-24

## 2022-05-24 NOTE — PROGRESS NOTES
Patient currently admitted to [facility] and Rehabilitation. Transition of care outreach postponed for 7 days due to patient's discharge to SNF. Will continue to monitor patient progress.

## 2022-05-26 ENCOUNTER — PATIENT OUTREACH (OUTPATIENT)
Dept: CASE MANAGEMENT | Age: 87
End: 2022-05-26

## 2022-05-31 NOTE — PROGRESS NOTES
Post Acute Facility Update     *The information contained in this note was received during a weekly care coordination call with the post acute facility*    Current Facility: [address] [place] [address] ([facility])  Anticipated Discharge Date: TBD    Facility Nursing Update:  Continues to be impacted by BP's. Last BP check  126/86 as of 5/26. Adjusted medications to stabilize,  Midodrine ordered. Facility Social Work Update:No d/c date has been set, pt. co ntinues to make gains. Plan is for pt. to return home w/her son and . Bundle Program Status: none  Upper Extremity Assistance: Moderate Assistance   Lower Extremity Assistance: Maximum Assistance  Bed Mobility: Minimal Assistance   Transfers: Minimal Assistance   Ambulation: Contact Guard Assist - hands on patient for balance   How far (in feet) is the patient ambulating?  30  Device Used: walker  Barriers to Discharge: TBD    Yg MENCHACAN, RN  [employer]

## 2022-06-02 ENCOUNTER — PATIENT OUTREACH (OUTPATIENT)
Dept: CASE MANAGEMENT | Age: 87
End: 2022-06-02

## 2022-06-03 NOTE — PROGRESS NOTES
Post Acute Facility Update     *The information contained in this note was received during a weekly care coordination call with the post acute facility*    Current Facility: [address] [place] [address] ([facility])  Anticipated Discharge Date: 2 weeks, date TBD    Facility Nursing Update: Blood pressures stablized  Facility Social Work Update: Plan to discharge to home with  and son  Bundle Program Status: none  Upper Extremity Assistance: Minimal Assistance   Lower Extremity Assistance: Moderate Assistance   Bed Mobility: Stand by Assist - Presence and Cueing  Transfers: Stand by Assist - Presence and Cueing  Ambulation: Contact Guard Assist - hands on patient for balance   How far (in feet) is the patient ambulating?  50  Device Used: walker  Barriers to Discharge: HARRISON MENCHACAN, RN  [employer]

## 2022-06-07 ENCOUNTER — PATIENT OUTREACH (OUTPATIENT)
Dept: CASE MANAGEMENT | Age: 87
End: 2022-06-07

## 2022-06-07 NOTE — PROGRESS NOTES
Patient currently admitted to [facility] and Rehabilitation. Writer will sign off.

## 2022-06-09 ENCOUNTER — PATIENT OUTREACH (OUTPATIENT)
Dept: CASE MANAGEMENT | Age: 87
End: 2022-06-09

## 2022-06-10 NOTE — PROGRESS NOTES
Post Acute Facility Update     *The information contained in this note was received during a weekly care coordination call with the post acute facility*    Current Facility: [address] [place] [address] ([facility])  Anticipated Discharge Date: 6/15/2022    Facility Nursing Update: no current updates  Facility Social Work Update: Pt will discharge to home with  and son  Bundle Program Status: none  Upper Extremity Assistance: Stand by Assist - Presence and Cueing  Lower Extremity Assistance: Minimal Assistance   Bed Mobility: Stand by Assist - Presence and Cueing  Transfers: Stand by Assist - Presence and Cueing  Ambulation: Stand by Assist - Presence and Cueing  How far (in feet) is the patient ambulating?  45  Device Used: walker  Barriers to Discharge: TBD    Jacki DIMAS, RN  [employer]

## 2022-06-24 ENCOUNTER — PATIENT OUTREACH (OUTPATIENT)
Dept: CASE MANAGEMENT | Age: 87
End: 2022-06-24

## 2022-06-24 NOTE — PROGRESS NOTES
[address] [place] [address] Dr Discharge Note    *The information contained in this note was received during a weekly care coordination call with the post acute facility*      Patient was discharged from [address] [place] [address] (SNF) on 6/15/22 to Home    PCP: MD FRANCISCO De La Cruz appointment: No future appointments. Home Health/Outpatient orders at discharge: home health care  [address]: [address] ordered at discharge: none  [address] received prior to discharge: [address] [facility] Team will sign off at this time. Medications were not reconciled and general patient assessment was not completed during this skilled nursing facility outreach.      Renetta MENCHACAN, RN  [employer]

## 2022-07-10 ENCOUNTER — HOSPITAL ENCOUNTER (INPATIENT)
Age: 87
LOS: 8 days | Discharge: HOME HEALTH | DRG: 292 | End: 2022-07-18
Attending: STUDENT IN AN ORGANIZED HEALTH CARE EDUCATION/TRAINING PROGRAM | Admitting: INTERNAL MEDICINE
Payer: MEDICARE

## 2022-07-10 ENCOUNTER — APPOINTMENT (OUTPATIENT)
Dept: GENERAL RADIOLOGY | Age: 87
DRG: 292 | End: 2022-07-10
Attending: STUDENT IN AN ORGANIZED HEALTH CARE EDUCATION/TRAINING PROGRAM
Payer: MEDICARE

## 2022-07-10 ENCOUNTER — APPOINTMENT (OUTPATIENT)
Dept: CT IMAGING | Age: 87
DRG: 292 | End: 2022-07-10
Attending: STUDENT IN AN ORGANIZED HEALTH CARE EDUCATION/TRAINING PROGRAM
Payer: MEDICARE

## 2022-07-10 DIAGNOSIS — I50.23 ACUTE ON CHRONIC SYSTOLIC CONGESTIVE HEART FAILURE (HCC): ICD-10-CM

## 2022-07-10 DIAGNOSIS — J90 BILATERAL PLEURAL EFFUSION: ICD-10-CM

## 2022-07-10 DIAGNOSIS — R11.2 NAUSEA AND VOMITING, UNSPECIFIED VOMITING TYPE: ICD-10-CM

## 2022-07-10 DIAGNOSIS — R60.9 PERIPHERAL EDEMA: ICD-10-CM

## 2022-07-10 DIAGNOSIS — E83.42 HYPOMAGNESEMIA: Primary | ICD-10-CM

## 2022-07-10 DIAGNOSIS — R77.8 ELEVATED TROPONIN: ICD-10-CM

## 2022-07-10 PROBLEM — E87.6 HYPOKALEMIA: Status: ACTIVE | Noted: 2022-07-10

## 2022-07-10 PROBLEM — I50.43 ACUTE ON CHRONIC COMBINED SYSTOLIC AND DIASTOLIC ACC/AHA STAGE C CONGESTIVE HEART FAILURE (HCC): Status: ACTIVE | Noted: 2022-04-22

## 2022-07-10 PROBLEM — I50.9 ACUTE ON CHRONIC CONGESTIVE HEART FAILURE (HCC): Status: ACTIVE | Noted: 2022-07-10

## 2022-07-10 LAB
ALBUMIN SERPL-MCNC: 2.5 G/DL (ref 3.5–5)
ALBUMIN/GLOB SERPL: 0.7 {RATIO} (ref 1.1–2.2)
ALP SERPL-CCNC: 101 U/L (ref 45–117)
ALT SERPL-CCNC: 14 U/L (ref 12–78)
ANION GAP SERPL CALC-SCNC: 8 MMOL/L (ref 5–15)
AST SERPL-CCNC: 29 U/L (ref 15–37)
BASOPHILS # BLD: 0 K/UL (ref 0–0.1)
BASOPHILS NFR BLD: 1 % (ref 0–1)
BILIRUB SERPL-MCNC: 1.1 MG/DL (ref 0.2–1)
BNP SERPL-MCNC: ABNORMAL PG/ML
BUN SERPL-MCNC: 23 MG/DL (ref 6–20)
BUN/CREAT SERPL: 21 (ref 12–20)
CALCIUM SERPL-MCNC: 9.3 MG/DL (ref 8.5–10.1)
CHLORIDE SERPL-SCNC: 111 MMOL/L (ref 97–108)
CO2 SERPL-SCNC: 27 MMOL/L (ref 21–32)
CREAT SERPL-MCNC: 1.12 MG/DL (ref 0.55–1.02)
DIFFERENTIAL METHOD BLD: ABNORMAL
EOSINOPHIL # BLD: 0.1 K/UL (ref 0–0.4)
EOSINOPHIL NFR BLD: 1 % (ref 0–7)
ERYTHROCYTE [DISTWIDTH] IN BLOOD BY AUTOMATED COUNT: 16.5 % (ref 11.5–14.5)
GLOBULIN SER CALC-MCNC: 3.4 G/DL (ref 2–4)
GLUCOSE SERPL-MCNC: 128 MG/DL (ref 65–100)
HCT VFR BLD AUTO: 40.1 % (ref 35–47)
HGB BLD-MCNC: 13.5 G/DL (ref 11.5–16)
IMM GRANULOCYTES # BLD AUTO: 0 K/UL (ref 0–0.04)
IMM GRANULOCYTES NFR BLD AUTO: 0 % (ref 0–0.5)
LIPASE SERPL-CCNC: 388 U/L (ref 73–393)
LYMPHOCYTES # BLD: 1.5 K/UL (ref 0.8–3.5)
LYMPHOCYTES NFR BLD: 18 % (ref 12–49)
MAGNESIUM SERPL-MCNC: 1 MG/DL (ref 1.6–2.4)
MCH RBC QN AUTO: 32.8 PG (ref 26–34)
MCHC RBC AUTO-ENTMCNC: 33.7 G/DL (ref 30–36.5)
MCV RBC AUTO: 97.3 FL (ref 80–99)
MONOCYTES # BLD: 0.7 K/UL (ref 0–1)
MONOCYTES NFR BLD: 8 % (ref 5–13)
NEUTS SEG # BLD: 6.2 K/UL (ref 1.8–8)
NEUTS SEG NFR BLD: 72 % (ref 32–75)
NRBC # BLD: 0 K/UL (ref 0–0.01)
NRBC BLD-RTO: 0 PER 100 WBC
PLATELET # BLD AUTO: 239 K/UL (ref 150–400)
PMV BLD AUTO: 10.4 FL (ref 8.9–12.9)
POTASSIUM SERPL-SCNC: 3.1 MMOL/L (ref 3.5–5.1)
PROT SERPL-MCNC: 5.9 G/DL (ref 6.4–8.2)
RBC # BLD AUTO: 4.12 M/UL (ref 3.8–5.2)
SODIUM SERPL-SCNC: 146 MMOL/L (ref 136–145)
TROPONIN-HIGH SENSITIVITY: 256 NG/L (ref 0–51)
UR CULT HOLD, URHOLD: NORMAL
WBC # BLD AUTO: 8.6 K/UL (ref 3.6–11)

## 2022-07-10 PROCEDURE — 74176 CT ABD & PELVIS W/O CONTRAST: CPT

## 2022-07-10 PROCEDURE — 81003 URINALYSIS AUTO W/O SCOPE: CPT

## 2022-07-10 PROCEDURE — 83690 ASSAY OF LIPASE: CPT

## 2022-07-10 PROCEDURE — 74011250636 HC RX REV CODE- 250/636: Performed by: STUDENT IN AN ORGANIZED HEALTH CARE EDUCATION/TRAINING PROGRAM

## 2022-07-10 PROCEDURE — 36600 WITHDRAWAL OF ARTERIAL BLOOD: CPT

## 2022-07-10 PROCEDURE — 80053 COMPREHEN METABOLIC PANEL: CPT

## 2022-07-10 PROCEDURE — 71045 X-RAY EXAM CHEST 1 VIEW: CPT

## 2022-07-10 PROCEDURE — 85025 COMPLETE CBC W/AUTO DIFF WBC: CPT

## 2022-07-10 PROCEDURE — 93005 ELECTROCARDIOGRAM TRACING: CPT

## 2022-07-10 PROCEDURE — 36415 COLL VENOUS BLD VENIPUNCTURE: CPT

## 2022-07-10 PROCEDURE — 83880 ASSAY OF NATRIURETIC PEPTIDE: CPT

## 2022-07-10 PROCEDURE — 74011250636 HC RX REV CODE- 250/636: Performed by: INTERNAL MEDICINE

## 2022-07-10 PROCEDURE — 74011000250 HC RX REV CODE- 250: Performed by: INTERNAL MEDICINE

## 2022-07-10 PROCEDURE — 84484 ASSAY OF TROPONIN QUANT: CPT

## 2022-07-10 PROCEDURE — 92950 HEART/LUNG RESUSCITATION CPR: CPT

## 2022-07-10 PROCEDURE — 74011250637 HC RX REV CODE- 250/637: Performed by: INTERNAL MEDICINE

## 2022-07-10 PROCEDURE — 96366 THER/PROPH/DIAG IV INF ADDON: CPT

## 2022-07-10 PROCEDURE — 94761 N-INVAS EAR/PLS OXIMETRY MLT: CPT

## 2022-07-10 PROCEDURE — 96375 TX/PRO/DX INJ NEW DRUG ADDON: CPT

## 2022-07-10 PROCEDURE — 74011000250 HC RX REV CODE- 250: Performed by: STUDENT IN AN ORGANIZED HEALTH CARE EDUCATION/TRAINING PROGRAM

## 2022-07-10 PROCEDURE — 99285 EMERGENCY DEPT VISIT HI MDM: CPT

## 2022-07-10 PROCEDURE — 65270000029 HC RM PRIVATE

## 2022-07-10 PROCEDURE — 96365 THER/PROPH/DIAG IV INF INIT: CPT

## 2022-07-10 PROCEDURE — 83735 ASSAY OF MAGNESIUM: CPT

## 2022-07-10 RX ORDER — DOCUSATE SODIUM 100 MG/1
100 CAPSULE, LIQUID FILLED ORAL AS NEEDED
Status: DISCONTINUED | OUTPATIENT
Start: 2022-07-10 | End: 2022-07-18 | Stop reason: HOSPADM

## 2022-07-10 RX ORDER — HEPARIN SODIUM 5000 [USP'U]/ML
5000 INJECTION, SOLUTION INTRAVENOUS; SUBCUTANEOUS EVERY 12 HOURS
Status: DISCONTINUED | OUTPATIENT
Start: 2022-07-10 | End: 2022-07-18 | Stop reason: HOSPADM

## 2022-07-10 RX ORDER — SODIUM CHLORIDE 0.9 % (FLUSH) 0.9 %
5-40 SYRINGE (ML) INJECTION AS NEEDED
Status: DISCONTINUED | OUTPATIENT
Start: 2022-07-10 | End: 2022-07-18 | Stop reason: HOSPADM

## 2022-07-10 RX ORDER — BUMETANIDE 0.25 MG/ML
0.5 INJECTION INTRAMUSCULAR; INTRAVENOUS ONCE
Status: COMPLETED | OUTPATIENT
Start: 2022-07-10 | End: 2022-07-10

## 2022-07-10 RX ORDER — SODIUM CHLORIDE 0.9 % (FLUSH) 0.9 %
5-40 SYRINGE (ML) INJECTION EVERY 8 HOURS
Status: DISCONTINUED | OUTPATIENT
Start: 2022-07-10 | End: 2022-07-18 | Stop reason: HOSPADM

## 2022-07-10 RX ORDER — ONDANSETRON 2 MG/ML
4 INJECTION INTRAMUSCULAR; INTRAVENOUS
Status: COMPLETED | OUTPATIENT
Start: 2022-07-10 | End: 2022-07-10

## 2022-07-10 RX ORDER — ONDANSETRON 2 MG/ML
4 INJECTION INTRAMUSCULAR; INTRAVENOUS
Status: DISCONTINUED | OUTPATIENT
Start: 2022-07-10 | End: 2022-07-15

## 2022-07-10 RX ORDER — FUROSEMIDE 10 MG/ML
40 INJECTION INTRAMUSCULAR; INTRAVENOUS DAILY
Status: DISCONTINUED | OUTPATIENT
Start: 2022-07-11 | End: 2022-07-14

## 2022-07-10 RX ORDER — MAGNESIUM SULFATE HEPTAHYDRATE 40 MG/ML
2 INJECTION, SOLUTION INTRAVENOUS ONCE
Status: COMPLETED | OUTPATIENT
Start: 2022-07-10 | End: 2022-07-10

## 2022-07-10 RX ORDER — ACETAMINOPHEN 650 MG/1
650 SUPPOSITORY RECTAL
Status: DISCONTINUED | OUTPATIENT
Start: 2022-07-10 | End: 2022-07-16

## 2022-07-10 RX ORDER — ENOXAPARIN SODIUM 100 MG/ML
30 INJECTION SUBCUTANEOUS DAILY
Status: DISCONTINUED | OUTPATIENT
Start: 2022-07-11 | End: 2022-07-10

## 2022-07-10 RX ORDER — BACITRACIN 500 UNIT/G
1 PACKET (EA) TOPICAL
Status: COMPLETED | OUTPATIENT
Start: 2022-07-10 | End: 2022-07-10

## 2022-07-10 RX ORDER — ONDANSETRON 4 MG/1
4 TABLET, ORALLY DISINTEGRATING ORAL
Status: DISCONTINUED | OUTPATIENT
Start: 2022-07-10 | End: 2022-07-18 | Stop reason: HOSPADM

## 2022-07-10 RX ORDER — POLYETHYLENE GLYCOL 3350 17 G/17G
17 POWDER, FOR SOLUTION ORAL DAILY PRN
Status: DISCONTINUED | OUTPATIENT
Start: 2022-07-10 | End: 2022-07-18 | Stop reason: HOSPADM

## 2022-07-10 RX ORDER — ACETAMINOPHEN 325 MG/1
650 TABLET ORAL
Status: DISCONTINUED | OUTPATIENT
Start: 2022-07-10 | End: 2022-07-18 | Stop reason: HOSPADM

## 2022-07-10 RX ORDER — ASPIRIN 81 MG/1
81 TABLET ORAL DAILY
Status: DISCONTINUED | OUTPATIENT
Start: 2022-07-11 | End: 2022-07-18 | Stop reason: HOSPADM

## 2022-07-10 RX ORDER — ISOSORBIDE MONONITRATE 30 MG/1
30 TABLET, EXTENDED RELEASE ORAL DAILY
Status: DISCONTINUED | OUTPATIENT
Start: 2022-07-11 | End: 2022-07-13

## 2022-07-10 RX ADMIN — ONDANSETRON 4 MG: 2 INJECTION INTRAMUSCULAR; INTRAVENOUS at 16:43

## 2022-07-10 RX ADMIN — MAGNESIUM SULFATE HEPTAHYDRATE 2 G: 40 INJECTION, SOLUTION INTRAVENOUS at 16:43

## 2022-07-10 RX ADMIN — HEPARIN SODIUM 5000 UNITS: 5000 INJECTION INTRAVENOUS; SUBCUTANEOUS at 22:45

## 2022-07-10 RX ADMIN — BACITRACIN 1 PACKET: 500 OINTMENT TOPICAL at 17:33

## 2022-07-10 RX ADMIN — BUMETANIDE 0.5 MG: 0.25 INJECTION, SOLUTION INTRAMUSCULAR; INTRAVENOUS at 17:33

## 2022-07-10 RX ADMIN — Medication 10 ML: at 22:46

## 2022-07-10 RX ADMIN — ACETAMINOPHEN 650 MG: 325 TABLET ORAL at 22:45

## 2022-07-10 NOTE — ED PROVIDER NOTES
The patient is a 51-year-old female history of coronary disease, heart failure with reduced ejection fraction, last echo shows an EF of 35%, hyperlipidemia, prior stroke presenting today secondary to multiple concerns. When asked what her biggest concern is she tells me abdominal pain. She is unable to tell me how long this has been going on. EMS got the idea that it has been going on for quite a while based on the discussion with family at the house. Apparently her nausea and vomiting have worsened recently and today had unable to even take her medications due to nausea and vomiting. Patient unable to tell me, time she has had vomiting. She also complains of intermittent shortness of breath and chest pain, she cannot tell me when the last time she felt this pain. Recently started back on Entresto for leg edema that was noticed by her PCP earlier in the week. She denies fever. No other complaints noted at this time.            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    Pacemaker     Stroke [facility]     Thromboembolus ([facility]        Past Surgical History:   Procedure Laterality Date    HX CHOLECYSTECTOMY      HX HEENT      HX ORTHOPAEDIC      VASCULAR SURGERY PROCEDURE UNLIST             Social History     Socioeconomic History    Marital status:      Spouse name: Not on file    Number of children: Not on file    Years of education: Not on file    Highest education level: Not on file   Occupational History    Not on file   Tobacco Use    Smoking status: Never Smoker    Smokeless tobacco: Never Used   Substance and Sexual Activity    Alcohol use: No    Drug use: No    Sexual activity: Not on file   Other Topics Concern    Not on file   Social History Narrative    Not on file     Social Determinants of Health     Financial Resource Strain:     Difficulty of Paying Living Expenses: Not on file   Food Insecurity:     Worried About Running Out of Food in the Last Year: Not on file    Som of Food in the Last Year: Not on file   Transportation Needs:     Lack of Transportation (Medical): Not on file    Lack of Transportation (Non-Medical): Not on file   Physical Activity:     Days of Exercise per Week: Not on file    Minutes of Exercise per Session: Not on file   Stress:     Feeling of Stress : Not on file   Social Connections:     Frequency of Communication with Friends and Family: Not on file    Frequency of Social Gatherings with Friends and Family: Not on file    Attends Rastafarian Services: Not on file    Active Member of [address] [place] [address] or Organizations: Not on file    Attends Club or Organization Meetings: Not on file    Marital Status: Not on file   Intimate Partner Violence:     Fear of Current or Ex-Partner: Not on file    Emotionally Abused: Not on file    Physically Abused: Not on file    Sexually Abused: Not on file   Housing Stability:     Unable to Pay for Housing in the Last Year: Not on file    Number of [place] in the Last Year: Not on file    Unstable Housing in the Last Year: Not on file         ALLERGIES: Cephalexin, Dilaudid [hydromorphone (bulk)], Egg, Fenofibrate, Gemfibrozil, Iodine, Oxycodone, Pcn [penicillins], Prilosec [omeprazole], Shellfish derived, and Sulfa (sulfonamide antibiotics)    Review of Systems   Constitutional: Positive for fatigue. Negative for chills and fever. HENT: Negative for congestion and rhinorrhea. Eyes: Negative for redness and visual disturbance. Respiratory: Positive for shortness of breath. Negative for cough.     Cardiovascular: Positive for chest pain and leg swelling. Gastrointestinal: Positive for abdominal pain, nausea and vomiting. Negative for diarrhea. Genitourinary: Negative for dysuria, flank pain, frequency, hematuria and urgency. Musculoskeletal: Negative for arthralgias, back pain, myalgias and neck pain. Skin: Negative for rash and wound. Allergic/Immunologic: Negative for immunocompromised state. Neurological: Negative for dizziness and headaches. Vitals:    07/10/22 1426   BP: (!) 160/80   Pulse: 79   Resp: 18   Temp: 97.9 °F (36.6 °C)   SpO2: 100%   Weight: 45.4 kg (100 lb)   Height: 5' 2\" (1.575 m)            Physical Exam  Vitals and nursing note reviewed. Constitutional:       General: She is not in acute distress. Appearance: She is well-developed. She is not diaphoretic. HENT:      Head: Normocephalic. Mouth/Throat:      Pharynx: No oropharyngeal exudate. Eyes:      General:         Right eye: No discharge. Left eye: No discharge. Pupils: Pupils are equal, round, and reactive to light. Cardiovascular:      Rate and Rhythm: Normal rate and regular rhythm. Heart sounds: Normal heart sounds. No murmur heard. No friction rub. No gallop. Pulmonary:      Effort: Pulmonary effort is normal. No respiratory distress. Breath sounds: Normal breath sounds. No stridor. No wheezing or rales. Abdominal:      General: Bowel sounds are normal. There is no distension. Palpations: Abdomen is soft. Tenderness: There is abdominal tenderness (mild periumbilical). There is no guarding or rebound. Musculoskeletal:         General: No deformity. Normal range of motion. Cervical back: Normal range of motion and neck supple. Right lower leg: Edema present. Left lower leg: Edema present. Skin:     General: Skin is warm and dry. Capillary Refill: Capillary refill takes less than 2 seconds. Findings: No rash.    Neurological:      Mental Status: She is alert and oriented to person, place, and time. Psychiatric:         Behavior: Behavior normal.          MDM       Procedures    Work-up shows:  No leukocytosis or anemia  Hypokalemic, hypomagnesemic but hemolyzed  Creatinine slightly uptrending at 1.12  Hyponatremic at 146  Sugar okay at 128  LFTs acceptable  BNP elevated at 13,139  Troponin elevated 256, no active chest pain  Lipase normal    CT of the abdomen shows no acute process, bilateral pleural effusions that are small to moderate in size    Zofran ordered  Bumex ordered    EKG interpreted by me  Time 4/14/2007  Ventricular paced rhythm rate of 103, PVCs noted, expected widened QRS, no criteria for STEMI     Perfect Serve Consult for Admission  4:57 PM    ED Room Number: ER12/12  Patient Name and age:  Merlepadmaja Newell 80 y.o.  female  Working Diagnosis:   1. Hypomagnesemia    2. Nausea and vomiting, unspecified vomiting type    3. Elevated troponin    4. Bilateral pleural effusion    5. Peripheral edema    6. Acute on chronic systolic congestive heart failure ([address]        COVID-19 Suspicion:  no  Sepsis present:  no  Reassessment needed: no  Code Status:  Full Code  Readmission: no  Isolation Requirements:  no  Recommended Level of Care:  telemetry  Department:[facility] ED - [phone]  Other: Patient is a 14-year-old female presenting today with abdominal pain, nausea, vomiting, chest pain, peripheral edema. Found to have slightly elevated troponin, no active chest pain. Hypomagnesemic. Hypokalemic (waiting on final number, initially hemolyzed). Found to have bilateral lower extremity edema and pleural effusions with known history of CHF. She has not been able to take medications for the past day due to nausea. Requesting mission for further management. Getting Bumex.         Ruperto Echeverria DO

## 2022-07-10 NOTE — ED TRIAGE NOTES
Pt here with non specific abdominal pain and nausea for a while. Pt seen by Dr Phylicia Rodriguez on arrival. VSS per EMS. Pt taken to phleb for blood work and vitals then will go to waiting room in wheelchair.

## 2022-07-10 NOTE — PROGRESS NOTES
Attempted art stick for labs. Attempted twice with no labs obtained. Patient refusing any more sticks at this time.

## 2022-07-11 LAB
ANION GAP SERPL CALC-SCNC: 8 MMOL/L (ref 5–15)
APPEARANCE UR: CLEAR
ATRIAL RATE: 103 BPM
BILIRUB UR QL: NEGATIVE
BUN SERPL-MCNC: 22 MG/DL (ref 6–20)
BUN/CREAT SERPL: 21 (ref 12–20)
CALCIUM SERPL-MCNC: 9.1 MG/DL (ref 8.5–10.1)
CALCULATED R AXIS, ECG10: -73 DEGREES
CALCULATED T AXIS, ECG11: 81 DEGREES
CHLORIDE SERPL-SCNC: 113 MMOL/L (ref 97–108)
CO2 SERPL-SCNC: 27 MMOL/L (ref 21–32)
COLOR UR: NORMAL
COMMENT, HOLDF: NORMAL
CREAT SERPL-MCNC: 1.03 MG/DL (ref 0.55–1.02)
DIAGNOSIS, 93000: NORMAL
GLUCOSE SERPL-MCNC: 77 MG/DL (ref 65–100)
GLUCOSE UR STRIP.AUTO-MCNC: NEGATIVE MG/DL
HGB UR QL STRIP: NEGATIVE
KETONES UR QL STRIP.AUTO: NEGATIVE MG/DL
LACTATE BLD-SCNC: 0.8 MMOL/L (ref 0.4–2)
LEUKOCYTE ESTERASE UR QL STRIP.AUTO: NEGATIVE
MAGNESIUM SERPL-MCNC: 1.4 MG/DL (ref 1.6–2.4)
MAGNESIUM SERPL-MCNC: 1.8 MG/DL (ref 1.6–2.4)
NITRITE UR QL STRIP.AUTO: NEGATIVE
PH UR STRIP: 5.5 [PH] (ref 5–8)
POTASSIUM SERPL-SCNC: 2.4 MMOL/L (ref 3.5–5.1)
POTASSIUM SERPL-SCNC: 2.5 MMOL/L (ref 3.5–5.1)
PROT UR STRIP-MCNC: NEGATIVE MG/DL
Q-T INTERVAL, ECG07: 454 MS
QRS DURATION, ECG06: 144 MS
QTC CALCULATION (BEZET), ECG08: 594 MS
SAMPLES BEING HELD,HOLD: NORMAL
SODIUM SERPL-SCNC: 148 MMOL/L (ref 136–145)
SP GR UR REFRACTOMETRY: 1.01 (ref 1–1.03)
TROPONIN-HIGH SENSITIVITY: 248 NG/L (ref 0–51)
UROBILINOGEN UR QL STRIP.AUTO: 1 EU/DL (ref 0.2–1)
VENTRICULAR RATE, ECG03: 103 BPM

## 2022-07-11 PROCEDURE — 74011000250 HC RX REV CODE- 250: Performed by: INTERNAL MEDICINE

## 2022-07-11 PROCEDURE — 83605 ASSAY OF LACTIC ACID: CPT

## 2022-07-11 PROCEDURE — 99223 1ST HOSP IP/OBS HIGH 75: CPT | Performed by: INTERNAL MEDICINE

## 2022-07-11 PROCEDURE — 94761 N-INVAS EAR/PLS OXIMETRY MLT: CPT

## 2022-07-11 PROCEDURE — 74011250637 HC RX REV CODE- 250/637: Performed by: INTERNAL MEDICINE

## 2022-07-11 PROCEDURE — 74011250636 HC RX REV CODE- 250/636: Performed by: INTERNAL MEDICINE

## 2022-07-11 PROCEDURE — 65270000029 HC RM PRIVATE

## 2022-07-11 PROCEDURE — 2709999900 HC NON-CHARGEABLE SUPPLY

## 2022-07-11 PROCEDURE — 84132 ASSAY OF SERUM POTASSIUM: CPT

## 2022-07-11 PROCEDURE — 36415 COLL VENOUS BLD VENIPUNCTURE: CPT

## 2022-07-11 PROCEDURE — 80048 BASIC METABOLIC PNL TOTAL CA: CPT

## 2022-07-11 PROCEDURE — 77030020847 HC STATLOK BARD -A

## 2022-07-11 PROCEDURE — 77010033678 HC OXYGEN DAILY

## 2022-07-11 PROCEDURE — 83735 ASSAY OF MAGNESIUM: CPT

## 2022-07-11 RX ORDER — LATANOPROST 50 UG/ML
1 SOLUTION/ DROPS OPHTHALMIC
COMMUNITY

## 2022-07-11 RX ORDER — POTASSIUM CHLORIDE 750 MG/1
40 TABLET, FILM COATED, EXTENDED RELEASE ORAL
Status: COMPLETED | OUTPATIENT
Start: 2022-07-11 | End: 2022-07-11

## 2022-07-11 RX ORDER — POTASSIUM CHLORIDE 750 MG/1
40 TABLET, FILM COATED, EXTENDED RELEASE ORAL
Status: DISCONTINUED | OUTPATIENT
Start: 2022-07-11 | End: 2022-07-11

## 2022-07-11 RX ORDER — POTASSIUM CHLORIDE 7.45 MG/ML
10 INJECTION INTRAVENOUS
Status: DISPENSED | OUTPATIENT
Start: 2022-07-11 | End: 2022-07-11

## 2022-07-11 RX ORDER — POTASSIUM CHLORIDE 7.45 MG/ML
10 INJECTION INTRAVENOUS ONCE
Status: COMPLETED | OUTPATIENT
Start: 2022-07-11 | End: 2022-07-11

## 2022-07-11 RX ADMIN — ONDANSETRON 4 MG: 2 INJECTION INTRAMUSCULAR; INTRAVENOUS at 10:43

## 2022-07-11 RX ADMIN — POTASSIUM CHLORIDE 10 MEQ: 7.46 INJECTION, SOLUTION INTRAVENOUS at 12:57

## 2022-07-11 RX ADMIN — POTASSIUM CHLORIDE 10 MEQ: 7.46 INJECTION, SOLUTION INTRAVENOUS at 16:05

## 2022-07-11 RX ADMIN — ISOSORBIDE MONONITRATE 30 MG: 30 TABLET, EXTENDED RELEASE ORAL at 09:34

## 2022-07-11 RX ADMIN — POTASSIUM CHLORIDE 40 MEQ: 750 TABLET, FILM COATED, EXTENDED RELEASE ORAL at 12:57

## 2022-07-11 RX ADMIN — Medication 10 ML: at 05:12

## 2022-07-11 RX ADMIN — POTASSIUM BICARBONATE 40 MEQ: 782 TABLET, EFFERVESCENT ORAL at 04:00

## 2022-07-11 RX ADMIN — FUROSEMIDE 40 MG: 10 INJECTION, SOLUTION INTRAMUSCULAR; INTRAVENOUS at 09:39

## 2022-07-11 RX ADMIN — ASPIRIN 81 MG: 81 TABLET, COATED ORAL at 09:34

## 2022-07-11 RX ADMIN — HEPARIN SODIUM 5000 UNITS: 5000 INJECTION INTRAVENOUS; SUBCUTANEOUS at 09:37

## 2022-07-11 RX ADMIN — HEPARIN SODIUM 5000 UNITS: 5000 INJECTION INTRAVENOUS; SUBCUTANEOUS at 21:23

## 2022-07-11 NOTE — PROGRESS NOTES
Spiritual Care Assessment/Progress Note  [address] [place] [address] Dr      NAME: Javan Wright      MRN: [id number]  AGE: 80 y.o.  SEX: female  Latter day Affiliation: Scientology   Language: English     7/11/2022     Total Time (in minutes): 35     Spiritual Assessment begun in [facility] EMERGENCY DEPT through conversation with:         [x]Patient        [x] Family    [] Friend(s)        Reason for Consult: Palliative Care, Initial/Spiritual Assessment     Spiritual beliefs: (Please include comment if needed)     [x] Identifies with a sweetie tradition: Congregational        [x] Supported by a sweetie community: Scientology           [] Claims no spiritual orientation:           [] Seeking spiritual identity:                [] Adheres to an individual form of spirituality:           [] Not able to assess:                           Identified resources for coping:      [] Prayer                               [] Music                  [] Guided Imagery     [x] Family/friends                 [] Pet visits     [] Devotional reading                         [] Unknown     [] Other:                                               Interventions offered during this visit: (See comments for more details)    Patient Interventions: Affirmation of emotions/emotional suffering,Affirmation of sweetie,Catharsis/review of pertinent events in supportive environment,Coping skills reviewed/reinforced,Iconic (affirming the presence of God/Higher Power),Normalization of emotional/spiritual concerns,Prayer (assurance of)           Plan of Care:     [] Support spiritual and/or cultural needs    [] Support AMD and/or advance care planning process      [] Support grieving process   [] Coordinate Rites and/or Rituals    [] Coordination with community clergy   [] No spiritual needs identified at this time   [] Detailed Plan of Care below (See Comments)  [] Make referral to Music Therapy  [] Make referral to Pet Therapy     [] Make referral to Addiction services  [] Make referral to [facility]  [] Make referral to Spiritual Care Partner  [] No future visits requested        [x] Follow up visits as needed     Visited patient for palliative initial spiritual assessment. Her chart was consulted. At bedside were her  Tiff Stafford and son Chantelle Stacy. In some discomfort she requested  call her nurse, which was accomplished and her nurse arrived quickly. She is a person of [address] [facility] and an active [employer] and derives a sense of hope and support from her sweetie.   Gregoria Councilman, Chaplain, MDiv, MS, [facility]

## 2022-07-11 NOTE — NURSE NAVIGATOR
Chart reviewed by Heart Failure Nurse Navigator. Heart Failure database completed. Patient admitted with acute on chronic HFrEF and abdominal pain. Patient with recent admissions:    4/9/22 to 4/14/22 with acute pancreatitis, hypotension on midodrine, and chronic systolic HF. Discharge to [facility] skilled nursing for rehab.  4/22/22 to 5/2/22 with septic shock and acute on chronic systolic HF. Returned to [facility] skilled nursing for additional rehab. Patient discharged home from [facility] on 6/15 with [facility]. EF:  35 to 40% by Echo 4/10/22. Current Echo is pending. Isosorbide mononitrate 30 mg daily     ACEi/ARB/ARNi: Unable to tolerate in the past due to hypotension    BB: Unable to tolerate in the past due to hypotension    Aldosterone Antagonist: **    Obstructive Sleep Apnea Screening:   Referred to Sleep Medicine:     CRT **. NYHA Functional Class **. Heart Failure Teach Back in Patient Education. Heart Failure Avoiding Triggers on Discharge Instructions. Cardiologist: Dr Yoon Siddiqui consulted last admission. Previous Outpatient Cardiologist: Dr Lon Hickey discharge follow up phone call to be made within 48-72 hours of discharge.

## 2022-07-11 NOTE — ACP (ADVANCE CARE PLANNING)
Primary Decision Maker: [facility] - [phone]  Advance Care Planning 7/11/2022   Patient's Healthcare Decision Maker is: Named in scanned ACP document   Confirm Advance Directive Yes, on file   Does the patient have other document types Do Not Resuscitate     Pt has AMD on file dated 4/11/2022 which appoints judd Vásquez Ala as sole Medical POA. Pt has DDNR on file dated 4/14/2022.

## 2022-07-11 NOTE — PROGRESS NOTES
Brief summary of visit , full note to follow . Met with patient in ER holding area with her son Tyler DAMIAN and  Minnie Paulino. She has fair understanding of her medical issues leading to hospitalization , however is anxious waiting in ER to go to floor , she notes nausea is better with I/V Zofran given recently , later on when I came back to review CT scan results, she was eating lunch . She tells me she wants to get \" better \" to return home . She confirmed DNR status . She is at risk of decline given her advance age , co morbidities, base line debility /decondition due to repeated hospitalization We will keep her on list to follow peripherally . Thank you for allowing us to participate in the care of Ms Bárbara Slaughter .

## 2022-07-11 NOTE — PROGRESS NOTES
CARE MANAGEMENT INITIAL ASSESSEMENT      NAME:   Paty Enamorado   :     1925   MRN:     [id number]       Emergency Contact:  Extended Emergency Contact Information  Primary Emergency Contact: Lashaun Antony  Address: [address] [facility], [address] [place] [address] Phone: [phone]  Mobile Phone: [phone]  Relation: Spouse  Secondary Emergency Contact: Addison Link  Address: [address] [facility], [address] [place] [address]  Mobile Phone: [phone]  Relation: Son    Advance Directive:  DNR, has an advance directive. Copy on chart. Healthcare Decision Maker:    Primary Decision Maker: Win Reynolds - [phone]     Reason for Admission:  Ms. Melina Chinchilla is a 80 y.o. female with history that includes anemia, arthritis, DM, CVA, CAD, CHF and GERD  who was emergently admitted for:  acute on chronic CHF    Patient Active Problem List   Diagnosis Code    Stroke ([facility] I63.9    High cholesterol E78.00    DVT (deep venous thrombosis) ([facility]) I82.409    LBBB (left bundle branch block) I44.7    Pancreatitis K85.90    Osteoporosis, post-menopausal - Bone Density 2010 M81.0    Stage III CKD - stable (2011) Secondary tHTN + Nephrosclerosis  - Dr. Julius Vick (Nephrologist) R79.89    Back pain M54.9    Acute pancreatitis K85.90    Acute on chronic combined systolic and diastolic ACC/AHA stage C congestive heart failure (HCC) I50.43    Septic shock ([facility]) A41.9, R65.21    EVERT (acute kidney injury) ([facility] N17.9    UTI (urinary tract infection) N39.0    Nausea and vomiting R11.2    Hypomagnesemia E83.42    Elevated troponin R77.8    Hypokalemia E87.6    Acute on chronic congestive heart failure (HCC) I50.9       Assessment: In person with patient. RUR:  19%  Risk Level:   Moderate  Value-based purchasing:  Yes  Bundle patient:  No    Residency:  Private residence  Exterior Steps:  chair lift  Interior Steps:  None    Lives With:  Spouse/Significant Other and Adult children (Chantelle Stacy)    Prior functioning:  Partial dependence. Patient requires assistance with:  Meals and medications    Prior DME required:  Rolling walker, Wheelchair, Grab bars, Shower chair, Raised toilet seat, Stair lift and Home O2 (4L/Provider Connor)    DME available:  Same as above    Rehab history:  SNF:  Provider Bria Hall   Date - 2022    Discharge Concerns/Barriers Identified:  Medical conditions- significant      Insurer:  Payor: Faye Ndiaye / Plan: [employer] / Product Type: Medicare /     PCP: Marlyn Ramirez MD   Name of Practice:  [facility]   Current patient: Yes   Approximate date of last visit: last week   Access to virtual PCP visits:  Unknown    Pharmacy:  [facility]   Financial/Difficulty affording medications:  None identified    COVID-19 vaccination status:  Fully vaccinated. DC Transport:  Family      Transition of care plan:  Unable to determine at this time. Awaiting clinical progress, and disposition recommendations     Comments:   Pt admitted on 7/10/22 for acute on chronic CHF. CM met with Pt to complete initial assessment. Pt reports she lives at home with her spouse and son Stacy Leo. Pt reports having HH but is unable to recall agency name. Pt has home O2 at 4L through [address]. Pt has a chair lift, rolling walker, w/c, raised toilet seat, shower chair, and grab bars. Pt reports her son helps with meals and medications. Pt reports she is relatively independent with other ADLs but family is available to assist as needed. Pt states she uses rolling walker and w/c for mobility. Pt confirms she is able to transfer in/out of w/c independently. Pt is retired. Pt is unable to drive. Pt states she would like to return home upon discharge.  Family will transport.   _____________________________________  [place]y [place] [address] [facility]  Available via [facility]  7/11/2022   7:43 PM      Care Management Interventions  PCP Verified by CM: Yes Lexy Dunn MD)  Mode of Transport at Discharge:  Other (see comment) (family)  Transition of Care Consult (CM Consult): Discharge Planning  MyChart Signup: No  Discharge Durable Medical Equipment: No  Physical Therapy Consult: No  Occupational Therapy Consult: No  Speech Therapy Consult: No  Support Systems: Spouse/Significant Other,Child(ramo)  Confirm Follow Up Transport: Family  Discharge Location  Patient Expects to be Discharged to[de-identified] Unable to determine at this time

## 2022-07-11 NOTE — PROGRESS NOTES
Ultrasound IV by Jayant Duran RN :  Procedure Note    Patient meets criteria for US IV insertion. Ultrasound IV education provided to patient. Opportunities for questions given. IV ultrasound technique used for PIV placement: Placed by Debbie Bonner RN  20 gauge 8 cm Arrow Endurance Extended Dwell(may stay in place for 29 days)  Left basilic location. 1 X Attempt(s). Flushed with ease; vigorous blood return. Reported to Lizzy Riley. MD - patient is not a PICC candidate due to inability to hold either arm out to the side for any length of time as well as vessels being mostly too small. Patient required an extensive amount of discussion for any stick what so ever. Procedure tolerated well. Primary RN aware of IV placement and added to LDA.                                 Jayant Duran RN

## 2022-07-11 NOTE — PROGRESS NOTES
[facility] [address]  [address]  [phone]      Medical Progress Note      NAME: Noe Shearer   :  1925  MRM:  [id number]    Date/Time of service: 2022         Subjective:     Chief Complaint:  Patient was personally seen and examined by me during this time period. Chart reviewed. F/u abd pain. Continues to endorse b/l abd pain. States she has some dyspnea but close to baseline. No burning with urination. Objective:       Vitals:       Last 24hrs VS reviewed since prior progress note.  Most recent are:    Visit Vitals  BP (!) 173/74   Pulse 77   Temp 97.7 °F (36.5 °C)   Resp 22   Ht 5' 2\" (1.575 m)   Wt 45.4 kg (100 lb)   SpO2 100%   BMI 18.29 kg/m²     SpO2 Readings from Last 6 Encounters:   22 100%   22 93%   22 100%   17 94%   09/29/15 99%   08/31/15 95%    O2 Flow Rate (L/min): 4 l/min       Intake/Output Summary (Last 24 hours) at 2022 1220  Last data filed at 2022 0600  Gross per 24 hour   Intake 440 ml   Output 250 ml   Net 190 ml        Exam:     Physical Exam:    Gen:  Well-developed, well-nourished, in no acute distress  HEENT:  Pink conjunctivae, PERRL, hearing intact to voice  Resp:  No accessory muscle use, clear breath sounds without wheezes rales or rhonchi  Card:  RRR, No murmurs, normal S1, S2, no peripheral edema  Abd:  Soft, b/l ABD TTP, non-distended, normoactive bowel sounds are present  Musc:  No cyanosis or clubbing  Skin:  No rashes or ulcers, skin turgor is good  Neuro:  Cranial nerves 3-12 are grossly intact, follows commands appropriately  Psych:  Oriented to person, place, and time, Alert with good insight      Medications Reviewed: (see below)    Lab Data Reviewed: (see below)    ______________________________________________________________________    Medications:     Current Facility-Administered Medications   Medication Dose Route Frequency    potassium chloride 10 mEq in 100 ml IVPB  10 mEq IntraVENous Q1H    potassium chloride SR (KLOR-CON 10) tablet 40 mEq  40 mEq Oral NOW    furosemide (LASIX) injection 40 mg  40 mg IntraVENous DAILY    isosorbide mononitrate ER (IMDUR) tablet 30 mg  30 mg Oral DAILY    aspirin delayed-release tablet 81 mg  81 mg Oral DAILY    docusate sodium (COLACE) capsule 100 mg  100 mg Oral PRN    sodium chloride (NS) flush 5-40 mL  5-40 mL IntraVENous Q8H    sodium chloride (NS) flush 5-40 mL  5-40 mL IntraVENous PRN    acetaminophen (TYLENOL) tablet 650 mg  650 mg Oral Q6H PRN    Or    acetaminophen (TYLENOL) suppository 650 mg  650 mg Rectal Q6H PRN    polyethylene glycol (MIRALAX) packet 17 g  17 g Oral DAILY PRN    ondansetron (ZOFRAN ODT) tablet 4 mg  4 mg Oral Q8H PRN    Or    ondansetron (ZOFRAN) injection 4 mg  4 mg IntraVENous Q6H PRN    heparin (porcine) injection 5,000 Units  5,000 Units SubCUTAneous Q12H     Current Outpatient Medications   Medication Sig    acetaminophen (Tylenol Arthritis Pain) 650 mg TbER Take 1 Tablet by mouth every six (6) hours as needed for Pain.  amitriptyline (ELAVIL) 25 mg tablet Take 1 Tablet by mouth nightly.  bumetanide (BUMEX) 0.5 mg tablet Take 1 Tablet by mouth two (2) times a day.  MV-Min-Folic Acid-Lutein 811-427 mcg chew Take 1 Tablet by mouth daily.  pyridoxine, vitamin B6, (Vitamin B-6) 25 mg tablet Take 1 Tablet by mouth daily.           Lab Review:     Recent Labs     07/10/22  1456   WBC 8.6   HGB 13.5   HCT 40.1        Recent Labs     07/11/22  0055 07/10/22  1456   NA  --  146*   K 2.4* 3.1*   CL  --  111*   CO2  --  27   GLU  --  128*   BUN  --  23*   CREA  --  1.12*   CA  --  9.3   MG 1.8 1.0*   ALB  --  2.5*   TBILI  --  1.1*   ALT  --  14     Lab Results   Component Value Date/Time    Glucose (POC) 82 04/23/2022 04:00 PM    Glucose (POC) 207 (H) 04/11/2022 11:07 AM    Glucose (POC) 234 (H) 04/11/2022 07:59 AM    Glucose (POC) 59 (L) 04/11/2022 05:43 AM    Glucose (POC) 210 (H) 05/12/2009 04:35 PM          Assessment / Plan:       Acute on chronic combined systolic and diastolic HF/ dyspnea POA: CX with small pleural effusions and probnp elevated. Check echo. Continue IV lasix as tolerated. Daily weight and monitor I/Os. Continue Imdur. Hold off cardio consult for now. Palliative care consulted. Acute abd pain/ Intractable Nausea and vomiting (7/10/2022) POA: Unclear etiology. CT abdomen pelvis without any acute concerns. Lipase within normal limits. UA unremarkable for infection. CT notes diverticulosis with no signs of diverticulitis. Viral gastroenteritis? Obtain stool cultures if develops any diarrhea. Monitor. Hypomagnesemia (7/10/2022)/ Hypokalemia (7/10/2022): Suspect due to vomiting. Replete IV and oral as needed. Elevated troponin (7/10/2022) POA: Suspect demand ischemia due to heart failure. Continue aspirin. Check echo. Consider cardiology consult pending echo results and if patient would want any intervention. Chronic respiratory failure POA: On baseline 4 L nasal cannula.   Monitor     Total time spent with patient: 28 Minutes **I personally saw and examined the patient during this time period**                 Care Plan discussed with: Patient, Nursing Staff and Consultant/Specialist    Discussed:  Care Plan    Prophylaxis:  Hep SQ    Disposition:  Home w/Family versus hospice            ___________________________________________________    Attending Physician: Derrell Armstrong DO

## 2022-07-11 NOTE — PROGRESS NOTES
2000 Pt a/ox4. On 4L O2 via nc. Wants pants off. Pt has +2 pitting edema in bilateral lower legs and feet. Sacrum is red but blanches. Pt wearing adult diaper. Diaper saturated. Pt cleaned and changed. Given water to drink. Tolerated well. Pt has Left BBB. Reports mid gastric abdominal discomfort and ache but doesn't want anything for pain at the moment. Worried it has to do with her pancreatitis acting up. Would like to discuss DNR and code options. 2200 Urine sample collected and sent  0000 Lab collection attempted with no successes. Multiple individuals attempted draw, ultra sound guided stick also attempted. No success. Doctor notified. Will consult PICC team in the morning. 4125 Dr. Carlie Hernandes called for critical potassium of 2.4 and down trending troponin of 248 from 256. Ordered 40meq potassium via PO now.   0400 pt tired wants to sleep. Pt doesn't want blood pressure run any more due to her arms being sore. 0600 Pt asleep.

## 2022-07-11 NOTE — CONSULTS
Palliative Medicine Consult  Devin: [phone] ([phone])    Patient Name: Emory Ortiz  YOB: 1925    Date of Initial Consult: 7/11/22  Reason for Consult: care decisions  Requesting Provider: Jordyn Yoder MD  Primary Care Physician: Marlyn Ramirez MD     SUMMARY:   Emory Ortiz is a 80 y.o. Female with a past history of CAD, chronic failure with reduced EF  EF of 35% ( 4/10/2022), on 4 litres of oxygen at home,  HL4/D, prior  R MCA stroke,  who was admitted on 7/10/2022 from home with a diagnosis of Acute on chronic CHF with bilateral pleural effusion, presented with abdominal pain , nausea and vomiting. She recently had two admission in April 4/9/22 for pancreatitis and 4/22/22, For septic shock due to UTI with gram negative bacteremia, d/c to rehab . CT scan of abdomen is negative for pancreatitis or diverticulitis . Current medical issues leading to Palliative Medicine involvement include: care decisions in a setting of debility, advance age , 3 admission in last 3 months, patient known to Palliative Care recently seen in April 2022. Jason Melgar Social hx : lives with spouse ( per chart who has dementia). Three sons, Chantelle Stacy is eldest most involved, he and his son  helps both parents with their care needs, another son in [facility], 3rd out of state . lately her balance is off, she uses walker and wheel chair , basically sedentary . Advance directives in place     Patient son Angelica Matos is primary MPOA. DDNR signed by patient 4/14/ 2022. PALLIATIVE DIAGNOSES:   1. Nausea   2. Swelling of feet   3. Fluid overload   4. Goals of care   5. Debility   6.  Deconditioning ( 3 admission in last 3 months )       PLAN:   Met with patient in ER holding area with her son Tammi DAMIAN and  Tiff Stafford.     She has fair understanding of her medical issues leading to hospitalization, she accurately tells me events in last hospitalization, she is able to make decisions for health care , however is anxious waiting in ER  Holding area, to go to floor , she notes nausea is better with I/V Zofran given recently , later on when I came back to review CT scan results, she was eating lunch .     She tells me she wants to get \" better \" to return home her son supports her decisions     She confirmed DNR status ( DDNR in place).     She is at risk of decline given her advance age,  co morbidities, base line debility /deconditiong due to repeated hospitalization We will keep her on list to follow peripherally .     Thank you for allowing us to participate in the care of Ms Rubia Brush . Initial consult note routed to primary continuity provider and/or primary health care team members  Communicated plan of care with: Palliative IDTHarsh 192 Team     GOALS OF CARE / TREATMENT PREFERENCES:     GOALS OF CARE:  Patient/Health Care Proxy Stated Goals: Prolong life    TREATMENT PREFERENCES:   Code Status: DNR    Patient and family's personal goals include: treatment of active medical issue to get better. Important upcoming milestones or family events: N/A    The patient identifies the following as important for living well:       Advance Care Planning:  [x] [facility] Interdisciplinary Team has updated the ACP Navigator with Health Care Decision Maker and Patient Capacity      Primary Decision Maker: Dominic Harman - [phone]  Advance Care Planning 7/11/2022   Patient's Healthcare Decision Maker is: Named in scanned ACP document   Confirm Advance Directive Yes, on file   Does the patient have other document types Do Not Resuscitate       Medical Interventions:  (DNR)       Other:    As far as possible, the palliative care team has discussed with patient / health care proxy about goals of care / treatment preferences for patient.      HISTORY:     History obtained from: patient , chart, son     CHIEF COMPLAINT: nausea     HPI/SUBJECTIVE:    The patient is:   [] Verbal and participatory  Patient notes he had nausea and abdominal pain x 3 days prompted to come to ER, currently she denies any abdominal pain , she has swelling of feet noted by her PCP a week ago , per son she was given benny she ws not able to take because of nausea. She denies any orthopnea or PND. Clinical Pain Assessment (nonverbal scale for severity on nonverbal patients):   Clinical Pain Assessment  Severity: 3  Location: substernal  Character: few days  Frequency: constant          Duration: for how long has pt been experiencing pain (e.g., 2 days, 1 month, years)  Frequency: how often pain is an issue (e.g., several times per day, once every few days, constant)     FUNCTIONAL ASSESSMENT:     Palliative Performance Scale (PPS):          PSYCHOSOCIAL/SPIRITUAL SCREENING:     Palliative IDT has assessed this patient for cultural preferences / practices and a referral made as appropriate to needs (Cultural Services, Patient Advocacy, Ethics, etc.)    Any spiritual / Anabaptism concerns:  [] Yes /  [x] No   If \"Yes\" to discuss with pastoral care during IDT     Does caregiver feel burdened by caring for their loved one:   [] Yes /  [x] No /  [] No Caregiver Present/Available [] No Caregiver [] Pt Lives at Facility  If \"Yes\" to discuss with social work during IDT    Anticipatory grief assessment:   [x] Normal  / [] Maladaptive     If \"Maladaptive\" to discuss with social work during IDT    ESAS Anxiety: Anxiety: 2    ESAS Depression: Depression: 0        REVIEW OF SYSTEMS:     Positive and pertinent negative findings in ROS are noted above in HPI. The following systems were [x] reviewed / [] unable to be reviewed as noted in HPI  Other findings are noted below. Systems: constitutional, ears/nose/mouth/throat, respiratory, gastrointestinal, genitourinary, musculoskeletal, integumentary, neurologic, psychiatric, endocrine. Positive findings noted below.   Modified ESAS Completed by: provider   Fatigue: 8 Drowsiness: 0   Depression: 0 Pain: 3   Anxiety: 2 Nausea: 6   Anorexia: 5       Constipation:  (had bowel movement today)     Stool Occurrence(s): 1        PHYSICAL EXAM:     From RN flowsheet:  Wt Readings from Last 3 Encounters:   07/10/22 100 lb (45.4 kg)   05/01/22 119 lb 0.8 oz (54 kg)   04/10/22 113 lb (51.3 kg)     Blood pressure (!) 173/74, pulse 77, temperature 97.7 °F (36.5 °C), resp. rate 22, height 5' 2\" (1.575 m), weight 100 lb (45.4 kg), last menstrual period 11/16/1975, SpO2 100 %. Pain Scale 1: Numeric (0 - 10)  Pain Intensity 1: 2     Pain Location 1: Abdomen  Pain Orientation 1: Mid  Pain Description 1: Aching  Pain Intervention(s) 1: Medication (see MAR)  Last bowel movement, if known:     Constitutional: frail, elderly, anxious, not in any distress  Eyes: pupils equal, anicteric  ENMT: no nasal discharge, moist mucous membranes  Cardiovascular: regular rhythm, ++ edema   Respiratory: breathing not labored, symmetric, on 4 litre nasal oxygen.   Gastrointestinal: soft non-tender, +bowel sounds  Musculoskeletal: no deformity, no tenderness to palpation  Skin: warm, dry  Neurologic: following commands, moving all extremities  Psychiatric: anxious , no hallucinations  Other:       HISTORY:     Active Problems:    Acute on chronic combined systolic and diastolic ACC/AHA stage C congestive heart failure (HCC) (4/22/2022)      Nausea and vomiting (7/10/2022)      Hypomagnesemia (7/10/2022)      Elevated troponin (7/10/2022)      Hypokalemia (7/10/2022)      Acute on chronic congestive heart failure ([facility] (7/10/2022)      Past Medical History:   Diagnosis Date    Anemia NEC     Arthritis     CAD (coronary artery disease)     Chronic pain     Contact dermatitis and other eczema, due to unspecified cause     Diabetes ([facility]     Elevated serum creatinine 4/19/2011    GERD (gastroesophageal reflux disease)     HFrEF (heart failure with reduced ejection fraction) ([facility]     Hypercholesterolemia     Osteoporosis, 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 History reviewed, no pertinent family history.   Social History     Tobacco Use    Smoking status: Never Smoker    Smokeless tobacco: Never Used   Substance Use Topics    Alcohol use: No     Allergies   Allergen Reactions    Cephalexin Unknown (comments)    Dilaudid [Hydromorphone (Bulk)] Rash, Nausea and Vomiting and Swelling    Egg Unknown (comments)    Fenofibrate Nausea Only    Gemfibrozil Unknown (comments)    Iodine Rash, Nausea and Vomiting and Swelling    Oxycodone Unknown (comments)    Pcn [Penicillins] Rash, Nausea and Vomiting, Swelling and Contact Dermatitis    Prilosec [Omeprazole] Nausea Only    Shellfish Derived Swelling    Sulfa (Sulfonamide Antibiotics) Rash, Nausea and Vomiting and Swelling      Current Facility-Administered Medications   Medication Dose Route Frequency    potassium chloride 10 mEq in 100 ml IVPB  10 mEq IntraVENous Q1H    furosemide (LASIX) injection 40 mg  40 mg IntraVENous DAILY    isosorbide mononitrate ER (IMDUR) tablet 30 mg  30 mg Oral DAILY    aspirin delayed-release tablet 81 mg  81 mg Oral DAILY    docusate sodium (COLACE) capsule 100 mg  100 mg Oral PRN    sodium chloride (NS) flush 5-40 mL  5-40 mL IntraVENous Q8H    sodium chloride (NS) flush 5-40 mL  5-40 mL IntraVENous PRN    acetaminophen (TYLENOL) tablet 650 mg  650 mg Oral Q6H PRN    Or    acetaminophen (TYLENOL) suppository 650 mg  650 mg Rectal Q6H PRN    polyethylene glycol (MIRALAX) packet 17 g  17 g Oral DAILY PRN    ondansetron (ZOFRAN ODT) tablet 4 mg  4 mg Oral Q8H PRN    Or    ondansetron (ZOFRAN) injection 4 mg  4 mg IntraVENous Q6H PRN    heparin (porcine) injection 5,000 Units  5,000 Units SubCUTAneous Q12H     Current Outpatient Medications   Medication Sig    acetaminophen (Tylenol Arthritis Pain) 650 mg TbER Take 1 Tablet by mouth every six (6) hours as needed for Pain.  amitriptyline (ELAVIL) 25 mg tablet Take 1 Tablet by mouth nightly.  bumetanide (BUMEX) 0.5 mg tablet Take 1 Tablet by mouth two (2) times a day.  MV-Min-Folic Acid-Lutein 248-736 mcg chew Take 1 Tablet by mouth daily.  pyridoxine, vitamin B6, (Vitamin B-6) 25 mg tablet Take 1 Tablet by mouth daily. LAB AND IMAGING FINDINGS:     Lab Results   Component Value Date/Time    WBC 8.6 07/10/2022 02:56 PM    HGB 13.5 07/10/2022 02:56 PM    PLATELET 985 35/33/2724 02:56 PM     Lab Results   Component Value Date/Time    Sodium 148 (H) 07/11/2022 12:55 AM    Potassium 2.4 (LL) 07/11/2022 12:55 AM    Potassium 2.5 (LL) 07/11/2022 12:55 AM    Chloride 113 (H) 07/11/2022 12:55 AM    CO2 27 07/11/2022 12:55 AM    BUN 22 (H) 07/11/2022 12:55 AM    Creatinine 1.03 (H) 07/11/2022 12:55 AM    Calcium 9.1 07/11/2022 12:55 AM    Magnesium 1.8 07/11/2022 12:55 AM    Phosphorus 3.5 04/23/2022 12:55 AM      Lab Results   Component Value Date/Time    Alk.  phosphatase 101 07/10/2022 02:56 PM    Protein, total 5.9 (L) 07/10/2022 02:56 PM    Albumin 2.5 (L) 07/10/2022 02:56 PM    Globulin 3.4 07/10/2022 02:56 PM     Lab Results   Component Value Date/Time    INR 1.1 04/10/2022 03:46 AM    Prothrombin time 11.9 (H) 04/10/2022 03:46 AM    aPTT 28.1 05/06/2009 12:00 PM      Lab Results   Component Value Date/Time    Iron 8 (L) 04/10/2022 03:46 AM    TIBC 109 (L) 04/10/2022 03:46 AM    Iron % saturation 7 (L) 04/10/2022 03:46 AM      Lab Results   Component Value Date/Time    pH 7.52 (H) 05/13/2009 05:23 AM    PCO2 28 (LL) 05/13/2009 05:23 AM    PO2 100 05/13/2009 05:23 AM     No components found for: GLPOC   No results found for: CPK, CKMB             Total time: 70 mins  Counseling / coordination time, spent as noted above: 55 mins  > 50% counseling / coordination?: yes     Prolonged service was provided for  []30 min   []75 min in face to face time in the presence of the patient, spent as noted above. Time Start:   Time End:   Note: this can only be billed with 95518 (initial) or 53662 (follow up). If multiple start / stop times, list each separately.

## 2022-07-11 NOTE — H&P
[facility]  [address] [place] [address] [address]  [phone]    Admission History and Physical      NAME:  Arlen Reyes   :   1925   MRN:  [id number]     PCP:  Hayley Rodney MD     Date of service:  7/10/2022         Subjective:     CHIEF COMPLAINT: Abdominal pain    HISTORY OF PRESENT ILLNESS:     Ms. Anmol Tierney is a 80 y.o. pleasant  female with past medical history of chronic hypoxemic respiratory failure on 4 L of oxygen therapy at home, coronary artery disease, HFrEF, hyperlipidemia, history of CVA, hyperlipidemia, GERD, eczema, and history of prior stroke is admitted with the diagnosis of acute on chronic congestive HFrEF, severe hypomagnesemia, elevated troponin, nausea and vomiting. She is alert and oriented but history is limited because she states she does not remember. Ms. Anmol Tierney presented to the Emergency Department today complaining of minimal pain associated with nausea and vomiting (dry heaving). She states abdominal pain is epigastric, nonradiating, associate with nausea and vomiting. Patient denies sore throat, cough, shortness of breath, hematuria, urinary frequency or urgency.     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    Pacemaker     Stroke ([address]     Thromboembolus ([address]         Past Surgical History:   Procedure Laterality Date    HX CHOLECYSTECTOMY      HX HEENT      HX ORTHOPAEDIC      VASCULAR SURGERY PROCEDURE UNLIST         Social History     Tobacco Use    Smoking status: Never Smoker    Smokeless tobacco: Never Used   Substance Use Topics    Alcohol use: No          Allergies   Allergen Reactions    Cephalexin Unknown (comments)    Dilaudid [Hydromorphone (Bulk)] Rash, Nausea and Vomiting and Swelling    Egg Unknown (comments)    Fenofibrate Nausea Only    Gemfibrozil Unknown (comments)    Iodine Rash, Nausea and Vomiting and Swelling    Oxycodone Unknown (comments)    Pcn [Penicillins] Rash, Nausea and Vomiting, Swelling and Contact Dermatitis    Prilosec [Omeprazole] Nausea Only    Shellfish Derived Swelling    Sulfa (Sulfonamide Antibiotics) Rash, Nausea and Vomiting and Swelling        Prior to Admission medications    Medication Sig Start Date End Date Taking? Authorizing Provider   acetaminophen (Tylenol Arthritis Pain) 650 mg TbER Take 1 Tablet by mouth every six (6) hours as needed for Pain. 5/2/22   Ronen Shea MD   amitriptyline (ELAVIL) 25 mg tablet Take 1 Tablet by mouth nightly. 5/2/22   Ronen Shea MD   bumetanide (BUMEX) 0.5 mg tablet Take 1 Tablet by mouth two (2) times a day. 5/2/22   Ronen Shea MD   MV-Min-Folic Acid-Lutein 602-368 mcg chew Take 1 Tablet by mouth daily. 5/2/22   Ronen Shea MD   pyridoxine, vitamin B6, (Vitamin B-6) 25 mg tablet Take 1 Tablet by mouth daily.  5/2/22   Ronen Shea MD         Review of Systems:  (bold if positive, if negative)    Gen:   fatigueEyes:  ENT:  CVS:  edema,Pulm:  GI:  Abdominal pain, nausea, emesis,:  MS:  Skin:  Psych:  Endo:  Hem:  Renal:  EdemaNeuro:            Objective:      VITALS:    Vital signs reviewed; most recent are:    Visit Vitals  BP (!) 154/133 (BP 1 Location: Right upper arm, BP Patient Position: At rest;Supine)   Pulse 63   Temp 97.8 °F (36.6 °C)   Resp 16   Ht 5' 2\" (1.575 m)   Wt 45.4 kg (100 lb)   SpO2 100%   BMI 18.29 kg/m²     SpO2 Readings from Last 6 Encounters:   07/10/22 100%   05/02/22 93%   04/14/22 100%   05/22/17 94%   09/29/15 99%   08/31/15 95%    O2 Flow Rate (L/min): 4 l/min       Intake/Output Summary (Last 24 hours) at 7/10/2022 2138  Last data filed at 7/10/2022 2000  Gross per 24 hour   Intake 80 ml   Output --   Net 80 ml            Exam:     Physical Exam:    Gen:  Well-developed, in no acute distress  HEENT:  Pink conjunctivae, PERRL, hearing intact to voice, moist mucous membranes  Neck:  Supple, without masses, thyroid non-tender  Resp: On 4L of oxygen via NC. No accessory muscle use, clear breath sounds without wheezes rales or rhonchi  Card:  No murmurs, normal S1, S2 without thrills, bruits or peripheral edema  Abd:  Soft, non-tender, non-distended, normoactive bowel sounds are present  Lymph: No cervical adenopathy  Musc:  No cyanosis or clubbing  Skin:  No rashes or ulcers, skin turgor is good  Neuro:  Cranial nerves are grossly intact, no focal motor weakness, follows commands appropriately  Psych:  oriented to person, place and time, alert       Labs:    Recent Labs     07/10/22  1456   WBC 8.6   HGB 13.5   HCT 40.1        Recent Labs     07/10/22  1456   *   K 3.1*   *   CO2 27   *   BUN 23*   CREA 1.12*   CA 9.3   MG 1.0*   ALB 2.5*   TBILI 1.1*   ALT 14     Lab Results   Component Value Date/Time    Glucose (POC) 82 04/23/2022 04:00 PM    Glucose (POC) 207 (H) 04/11/2022 11:07 AM     No results for input(s): PH, PCO2, PO2, HCO3, FIO2 in the last 72 hours. No results for input(s): INR, INREXT in the last 72 hours.     Telemetry reviewed:   V paced rhythm       Assessment/Plan:    Active Problems:  Acute on chronic congestive heart failure ([facility] (7/10/2022)  -Patient with bilateral lower extremity edema, dyspnea, bilateral pleural effusion  -Lasix 40 mg IV  -Daily weight  -Monitor I/O  -TTE  -Palliative medicine consult- discuss on [employer]- Patient well known to our palliative medicine team and she is DNR, team will follow up    Nausea and vomiting (7/10/2022)  -Zofran as needed    Hypomagnesemia (7/10/2022)  -replete and recheck level, tele monitoring for severe hypomagnesemia    Elevated troponin (7/10/2022)  -troponin is 256  -patient states she is still full code and she would like to involve her son for discussion and decision     Hypokalemia (7/10/2022)  -replete and recheck         Previous medical records reviewed     Risk of deterioration: high      Total time spent with patient: 48 [address] discussed with: Patient    Discussed:  Care Plan    Prophylaxis:  Hep SQ    Probable Disposition:  Home w/Family           ___________________________________________________    Attending Physician: Geno Solomon MD

## 2022-07-12 ENCOUNTER — APPOINTMENT (OUTPATIENT)
Dept: NON INVASIVE DIAGNOSTICS | Age: 87
DRG: 292 | End: 2022-07-12
Attending: INTERNAL MEDICINE
Payer: MEDICARE

## 2022-07-12 LAB
ANION GAP SERPL CALC-SCNC: 5 MMOL/L (ref 5–15)
BASOPHILS # BLD: 0 K/UL (ref 0–0.1)
BASOPHILS NFR BLD: 1 % (ref 0–1)
BUN SERPL-MCNC: 20 MG/DL (ref 6–20)
BUN/CREAT SERPL: 22 (ref 12–20)
CALCIUM SERPL-MCNC: 8.3 MG/DL (ref 8.5–10.1)
CHLORIDE SERPL-SCNC: 112 MMOL/L (ref 97–108)
CO2 SERPL-SCNC: 30 MMOL/L (ref 21–32)
CREAT SERPL-MCNC: 0.89 MG/DL (ref 0.55–1.02)
DIFFERENTIAL METHOD BLD: ABNORMAL
ECHO AV AREA PEAK VELOCITY: 0.8 CM2
ECHO AV AREA VTI: 0.9 CM2
ECHO AV AREA/BSA PEAK VELOCITY: 0.6 CM2/M2
ECHO AV AREA/BSA VTI: 0.6 CM2/M2
ECHO AV MEAN GRADIENT: 5 MMHG
ECHO AV MEAN VELOCITY: 1 M/S
ECHO AV PEAK GRADIENT: 8 MMHG
ECHO AV PEAK VELOCITY: 1.4 M/S
ECHO AV VELOCITY RATIO: 0.5
ECHO AV VTI: 25.7 CM
ECHO LA DIAMETER INDEX: 1.76 CM/M2
ECHO LA DIAMETER: 2.5 CM
ECHO LV FRACTIONAL SHORTENING: 11 % (ref 28–44)
ECHO LV INTERNAL DIMENSION DIASTOLE INDEX: 2.54 CM/M2
ECHO LV INTERNAL DIMENSION DIASTOLIC: 3.6 CM (ref 3.9–5.3)
ECHO LV INTERNAL DIMENSION SYSTOLIC INDEX: 2.25 CM/M2
ECHO LV INTERNAL DIMENSION SYSTOLIC: 3.2 CM
ECHO LV IVSD: 0.6 CM (ref 0.6–0.9)
ECHO LV MASS 2D: 59.7 G (ref 67–162)
ECHO LV MASS INDEX 2D: 42 G/M2 (ref 43–95)
ECHO LV POSTERIOR WALL DIASTOLIC: 0.7 CM (ref 0.6–0.9)
ECHO LV RELATIVE WALL THICKNESS RATIO: 0.39
ECHO LVOT AREA: 1.5 CM2
ECHO LVOT AV VTI INDEX: 0.55
ECHO LVOT DIAM: 1.4 CM
ECHO LVOT MEAN GRADIENT: 1 MMHG
ECHO LVOT PEAK GRADIENT: 2 MMHG
ECHO LVOT PEAK VELOCITY: 0.7 M/S
ECHO LVOT STROKE VOLUME INDEX: 15.4 ML/M2
ECHO LVOT SV: 21.8 ML
ECHO LVOT VTI: 14.2 CM
ECHO MV A VELOCITY: 0.87 M/S
ECHO MV AREA VTI: 0.8 CM2
ECHO MV E DECELERATION TIME (DT): 290.5 MS
ECHO MV E VELOCITY: 0.87 M/S
ECHO MV E/A RATIO: 1
ECHO MV LVOT VTI INDEX: 1.86
ECHO MV MAX VELOCITY: 1.2 M/S
ECHO MV MEAN GRADIENT: 2 MMHG
ECHO MV MEAN VELOCITY: 0.7 M/S
ECHO MV PEAK GRADIENT: 6 MMHG
ECHO MV VTI: 26.4 CM
ECHO TV REGURGITANT MAX VELOCITY: 2.84 M/S
ECHO TV REGURGITANT PEAK GRADIENT: 32 MMHG
EOSINOPHIL # BLD: 0.1 K/UL (ref 0–0.4)
EOSINOPHIL NFR BLD: 2 % (ref 0–7)
ERYTHROCYTE [DISTWIDTH] IN BLOOD BY AUTOMATED COUNT: 16.5 % (ref 11.5–14.5)
GLUCOSE SERPL-MCNC: 70 MG/DL (ref 65–100)
HAPTOGLOB SERPL-MCNC: 137 MG/DL (ref 30–200)
HCT VFR BLD AUTO: 30.5 % (ref 35–47)
HGB BLD-MCNC: 10.1 G/DL (ref 11.5–16)
IMM GRANULOCYTES # BLD AUTO: 0 K/UL (ref 0–0.04)
IMM GRANULOCYTES NFR BLD AUTO: 0 % (ref 0–0.5)
LACTATE BLD-SCNC: 0.57 MMOL/L (ref 0.4–2)
LACTATE SERPL-SCNC: 0.4 MMOL/L (ref 0.4–2)
LIPASE SERPL-CCNC: 291 U/L (ref 73–393)
LYMPHOCYTES # BLD: 1.1 K/UL (ref 0.8–3.5)
LYMPHOCYTES NFR BLD: 21 % (ref 12–49)
MAGNESIUM SERPL-MCNC: 1.5 MG/DL (ref 1.6–2.4)
MCH RBC QN AUTO: 32.5 PG (ref 26–34)
MCHC RBC AUTO-ENTMCNC: 33.1 G/DL (ref 30–36.5)
MCV RBC AUTO: 98.1 FL (ref 80–99)
MONOCYTES # BLD: 0.5 K/UL (ref 0–1)
MONOCYTES NFR BLD: 10 % (ref 5–13)
NEUTS SEG # BLD: 3.5 K/UL (ref 1.8–8)
NEUTS SEG NFR BLD: 66 % (ref 32–75)
NRBC # BLD: 0 K/UL (ref 0–0.01)
NRBC BLD-RTO: 0 PER 100 WBC
PLATELET # BLD AUTO: 187 K/UL (ref 150–400)
PMV BLD AUTO: 10.9 FL (ref 8.9–12.9)
POTASSIUM SERPL-SCNC: 3.6 MMOL/L (ref 3.5–5.1)
RBC # BLD AUTO: 3.11 M/UL (ref 3.8–5.2)
RETICS # AUTO: 0.04 M/UL (ref 0.02–0.08)
RETICS/RBC NFR AUTO: 1.3 % (ref 0.7–2.1)
SODIUM SERPL-SCNC: 147 MMOL/L (ref 136–145)
WBC # BLD AUTO: 5.3 K/UL (ref 3.6–11)

## 2022-07-12 PROCEDURE — 93306 TTE W/DOPPLER COMPLETE: CPT | Performed by: STUDENT IN AN ORGANIZED HEALTH CARE EDUCATION/TRAINING PROGRAM

## 2022-07-12 PROCEDURE — 93306 TTE W/DOPPLER COMPLETE: CPT

## 2022-07-12 PROCEDURE — 74011000250 HC RX REV CODE- 250: Performed by: INTERNAL MEDICINE

## 2022-07-12 PROCEDURE — 65270000029 HC RM PRIVATE

## 2022-07-12 PROCEDURE — 85045 AUTOMATED RETICULOCYTE COUNT: CPT

## 2022-07-12 PROCEDURE — 85025 COMPLETE CBC W/AUTO DIFF WBC: CPT

## 2022-07-12 PROCEDURE — 83605 ASSAY OF LACTIC ACID: CPT

## 2022-07-12 PROCEDURE — 36415 COLL VENOUS BLD VENIPUNCTURE: CPT

## 2022-07-12 PROCEDURE — 83690 ASSAY OF LIPASE: CPT

## 2022-07-12 PROCEDURE — 74011250637 HC RX REV CODE- 250/637: Performed by: INTERNAL MEDICINE

## 2022-07-12 PROCEDURE — 74011250636 HC RX REV CODE- 250/636: Performed by: INTERNAL MEDICINE

## 2022-07-12 PROCEDURE — C9113 INJ PANTOPRAZOLE SODIUM, VIA: HCPCS | Performed by: INTERNAL MEDICINE

## 2022-07-12 PROCEDURE — 83010 ASSAY OF HAPTOGLOBIN QUANT: CPT

## 2022-07-12 PROCEDURE — 83735 ASSAY OF MAGNESIUM: CPT

## 2022-07-12 PROCEDURE — 74011000250 HC RX REV CODE- 250: Performed by: STUDENT IN AN ORGANIZED HEALTH CARE EDUCATION/TRAINING PROGRAM

## 2022-07-12 PROCEDURE — 80048 BASIC METABOLIC PNL TOTAL CA: CPT

## 2022-07-12 RX ORDER — LATANOPROST 50 UG/ML
1 SOLUTION/ DROPS OPHTHALMIC EVERY EVENING
Status: DISCONTINUED | OUTPATIENT
Start: 2022-07-12 | End: 2022-07-18 | Stop reason: HOSPADM

## 2022-07-12 RX ORDER — MAGNESIUM SULFATE HEPTAHYDRATE 40 MG/ML
2 INJECTION, SOLUTION INTRAVENOUS ONCE
Status: COMPLETED | OUTPATIENT
Start: 2022-07-12 | End: 2022-07-12

## 2022-07-12 RX ORDER — DICYCLOMINE HYDROCHLORIDE 10 MG/1
10 CAPSULE ORAL 3 TIMES DAILY
Status: DISCONTINUED | OUTPATIENT
Start: 2022-07-12 | End: 2022-07-16

## 2022-07-12 RX ADMIN — Medication 10 ML: at 21:28

## 2022-07-12 RX ADMIN — DICYCLOMINE HYDROCHLORIDE 10 MG: 10 CAPSULE ORAL at 15:49

## 2022-07-12 RX ADMIN — LATANOPROST 1 DROP: 50 SOLUTION OPHTHALMIC at 23:00

## 2022-07-12 RX ADMIN — ASPIRIN 81 MG: 81 TABLET, COATED ORAL at 09:17

## 2022-07-12 RX ADMIN — SODIUM CHLORIDE, PRESERVATIVE FREE 40 MG: 5 INJECTION INTRAVENOUS at 14:42

## 2022-07-12 RX ADMIN — FUROSEMIDE 40 MG: 10 INJECTION, SOLUTION INTRAMUSCULAR; INTRAVENOUS at 09:17

## 2022-07-12 RX ADMIN — HEPARIN SODIUM 5000 UNITS: 5000 INJECTION INTRAVENOUS; SUBCUTANEOUS at 21:25

## 2022-07-12 RX ADMIN — DICYCLOMINE HYDROCHLORIDE 10 MG: 10 CAPSULE ORAL at 21:25

## 2022-07-12 RX ADMIN — Medication 10 ML: at 14:42

## 2022-07-12 RX ADMIN — SODIUM CHLORIDE, PRESERVATIVE FREE 40 MG: 5 INJECTION INTRAVENOUS at 21:25

## 2022-07-12 RX ADMIN — ISOSORBIDE MONONITRATE 30 MG: 30 TABLET, EXTENDED RELEASE ORAL at 09:17

## 2022-07-12 RX ADMIN — ACETAMINOPHEN 650 MG: 325 TABLET ORAL at 09:17

## 2022-07-12 RX ADMIN — MAGNESIUM SULFATE HEPTAHYDRATE 2 G: 40 INJECTION, SOLUTION INTRAVENOUS at 14:42

## 2022-07-12 RX ADMIN — HEPARIN SODIUM 5000 UNITS: 5000 INJECTION INTRAVENOUS; SUBCUTANEOUS at 09:17

## 2022-07-12 NOTE — PROGRESS NOTES
Care Management Progress Note      ICD-10-CM ICD-9-CM    1. Hypomagnesemia  E83.42 275.2    2. Nausea and vomiting, unspecified vomiting type  R11.2 787.01    3. Elevated troponin  R77.8 790.6    4. Bilateral pleural effusion  J90 511.9    5. Peripheral edema  R60.9 782.3    6. Acute on chronic systolic congestive heart failure (HCC)  I50.23 428.23      428.0        RUR: 21%    Risk Level: []Low []Moderate [x]High  Value-based purchasing: [x] Yes [] No  Bundle patient: [] Yes [x] No   Specify:     Transition of care plan:  1. Ongoing medical management, Palliative following  2. Pt was living at home prior to admission, TBD on needs at this time  3. Outpatient follow-up.   4. TBD on transport at this time

## 2022-07-12 NOTE — PROGRESS NOTES
Problem: Pressure Injury - Risk of  Goal: *Prevention of pressure injury  Description: Document Destin Scale and appropriate interventions in the flowsheet.   Outcome: Progressing Towards Goal  Note: Pressure Injury Interventions:  Sensory Interventions: Assess changes in LOC,Avoid rigorous massage over bony prominences,Check visual cues for pain,Float heels,Keep linens dry and wrinkle-free    Moisture Interventions: Absorbent underpads,Check for incontinence Q2 hours and as needed,Internal/External urinary devices    Activity Interventions: Increase time out of bed,Pressure redistribution bed/mattress(bed type),PT/OT evaluation    Mobility Interventions: HOB 30 degrees or less,Pressure redistribution bed/mattress (bed type),PT/OT evaluation    Nutrition Interventions: Document food/fluid/supplement intake    Friction and Shear Interventions: Apply protective barrier, creams and emollients,HOB 30 degrees or less,Lift sheet,Lift team/patient mobility team

## 2022-07-12 NOTE — ED NOTES
TRANSFER - OUT REPORT:    Verbal report given to 4th floor RN(name) on Mark Prince  being transferred to 4th floor room 419(unit) for routine progression of care       Report consisted of patients Situation, Background, Assessment and   Recommendations(SBAR). Information from the following report(s) SBAR, Kardex, ED Summary, Intake/Output, MAR and Recent Results was reviewed with the receiving nurse. Lines:   Peripheral IV 07/10/22 Left Forearm (Active)   Site Assessment Clean, dry, & intact 07/11/22 2332   Phlebitis Assessment 0 07/11/22 2332   Infiltration Assessment 0 07/11/22 2332   Dressing Status Clean, dry, & intact 07/11/22 2332   Dressing Type Transparent 07/11/22 2332   Hub Color/Line Status Capped 07/11/22 2332   Action Taken Open ports on tubing capped 07/11/22 2332   Alcohol Cap Used Yes 07/11/22 2332       Peripheral IV 07/11/22 Right Antecubital (Active)   Site Assessment Clean, dry, & intact 07/11/22 2332   Phlebitis Assessment 0 07/11/22 2332   Infiltration Assessment 0 07/11/22 2332   Dressing Status Clean, dry, & intact 07/11/22 2332   Dressing Type Transparent 07/11/22 2332   Hub Color/Line Status Capped 07/11/22 2332   Action Taken Open ports on tubing capped 07/11/22 2332   Alcohol Cap Used Yes 07/11/22 2332       Peripheral IV 41/79/10 Left Basilic (Active)   Site Assessment Clean, dry, & intact 07/11/22 2332   Phlebitis Assessment 0 07/11/22 2332   Infiltration Assessment 0 07/11/22 2332   Dressing Status Clean, dry, & intact 07/11/22 2332   Dressing Type Transparent 07/11/22 2332   Hub Color/Line Status Capped 07/11/22 2332   Action Taken Open ports on tubing capped 07/11/22 2332   Alcohol Cap Used Yes 07/11/22 2332        Opportunity for questions and clarification was provided.       Patient transported with:   O2 @ 4 liters  Tech

## 2022-07-12 NOTE — PROGRESS NOTES
1015: Pt complaining of upper abdominal pain and says it feels like when she had pancreatitis before. Notified Dr. Camilo Davis    1900: Bedside shift change report given to Marda Cowden (oncoming nurse) by Matheus Wynn (offgoing nurse). Report included the following information SBAR, Kardex, Intake/Output, MAR, Recent Results and Cardiac Rhythm A/V paced.

## 2022-07-12 NOTE — PROGRESS NOTES
[facility]  [address] [facility], [address] [place] [address]  [phone]      Medical Progress Note      NAME: Chinedu Garrido   :  1925  MRM:  [id number]    Date/Time of service: 2022         Subjective:     Chief Complaint:  Patient was personally seen and examined by me during this time period. Chart reviewed. F/u abd pain. Continues to endorse abd pain but no states more epiagstric. NO n/v. States she has some dyspnea but close to baseline. Discussed with  at bedside. Objective:       Vitals:       Last 24hrs VS reviewed since prior progress note.  Most recent are:    Visit Vitals  /69 (BP 1 Location: Right lower arm, BP Patient Position: At rest)   Pulse 91   Temp 98.3 °F (36.8 °C)   Resp 18   Ht 5' 2\" (1.575 m)   Wt 45.4 kg (100 lb)   SpO2 100%   BMI 18.29 kg/m²     SpO2 Readings from Last 6 Encounters:   22 100%   22 93%   22 100%   17 94%   09/29/15 99%   08/31/15 95%    O2 Flow Rate (L/min): 3 l/min       Intake/Output Summary (Last 24 hours) at 2022 1329  Last data filed at 2022 1011  Gross per 24 hour   Intake 200 ml   Output 1450 ml   Net -1250 ml        Exam:     Physical Exam:    Gen: elderly, frail   HEENT:  Pink conjunctivae, PERRL, hearing intact to voice  Resp:  No accessory muscle use, clear breath sounds without wheezes rales or rhonchi  Card:  RRR, No murmurs, normal S1, S2, no peripheral edema  Abd:  Soft, epigastric and b/l Lower quadrant TTP, non-distended, normoactive bowel sounds are present  Musc:  No cyanosis or clubbing  Skin:  No rashes or ulcers, skin turgor is good  Neuro:  Cranial nerves 3-12 are grossly intact, follows commands appropriately  Psych:  Oriented to person, place, and time, Alert with good insight      Medications Reviewed: (see below)    Lab Data Reviewed: (see below)    ______________________________________________________________________    Medications:     Current Facility-Administered Medications   Medication Dose Route Frequency    magnesium sulfate 2 g/50 ml IVPB (premix or compounded)  2 g IntraVENous ONCE    pantoprazole (PROTONIX) 40 mg in 0.9% sodium chloride 10 mL injection  40 mg IntraVENous Q12H    dicyclomine (BENTYL) capsule 10 mg  10 mg Oral TID    furosemide (LASIX) injection 40 mg  40 mg IntraVENous DAILY    isosorbide mononitrate ER (IMDUR) tablet 30 mg  30 mg Oral DAILY    aspirin delayed-release tablet 81 mg  81 mg Oral DAILY    docusate sodium (COLACE) capsule 100 mg  100 mg Oral PRN    sodium chloride (NS) flush 5-40 mL  5-40 mL IntraVENous Q8H    sodium chloride (NS) flush 5-40 mL  5-40 mL IntraVENous PRN    acetaminophen (TYLENOL) tablet 650 mg  650 mg Oral Q6H PRN    Or    acetaminophen (TYLENOL) suppository 650 mg  650 mg Rectal Q6H PRN    polyethylene glycol (MIRALAX) packet 17 g  17 g Oral DAILY PRN    ondansetron (ZOFRAN ODT) tablet 4 mg  4 mg Oral Q8H PRN    Or    ondansetron (ZOFRAN) injection 4 mg  4 mg IntraVENous Q6H PRN    heparin (porcine) injection 5,000 Units  5,000 Units SubCUTAneous Q12H          Lab Review:     Recent Labs     07/12/22  0408 07/10/22  1456   WBC 5.3 8.6   HGB 10.1* 13.5   HCT 30.5* 40.1    239     Recent Labs     07/12/22  0408 07/11/22  1838 07/11/22  0055 07/10/22  1456 07/10/22  1456   *  --  148*  --  146*   K 3.6  --  2.5*  2.4*  --  3.1*   *  --  113*  --  111*   CO2 30  --  27  --  27   GLU 70  --  77  --  128*   BUN 20  --  22*  --  23*   CREA 0.89  --  1.03*  --  1.12*   CA 8.3*  --  9.1  --  9.3   MG 1.5* 1.4* 1.8   < > 1.0*   ALB  --   --   --   --  2.5*   TBILI  --   --   --   --  1.1*   ALT  --   --   --   --  14    < > = values in this interval not displayed.      Lab Results   Component Value Date/Time    Glucose (POC) 82 04/23/2022 04:00 PM    Glucose (POC) 207 (H) 04/11/2022 11:07 AM    Glucose (POC) 234 (H) 04/11/2022 07:59 AM    Glucose (POC) 59 (L) 04/11/2022 05:43 AM Glucose (POC) 210 (H) 05/12/2009 04:35 PM          Assessment / Plan:       Acute on chronic combined systolic and diastolic HF/ dyspnea POA: CX with small pleural effusions and probnp elevated. Echo pending. Continue IV lasix as tolerated. Daily weight and monitor I/Os. Continue Imdur. Hold off cardio consult for now. Palliative care consulted. Acute abd pain/ Intractable Nausea and vomiting (7/10/2022) POA: Unclear etiology. CT abdomen pelvis without any acute concerns. Lipase within normal limits. UA unremarkable for infection. CT notes diverticulosis with no signs of diverticulitis. Viral gastroenteritis? Obtain stool cultures if develops any diarrhea. Repeat lipase due to reported epigastric pain today. Repeat lactic acid. Doubt mesenteric ischemia base on clinical exam but if does not improve or worsening may have to re image with CTA abd. Hgb dropped; check occult stool, retic but BUN reassuring. Start IV PPI BID and bentyl TID. Holding off on GI consult because patient states she would not be interested in endoscopies and her resp status likely wouldn't permit any procedures. Palliative care following. Hypomagnesemia (7/10/2022)/ Hypokalemia (7/10/2022): Suspect due to vomiting. Replete IV and oral as needed. Elevated troponin (7/10/2022) POA: Suspect demand ischemia due to heart failure. Continue aspirin. Check echo. Consider cardiology consult pending echo results and if patient would want any intervention. Chronic respiratory failure POA: On baseline 4 L nasal cannula.   Monitor     Total time spent with patient: 28 Minutes **I personally saw and examined the patient during this time period**                 Care Plan discussed with: Patient, Nursing Staff and Consultant/Specialist    Discussed:  Care Plan    Prophylaxis:  Hep SQ    Disposition:  Home w/Family versus hospice            ___________________________________________________    Attending Physician: Tricia Aschoff, DO

## 2022-07-12 NOTE — PROGRESS NOTES
Rounded on [facility] patients and provided Anointing of the Sick and Communion  at request of patient.     Génesis Jamil

## 2022-07-12 NOTE — PROGRESS NOTES
Attempted to get report at 5:15 nurse unable to give report, attempted again at 6:00 nurse still unable to get report

## 2022-07-13 LAB
ANION GAP SERPL CALC-SCNC: 8 MMOL/L (ref 5–15)
BASOPHILS # BLD: 0 K/UL (ref 0–0.1)
BASOPHILS NFR BLD: 1 % (ref 0–1)
BUN SERPL-MCNC: 21 MG/DL (ref 6–20)
BUN/CREAT SERPL: 22 (ref 12–20)
CALCIUM SERPL-MCNC: 8.5 MG/DL (ref 8.5–10.1)
CHLORIDE SERPL-SCNC: 106 MMOL/L (ref 97–108)
CO2 SERPL-SCNC: 30 MMOL/L (ref 21–32)
CREAT SERPL-MCNC: 0.97 MG/DL (ref 0.55–1.02)
DIFFERENTIAL METHOD BLD: ABNORMAL
EOSINOPHIL # BLD: 0.2 K/UL (ref 0–0.4)
EOSINOPHIL NFR BLD: 3 % (ref 0–7)
ERYTHROCYTE [DISTWIDTH] IN BLOOD BY AUTOMATED COUNT: 16.4 % (ref 11.5–14.5)
GLUCOSE SERPL-MCNC: 62 MG/DL (ref 65–100)
HCT VFR BLD AUTO: 32.4 % (ref 35–47)
HGB BLD-MCNC: 10.6 G/DL (ref 11.5–16)
IMM GRANULOCYTES # BLD AUTO: 0 K/UL (ref 0–0.04)
IMM GRANULOCYTES NFR BLD AUTO: 0 % (ref 0–0.5)
LYMPHOCYTES # BLD: 1.2 K/UL (ref 0.8–3.5)
LYMPHOCYTES NFR BLD: 22 % (ref 12–49)
MAGNESIUM SERPL-MCNC: 2 MG/DL (ref 1.6–2.4)
MCH RBC QN AUTO: 32.7 PG (ref 26–34)
MCHC RBC AUTO-ENTMCNC: 32.7 G/DL (ref 30–36.5)
MCV RBC AUTO: 100 FL (ref 80–99)
MONOCYTES # BLD: 0.5 K/UL (ref 0–1)
MONOCYTES NFR BLD: 9 % (ref 5–13)
NEUTS SEG # BLD: 3.4 K/UL (ref 1.8–8)
NEUTS SEG NFR BLD: 65 % (ref 32–75)
NRBC # BLD: 0 K/UL (ref 0–0.01)
NRBC BLD-RTO: 0 PER 100 WBC
PLATELET # BLD AUTO: 185 K/UL (ref 150–400)
PMV BLD AUTO: 11.3 FL (ref 8.9–12.9)
POTASSIUM SERPL-SCNC: 3.2 MMOL/L (ref 3.5–5.1)
RBC # BLD AUTO: 3.24 M/UL (ref 3.8–5.2)
SODIUM SERPL-SCNC: 144 MMOL/L (ref 136–145)
WBC # BLD AUTO: 5.3 K/UL (ref 3.6–11)

## 2022-07-13 PROCEDURE — 85025 COMPLETE CBC W/AUTO DIFF WBC: CPT

## 2022-07-13 PROCEDURE — 74011250637 HC RX REV CODE- 250/637: Performed by: INTERNAL MEDICINE

## 2022-07-13 PROCEDURE — 94761 N-INVAS EAR/PLS OXIMETRY MLT: CPT

## 2022-07-13 PROCEDURE — 80048 BASIC METABOLIC PNL TOTAL CA: CPT

## 2022-07-13 PROCEDURE — 74011000250 HC RX REV CODE- 250: Performed by: INTERNAL MEDICINE

## 2022-07-13 PROCEDURE — 74011250636 HC RX REV CODE- 250/636: Performed by: INTERNAL MEDICINE

## 2022-07-13 PROCEDURE — 74011250637 HC RX REV CODE- 250/637: Performed by: HOSPITALIST

## 2022-07-13 PROCEDURE — 97165 OT EVAL LOW COMPLEX 30 MIN: CPT

## 2022-07-13 PROCEDURE — 36415 COLL VENOUS BLD VENIPUNCTURE: CPT

## 2022-07-13 PROCEDURE — 83735 ASSAY OF MAGNESIUM: CPT

## 2022-07-13 PROCEDURE — C9113 INJ PANTOPRAZOLE SODIUM, VIA: HCPCS | Performed by: INTERNAL MEDICINE

## 2022-07-13 PROCEDURE — 77010033678 HC OXYGEN DAILY

## 2022-07-13 PROCEDURE — 99223 1ST HOSP IP/OBS HIGH 75: CPT | Performed by: STUDENT IN AN ORGANIZED HEALTH CARE EDUCATION/TRAINING PROGRAM

## 2022-07-13 PROCEDURE — 65270000029 HC RM PRIVATE

## 2022-07-13 PROCEDURE — 97530 THERAPEUTIC ACTIVITIES: CPT

## 2022-07-13 PROCEDURE — APPSS30 APP SPLIT SHARED TIME 16-30 MINUTES: Performed by: NURSE PRACTITIONER

## 2022-07-13 RX ORDER — POTASSIUM CHLORIDE 750 MG/1
20 TABLET, FILM COATED, EXTENDED RELEASE ORAL DAILY
Status: DISCONTINUED | OUTPATIENT
Start: 2022-07-13 | End: 2022-07-13

## 2022-07-13 RX ORDER — METOPROLOL SUCCINATE 25 MG/1
25 TABLET, EXTENDED RELEASE ORAL DAILY
Status: DISCONTINUED | OUTPATIENT
Start: 2022-07-14 | End: 2022-07-18 | Stop reason: HOSPADM

## 2022-07-13 RX ORDER — SPIRONOLACTONE 25 MG/1
25 TABLET ORAL DAILY
Status: DISCONTINUED | OUTPATIENT
Start: 2022-07-14 | End: 2022-07-18 | Stop reason: HOSPADM

## 2022-07-13 RX ADMIN — DICYCLOMINE HYDROCHLORIDE 10 MG: 10 CAPSULE ORAL at 15:36

## 2022-07-13 RX ADMIN — Medication 10 ML: at 22:20

## 2022-07-13 RX ADMIN — ISOSORBIDE MONONITRATE 30 MG: 30 TABLET, EXTENDED RELEASE ORAL at 08:23

## 2022-07-13 RX ADMIN — SODIUM CHLORIDE, PRESERVATIVE FREE 40 MG: 5 INJECTION INTRAVENOUS at 20:41

## 2022-07-13 RX ADMIN — Medication 10 ML: at 15:36

## 2022-07-13 RX ADMIN — HEPARIN SODIUM 5000 UNITS: 5000 INJECTION INTRAVENOUS; SUBCUTANEOUS at 20:40

## 2022-07-13 RX ADMIN — Medication 10 ML: at 06:00

## 2022-07-13 RX ADMIN — SODIUM CHLORIDE, PRESERVATIVE FREE 40 MG: 5 INJECTION INTRAVENOUS at 08:23

## 2022-07-13 RX ADMIN — POTASSIUM CHLORIDE 20 MEQ: 750 TABLET, FILM COATED, EXTENDED RELEASE ORAL at 15:36

## 2022-07-13 RX ADMIN — HEPARIN SODIUM 5000 UNITS: 5000 INJECTION INTRAVENOUS; SUBCUTANEOUS at 08:23

## 2022-07-13 RX ADMIN — LATANOPROST 1 DROP: 50 SOLUTION OPHTHALMIC at 17:01

## 2022-07-13 RX ADMIN — ASPIRIN 81 MG: 81 TABLET, COATED ORAL at 08:23

## 2022-07-13 RX ADMIN — FUROSEMIDE 40 MG: 10 INJECTION, SOLUTION INTRAMUSCULAR; INTRAVENOUS at 08:23

## 2022-07-13 RX ADMIN — DICYCLOMINE HYDROCHLORIDE 10 MG: 10 CAPSULE ORAL at 22:20

## 2022-07-13 RX ADMIN — DICYCLOMINE HYDROCHLORIDE 10 MG: 10 CAPSULE ORAL at 08:23

## 2022-07-13 NOTE — PROGRESS NOTES
1900: Bedside shift change report given to [address] [place] [address] (oncoming nurse) by Celio Carranza (offgoing nurse). Report included the following information SBAR, Kardex, Intake/Output, MAR, Recent Results and Cardiac Rhythm a paced.

## 2022-07-13 NOTE — PROGRESS NOTES
Cardiology Initial Care Encounter    Patient: Andree Crane MRN: [id number]     YOB: 1925  Age: 80 y.o. Sex: female      Admit Date: 7/10/2022       Assessment/Plan     1 A/C HF r EF: per pt she is breathing better. Check pBNP. LV function unchanged from 4/2022. Nearing euvolemia. Can likely change lasix to po tomorrow . Previously GDMT (BB, entresto) was stopped due to hypotension. BP appears to be elevated this admission. Would start low dose BB. Assess going forward for ARB. 2 chronic resp failure: on 4 liters baseline  3 CKD 3   4   Acute abd pain/ Intractable Nausea and vomiting (7/10/2022) POA: Unclear etiology. CT abdomen pelvis without any acute concerns. Lipase within normal limits. Patient states she would not be interested in endoscopies and her resp status likely wouldn't permit any procedures. 5 hypokalemia/hypomagnesemia: replete and recheck. NP spent __8__ minutes reviewing chart, labs, diagnostics and coordination of care . NP spent __10__ minutes with pt/family in examination and care plan review.            HPI     Andree Crane is a 80 y.o. white female  with PMH of chronic hypoxemic respiratory failure on 4 L of oxygen therapy at home, coronary artery disease, HFrEF, hyperlipidemia, history of CVA, hyperlipidemia, GERD, eczema, and history of prior stroke is admitted with the diagnosis of acute on chronic congestive HFrEF, severe hypomagnesemia, elevated troponin, nausea and vomiting. XR Results (most recent):  Results from Hospital Encounter encounter on 07/10/22    XR CHEST PORT    Narrative  EXAM: XR CHEST PORT    INDICATION: sob, fluid retention    COMPARISON: 4/27/2022    FINDINGS: A portable AP radiograph of the chest was obtained at 1426 hours. Small bilateral pleural effusions are shown. Mild patchy bibasilar segmental  atelectasis is noted. The lungs are otherwise clear.  Cardiac, mediastinal and  hilar contours are normal. Abundant atherosclerotic calcification is again noted  at the aortic arch. A left subclavian pacemaker with dual leads is again noted. The bones are severely osteopenic with posttraumatic deformity of the left  proximal humerus and severe degenerative changes at the right shoulder girdle  again shown. Impression  Small bilateral pleural effusions with bibasilar patchy atelectasis. The patient denies chest pain, dependent edema, diaphoresis, REDDY, orthopnea, palpitations, PND, shortness of breath, claudication or syncope. No bleeding. The patient has been referred to cardiology for on going management of HF r eF       Review of Symptoms:  Constitutional: positive for fatigue  ENT: negative   Respiratory: positive for dyspnea on exertion  Gastrointestinal: positive for dyspepsia  Genitourinary: no dysuria, hematuria, frequency   Musculoskeletal:negative for myalgias  Neurological: negative for memory problems  Other systems reviewed and negative except as above. Previous cardiac hx  07/10/22    ECHO ADULT COMPLETE 07/12/2022 7/12/2022    Interpretation Summary    Left Ventricle: Moderately reduced left ventricular systolic function with a visually estimated EF of 30 - 35%. Left ventricle size is normal. Normal wall thickness. See diagram for wall motion findings.   Right Ventricle: Mildly reduced systolic function. Pacer wire present.   Aortic Valve: Not well visualized. Mildly calcified cusp.   Mitral Valve: Mildly calcified leaflet. Mild annular calcification of the mitral valve. Mildly calcified subvalvular apparatus. Mild stenosis noted.   Left Atrium: Left atrium is dilated.   Right Atrium: Multiple leads present in the right atrium. Right atrium is mildly dilated.   Technical qualifiers: Echo study was limited due to patient's condition and limited due to patient tolerance.     Signed by: Apple Deluca DO on 7/12/2022  1:53 PM      Risk factors:       Social History     Tobacco Use    Smoking status: Never Smoker    Smokeless tobacco: Never Used   Substance Use Topics    Alcohol use: No     No family history on file. Current Facility-Administered Medications   Medication Dose Route Frequency    potassium chloride SR (KLOR-CON 10) tablet 20 mEq  20 mEq Oral DAILY    pantoprazole (PROTONIX) 40 mg in 0.9% sodium chloride 10 mL injection  40 mg IntraVENous Q12H    dicyclomine (BENTYL) capsule 10 mg  10 mg Oral TID    latanoprost (XALATAN) 0.005 % ophthalmic solution 1 Drop  1 Drop Left Eye QPM    furosemide (LASIX) injection 40 mg  40 mg IntraVENous DAILY    isosorbide mononitrate ER (IMDUR) tablet 30 mg  30 mg Oral DAILY    aspirin delayed-release tablet 81 mg  81 mg Oral DAILY    docusate sodium (COLACE) capsule 100 mg  100 mg Oral PRN    sodium chloride (NS) flush 5-40 mL  5-40 mL IntraVENous Q8H    sodium chloride (NS) flush 5-40 mL  5-40 mL IntraVENous PRN    acetaminophen (TYLENOL) tablet 650 mg  650 mg Oral Q6H PRN    Or    acetaminophen (TYLENOL) suppository 650 mg  650 mg Rectal Q6H PRN    polyethylene glycol (MIRALAX) packet 17 g  17 g Oral DAILY PRN    ondansetron (ZOFRAN ODT) tablet 4 mg  4 mg Oral Q8H PRN    Or    ondansetron (ZOFRAN) injection 4 mg  4 mg IntraVENous Q6H PRN    heparin (porcine) injection 5,000 Units  5,000 Units SubCUTAneous Q12H       Objective:     Vitals:    07/13/22 0211 07/13/22 0725 07/13/22 0822 07/13/22 1053   BP: 126/69 (!) 146/61  (!) 147/82   Pulse: 75 82  82   Resp: 18 18  18   Temp: 97.4 °F (36.3 °C) 98 °F (36.7 °C)  97.8 °F (36.6 °C)   SpO2: 100% 100%  100%   Weight:   51.3 kg (113 lb 3.2 oz)    Height:            Intake and Output:  Current Shift: 07/13 0701 - 07/13 1900  In: 150 [P.O.:150]  Out: 250 [Urine:250]  Last three shifts: 07/11 1901 - 07/13 0700  In: 680 [P.O.:680]  Out: 950 [Urine:950]          Gen: Thin, frail,  in no acute distress.  [place]   Neck: Supple,No JVD, No Carotid Bruit,   Resp: No accessory muscle use, Clear breath sounds, No rales or rhonchi  Card: Regular Rate,  Rythm,Normal S1, S2, 2/6 systolic murmur, no rubs or gallop. Abd:  Soft, non-tender, non-distended,BS+,  MSK: No cyanosis  Skin: No rashes    Neuro: moving all four extremities , follows commands appropriately  Psych:  Good insight, oriented to person, place , alert, Nml Affect  LE: No edema    EKG:   EKG Results     Procedure 720 Value Units Date/Time    EKG, 12 LEAD, INITIAL [[id number]] Collected: 07/10/22 1407    Order Status: Completed Updated: 07/11/22 1149     Ventricular Rate 103 BPM      Atrial Rate 103 BPM      QRS Duration 144 ms      Q-T Interval 454 ms      QTC Calculation (Bezet) 594 ms      Calculated R Axis -73 degrees      Calculated T Axis 81 degrees      Diagnosis --     Ventricular-paced rhythm with frequent premature ventricular complexes  Abnormal ECG  When compared with ECG of 22-APR-2022 14:59,  premature ventricular complexes are now present  Vent.  rate has increased BY  30 BPM  Confirmed by Letha Langley ([id number]) on 7/11/2022 11:48:11 AM            TELEMETRY:  v paced     Lab/Data Review:  BMP:   Lab Results   Component Value Date/Time     07/13/2022 04:01 AM    K 3.2 (L) 07/13/2022 04:01 AM     07/13/2022 04:01 AM    CO2 30 07/13/2022 04:01 AM    AGAP 8 07/13/2022 04:01 AM    GLU 62 (L) 07/13/2022 04:01 AM    BUN 21 (H) 07/13/2022 04:01 AM    CREA 0.97 07/13/2022 04:01 AM    GFRAA >60 07/13/2022 04:01 AM    GFRNA 53 (L) 07/13/2022 04:01 AM     CBC:   Lab Results   Component Value Date/Time    WBC 5.3 07/13/2022 04:01 AM    HGB 10.6 (L) 07/13/2022 04:01 AM    HCT 32.4 (L) 07/13/2022 04:01 AM     07/13/2022 04:01 AM        Signed By: Simón Benito NP     July 13, 2022

## 2022-07-13 NOTE — PROGRESS NOTES
Problem: Self Care Deficits Care Plan (Adult)  Goal: *Acute Goals and Plan of Care (Insert Text)  7/13/2022 4219 by Jo Ann Benson OT  Note: FUNCTIONAL STATUS PRIOR TO ADMISSION: Patient required minimal assistance for basic and instrumental ADLs. Patient reports requiring intermittent assistance from her  for upper body dressing. Patient was ambulating household distances with use of a rolling walker and she was utilizing 4L of continuous oxygen at baseline. Patient's son assist with transportation and other aspects of IADL's. HOME SUPPORT: The patient lived alone with  and son to provide assistance. Occupational Therapy Goals  Initiated 7/13/2022  1. Patient will perform bathing with moderate assistance  within 7 day(s). 2.  Patient will perform grooming with supervision/set-up within 7 day(s) while sitting unsupported on edge of bed. 3.  Patient will perform supine>sit with minimal assistance/contact guard assist within 7 day(s). 4.  Patient will perform toilet transfers to [facility] with minimal assistance/contact guard assist within 7 day(s). 5.  Patient will perform all aspects of toileting with minimal assistance/contact guard assist within 7 day(s). 6.  Patient will utilize energy conservation techniques during functional activities with min verbal cues within 7 day(s). 7/13/2022 7129 by Jo Ann Benson OT  Outcome: Progressing Towards Goal  7/13/2022 0950 by Jo Ann Benson OT  Outcome: Progressing Towards Goal       ASSESSMENT  Based on the objective data described below, the patient presents with nausea and vomiting accompanied by abdominal pain and acute on chronic congestive heart failure. Prior to admission, patient was ambulatory at a modified independent level with use of a walker and was using 4L of continuous oxygen.  At present, patient presents with limited activity tolerance and is unwilling to transfer out of bed to [facility] for endurance and postural strengthening. Patient required max A to transfer supine<>sit and was unable to tolerate sitting edge of bed for more than ten seconds before she reported abdominal discomfort. Patient had limited active participation with self care, resulting in max a-dependent for most ADL's at the bed level. Current Level of Function Impacting Discharge (ADLs/self-care): Max a for ADL's at supine level, dependent for lower body dressing. Functional Outcome Measure: The patient scored 30/50 on the Barthel Index outcome measure. Other factors to consider for discharge:  is unable to provide physical assistance for transfer or mobility (per patient); currently unable to transfer without assistance. Patient will benefit from skilled therapy intervention to address the above noted impairments. PLAN :  Recommendations and Planned Interventions: self care training, functional mobility training, balance training, therapeutic activities, patient education, and family training/education    Frequency/Duration: Patient will be followed by occupational therapy 5 times a week to address goals. Recommendation for discharge: (in order for the patient to meet his/her long term goals)  To be determined: pending progress; currently unable to fully assess transfers to bedside commode, recliner, etc.     This discharge recommendation:  Has not yet been discussed the attending provider and/or case management    IF patient discharges home will need the following DME: pending progress with transfers       SUBJECTIVE:   Patient stated I'm not here to work on my legs, I'm here for my pain.     OBJECTIVE DATA SUMMARY:   HISTORY:   Past Medical History:   Diagnosis Date    Anemia NEC     Arthritis     CAD (coronary artery disease)     Chronic pain     Contact dermatitis and other eczema, due to unspecified cause     [facility]     Elevated serum creatinine 4/19/2011    GERD (gastroesophageal reflux disease)     HFrEF (heart failure with reduced ejection fraction) (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    Pacemaker     [facility]     Thromboembolus [facility])      Past Surgical History:   Procedure Laterality Date    HX CHOLECYSTECTOMY      HX HEENT      HX ORTHOPAEDIC      VASCULAR SURGERY PROCEDURE UNLIST         Expanded or extensive additional review of patient history:     Home Situation  Home Environment: Private residence  # Steps to Enter: 0 (threshold)  One/Two Story Residence: One story  Living Alone: No (resides with )  Support Systems: Spouse/Significant Other,Child(ramo)  Patient Expects to be Discharged to[de-identified] Home  Current DME Used/Available at Home: Grab bars,Cane, straight,Walker, rolling  Tub or Shower Type: Tub/Shower combination    Hand dominance: Right    EXAMINATION OF PERFORMANCE DEFICITS:  Cognitive/Behavioral Status:  Neurologic State: Alert; Appropriate for age  Orientation Level: Oriented X4  Cognition: Follows commands     Safety/Judgement: Awareness of environment    Skin: intact    Edema: none noted    Hearing: Auditory  Auditory Impairment: None    Vision/Perceptual:      Corrective Lenses: Reading glasses    Range of Motion:  AROM: Within functional limits  PROM: Within functional limits    Strength:  Strength: Generally decreased, functional    Coordination:  Coordination: Within functional limits  Fine Motor Skills-Upper: Left Intact; Right Intact    Gross Motor Skills-Upper: Left Intact; Right Intact    Tone & Sensation:  Tone: Normal  Sensation: Intact    Balance:  Sitting: Impaired; With support  Sitting - Static: Not tested (declined at this time)    Functional Mobility and Transfers for ADLs:  Bed Mobility:  Rolling: Moderate assistance  Supine to Sit: Maximum assistance  Sit to Supine: Maximum assistance  Scooting: Maximum assistance    Transfers:  Unable to assess standing at this time due to patient report of discomfort in abdomen.  Unable to tolerate sitting edge of bed for more than ten seconds. ADL Assessment:  Feeding: Independent    Oral Facial Hygiene/Grooming: Setup    Bathing: Moderate assistance    Type of Bath: Chlorhexidine (CHG)    Upper Body Dressing: Maximum assistance    Lower Body Dressing: Total assistance    ADL Intervention and task modifications:  Feeding  Feeding Assistance: Independent    Grooming  Grooming Assistance: Set-up  Position Performed:  (semifowler position)  Washing Face: Set-up  Washing Hands: Set-up  Brushing Teeth: Set-up    Upper Body Bathing  Bathing Assistance: Moderate assistance  Position Performed:  (semifowler)  Cues: Physical assistance;Verbal cues provided    Type of Bath: Chlorhexidine (CHG)    Lower Body Bathing  Bathing Assistance: Maximum assistance  Perineal  : Maximum assistance  Position Performed:  (semifowler position)  Cues: Physical assistance pants down;Physical assistance pants up  Lower Body : Total assistance (dependent)  Position Performed: Supine  Cues: Physical assistance    Upper Body Dressing Assistance  Dressing Assistance: Maximum assistance  Hospital Gown: Maximum assistance  Shirt simulation with hospital gown: Maximum assistance  Cues: Physical assistance    Lower Body Dressing Assistance  Dressing Assistance: Total assistance(dependent)  Underpants: Total assistance (dependent)  Socks: Total assistance (dependent)  Position Performed: Seated edge of bed  Cues: Physical assistance       Cognitive Retraining  Following Commands: Awareness of environment  Safety/Judgement: Awareness of environment      Functional Measure:    Barthel Index:  Bathin  Bladder: 5  Bowels: 10  Groomin  Dressin  Feeding: 10  Mobility: 0  Stairs: 0  Toilet Use: 0  Transfer (Bed to Chair and Back): 5  Total: 30/100      The Barthel ADL Index: Guidelines  1. The index should be used as a record of what a patient does, not as a record of what a patient could do.   2. The main aim is to establish degree of independence from any help, physical or verbal, however minor and for whatever reason. 3. The need for supervision renders the patient not independent. 4. A patient's performance should be established using the best available evidence. Asking the patient, friends/relatives and nurses are the usual sources, but direct observation and common sense are also important. However direct testing is not needed. 5. Usually the patient's performance over the preceding 24-48 hours is important, but occasionally longer periods will be relevant. 6. Middle categories imply that the patient supplies over 50 per cent of the effort. 7. Use of aids to be independent is allowed. Score Interpretation (from [address] [facility])    Independent   60-79 Minimally independent   40-59 Partially dependent   20-39 Very dependent   <20 Totally dependent     -Daya Medina., Barthel, D.W. (1965). Functional evaluation: the Barthel Index. [address] [facility] ([address] (1997). The Barthel activities of daily living index: self-reporting versus actual performance in the old (> or = 75 years). Journal [address] 45(7), [facility], J.J.M.F, Papito Clement., [facility]. (1999). Measuring the change in disability after inpatient rehabilitation; comparison of the responsiveness of the Barthel Index and Functional [place] Measure. Journal of Neurology, Neurosurgery, and Psychiatry, 66(4), 695-625. Markell Currie, N.J.A, JEN Painting, & BRYSON SanabriaA. (2004) Assessment of post-stroke quality of life in cost-effectiveness studies: The usefulness of the Barthel Index and the EuroQoL-5D.  Quality of Life Research, 15, 811-17        Occupational Therapy Evaluation Charge Determination   History Examination Decision-Making   LOW Complexity : Brief history review  LOW Complexity : 1-3 performance deficits relating to physical, cognitive , or psychosocial skils that result in activity limitations and / or participation restrictions  MEDIUM Complexity : Patient may present with comorbidities that affect occupational performnce. Miniml to moderate modification of tasks or assistance (eg, physical or verbal ) with assesment(s) is necessary to enable patient to complete evaluation       Based on the above components, the patient evaluation is determined to be of the following complexity level: LOW   Pain Rating:  3/10; assisted back to supine for pain management    Activity Tolerance:   Poor    After treatment patient left in no apparent distress:    Supine in bed and Call bell within reach    COMMUNICATION/EDUCATION:   The patients plan of care was discussed with: Physical therapist and Registered nurse. Patient understands intent and goals of therapy, but is neutral about his/her participation. Extensive education provided regarding importance of completing out of bed transfers for appropriate discharge recommendations, to maintain prior level of function, and to ensure a safe discharge. Patient is minimally receptive at this time. This patients plan of care is appropriate for delegation to [facility].     Thank you for this referral.  Juan Carlos Lugo, OT

## 2022-07-13 NOTE — PROGRESS NOTES
7/13/2022 12:23 PM   Care Management Progress Note         ICD-10-CM ICD-9-CM     1. Hypomagnesemia  E83.42 275.2     2. Nausea and vomiting, unspecified vomiting type  R11.2 787.01     3. Elevated troponin  R77.8 790. 6     4. Bilateral pleural effusion  J90 511.9     5. Peripheral edema  R60.9 782. 3     6. Acute on chronic systolic congestive heart failure (HCC)  I50.23 428.23         428. 0           RUR: 21%    Risk Level: []? Low []? Moderate [x]? High  Value-based purchasing: [x]? Yes []? No  Bundle patient: []? Yes [x]? No              Specify:      Transition of care plan:  1. Ongoing medical management, Palliative following  2. Pt was living at home prior to admission, TBD on needs at this time  3. PT and OT evals pending pt participation   4. Outpatient follow-up.   5. TBD on transport at this time

## 2022-07-13 NOTE — WOUND CARE
Wound Consult:  New consult Visit. Chart reviewed. Consulted for sacrum . Spoke with patients nurse,  Danny Cannon RN at bedside for assessment. Patient is resting on a nicola bed with FRANCOIS mattress. Heels off loaded with pillows. Patient is awake, alert, cooperative; requires 2 assists to move side to side in bed. purewick/brief  [place] score 18  Assessment:  Right hand- 2x0.4x0.1cm skin tear, pink, moist, no drainage, no surrounding redness. foam dressing reapplied  Sacrum/buttocks- areas of hypopigmentation and hyperpigmentation, lightly pink, blanches. Bilateral heels- bogy, no redness, patient complains of pain in heels. Elevated on pillows  Wound Recommendations:  Elevate heels on pillows if pain continues use heel boots  Mobility team  Right hand skin tear- cleanse with NSS, apply foam dressing . Change every 3 days. Skin Care / PI Prevention Recommendations:  1. Minimize friction/shear: minimize layers of linen/pads under patient. 2. Off load pressure/reposition:  turn and reposition approximately every 2 hours; float heels with pillows or use off loading heel boots; waffle cushion for sitting; position wedge. 3. Manage Moisture - keep skin folds dry; incontinence skin care with incontinence wipes; zinc guard barrier ointment; appropriate sized briefs ; purewick in use to help contain urine. 4. Continue to monitor nutrition, pain, and skin risk scale, and skin assessment. Plan:  Spoke with Dr. Jose Arnett regarding findings and proposed orders for treatment. We will continue to reassess weekly and as needed. Please re-consult should concerns arise despite continued skin/PI prevention measures.   [address] [facility] [place], Wound / [address] [facility] Healing Office [phone]

## 2022-07-13 NOTE — PROGRESS NOTES
Provided pastoral care visit to [facility] patient. Did not include sacramental care.     Ryne Wiley

## 2022-07-13 NOTE — PROGRESS NOTES
Hugo Jensen [facility]  [address]  [phone]      Medical Progress Note      NAME: Connor Rinne   :  1925  MRM:  [id number]    Date/Time of service: 2022         Subjective:     Chief Complaint:  Patient was personally seen and examined by me during this time period. Chart reviewed. F/u CHF  She does not have any complaints today. She states she feels uncomfortable  Denies abdominal pain at this time         Objective:       Vitals:       Last 24hrs VS reviewed since prior progress note.  Most recent are:    Visit Vitals  BP (!) 147/82 (BP 1 Location: Right lower arm, BP Patient Position: At rest)   Pulse 82   Temp 97.8 °F (36.6 °C)   Resp 18   Ht 5' 2\" (1.575 m)   Wt 51.3 kg (113 lb 3.2 oz)   SpO2 100%   BMI 20.70 kg/m²     SpO2 Readings from Last 6 Encounters:   22 100%   22 93%   22 100%   17 94%   09/29/15 99%   08/31/15 95%    O2 Flow Rate (L/min): 2 l/min       Intake/Output Summary (Last 24 hours) at 2022 1335  Last data filed at 2022 0955  Gross per 24 hour   Intake 630 ml   Output 700 ml   Net -70 ml        Exam:     Physical Exam:    Gen: elderly, frail   HEENT:  Pink conjunctivae, PERRL, hearing intact to voice  Resp:  No accessory muscle use, clear breath sounds without wheezes rales or rhonchi  Card:  RRR, No murmurs, normal S1, S2, no peripheral edema  Abd:  Soft, epigastric and b/l Lower quadrant TTP, non-distended, normoactive bowel sounds are present  Musc:  No cyanosis or clubbing  Skin:  No rashes or ulcers, skin turgor is good  Neuro:  Cranial nerves 3-12 are grossly intact, follows commands appropriately  Psych:  Oriented to person, place, and time, Alert with good insight      Medications Reviewed: (see below)    Lab Data Reviewed: (see below)    ______________________________________________________________________    Medications:     Current Facility-Administered Medications   Medication Dose Route Frequency    pantoprazole (PROTONIX) 40 mg in 0.9% sodium chloride 10 mL injection  40 mg IntraVENous Q12H    dicyclomine (BENTYL) capsule 10 mg  10 mg Oral TID    latanoprost (XALATAN) 0.005 % ophthalmic solution 1 Drop  1 Drop Left Eye QPM    furosemide (LASIX) injection 40 mg  40 mg IntraVENous DAILY    isosorbide mononitrate ER (IMDUR) tablet 30 mg  30 mg Oral DAILY    aspirin delayed-release tablet 81 mg  81 mg Oral DAILY    docusate sodium (COLACE) capsule 100 mg  100 mg Oral PRN    sodium chloride (NS) flush 5-40 mL  5-40 mL IntraVENous Q8H    sodium chloride (NS) flush 5-40 mL  5-40 mL IntraVENous PRN    acetaminophen (TYLENOL) tablet 650 mg  650 mg Oral Q6H PRN    Or    acetaminophen (TYLENOL) suppository 650 mg  650 mg Rectal Q6H PRN    polyethylene glycol (MIRALAX) packet 17 g  17 g Oral DAILY PRN    ondansetron (ZOFRAN ODT) tablet 4 mg  4 mg Oral Q8H PRN    Or    ondansetron (ZOFRAN) injection 4 mg  4 mg IntraVENous Q6H PRN    heparin (porcine) injection 5,000 Units  5,000 Units SubCUTAneous Q12H          Lab Review:     Recent Labs     07/13/22  0401 07/12/22  0408 07/10/22  1456   WBC 5.3 5.3 8.6   HGB 10.6* 10.1* 13.5   HCT 32.4* 30.5* 40.1    187 239     Recent Labs     07/13/22  0401 07/12/22  0408 07/11/22  1838 07/11/22  0055 07/10/22  1456 07/10/22  1456    147*  --  148*   < > 146*   K 3.2* 3.6  --  2.5*  2.4*   < > 3.1*    112*  --  113*   < > 111*   CO2 30 30  --  27   < > 27   GLU 62* 70  --  77   < > 128*   BUN 21* 20  --  22*   < > 23*   CREA 0.97 0.89  --  1.03*   < > 1.12*   CA 8.5 8.3*  --  9.1   < > 9.3   MG  --  1.5* 1.4* 1.8   < > 1.0*   ALB  --   --   --   --   --  2.5*   TBILI  --   --   --   --   --  1.1*   ALT  --   --   --   --   --  14    < > = values in this interval not displayed.      Lab Results   Component Value Date/Time    Glucose (POC) 82 04/23/2022 04:00 PM    Glucose (POC) 207 (H) 04/11/2022 11:07 AM    Glucose (POC) 234 (H) 04/11/2022 07:59 AM    Glucose (POC) 59 (L) 04/11/2022 05:43 AM    Glucose (POC) 210 (H) 05/12/2009 04:35 PM          Assessment / Plan:       Acute on chronic combined systolic and diastolic HF/ dyspnea POA: CX with small pleural effusions and probnp elevated. Echo Ef 30-35%. Continue IV lasix 40 mg I.V daily, imdur, aspirin  Consult cardiology for evaluation  Daily weight  Palliative care consulted. Acute abd pain/ Intractable Nausea and vomiting (7/10/2022) POA: Unclear etiology. CT abdomen pelvis without any acute concerns. Lipase within normal limits. UA unremarkable for infection. CT notes diverticulosis with no signs of diverticulitis. Viral gastroenteritis?    -Continue protonix 40 mg BID  - Supportive care  - GI not consulted       Hypomagnesemia (7/10/2022)/ Hypokalemia (7/10/2022): Suspect due to vomiting. Replete IV and oral as needed      Elevated troponin (7/10/2022) POA: Suspect demand ischemia due to heart failure. Continue aspirin. Chronic respiratory failure POA: On baseline 4 L nasal cannula. Monitor     Total time spent with patient: 28 Minutes **I personally saw and examined the patient during this time period**                 Care Plan discussed with: Patient, Nursing Staff and Consultant/Specialist    Discussed:  Care Plan    Prophylaxis:  Hep SQ    Disposition:  Home w/Family.  Likely discharge within the next 1-2 days           ___________________________________________________    Attending Physician: Jose Colby MD

## 2022-07-13 NOTE — PROGRESS NOTES
Physical Therapy    Consult received, RN cleared patient for participation. Spoke with OT who notes patient with minimal participation, to EOB only for brief minute or two. Received patient in bed eating with patient adamantly declining to work with therapy because \"I am sick and they haven't fixed it yet\". Education provided regarding benefits of OOB activity when sick but patient adamantly declining. Will follow as able and appropriate to complete evaluation.     Melia Kanner, MS, PT

## 2022-07-14 PROCEDURE — APPSS15 APP SPLIT SHARED TIME 0-15 MINUTES: Performed by: NURSE PRACTITIONER

## 2022-07-14 PROCEDURE — 74011250636 HC RX REV CODE- 250/636: Performed by: INTERNAL MEDICINE

## 2022-07-14 PROCEDURE — 74011250637 HC RX REV CODE- 250/637: Performed by: INTERNAL MEDICINE

## 2022-07-14 PROCEDURE — 94761 N-INVAS EAR/PLS OXIMETRY MLT: CPT

## 2022-07-14 PROCEDURE — C9113 INJ PANTOPRAZOLE SODIUM, VIA: HCPCS | Performed by: INTERNAL MEDICINE

## 2022-07-14 PROCEDURE — 74011250637 HC RX REV CODE- 250/637: Performed by: NURSE PRACTITIONER

## 2022-07-14 PROCEDURE — 77030038269 HC DRN EXT URIN PURWCK BARD -A

## 2022-07-14 PROCEDURE — 77010033678 HC OXYGEN DAILY

## 2022-07-14 PROCEDURE — 74011000250 HC RX REV CODE- 250: Performed by: INTERNAL MEDICINE

## 2022-07-14 PROCEDURE — 65270000029 HC RM PRIVATE

## 2022-07-14 PROCEDURE — 2709999900 HC NON-CHARGEABLE SUPPLY

## 2022-07-14 PROCEDURE — 74011250637 HC RX REV CODE- 250/637: Performed by: STUDENT IN AN ORGANIZED HEALTH CARE EDUCATION/TRAINING PROGRAM

## 2022-07-14 RX ORDER — METOPROLOL SUCCINATE 25 MG/1
25 TABLET, EXTENDED RELEASE ORAL DAILY
Qty: 60 TABLET | Refills: 1 | Status: SHIPPED | OUTPATIENT
Start: 2022-07-15

## 2022-07-14 RX ORDER — PANTOPRAZOLE SODIUM 40 MG/1
40 TABLET, DELAYED RELEASE ORAL
Status: DISCONTINUED | OUTPATIENT
Start: 2022-07-15 | End: 2022-07-18 | Stop reason: HOSPADM

## 2022-07-14 RX ORDER — ASPIRIN 81 MG/1
81 TABLET ORAL DAILY
Qty: 30 TABLET | Refills: 1 | Status: SHIPPED | OUTPATIENT
Start: 2022-07-15

## 2022-07-14 RX ORDER — FUROSEMIDE 40 MG/1
40 TABLET ORAL DAILY
Qty: 60 TABLET | Refills: 2 | Status: SHIPPED | OUTPATIENT
Start: 2022-07-14 | End: 2022-07-19

## 2022-07-14 RX ORDER — FUROSEMIDE 40 MG/1
40 TABLET ORAL DAILY
Status: DISCONTINUED | OUTPATIENT
Start: 2022-07-14 | End: 2022-07-15

## 2022-07-14 RX ORDER — SPIRONOLACTONE 25 MG/1
25 TABLET ORAL DAILY
Qty: 60 TABLET | Refills: 2 | Status: SHIPPED | OUTPATIENT
Start: 2022-07-15 | End: 2022-09-06

## 2022-07-14 RX ADMIN — Medication 10 ML: at 06:41

## 2022-07-14 RX ADMIN — ONDANSETRON 4 MG: 2 INJECTION INTRAMUSCULAR; INTRAVENOUS at 04:27

## 2022-07-14 RX ADMIN — Medication 10 ML: at 17:56

## 2022-07-14 RX ADMIN — DICYCLOMINE HYDROCHLORIDE 10 MG: 10 CAPSULE ORAL at 22:38

## 2022-07-14 RX ADMIN — HEPARIN SODIUM 5000 UNITS: 5000 INJECTION INTRAVENOUS; SUBCUTANEOUS at 10:06

## 2022-07-14 RX ADMIN — DICYCLOMINE HYDROCHLORIDE 10 MG: 10 CAPSULE ORAL at 17:56

## 2022-07-14 RX ADMIN — SODIUM CHLORIDE, PRESERVATIVE FREE 40 MG: 5 INJECTION INTRAVENOUS at 10:05

## 2022-07-14 RX ADMIN — FUROSEMIDE 40 MG: 40 TABLET ORAL at 10:06

## 2022-07-14 RX ADMIN — LATANOPROST 1 DROP: 50 SOLUTION OPHTHALMIC at 17:55

## 2022-07-14 RX ADMIN — HEPARIN SODIUM 5000 UNITS: 5000 INJECTION INTRAVENOUS; SUBCUTANEOUS at 20:01

## 2022-07-14 RX ADMIN — DICYCLOMINE HYDROCHLORIDE 10 MG: 10 CAPSULE ORAL at 10:06

## 2022-07-14 RX ADMIN — ASPIRIN 81 MG: 81 TABLET, COATED ORAL at 10:06

## 2022-07-14 RX ADMIN — METOPROLOL SUCCINATE 25 MG: 25 TABLET, EXTENDED RELEASE ORAL at 10:06

## 2022-07-14 RX ADMIN — Medication 10 ML: at 22:38

## 2022-07-14 RX ADMIN — ONDANSETRON 4 MG: 2 INJECTION INTRAMUSCULAR; INTRAVENOUS at 10:06

## 2022-07-14 RX ADMIN — SPIRONOLACTONE 25 MG: 25 TABLET ORAL at 10:06

## 2022-07-14 NOTE — PROGRESS NOTES
Problem: Pressure Injury - Risk of  Goal: *Prevention of pressure injury  Description: Document Destin Scale and appropriate interventions in the flowsheet.   Outcome: Progressing Towards Goal  Note: Pressure Injury Interventions:  Sensory Interventions: Keep linens dry and wrinkle-free    Moisture Interventions: Internal/External urinary devices    Activity Interventions: Increase time out of bed,PT/OT evaluation,Pressure redistribution bed/mattress(bed type)    Mobility Interventions: Pressure redistribution bed/mattress (bed type)    Nutrition Interventions: Document food/fluid/supplement intake    Friction and Shear Interventions: Minimize layers

## 2022-07-14 NOTE — PROGRESS NOTES
Attempted to assess patient but she has refused PT for the 2nd time despite encouragement, due to ongoing nausea. Provided her with longer O2 cannula and ginger ale at her request. Nursing aware. O2 sats 99% on 4 liters.

## 2022-07-14 NOTE — PROGRESS NOTES
Hugo Jensen demarcus [place] [address]  [address] [facility], [address] [place] [address]  [phone]      Medical Progress Note      NAME: Alyse Freitas   :  1925  MRM:  [id number]    Date/Time of service: 2022         Subjective:     Chief Complaint:  Patient was personally seen and examined by me during this time period. Chart reviewed. F/u CHF  She c/o nausea   Refusing to work with physical therapy  No SOB, or chest pain       Objective:       Vitals:       Last 24hrs VS reviewed since prior progress note.  Most recent are:    Visit Vitals  /65 (BP 1 Location: Right lower arm)   Pulse 80   Temp 98.9 °F (37.2 °C)   Resp 18   Ht 5' 2\" (1.575 m)   Wt 51.3 kg (113 lb 3.2 oz)   SpO2 97%   BMI 20.70 kg/m²     SpO2 Readings from Last 6 Encounters:   22 97%   22 93%   22 100%   17 94%   09/29/15 99%   08/31/15 95%    O2 Flow Rate (L/min): 2 l/min       Intake/Output Summary (Last 24 hours) at 2022 1450  Last data filed at 2022 0947  Gross per 24 hour   Intake 530 ml   Output 450 ml   Net 80 ml        Exam:     Physical Exam:    Gen: elderly, frail   HEENT:  Pink conjunctivae, PERRL, hearing intact to voice  Resp:  No accessory muscle use, clear breath sounds without wheezes rales or rhonchi  Card:  RRR, No murmurs, normal S1, S2, no peripheral edema  Abd:  Soft, epigastric and b/l Lower quadrant TTP, non-distended, normoactive bowel sounds are present  Musc:  No cyanosis or clubbing  Skin:  No rashes or ulcers, skin turgor is good  Neuro:  Cranial nerves 3-12 are grossly intact, follows commands appropriately  Psych:  Oriented to person, place, and time, Alert with good insight      Medications Reviewed: (see below)    Lab Data Reviewed: (see below)    ______________________________________________________________________    Medications:     Current Facility-Administered Medications   Medication Dose Route Frequency    furosemide (LASIX) tablet 40 mg  40 mg Oral DAILY  [START ON 7/15/2022] pantoprazole (PROTONIX) tablet 40 mg  40 mg Oral ACB    metoprolol succinate (TOPROL-XL) XL tablet 25 mg  25 mg Oral DAILY    spironolactone (ALDACTONE) tablet 25 mg  25 mg Oral DAILY    dicyclomine (BENTYL) capsule 10 mg  10 mg Oral TID    latanoprost (XALATAN) 0.005 % ophthalmic solution 1 Drop  1 Drop Left Eye QPM    aspirin delayed-release tablet 81 mg  81 mg Oral DAILY    docusate sodium (COLACE) capsule 100 mg  100 mg Oral PRN    sodium chloride (NS) flush 5-40 mL  5-40 mL IntraVENous Q8H    sodium chloride (NS) flush 5-40 mL  5-40 mL IntraVENous PRN    acetaminophen (TYLENOL) tablet 650 mg  650 mg Oral Q6H PRN    Or    acetaminophen (TYLENOL) suppository 650 mg  650 mg Rectal Q6H PRN    polyethylene glycol (MIRALAX) packet 17 g  17 g Oral DAILY PRN    ondansetron (ZOFRAN ODT) tablet 4 mg  4 mg Oral Q8H PRN    Or    ondansetron (ZOFRAN) injection 4 mg  4 mg IntraVENous Q6H PRN    heparin (porcine) injection 5,000 Units  5,000 Units SubCUTAneous Q12H          Lab Review:     Recent Labs     07/13/22  0401 07/12/22  0408   WBC 5.3 5.3   HGB 10.6* 10.1*   HCT 32.4* 30.5*    187     Recent Labs     07/13/22  0401 07/12/22  0408 07/11/22  1838    147*  --    K 3.2* 3.6  --     112*  --    CO2 30 30  --    GLU 62* 70  --    BUN 21* 20  --    CREA 0.97 0.89  --    CA 8.5 8.3*  --    MG 2.0 1.5* 1.4*     Lab Results   Component Value Date/Time    Glucose (POC) 82 04/23/2022 04:00 PM    Glucose (POC) 207 (H) 04/11/2022 11:07 AM    Glucose (POC) 234 (H) 04/11/2022 07:59 AM    Glucose (POC) 59 (L) 04/11/2022 05:43 AM    Glucose (POC) 210 (H) 05/12/2009 04:35 PM          Assessment / Plan:      Acute on chronic combined systolic and diastolic HF/ dyspnea POA: CX with small pleural effusions and probnp elevated. Echo Ef 30-35%. Lasix I.V switched to oral lasix 40mg po daily.  Imdur d/umer due to fall risk in the elderly  Continue aspirin, metoprolol  Appreciate card input    Acute abd pain/ Intractable Nausea and vomiting (7/10/2022) POA: Unclear etiology. CT abdomen pelvis without any acute concerns. Lipase within normal limits. UA unremarkable for infection. CT notes diverticulosis with no signs of diverticulitis. Viral gastroenteritis?    - Continue zofran as needed  - Change protonix to oral 40 mg po daily   - Supportive care  - GI not consulted      Hypomagnesemia (7/10/2022)/ Hypokalemia (7/10/2022): Suspect due to vomiting. Replete IV and oral as needed    Elevated troponin (7/10/2022) POA: Suspect demand ischemia due to heart failure. Continue aspirin. Chronic respiratory failure POA: On baseline 4 L nasal cannula. Monitor     Total time spent with patient: 28 Minutes **I personally saw and examined the patient during this time period**                 Care Plan discussed with: Patient, Nursing Staff and Consultant/Specialist    Discussed:  Care Plan    Prophylaxis:  Hep SQ    Disposition:  Home w/Family.  Patient is stable for discharge home with [facility] with skilled nursing           ___________________________________________________    Attending Physician: Leilani Valencia MD

## 2022-07-14 NOTE — PROGRESS NOTES
Cardiology Progress Note     Patient: Merle Newell MRN: [id number]     YOB: 1925  Age: 80 y.o. Sex: female      Admit Date: 7/10/2022       Assessment/Plan     1 A/C HF r EF: per pt she is breathing better. LV function unchanged from 4/2022. Change lasix to po. Ck pBNP. , BMP. Cont BB/aldactone, consider low dose arb as BP allows. 2 chronic resp failure: on 4 liters baseline  3 CKD 3   4   Acute abd pain/ Intractable Nausea and vomiting (7/10/2022) POA: Unclear etiology. CT abdomen pelvis without any acute concerns. Lipase within normal limits. Patient states she would not be interested in endoscopies. 5 hypokalemia/hypomagnesemia: am lab  Pending         NP spent __3__ minutes reviewing chart, labs, diagnostics and coordination of care . NP spent __10__ minutes with pt/family in examination and care plan review.            HPI     Merle Newell is a 80 y.o. white female  with PMH of chronic hypoxemic respiratory failure on 4 L of oxygen therapy at home, coronary artery disease, HFrEF, hyperlipidemia, history of CVA, hyperlipidemia, GERD, eczema, and history of prior stroke is admitted with the diagnosis of acute on chronic congestive HFrEF, severe hypomagnesemia, elevated troponin, nausea and vomiting. The patient denies chest pain, dependent edema, diaphoresis, REDDY, orthopnea, palpitations, PND, shortness of breath, claudication or syncope. No bleeding. The patient has been referred to cardiology for on going management of HF r eF       Review of Symptoms:  Constitutional: positive for fatigue  ENT: negative   Respiratory: positive for dyspnea on exertion  Gastrointestinal: positive for dyspepsia  Genitourinary: no dysuria, hematuria, frequency   Musculoskeletal:negative for myalgias  Neurological: negative for memory problems  Other systems reviewed and negative except as above.       Previous cardiac hx  07/10/22    ECHO ADULT COMPLETE 07/12/2022 7/12/2022    Interpretation Summary    Left Ventricle: Moderately reduced left ventricular systolic function with a visually estimated EF of 30 - 35%. Left ventricle size is normal. Normal wall thickness. See diagram for wall motion findings.   Right Ventricle: Mildly reduced systolic function. Pacer wire present.   Aortic Valve: Not well visualized. Mildly calcified cusp.   Mitral Valve: Mildly calcified leaflet. Mild annular calcification of the mitral valve. Mildly calcified subvalvular apparatus. Mild stenosis noted.   Left Atrium: Left atrium is dilated.   Right Atrium: Multiple leads present in the right atrium. Right atrium is mildly dilated.   Technical qualifiers: Echo study was limited due to patient's condition and limited due to patient tolerance. Signed by: Ruby Heaton DO on 7/12/2022  1:53 PM      Risk factors:       Social History     Tobacco Use    Smoking status: Never Smoker    Smokeless tobacco: Never Used   Substance Use Topics    Alcohol use: No     No family history on file.     Current Facility-Administered Medications   Medication Dose Route Frequency    metoprolol succinate (TOPROL-XL) XL tablet 25 mg  25 mg Oral DAILY    spironolactone (ALDACTONE) tablet 25 mg  25 mg Oral DAILY    pantoprazole (PROTONIX) 40 mg in 0.9% sodium chloride 10 mL injection  40 mg IntraVENous Q12H    dicyclomine (BENTYL) capsule 10 mg  10 mg Oral TID    latanoprost (XALATAN) 0.005 % ophthalmic solution 1 Drop  1 Drop Left Eye QPM    furosemide (LASIX) injection 40 mg  40 mg IntraVENous DAILY    aspirin delayed-release tablet 81 mg  81 mg Oral DAILY    docusate sodium (COLACE) capsule 100 mg  100 mg Oral PRN    sodium chloride (NS) flush 5-40 mL  5-40 mL IntraVENous Q8H    sodium chloride (NS) flush 5-40 mL  5-40 mL IntraVENous PRN    acetaminophen (TYLENOL) tablet 650 mg  650 mg Oral Q6H PRN    Or    acetaminophen (TYLENOL) suppository 650 mg  650 mg Rectal Q6H PRN    polyethylene glycol (MIRALAX) packet 17 g  17 g Oral DAILY PRN    ondansetron (ZOFRAN ODT) tablet 4 mg  4 mg Oral Q8H PRN    Or    ondansetron (ZOFRAN) injection 4 mg  4 mg IntraVENous Q6H PRN    heparin (porcine) injection 5,000 Units  5,000 Units SubCUTAneous Q12H       Objective:     Vitals:    07/13/22 2330 07/13/22 2354 07/14/22 0357 07/14/22 0718   BP:  (!) 115/57 (!) 133/53 134/63   Pulse: 93 80 87 83   Resp:  18 18 18   Temp:  98.6 °F (37 °C) 98.2 °F (36.8 °C) 98.8 °F (37.1 °C)   SpO2:  99% 97% 100%   Weight:       Height:            Intake and Output:  Current Shift: No intake/output data recorded. Last three shifts: 07/12 1901 - 07/14 0700  In: 870 [P.O.:870]  Out: 650 [Urine:650]          Gen: Thin, frail,  in no acute distress. [place]   Neck: Supple,No JVD, No Carotid Bruits  Resp: No accessory muscle use, Clear breath sounds, No rales or rhonchi  Card: Regular Rate,  Rythm,Normal S1, S2, 2/6 systolic murmur, no rubs or gallop. Abd:  Soft, non-tender, non-distended,BS+,  MSK: No cyanosis  Skin: No rashes    Neuro: moving all four extremities , follows commands appropriately  Psych:  Good insight, oriented to person, place , alert, Nml Affect  LE: No edema    EKG:   EKG Results     Procedure 720 Value Units Date/Time    EKG, 12 LEAD, INITIAL [[id number]] Collected: 07/10/22 1407    Order Status: Completed Updated: 07/11/22 1149     Ventricular Rate 103 BPM      Atrial Rate 103 BPM      QRS Duration 144 ms      Q-T Interval 454 ms      QTC Calculation (Bezet) 594 ms      Calculated R Axis -73 degrees      Calculated T Axis 81 degrees      Diagnosis --     Ventricular-paced rhythm with frequent premature ventricular complexes  Abnormal ECG  When compared with ECG of 22-APR-2022 14:59,  premature ventricular complexes are now present  Vent.  rate has increased BY  30 BPM  Confirmed by Martín Roberts ([id number]) on 7/11/2022 11:48:11 AM            TELEMETRY:  v paced     Lab/Data Review:  BMP:   No results found for: NA, K, CL, CO2, AGAP, GLU, BUN, CREA, GFRAA, GFRNA  CBC:   No results found for: WBC, HGB, HGBEXT, HCT, HCTEXT, PLT, PLTEXT, HGBEXT, HCTEXT, PLTEXT     Signed By: Travis Espana NP     July 14, 2022

## 2022-07-14 NOTE — PROGRESS NOTES
7/14/2022 4:59 PM CM has spoken with pt and pt's son, Ora Iglesias separately regarding pt's discharge. Pt requesting to only discharge home. CM relayed to pt and pt's son MD is planning for discharge tomorrow. Pt and pt's son expressed their concern with discharge tomorrow. Pt reported she does not feel she is medically ready to discharge home. CM encouraged pt to participate with PT and OT to help determine what will be needed at discharge. Pt reported she continues to feel too nauseated for therapy. Pt's son expressed his concern with pt discharging and having the weekend without pt having access to her PCP. CM discussed home health and [facility]. Pt's son requesting pt to stay at [facility] until Monday. Pt's son expressed concern with pt continuing to require iv zofran and not having access to this at home. CM again relayed pt's stay will be based on medical necessity. CM requested pt's attending contact pt's son to discuss. CM also confirmed with pt's son, pt is open to PT and OT through \"[facility]\"([phone]). CM called to [facility], spoke with Isaiah Bedoya who reported they are only able to provide PT and OT but they can provide therapy 5x a week for pt. Isaiah Bedoya reported if pt does not have a skilled nursing need they will be able to accept pt back into their services. CM will follow for PT and OT evals. MARLIN Iraheta     Care Management Progress Note         ICD-10-CM ICD-9-CM     1. Hypomagnesemia  E83.42 275.2     2. Nausea and vomiting, unspecified vomiting type  R11.2 787.01     3. Elevated troponin  R77.8 790. 6     4. Bilateral pleural effusion  J90 511.9     5. Peripheral edema  R60.9 782. 3     6. Acute on chronic systolic congestive heart failure (HCC)  I50.23 428.23         428. 0           RUR: 21%    Risk Level: []? ?Low []? ? Moderate [x]? ? High  Value-based purchasing: [x]? ? Yes []? ? No  Bundle patient: []?? Yes [x]? ? No              Specify:      Transition of care plan:  1.  Ongoing medical management, Palliative following  2. Pt was living at home prior to admission, TBD on needs at this time  3. Pt was open to PT and OT at home through [facility] prior to admission   4. PT and OT evals pending pt participation   5. Outpatient follow-up.   6. TBD on transport at this time

## 2022-07-14 NOTE — PROGRESS NOTES
Hospital Follow Up with PCP  Krysten Lanier MD for  7/19/2022 at 2:30pm. Scheduling advised patient already had this appointment scheduled.

## 2022-07-14 NOTE — PROGRESS NOTES
Problem: Pressure Injury - Risk of  Goal: *Prevention of pressure injury  Description: Document Destin Scale and appropriate interventions in the flowsheet. Outcome: Progressing Towards Goal  Note: Pressure Injury Interventions:  Sensory Interventions: Keep linens dry and wrinkle-free    Moisture Interventions: Internal/External urinary devices    Activity Interventions: Increase time out of bed    Mobility Interventions: Pressure redistribution bed/mattress (bed type)    Nutrition Interventions: Document food/fluid/supplement intake    Friction and Shear Interventions: Minimize layers                Problem: Patient Education: Go to Patient Education Activity  Goal: Patient/Family Education  Outcome: Progressing Towards Goal     Problem: Falls - Risk of  Goal: *Absence of Falls  Description: Document Rei Fall Risk and appropriate interventions in the flowsheet.   Outcome: Progressing Towards Goal  Note: Fall Risk Interventions:  Mobility Interventions: Bed/chair exit alarm         Medication Interventions: Bed/chair exit alarm    Elimination Interventions: Call light in reach,Bed/chair exit alarm    History of Falls Interventions: Bed/chair exit alarm         Problem: Patient Education: Go to Patient Education Activity  Goal: Patient/Family Education  Outcome: Progressing Towards Goal

## 2022-07-14 NOTE — PALLIATIVE CARE DISCHARGE
The Palliative Medicine team was consulted as part of your / your loved one's care in the hospital. Our team is a supportive service; we strive to relieve suffering and improve quality of life. You identified the following goal(s) as your main focus for healthcare: Patient/Health Care Proxy Stated Goals: Prolong life    We reviewed advance care planning information, which includes the following:  Advance Care Planning 7/11/2022   Patient's Healthcare Decision Maker is: Named in scanned ACP document   Confirm Advance Directive Yes, on file   Does the patient have other document types Do Not Resuscitate       We reviewed / discussed your code status as: DNR     Full Code means perform CPR in the event of cardiac arrest     [facility] means do NOT perform CPR in the event of cardiac arrest     Partial Code means you have specific preferences, please discuss with your health care team     No Order means this issue was not addressed / resolved during your stay    You have a Durable Do Not Resuscitate Order in place, which should travel with you. When you are in a facility, this form should be placed on your chart. Once you are home, it is recommended that the [facility] form be placed in a visible location such as on the refrigerator or bedroom door. Because of the importance of this information, we are providing you with a printed copy to share with other healthcare providers after this hospitalization is complete. If any of the above information is incomplete or incorrect, please contact the Palliative Medicine team at [phone].

## 2022-07-15 LAB
ANION GAP SERPL CALC-SCNC: 9 MMOL/L (ref 5–15)
BASOPHILS # BLD: 0 K/UL (ref 0–0.1)
BASOPHILS NFR BLD: 1 % (ref 0–1)
BNP SERPL-MCNC: 4872 PG/ML
BUN SERPL-MCNC: 22 MG/DL (ref 6–20)
BUN/CREAT SERPL: 21 (ref 12–20)
CALCIUM SERPL-MCNC: 7.9 MG/DL (ref 8.5–10.1)
CHLORIDE SERPL-SCNC: 109 MMOL/L (ref 97–108)
CO2 SERPL-SCNC: 23 MMOL/L (ref 21–32)
CREAT SERPL-MCNC: 1.03 MG/DL (ref 0.55–1.02)
DIFFERENTIAL METHOD BLD: ABNORMAL
EOSINOPHIL # BLD: 0.1 K/UL (ref 0–0.4)
EOSINOPHIL NFR BLD: 2 % (ref 0–7)
ERYTHROCYTE [DISTWIDTH] IN BLOOD BY AUTOMATED COUNT: 16.2 % (ref 11.5–14.5)
GLUCOSE SERPL-MCNC: 81 MG/DL (ref 65–100)
HCT VFR BLD AUTO: 32.4 % (ref 35–47)
HGB BLD-MCNC: 10.6 G/DL (ref 11.5–16)
IMM GRANULOCYTES # BLD AUTO: 0 K/UL (ref 0–0.04)
IMM GRANULOCYTES NFR BLD AUTO: 0 % (ref 0–0.5)
LYMPHOCYTES # BLD: 1.4 K/UL (ref 0.8–3.5)
LYMPHOCYTES NFR BLD: 26 % (ref 12–49)
MCH RBC QN AUTO: 32.3 PG (ref 26–34)
MCHC RBC AUTO-ENTMCNC: 32.7 G/DL (ref 30–36.5)
MCV RBC AUTO: 98.8 FL (ref 80–99)
MONOCYTES # BLD: 0.5 K/UL (ref 0–1)
MONOCYTES NFR BLD: 9 % (ref 5–13)
NEUTS SEG # BLD: 3.3 K/UL (ref 1.8–8)
NEUTS SEG NFR BLD: 62 % (ref 32–75)
NRBC # BLD: 0 K/UL (ref 0–0.01)
NRBC BLD-RTO: 0 PER 100 WBC
PLATELET # BLD AUTO: 168 K/UL (ref 150–400)
PMV BLD AUTO: 11.5 FL (ref 8.9–12.9)
POTASSIUM SERPL-SCNC: 4.2 MMOL/L (ref 3.5–5.1)
RBC # BLD AUTO: 3.28 M/UL (ref 3.8–5.2)
SODIUM SERPL-SCNC: 141 MMOL/L (ref 136–145)
WBC # BLD AUTO: 5.3 K/UL (ref 3.6–11)

## 2022-07-15 PROCEDURE — 74011250637 HC RX REV CODE- 250/637: Performed by: INTERNAL MEDICINE

## 2022-07-15 PROCEDURE — 97530 THERAPEUTIC ACTIVITIES: CPT

## 2022-07-15 PROCEDURE — 77010033678 HC OXYGEN DAILY

## 2022-07-15 PROCEDURE — 74011250636 HC RX REV CODE- 250/636: Performed by: INTERNAL MEDICINE

## 2022-07-15 PROCEDURE — 65270000029 HC RM PRIVATE

## 2022-07-15 PROCEDURE — 85025 COMPLETE CBC W/AUTO DIFF WBC: CPT

## 2022-07-15 PROCEDURE — 80048 BASIC METABOLIC PNL TOTAL CA: CPT

## 2022-07-15 PROCEDURE — 83880 ASSAY OF NATRIURETIC PEPTIDE: CPT

## 2022-07-15 PROCEDURE — 77030038269 HC DRN EXT URIN PURWCK BARD -A

## 2022-07-15 PROCEDURE — 97161 PT EVAL LOW COMPLEX 20 MIN: CPT

## 2022-07-15 PROCEDURE — 74011250637 HC RX REV CODE- 250/637: Performed by: HOSPITALIST

## 2022-07-15 PROCEDURE — 74011000250 HC RX REV CODE- 250: Performed by: INTERNAL MEDICINE

## 2022-07-15 PROCEDURE — 94761 N-INVAS EAR/PLS OXIMETRY MLT: CPT

## 2022-07-15 PROCEDURE — 74011250637 HC RX REV CODE- 250/637: Performed by: NURSE PRACTITIONER

## 2022-07-15 PROCEDURE — 74011250637 HC RX REV CODE- 250/637: Performed by: STUDENT IN AN ORGANIZED HEALTH CARE EDUCATION/TRAINING PROGRAM

## 2022-07-15 PROCEDURE — 36415 COLL VENOUS BLD VENIPUNCTURE: CPT

## 2022-07-15 RX ORDER — FUROSEMIDE 20 MG/1
20 TABLET ORAL DAILY
Status: DISCONTINUED | OUTPATIENT
Start: 2022-07-16 | End: 2022-07-18 | Stop reason: HOSPADM

## 2022-07-15 RX ORDER — PROMETHAZINE HYDROCHLORIDE 25 MG/1
25 TABLET ORAL
Status: DISCONTINUED | OUTPATIENT
Start: 2022-07-15 | End: 2022-07-18 | Stop reason: HOSPADM

## 2022-07-15 RX ADMIN — ASPIRIN 81 MG: 81 TABLET, COATED ORAL at 08:29

## 2022-07-15 RX ADMIN — DICYCLOMINE HYDROCHLORIDE 10 MG: 10 CAPSULE ORAL at 08:29

## 2022-07-15 RX ADMIN — HEPARIN SODIUM 5000 UNITS: 5000 INJECTION INTRAVENOUS; SUBCUTANEOUS at 22:33

## 2022-07-15 RX ADMIN — Medication 10 ML: at 06:30

## 2022-07-15 RX ADMIN — FUROSEMIDE 40 MG: 40 TABLET ORAL at 08:29

## 2022-07-15 RX ADMIN — DICYCLOMINE HYDROCHLORIDE 10 MG: 10 CAPSULE ORAL at 22:32

## 2022-07-15 RX ADMIN — DICYCLOMINE HYDROCHLORIDE 10 MG: 10 CAPSULE ORAL at 16:01

## 2022-07-15 RX ADMIN — LATANOPROST 1 DROP: 50 SOLUTION OPHTHALMIC at 17:35

## 2022-07-15 RX ADMIN — SPIRONOLACTONE 25 MG: 25 TABLET ORAL at 08:29

## 2022-07-15 RX ADMIN — HEPARIN SODIUM 5000 UNITS: 5000 INJECTION INTRAVENOUS; SUBCUTANEOUS at 08:29

## 2022-07-15 RX ADMIN — SODIUM CHLORIDE, PRESERVATIVE FREE 10 ML: 5 INJECTION INTRAVENOUS at 08:32

## 2022-07-15 RX ADMIN — Medication 10 ML: at 22:32

## 2022-07-15 RX ADMIN — METOPROLOL SUCCINATE 25 MG: 25 TABLET, EXTENDED RELEASE ORAL at 08:29

## 2022-07-15 RX ADMIN — PANTOPRAZOLE SODIUM 40 MG: 40 TABLET, DELAYED RELEASE ORAL at 06:30

## 2022-07-15 NOTE — PROGRESS NOTES
Hugo Jensen [facility] [address]  [address] [facility], [address] [place] [address]  [phone]      Medical Progress Note      NAME: Merle Newell   :  1925  MRM:  [id number]    Date/Time of service: 7/15/2022         Subjective:     Chief Complaint:  Patient was personally seen and examined by me during this time period. Chart reviewed. F/u CHF  Currently working with PT  She states she would liked to be discharged next week Monday or tuesday  Continues to c/o nausea       Objective:       Vitals:       Last 24hrs VS reviewed since prior progress note.  Most recent are:    Visit Vitals  BP (!) 147/70 (BP 1 Location: Right lower arm, BP Patient Position: At rest)   Pulse 85   Temp 97.4 °F (36.3 °C)   Resp 18   Ht 5' 2.01\" (1.575 m)   Wt 51.3 kg (113 lb 3.2 oz)   SpO2 99%   BMI 20.70 kg/m²     SpO2 Readings from Last 6 Encounters:   07/15/22 99%   22 93%   22 100%   17 94%   09/29/15 99%   08/31/15 95%    O2 Flow Rate (L/min): 2 l/min       Intake/Output Summary (Last 24 hours) at 7/15/2022 1612  Last data filed at 7/15/2022 1608  Gross per 24 hour   Intake 1160 ml   Output 1900 ml   Net -740 ml        Exam:     Physical Exam:    Gen: elderly, frail   HEENT:  Pink conjunctivae, PERRL, hearing intact to voice  Resp:  No accessory muscle use, clear breath sounds without wheezes rales or rhonchi  Card:  RRR, No murmurs, normal S1, S2, no peripheral edema  Abd:  Soft, epigastric and b/l Lower quadrant TTP, non-distended, normoactive bowel sounds are present  Musc:  No cyanosis or clubbing  Skin:  No rashes or ulcers, skin turgor is good  Neuro:  Cranial nerves 3-12 are grossly intact, follows commands appropriately  Psych:  Oriented to person, place, and time, Alert with good insight      Medications Reviewed: (see below)    Lab Data Reviewed: (see below)    ______________________________________________________________________    Medications:     Current Facility-Administered Medications Medication Dose Route Frequency    promethazine (PHENERGAN) tablet 25 mg  25 mg Oral Q6H PRN    furosemide (LASIX) tablet 40 mg  40 mg Oral DAILY    pantoprazole (PROTONIX) tablet 40 mg  40 mg Oral ACB    metoprolol succinate (TOPROL-XL) XL tablet 25 mg  25 mg Oral DAILY    spironolactone (ALDACTONE) tablet 25 mg  25 mg Oral DAILY    dicyclomine (BENTYL) capsule 10 mg  10 mg Oral TID    latanoprost (XALATAN) 0.005 % ophthalmic solution 1 Drop  1 Drop Left Eye QPM    aspirin delayed-release tablet 81 mg  81 mg Oral DAILY    docusate sodium (COLACE) capsule 100 mg  100 mg Oral PRN    sodium chloride (NS) flush 5-40 mL  5-40 mL IntraVENous Q8H    sodium chloride (NS) flush 5-40 mL  5-40 mL IntraVENous PRN    acetaminophen (TYLENOL) tablet 650 mg  650 mg Oral Q6H PRN    Or    acetaminophen (TYLENOL) suppository 650 mg  650 mg Rectal Q6H PRN    polyethylene glycol (MIRALAX) packet 17 g  17 g Oral DAILY PRN    ondansetron (ZOFRAN ODT) tablet 4 mg  4 mg Oral Q8H PRN    heparin (porcine) injection 5,000 Units  5,000 Units SubCUTAneous Q12H          Lab Review:     Recent Labs     07/15/22  1028 07/13/22  0401   WBC 5.3 5.3   HGB 10.6* 10.6*   HCT 32.4* 32.4*    185     Recent Labs     07/15/22  1028 07/13/22  0401    144   K 4.2 3.2*   * 106   CO2 23 30   GLU 81 62*   BUN 22* 21*   CREA 1.03* 0.97   CA 7.9* 8.5   MG  --  2.0     Lab Results   Component Value Date/Time    Glucose (POC) 82 04/23/2022 04:00 PM    Glucose (POC) 207 (H) 04/11/2022 11:07 AM    Glucose (POC) 234 (H) 04/11/2022 07:59 AM    Glucose (POC) 59 (L) 04/11/2022 05:43 AM    Glucose (POC) 210 (H) 05/12/2009 04:35 PM          Assessment / Plan:      Acute on chronic combined systolic and diastolic HF/ dyspnea POA: CX with small pleural effusions and probnp elevated. Echo Ef 30-35%. Reduce lasix to 20 mg po daily as serum Cr is rising.  Continue aldactone 25 mg po dailu  Imdur d/umer due to fall risk in the elderly  Continue aspirin, metoprolol  Appreciate card input    Acute abd pain/ Intractable Nausea and vomiting (7/10/2022) POA: Unclear etiology. CT abdomen pelvis without any acute concerns. Lipase within normal limits. UA unremarkable for infection. CT notes diverticulosis with no signs of diverticulitis. Viral gastroenteritis?    - Continue zofran as needed. Add phenergan of zofran does not work  - Continue protonix 40 mg po daily   - Supportive care  - GI not consulted      Hypomagnesemia (7/10/2022)/ Hypokalemia (7/10/2022): Suspect due to vomiting. Replete IV and oral as needed    Elevated troponin (7/10/2022) POA: Suspect demand ischemia due to heart failure. Continue aspirin. Chronic respiratory failure POA: On baseline 4 L nasal cannula. Monitor   Encourage incentive spirometry    Total time spent with patient: 28 Minutes **I personally saw and examined the patient during this time period**                 Care Plan discussed with: Patient, Nursing Staff and Consultant/Specialist    Discussed:  Care Plan    Prophylaxis:  Hep SQ    Disposition:  Home with HHPT and nursing services.  Patient wants to be discharged next week Monday or tuesday           ___________________________________________________    Attending Physician: Jose Colby MD

## 2022-07-15 NOTE — PROGRESS NOTES
7/15/2022 12:11 PM CM received return phone call from Juan Varela with [facility] who confirmed they can continue pt's care at discharge. Juan Varela provided fax number of [phone].     7/15/2022 11:56 AM Spoke with pt's attending, pt to discharge on Monday. CM spoke with pt and pt's son relayed plan for discharge on Monday. CM also relayed recommendations from PT for SNF placement. Pt politely declined SNF placement. Pt reported she will only discharge home. Pt and pt's son were agreeable to pt continuing with the home health PT and OT pt was open to prior to admission, [facility]. CM called to [facility]([phone]) CM had to [facility] but notified pt will discharge on Monday and resume services through their agency. Pt's son confirmed he will transport pt home on Monday. MARLIN Eli    Care Management Progress Note         ICD-10-CM ICD-9-CM     1. Hypomagnesemia  E83.42 275.2     2. Nausea and vomiting, unspecified vomiting type  R11.2 787.01     3. Elevated troponin  R77.8 790. 6     4. Bilateral pleural effusion  J90 511.9     5. Peripheral edema  R60.9 782. 3     6. Acute on chronic systolic congestive heart failure (HCC)  I50.23 428.23         428. 0           RUR: 21%    Risk Level: []? ? ?Low []? ? ? Moderate [x]? ? ? High  Value-based purchasing: [x]??? Yes []? ?? No  Bundle patient: []??? Yes [x]? ?? No              Specify:      Transition of care plan:  1. Ongoing medical management, Palliative following  2. Pt was living at home prior to admission, SNF recommendation declined by pt. Pt requesting return home at discharge with therapy through [facility]   3. Pt was open to PT and OT at home through [facility] prior to admission   4. Outpatient follow-up.   5. TBD on transport at this time

## 2022-07-15 NOTE — NURSE NAVIGATOR
Met with patient at bedside. No family was present. Introduced self and role of HF NN. Assessed her current understanding of HF and hospitalization. Patient states she told her PCP that making a person take \"pee pills\" is inhumane until her PCP agreed to her not taking them. She states she ended up coming to the hospital with pain and swelling and currently feels much improved. Discussed with her symptoms of HF, including sudden weight gain, swelling and bloating, and shortness of breath. Reviewed importance of diuretics to decrease symptoms. Patient states she was managing her own medications prior to her first admission but she currently has no idea what medications she is on and her son has started managing her medications. She does not recall being asked to weigh daily previously. She states she has seen Dr Teo Purcell or his NP in the past but lately she has been seeing Dr Swapna Briggs. Patient was given the Living with HF Education book, HF magnet, and HF calendar, and my contact information. She was not amenable to in depth HF education. Will attempt to visit with family present. Discussed with primary nurse.

## 2022-07-15 NOTE — PROGRESS NOTES
Problem: Mobility Impaired (Adult and Pediatric)  Goal: *Acute Goals and Plan of Care (Insert Text)  Description: FUNCTIONAL STATUS PRIOR TO ADMISSION: Patient was modified independent using a rolling walker ( short distances)for functional mobility. HOME SUPPORT PRIOR TO ADMISSION: The patient lived with  but  required SUP. Physical Therapy Goals  Initiated 7/15/2022  1. Patient will move from supine to sit and sit to supine  in bed with modified independence within 7 day(s). 2.  Patient will transfer from bed to chair and chair to bed with modified independence using the least restrictive device within 7 day(s). 3.  Patient will perform sit to stand with modified independence within 7 day(s). 4.  Patient will ambulate with supervision/set-up for 50 feet with the least restrictive device within 7 day(s). Outcome: Not Met           PHYSICAL THERAPY EVALUATION  Patient: Merle Newell (35 y.o. female)  Date: 7/15/2022  Primary Diagnosis: Acute on chronic congestive heart failure ([facility] [I50.9]        Precautions: falls      ASSESSMENT  This is a 79 y/o female who came to  [facility]  ED with c/o minimal abdominal pain associated with nausea and vomiting (dry heaving), admitted  on 7/10/22 for acute on chronic CHF. Pt is A& O x 4, required sup for functional transfers/mobility with RW prior to admission . Based on the objective data described below, the patient presents with increased anxiety, apprehensive of falls,  balance deficits , generalized weakness,  decreased activity tolerance and increased need for assist with functional mobility ( needs min/modA for bed mobility, good  static sitting balance , min/modA for sit <>stand x 3 attempts and additional time, modA for bed<>chair transfers , fair  static  standing balance with support , is able to ambulate - 4' with RW, min/modA with slow kaylie and decreased step length  b/l Le ). Recommend d/c to SNF when medically appropriate.  Pt requesting for HHPT.    Current Level of Function Impacting Discharge (mobility/balance): Pt requires min/modA for functional transfers/mobility    Functional Outcome Measure: The patient scored 8 on the tinetti outcome measure which is indicative of high risk for falls. Other factors to consider for discharge: severity of deficits, time since onset     Patient will benefit from skilled therapy intervention to address the above noted impairments. PLAN :  Recommendations and Planned Interventions: bed mobility training, transfer training, gait training, therapeutic exercises, neuromuscular re-education, patient and family training/education, and therapeutic activities      Frequency/Duration: Patient will be followed by physical therapy:  5 times a week to address goals.     Recommendation for discharge: (in order for the patient to meet his/her long term goals)  Therapy up to 5 days/week in SNF setting    This discharge recommendation:  Has been made in collaboration with the attending provider and/or case management    IF patient discharges home will need the following DME: none         SUBJECTIVE:   Patient stated  I want to sit up    OBJECTIVE DATA SUMMARY:   HISTORY:    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    Pacemaker     Stroke ([address]     Thromboembolus ([facility]      Past Surgical History:   Procedure Laterality Date    HX CHOLECYSTECTOMY      HX HEENT      HX ORTHOPAEDIC      VASCULAR SURGERY PROCEDURE UNLIST         Personal factors and/or comorbidities impacting plan of care:     Home Situation  Home Environment: Private residence  # Steps to Enter: 0 (has stair lift)  One/Two Story Residence: One story  Living Alone: No  Support Systems: Spouse/Significant Other,Child(ramo)  Patient Expects to be Discharged to[de-identified] Home  Current DME Used/Available at Home: Yumiko Bile, rolling,Wheelchair,Shower chair  Tub or Shower Type: Tub/Shower combination    EXAMINATION/PRESENTATION/DECISION MAKING:   Critical Behavior:  Neurologic State: Alert  Orientation Level: Oriented X4  Cognition: Follows commands  Safety/Judgement: Awareness of environment  Hearing: Auditory  Auditory Impairment: None  Skin:  intact where exposed    Range Of Motion:  AROM: Generally decreased, functional    Strength:    Strength: Generally decreased, functional    Tone & Sensation:   Tone: Normal    Sensation: Intact     Coordination:  Coordination: Within functional limits    Functional Mobility:  Bed Mobility:  Rolling: Minimum assistance; Moderate assistance; Additional time  Supine to Sit: Moderate assistance; Additional time     Scooting: Moderate assistance; Additional time  Transfers:  Sit to Stand: Minimum assistance; Moderate assistance  Stand to Sit: Minimum assistance        Bed to Chair: Moderate assistance    Balance:   Sitting: Impaired; Without support  Sitting - Static: Good (unsupported)  Sitting - Dynamic: Fair (occasional)  Standing: Impaired;Pull to stand; With support  Standing - Static: Fair;Constant support  Standing - Dynamic : Poor;Constant support  Ambulation/Gait Training:  Distance (ft): 4 Feet (ft) (took few steps towards the recliner)  Assistive Device: Walker, rolling;Gait belt  Ambulation - Level of Assistance: Minimal assistance; Moderate assistance    Base of Support: Narrowed    Step Length: Right shortened;Left shortened    Functional Measure:    Tinetti test:    Sitting Balance: 1  Arises: 0  Attempts to Rise: 0  Immediate Standing Balance: 0  Standing Balance: 0  Nudged: 0  Eyes Closed: 0  Turn 360 Degrees - Continuous/Discontinuous: 0  Turn 360 Degrees - Steady/Unsteady: 0  Sitting Down: 1  Balance Score: 2 Balance total score  Indication of Gait: 1  R Step Length/Height: 0  L Step Length/Height: 0  R Foot Clearance: 1  L Foot Clearance: 1  Step Symmetry: 1  Step Continuity: 1  Path: 1  Trunk: 0  Walking Time: 0  Gait Score: 6 Gait total score  Total Score: 8/28 Overall total score         Tinetti Tool Score Risk of Falls  <19 = High Fall Risk  19-24 = Moderate Fall Risk  25-28 = Low Fall Risk  Tinetti ME. Performance-Oriented Assessment of Mobility Problems in Elderly Patients. [place] [address]; [id number]. (Scoring Description: PT Bulletin Feb. 10, 1993)    Older adults: yAlin Godoy et al, 2009; n = 1000 [facility] elderly evaluated with ABC, EFE, ADL, and IADL)  · Mean EFE score for males aged 69-68 years = 26.21(3.40)  · Mean EFE score for females age 69-68 years = 25.16(4.30)  · Mean EFE score for males over 80 years = 23.29(6.02)  · Mean EFE score for females over 80 years = 17.20(8.32)        Physical Therapy Evaluation Charge Determination   History Examination Presentation Decision-Making   MEDIUM  Complexity : 1-2 comorbidities / personal factors will impact the outcome/ POC  MEDIUM Complexity : 3 Standardized tests and measures addressing body structure, function, activity limitation and / or participation in recreation  LOW Complexity : Stable, uncomplicated  Other outcome measures tinetti  HIGH       Based on the above components, the patient evaluation is determined to be of the following complexity level: LOW     Pain Ratin/10    Activity Tolerance:   Fair    After treatment patient left in no apparent distress:   Sitting in chair, Call bell within reach, Bed / chair alarm activated, and Caregiver / family present    COMMUNICATION/EDUCATION:   The patients plan of care was discussed with: Registered nurse. Fall prevention education was provided and the patient/caregiver indicated understanding.  and Patient/family agree to work toward stated goals and plan of care.     Thank you for this referral.  Loreta Mendoza   Time Calculation: 47 mins

## 2022-07-15 NOTE — PROGRESS NOTES
Problem: Falls - Risk of  Goal: *Absence of Falls  Description: Document La Nena Avelar Fall Risk and appropriate interventions in the flowsheet.   Outcome: Progressing Towards Goal  Note: Fall Risk Interventions:  Mobility Interventions: Bed/chair exit alarm         Medication Interventions: Bed/chair exit alarm    Elimination Interventions: Bed/chair exit alarm,Call light in reach    History of Falls Interventions: Bed/chair exit alarm         Problem: Patient Education: Go to Patient Education Activity  Goal: Patient/Family Education  Outcome: Progressing Towards Goal     Problem: Patient Education: Go to Patient Education Activity  Goal: Patient/Family Education  Outcome: Progressing Towards Goal

## 2022-07-15 NOTE — PROGRESS NOTES
Comprehensive Nutrition Assessment    Type and Reason for Visit: Initial,RD nutrition re-screen/LOS    Nutrition Recommendations/Plan:   1. Adjusted diet to Regular - no need for cardiac restriction 2/2 age  2. Pt declines ONS  3. Document PO intake     Malnutrition Assessment:  Malnutrition Status:  Severe malnutrition (07/15/22 1100)    Context:  Acute illness     Findings of the 6 clinical characteristics of malnutrition:   Energy Intake:  No significant decrease in energy intake  Weight Loss:  No significant weight loss     Body Fat Loss:  Mild body fat loss,     Muscle Mass Loss:  Mild muscle mass loss,    Fluid Accumulation:  No significant fluid accumulation,     Strength:  Not performed         Nutrition Assessment:         Pt admitted with Acute on chronic congestive heart failure ([facility] [I50.9]    Past Medical History:   Diagnosis Date    Anemia NEC     Arthritis     CAD (coronary artery disease)     Chronic pain     Contact dermatitis and other eczema, due to unspecified cause     [facility]     Elevated serum creatinine 4/19/2011    GERD (gastroesophageal reflux disease)     HFrEF (heart failure with reduced ejection fraction) [facility]     Hypercholesterolemia     Osteoporosis, 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 Pt eating well. Pt encouraged to call kitchen and order preferred foods. Noted allergies - eggs/shellfish. Pt reports -120 lbs. Currently 113 lbs. Noted pt with nausea but pt eats anyway. No reports vomiting. Last BM 7/11 - consider bowel adjustment with no BM in 4 days.          Last BM: 07/11/22, Loose    PO intake:   Patient Vitals for the past 168 hrs:   % Diet Eaten   07/14/22 1804 51 - 75%   07/14/22 1358 76 - 100%   07/14/22 0947 0%   07/13/22 1854 26 - 50%   07/13/22 1612 51 - 75%   07/13/22 0955 1 - 25%   07/12/22 1913 26 - 50%   07/12/22 1420 51 - 75%   07/12/22 1011 51 - 75%       Wt Readings from Last 30 Encounters:   07/13/22 51.3 kg (113 lb 3.2 oz)   05/01/22 54 kg (119 lb 0.8 oz)   04/10/22 51.3 kg (113 lb)   05/22/17 62.3 kg (137 lb 6.4 oz)   09/29/15 58.6 kg (129 lb 1.6 oz)   09/05/15 58.1 kg (128 lb)   08/31/15 58.1 kg (128 lb)   05/15/12 57.2 kg (126 lb)   03/19/12 61.2 kg (135 lb)   03/06/12 63 kg (139 lb)   03/01/12 62.1 kg (137 lb)   05/31/11 59.5 kg (131 lb 3.2 oz)   05/25/11 59.6 kg (131 lb 6.4 oz)   03/29/11 57.6 kg (127 lb)   11/16/10 60.8 kg (134 lb)           Nutrition Related Findings:      Wound Type: None  Edema: Generalized: No Edema (7/14/2022  8:00 PM)  LLE: Trace (7/14/2022  8:00 AM)  RLE: 1+ (7/15/2022  8:30 AM)        Current Nutrition Intake & Therapies:  Average Meal Intake: 26-50%  Average Supplement Intake: None ordered  ADULT DIET Regular; Low Fat/Low Chol/High Fiber/ARIANE    Anthropometric Measures:  Height: 5' 2.01\" (157.5 cm)  Ideal Body Weight (IBW): 110 lbs (50 kg)     Current Body Wt:  51.3 kg (113 lb 1.5 oz), 102.8 % IBW.  Bed scale  Current BMI (kg/m2): 20.7  Usual Body Weight: 54.4 kg (120 lb)  % Weight Change (Calculated): -5.8  Weight Adjustment: No adjustment                 BMI Category: Underweight (BMI less than 22) age over 72    Estimated Daily Nutrient Needs:  Energy Requirements Based On: Kcal/kg  Weight Used for Energy Requirements: Current  Energy (kcal/day): 1283  Weight Used for Protein Requirements: Current  Protein (g/day): 51-61  Method Used for Fluid Requirements: 1 ml/kcal  Fluid (ml/day): 1500 for elderly    Nutrition Diagnosis:   · Inadequate energy intake related to early satiety as evidenced by weight loss,BMI,weight loss 7.5% in 3 months      Nutrition Interventions:   Food and/or Nutrient Delivery: Start oral nutrition supplement,Modify current diet  Nutrition Education/Counseling: No recommendations at this time  Coordination of Nutrition Care: Continue to monitor while inpatient,Interdisciplinary rounds       Goals:     Goals: PO intake 50% or greater,by next RD assessment       Nutrition Monitoring and Evaluation:   Behavioral-Environmental Outcomes: None identified  Food/Nutrient Intake Outcomes: Food and nutrient intake,Supplement intake  Physical Signs/Symptoms Outcomes: Biochemical data,Weight    Discharge Planning:    Continue current diet,Continue oral nutrition supplement    Gabriela Dominguez  Contact: [phone]

## 2022-07-15 NOTE — PROGRESS NOTES
Bedside and Verbal shift change report given to Nader Jernigan (oncoming nurse) by Brandon Sung RN (offgoing nurse). Report included the following information SBAR, Kardex, Intake/Output, MAR and Recent Results.

## 2022-07-16 PROBLEM — A41.9 SEPTIC SHOCK (HCC): Status: RESOLVED | Noted: 2022-04-22 | Resolved: 2022-07-16

## 2022-07-16 PROBLEM — N18.30 CKD (CHRONIC KIDNEY DISEASE), STAGE III (HCC): Status: ACTIVE | Noted: 2022-07-16

## 2022-07-16 PROBLEM — N39.0 UTI (URINARY TRACT INFECTION): Status: RESOLVED | Noted: 2022-04-22 | Resolved: 2022-07-16

## 2022-07-16 PROBLEM — I50.9 ACUTE ON CHRONIC CONGESTIVE HEART FAILURE (HCC): Status: RESOLVED | Noted: 2022-07-10 | Resolved: 2022-07-16

## 2022-07-16 PROBLEM — R65.21 SEPTIC SHOCK (HCC): Status: RESOLVED | Noted: 2022-04-22 | Resolved: 2022-07-16

## 2022-07-16 PROBLEM — K85.90 ACUTE PANCREATITIS: Status: RESOLVED | Noted: 2022-04-09 | Resolved: 2022-07-16

## 2022-07-16 PROBLEM — N17.9 AKI (ACUTE KIDNEY INJURY) (HCC): Status: RESOLVED | Noted: 2022-04-22 | Resolved: 2022-07-16

## 2022-07-16 PROCEDURE — 74011250637 HC RX REV CODE- 250/637: Performed by: INTERNAL MEDICINE

## 2022-07-16 PROCEDURE — 74011250637 HC RX REV CODE- 250/637: Performed by: HOSPITALIST

## 2022-07-16 PROCEDURE — 74011250637 HC RX REV CODE- 250/637: Performed by: NURSE PRACTITIONER

## 2022-07-16 PROCEDURE — 74011250637 HC RX REV CODE- 250/637: Performed by: STUDENT IN AN ORGANIZED HEALTH CARE EDUCATION/TRAINING PROGRAM

## 2022-07-16 PROCEDURE — 74011250636 HC RX REV CODE- 250/636: Performed by: INTERNAL MEDICINE

## 2022-07-16 PROCEDURE — 74011000250 HC RX REV CODE- 250: Performed by: INTERNAL MEDICINE

## 2022-07-16 PROCEDURE — 77010033678 HC OXYGEN DAILY

## 2022-07-16 PROCEDURE — 65270000029 HC RM PRIVATE

## 2022-07-16 PROCEDURE — 94761 N-INVAS EAR/PLS OXIMETRY MLT: CPT

## 2022-07-16 RX ORDER — ONDANSETRON 4 MG/1
4 TABLET, ORALLY DISINTEGRATING ORAL
Qty: 20 TABLET | Refills: 0 | Status: SHIPPED | OUTPATIENT
Start: 2022-07-16 | End: 2022-09-06

## 2022-07-16 RX ADMIN — ASPIRIN 81 MG: 81 TABLET, COATED ORAL at 09:15

## 2022-07-16 RX ADMIN — HEPARIN SODIUM 5000 UNITS: 5000 INJECTION INTRAVENOUS; SUBCUTANEOUS at 09:17

## 2022-07-16 RX ADMIN — LATANOPROST 1 DROP: 50 SOLUTION OPHTHALMIC at 19:19

## 2022-07-16 RX ADMIN — PANTOPRAZOLE SODIUM 40 MG: 40 TABLET, DELAYED RELEASE ORAL at 09:15

## 2022-07-16 RX ADMIN — HEPARIN SODIUM 5000 UNITS: 5000 INJECTION INTRAVENOUS; SUBCUTANEOUS at 22:04

## 2022-07-16 RX ADMIN — Medication 10 ML: at 22:04

## 2022-07-16 RX ADMIN — METOPROLOL SUCCINATE 25 MG: 25 TABLET, EXTENDED RELEASE ORAL at 09:16

## 2022-07-16 RX ADMIN — Medication 10 ML: at 14:00

## 2022-07-16 RX ADMIN — DICYCLOMINE HYDROCHLORIDE 10 MG: 10 CAPSULE ORAL at 09:16

## 2022-07-16 RX ADMIN — SPIRONOLACTONE 25 MG: 25 TABLET ORAL at 09:16

## 2022-07-16 RX ADMIN — FUROSEMIDE 20 MG: 20 TABLET ORAL at 09:17

## 2022-07-16 NOTE — PROGRESS NOTES
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 Cardiology consulted. Resolved on BB, lasix and spironolactone. She is not capable of retaining CHF education. Edema resolved and she is not hypoxic. However due to non compliance, hospice would be appropriate at any time. Debility / Chronic pain / Arthritis - POA, worse than baseline due to CHF and deconditioning. PT OT recommended SNF but she refused. [facility]. She is medically stable to discharge. She refuses to go home. I informed her and her son that her insurance may not pay for additional days she stays in the hospital after being cleared to discharge. CAD (coronary artery disease) / Elevated troponin - POA, stable. Troponin on this admit due to strain. Continue metoprolol and ASA    Hx of completed stroke - POA, stable. 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 BG too labile to give meds. In setting of advanced age and noncompliance, no further testing or treatment. Nausea and vomiting / GERD (gastroesophageal reflux disease) - POA, now better. Unclear etiology, likely due to CHF vs gastroparesis vs other. ADAT. PPI    CKD (chronic kidney disease), stage III / Hypomagnesemia / Hypokalemia - POA, stable. Repleted lytes. Anemia - POA, likely due to CKD and age. Monitor    Glaucoma - Continue xalatan    Hx of deep venous thrombosis - Not on DOAC due to age and fall risk. Hypercholesterolemia - Not on statin due to age.        Subjective:     Chief Complaint:  weak    ROS:  (bold if positive, if negative)    Tolerating some PT  Tolerating Diet        Objective: Last 24hrs VS reviewed since prior progress note.  Most recent are:    Visit Vitals  /71 (BP 1 Location: Right lower arm, BP Patient Position: At rest)   Pulse 75   Temp 98 °F (36.7 °C)   Resp 16   Ht 5' 2.01\" (1.575 m)   Wt 51.7 kg (114 lb)   SpO2 100%   BMI 20.85 kg/m²     SpO2 Readings from Last 6 Encounters:   07/16/22 100%   05/02/22 93%   04/14/22 100%   05/22/17 94%   09/29/15 99%   08/31/15 95%    O2 Flow Rate (L/min): 2 l/min       Intake/Output Summary (Last 24 hours) at 7/16/2022 1033  Last data filed at 7/15/2022 2233  Gross per 24 hour   Intake 820 ml   Output 1450 ml   Net -630 ml        Physical Exam:    Gen:  Well-developed, well-nourished, in no acute distress  HEENT:  Pink conjunctivae, PERRL, hearing intact to voice, moist mucous membranes  Neck:  Supple, without masses, thyroid non-tender  Resp:  No accessory muscle use, clear breath sounds without wheezes rales or rhonchi  Card:  No murmurs, normal S1, S2 without thrills, bruits or peripheral edema  Abd:  Soft, non-tender, non-distended, normoactive bowel sounds are present, no mass  Lymph:  No cervical or inguinal adenopathy  Musc:  No cyanosis or clubbing  Skin:  No rashes or ulcers, skin turgor is good  Neuro:  Cranial nerves are grossly intact, no focal motor weakness, follows commands appropriately  Psych:  Good insight, oriented to person, place and time, alert    Telemetry reviewed:   normal sinus rhythm  __________________________________________________________________  Medications Reviewed: (see below)  Medications:     Current Facility-Administered Medications   Medication Dose Route Frequency    promethazine (PHENERGAN) tablet 25 mg  25 mg Oral Q6H PRN    furosemide (LASIX) tablet 20 mg  20 mg Oral DAILY    pantoprazole (PROTONIX) tablet 40 mg  40 mg Oral ACB    metoprolol succinate (TOPROL-XL) XL tablet 25 mg  25 mg Oral DAILY    spironolactone (ALDACTONE) tablet 25 mg  25 mg Oral DAILY    latanoprost (XALATAN) 0.005 % ophthalmic solution 1 Drop  1 Drop Left Eye QPM    aspirin delayed-release tablet 81 mg  81 mg Oral DAILY    docusate sodium (COLACE) capsule 100 mg  100 mg Oral PRN    sodium chloride (NS) flush 5-40 mL  5-40 mL IntraVENous Q8H    sodium chloride (NS) flush 5-40 mL  5-40 mL IntraVENous PRN    acetaminophen (TYLENOL) tablet 650 mg  650 mg Oral Q6H PRN    polyethylene glycol (MIRALAX) packet 17 g  17 g Oral DAILY PRN    ondansetron (ZOFRAN ODT) tablet 4 mg  4 mg Oral Q8H PRN    heparin (porcine) injection 5,000 Units  5,000 Units SubCUTAneous Q12H        Lab Data Reviewed: (see below)  Lab Review:     Recent Labs     07/15/22  1028   WBC 5.3   HGB 10.6*   HCT 32.4*        Recent Labs     07/15/22  1028      K 4.2   *   CO2 23   GLU 81   BUN 22*   CREA 1.03*   CA 7.9*     Lab Results   Component Value Date/Time    Glucose (POC) 82 04/23/2022 04:00 PM    Glucose (POC) 207 (H) 04/11/2022 11:07 AM    Glucose (POC) 234 (H) 04/11/2022 07:59 AM    Glucose (POC) 59 (L) 04/11/2022 05:43 AM    Glucose (POC) 210 (H) 05/12/2009 04:35 PM     No results for input(s): PH, PCO2, PO2, HCO3, FIO2 in the last 72 hours. No results for input(s): INR, INREXT in the last 72 hours. All Micro Results     Procedure Component Value Units Date/Time    ENTERIC BACTERIA PANEL, DNA [[id number]] Collected: 07/11/22 1230    Order Status: Canceled Specimen: Stool     URINE CULTURE HOLD SAMPLE [[id number]] Collected: 07/10/22 2314    Order Status: Completed Specimen: Serum Updated: 07/10/22 2344     Urine culture hold       Urine on hold in Microbiology dept for 2 days. If unpreserved urine is submitted, it cannot be used for addtional testing after 24 hours, recollection will be required. Other pertinent lab: none    Total time spent with patient: 30 Minutes I personally reviewed chart, notes, data and current medications in the medical record.   I have personally examined and treated the patient at bedside during this period. To assist coordination of care and communication with nursing and staff, this note may be preliminary early in the day, but finalized by end of the day.                  Care Plan discussed with: Patient, Family, Care Manager, Nursing Staff, Consultant/Specialist and >50% of time spent in counseling and coordination of care    Discussed:  Care Plan and D/C Planning    Prophylaxis:  Hep SQ and H2B/PPI    Disposition:  Home w/Family           ___________________________________________________    Attending Physician: Joshua Tom MD

## 2022-07-17 PROCEDURE — 65270000029 HC RM PRIVATE

## 2022-07-17 PROCEDURE — 74011250637 HC RX REV CODE- 250/637: Performed by: INTERNAL MEDICINE

## 2022-07-17 PROCEDURE — 77010033678 HC OXYGEN DAILY

## 2022-07-17 PROCEDURE — 74011250636 HC RX REV CODE- 250/636: Performed by: INTERNAL MEDICINE

## 2022-07-17 PROCEDURE — 74011250637 HC RX REV CODE- 250/637: Performed by: STUDENT IN AN ORGANIZED HEALTH CARE EDUCATION/TRAINING PROGRAM

## 2022-07-17 PROCEDURE — 2709999900 HC NON-CHARGEABLE SUPPLY

## 2022-07-17 PROCEDURE — 74011000250 HC RX REV CODE- 250: Performed by: INTERNAL MEDICINE

## 2022-07-17 PROCEDURE — 94761 N-INVAS EAR/PLS OXIMETRY MLT: CPT

## 2022-07-17 PROCEDURE — 74011250637 HC RX REV CODE- 250/637: Performed by: NURSE PRACTITIONER

## 2022-07-17 PROCEDURE — 74011250637 HC RX REV CODE- 250/637: Performed by: HOSPITALIST

## 2022-07-17 RX ADMIN — METOPROLOL SUCCINATE 25 MG: 25 TABLET, EXTENDED RELEASE ORAL at 08:56

## 2022-07-17 RX ADMIN — HEPARIN SODIUM 5000 UNITS: 5000 INJECTION INTRAVENOUS; SUBCUTANEOUS at 08:55

## 2022-07-17 RX ADMIN — ACETAMINOPHEN 650 MG: 325 TABLET ORAL at 08:54

## 2022-07-17 RX ADMIN — SPIRONOLACTONE 25 MG: 25 TABLET ORAL at 08:55

## 2022-07-17 RX ADMIN — ASPIRIN 81 MG: 81 TABLET, COATED ORAL at 08:55

## 2022-07-17 RX ADMIN — LATANOPROST 1 DROP: 50 SOLUTION OPHTHALMIC at 18:56

## 2022-07-17 RX ADMIN — HEPARIN SODIUM 5000 UNITS: 5000 INJECTION INTRAVENOUS; SUBCUTANEOUS at 21:22

## 2022-07-17 RX ADMIN — ACETAMINOPHEN 650 MG: 325 TABLET ORAL at 23:50

## 2022-07-17 RX ADMIN — PANTOPRAZOLE SODIUM 40 MG: 40 TABLET, DELAYED RELEASE ORAL at 06:34

## 2022-07-17 RX ADMIN — Medication 10 ML: at 05:32

## 2022-07-17 RX ADMIN — Medication 10 ML: at 21:22

## 2022-07-17 RX ADMIN — FUROSEMIDE 20 MG: 20 TABLET ORAL at 08:55

## 2022-07-17 RX ADMIN — Medication 10 ML: at 18:56

## 2022-07-17 NOTE — PROGRESS NOTES
Problem: Pressure Injury - Risk of  Goal: *Prevention of pressure injury  Description: Document Destin Scale and appropriate interventions in the flowsheet.   Outcome: Progressing Towards Goal  Note: Pressure Injury Interventions:  Sensory Interventions: Assess changes in LOC,Minimize linen layers    Moisture Interventions: Absorbent underpads    Activity Interventions: Pressure redistribution bed/mattress(bed type)    Mobility Interventions: Pressure redistribution bed/mattress (bed type)    Nutrition Interventions: Document food/fluid/supplement intake    Friction and Shear Interventions: HOB 30 degrees or less,Minimize layers

## 2022-07-17 NOTE — PROGRESS NOTES
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 Cardiology consulted. Resolved on BB, lasix and spironolactone. She is not capable of retaining CHF education. Edema resolved and she is not hypoxic, but she is using oxygen for comfort, we have not documented any hypoxia. Due to non compliance, hospice would be appropriate at any time. I discussed with her and her family that we do not expect much further improvement. Debility / Chronic pain / Arthritis - POA, worse than baseline due to CHF and deconditioning. PT OT recommended SNF but she refused. We arranged [facility] PT, but I discussed with her and her family that we do not expect much further improvement with that plan. She has been medically stable to discharge for 2 days. She refuses to go home. I informed her and her son that her insurance may not pay for additional days she stays in the hospital after being medically cleared to discharge. CAD (coronary artery disease) / Elevated troponin - POA, stable. Troponin on this admit due to strain. Continue metoprolol and ASA    Hx of completed stroke - POA, stable. 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 BG too labile to give meds. In setting of advanced age and noncompliance, no further testing or treatment. Nausea and vomiting / GERD (gastroesophageal reflux disease) - POA, now better. Unclear etiology, likely due to CHF vs gastroparesis vs other. ADAT. PPI    CKD (chronic kidney disease), stage III / Hypomagnesemia / Hypokalemia - POA, stable. Repleted lytes. Anemia - POA, likely due to CKD and age.   Monitor    Glaucoma - Continue xalatan    Hx of deep venous thrombosis - Not on DOAC due to age and fall risk. Hypercholesterolemia - Not on statin due to age. Subjective:     Chief Complaint:  Weak, dyspnea, feels poorly    ROS:  (bold if positive, if negative)    SOB/DOETolerating some PT  Tolerating some Diet        Objective:     Last 24hrs VS reviewed since prior progress note.  Most recent are:    Visit Vitals  BP (!) 155/85 (BP 1 Location: Right lower arm, BP Patient Position: At rest;Lying)   Pulse 87   Temp 97.7 °F (36.5 °C)   Resp 16   Ht 5' 2.01\" (1.575 m)   Wt 53.3 kg (117 lb 6.4 oz)   SpO2 98%   BMI 21.47 kg/m²     SpO2 Readings from Last 6 Encounters:   07/17/22 98%   05/02/22 93%   04/14/22 100%   05/22/17 94%   09/29/15 99%   08/31/15 95%    O2 Flow Rate (L/min): 3 l/min       Intake/Output Summary (Last 24 hours) at 7/17/2022 1146  Last data filed at 7/17/2022 5022  Gross per 24 hour   Intake 360 ml   Output 380 ml   Net -20 ml        Physical Exam:    Gen:  Well-developed, well-nourished, in no acute distress  HEENT:  Pink conjunctivae, PERRL, hearing intact to voice, moist mucous membranes  Neck:  Supple, without masses, thyroid non-tender  Resp:  No accessory muscle use, clear breath sounds without wheezes rales or rhonchi  Card:  No murmurs, normal S1, S2 without thrills, bruits or peripheral edema  Abd:  Soft, non-tender, non-distended, normoactive bowel sounds are present, no mass  Lymph:  No cervical or inguinal adenopathy  Musc:  No cyanosis or clubbing  Skin:  No rashes or ulcers, skin turgor is good  Neuro:  Cranial nerves are grossly intact, no focal motor weakness, follows commands appropriately  Psych:  Good insight, oriented to person, place and time, alert    Telemetry reviewed:   normal sinus rhythm  __________________________________________________________________  Medications Reviewed: (see below)  Medications:     Current Facility-Administered Medications   Medication Dose Route Frequency    promethazine (PHENERGAN) tablet 25 mg  25 mg Oral Q6H PRN    furosemide (LASIX) tablet 20 mg  20 mg Oral DAILY    pantoprazole (PROTONIX) tablet 40 mg  40 mg Oral ACB    metoprolol succinate (TOPROL-XL) XL tablet 25 mg  25 mg Oral DAILY    spironolactone (ALDACTONE) tablet 25 mg  25 mg Oral DAILY    latanoprost (XALATAN) 0.005 % ophthalmic solution 1 Drop  1 Drop Left Eye QPM    aspirin delayed-release tablet 81 mg  81 mg Oral DAILY    docusate sodium (COLACE) capsule 100 mg  100 mg Oral PRN    sodium chloride (NS) flush 5-40 mL  5-40 mL IntraVENous Q8H    sodium chloride (NS) flush 5-40 mL  5-40 mL IntraVENous PRN    acetaminophen (TYLENOL) tablet 650 mg  650 mg Oral Q6H PRN    polyethylene glycol (MIRALAX) packet 17 g  17 g Oral DAILY PRN    ondansetron (ZOFRAN ODT) tablet 4 mg  4 mg Oral Q8H PRN    heparin (porcine) injection 5,000 Units  5,000 Units SubCUTAneous Q12H        Lab Data Reviewed: (see below)  Lab Review:     Recent Labs     07/15/22  1028   WBC 5.3   HGB 10.6*   HCT 32.4*        Recent Labs     07/15/22  1028      K 4.2   *   CO2 23   GLU 81   BUN 22*   CREA 1.03*   CA 7.9*     Lab Results   Component Value Date/Time    Glucose (POC) 82 04/23/2022 04:00 PM    Glucose (POC) 207 (H) 04/11/2022 11:07 AM    Glucose (POC) 234 (H) 04/11/2022 07:59 AM    Glucose (POC) 59 (L) 04/11/2022 05:43 AM    Glucose (POC) 210 (H) 05/12/2009 04:35 PM     No results for input(s): PH, PCO2, PO2, HCO3, FIO2 in the last 72 hours. No results for input(s): INR, INREXT, INREXT in the last 72 hours. All Micro Results     Procedure Component Value Units Date/Time    ENTERIC BACTERIA PANEL, DNA [[id number]] Collected: 07/11/22 1230    Order Status: Canceled Specimen: Stool     URINE CULTURE HOLD SAMPLE [[id number]] Collected: 07/10/22 2319    Order Status: Completed Specimen: Serum Updated: 07/10/22 7024     Urine culture hold       Urine on hold in Microbiology dept for 2 days.   If unpreserved urine is submitted, it cannot be used for addtional testing after 24 hours, recollection will be required. Other pertinent lab: none    Total time spent with patient: 30 Minutes I personally reviewed chart, notes, data and current medications in the medical record. I have personally examined and treated the patient at bedside during this period. To assist coordination of care and communication with nursing and staff, this note may be preliminary early in the day, but finalized by end of the day.                  Care Plan discussed with: Patient, Family, Care Manager, Nursing Staff, Consultant/Specialist and >50% of time spent in counseling and coordination of care    Discussed:  Care Plan and D/C Planning    Prophylaxis:  Hep SQ and H2B/PPI    Disposition:  Home w/Family           ___________________________________________________    Attending Physician: Margarita Patel MD

## 2022-07-18 VITALS
HEART RATE: 79 BPM | OXYGEN SATURATION: 100 % | TEMPERATURE: 97.8 F | HEIGHT: 62 IN | SYSTOLIC BLOOD PRESSURE: 148 MMHG | WEIGHT: 117.39 LBS | BODY MASS INDEX: 21.6 KG/M2 | RESPIRATION RATE: 18 BRPM | DIASTOLIC BLOOD PRESSURE: 72 MMHG

## 2022-07-18 PROCEDURE — 77010033678 HC OXYGEN DAILY

## 2022-07-18 PROCEDURE — 74011250637 HC RX REV CODE- 250/637: Performed by: STUDENT IN AN ORGANIZED HEALTH CARE EDUCATION/TRAINING PROGRAM

## 2022-07-18 PROCEDURE — 74011000250 HC RX REV CODE- 250: Performed by: INTERNAL MEDICINE

## 2022-07-18 PROCEDURE — 74011250637 HC RX REV CODE- 250/637: Performed by: NURSE PRACTITIONER

## 2022-07-18 PROCEDURE — 74011250637 HC RX REV CODE- 250/637: Performed by: HOSPITALIST

## 2022-07-18 PROCEDURE — 74011250636 HC RX REV CODE- 250/636: Performed by: INTERNAL MEDICINE

## 2022-07-18 PROCEDURE — 74011250637 HC RX REV CODE- 250/637: Performed by: INTERNAL MEDICINE

## 2022-07-18 PROCEDURE — 94761 N-INVAS EAR/PLS OXIMETRY MLT: CPT

## 2022-07-18 RX ORDER — FUROSEMIDE 20 MG/1
20 TABLET ORAL DAILY
Qty: 30 TABLET | Refills: 0 | Status: SHIPPED | OUTPATIENT
Start: 2022-07-18 | End: 2022-09-06

## 2022-07-18 RX ORDER — PROMETHAZINE HYDROCHLORIDE 25 MG/1
25 TABLET ORAL
Qty: 20 TABLET | Refills: 0 | Status: SHIPPED | OUTPATIENT
Start: 2022-07-18 | End: 2022-09-06

## 2022-07-18 RX ORDER — PANTOPRAZOLE SODIUM 40 MG/1
40 TABLET, DELAYED RELEASE ORAL
Qty: 30 TABLET | Refills: 0 | Status: SHIPPED | OUTPATIENT
Start: 2022-07-19 | End: 2022-09-06

## 2022-07-18 RX ADMIN — Medication 10 ML: at 05:40

## 2022-07-18 RX ADMIN — HEPARIN SODIUM 5000 UNITS: 5000 INJECTION INTRAVENOUS; SUBCUTANEOUS at 09:16

## 2022-07-18 RX ADMIN — SPIRONOLACTONE 25 MG: 25 TABLET ORAL at 09:16

## 2022-07-18 RX ADMIN — ACETAMINOPHEN 650 MG: 325 TABLET ORAL at 09:15

## 2022-07-18 RX ADMIN — FUROSEMIDE 20 MG: 20 TABLET ORAL at 09:16

## 2022-07-18 RX ADMIN — METOPROLOL SUCCINATE 25 MG: 25 TABLET, EXTENDED RELEASE ORAL at 09:16

## 2022-07-18 RX ADMIN — PANTOPRAZOLE SODIUM 40 MG: 40 TABLET, DELAYED RELEASE ORAL at 05:41

## 2022-07-18 NOTE — PROGRESS NOTES
Problem: Pressure Injury - Risk of  Goal: *Prevention of pressure injury  Description: Document Destin Scale and appropriate interventions in the flowsheet. Outcome: Progressing Towards Goal  Note: Pressure Injury Interventions:  Sensory Interventions: Assess changes in LOC    Moisture Interventions: Absorbent underpads    Activity Interventions: PT/OT evaluation    Mobility Interventions: HOB 30 degrees or less    Nutrition Interventions: Document food/fluid/supplement intake    Friction and Shear Interventions: Apply protective barrier, creams and emollients                Problem: Patient Education: Go to Patient Education Activity  Goal: Patient/Family Education  Outcome: Progressing Towards Goal     Problem: Falls - Risk of  Goal: *Absence of Falls  Description: Document Rei Fall Risk and appropriate interventions in the flowsheet.   Outcome: Progressing Towards Goal  Note: Fall Risk Interventions:  Mobility Interventions: Bed/chair exit alarm         Medication Interventions: Bed/chair exit alarm    Elimination Interventions: Call light in reach,Bed/chair exit alarm    History of Falls Interventions: Bed/chair exit alarm         Problem: Patient Education: Go to Patient Education Activity  Goal: Patient/Family Education  Outcome: Progressing Towards Goal     Problem: Patient Education: Go to Patient Education Activity  Goal: Patient/Family Education  Outcome: Progressing Towards Goal     Problem: Patient Education: Go to Patient Education Activity  Goal: Patient/Family Education  Outcome: Progressing Towards Goal

## 2022-07-18 NOTE — PROGRESS NOTES
Problem: Pressure Injury - Risk of  Goal: *Prevention of pressure injury  Description: Document Destin Scale and appropriate interventions in the flowsheet. Outcome: Progressing Towards Goal  Note: Pressure Injury Interventions:  Sensory Interventions: Assess changes in LOC,Assess need for specialty bed,Avoid rigorous massage over bony prominences,Check visual cues for pain,Discuss PT/OT consult with provider,Float heels,Keep linens dry and wrinkle-free,Minimize linen layers,Monitor skin under medical devices,Pad between skin to skin,Turn and reposition approx. every two hours (pillows and wedges if needed),Use 30-degree side-lying position    Moisture Interventions: Absorbent underpads,Apply protective barrier, creams and emollients,Assess need for specialty bed,Check for incontinence Q2 hours and as needed,Internal/External urinary devices,Limit adult briefs,Maintain skin hydration (lotion/cream),Minimize layers,Moisture barrier    Activity Interventions: Increase time out of bed,Pressure redistribution bed/mattress(bed type)    Mobility Interventions: Float heels,HOB 30 degrees or less,Pressure redistribution bed/mattress (bed type),PT/OT evaluation,Turn and reposition approx. every two hours(pillow and wedges)    Nutrition Interventions: Document food/fluid/supplement intake,Discuss nutritional consult with provider    Friction and Shear Interventions: Apply protective barrier, creams and emollients,Foam dressings/transparent film/skin sealants,HOB 30 degrees or less,Lift sheet,Lift team/patient mobility team,Transfer aides:transfer board/Joe lift/ceiling lift,Transferring/repositioning devices                Problem: Falls - Risk of  Goal: *Absence of Falls  Description: Document Rei Fall Risk and appropriate interventions in the flowsheet.   Outcome: Progressing Towards Goal  Note: Fall Risk Interventions:  Mobility Interventions: Bed/chair exit alarm,Communicate number of staff needed for ambulation/transfer,OT consult for ADLs,Patient to call before getting OOB,PT Consult for mobility concerns,PT Consult for assist device competence,Strengthening exercises (ROM-active/passive),Utilize walker, cane, or other assistive device,Utilize gait belt for transfers/ambulation         Medication Interventions: Assess postural VS orthostatic hypotension,Bed/chair exit alarm,Patient to call before getting OOB,Teach patient to arise slowly,Utilize gait belt for transfers/ambulation    Elimination Interventions: Bed/chair exit alarm,Call light in reach,Elevated toilet seat,Patient to call for help with toileting needs,Stay With Me (per policy),Toilet paper/wipes in reach,Toileting schedule/hourly rounds    History of Falls Interventions: Bed/chair exit alarm,Consult care management for discharge planning,Evaluate medications/consider consulting pharmacy,Investigate reason for fall,Room close to nurse's station,Utilize gait belt for transfer/ambulation

## 2022-07-18 NOTE — DISCHARGE INSTRUCTIONS
Patient Discharge Instructions    Diego Varela / [id number] : 1925    Admitted 7/10/2022 Discharged: 2022     Primary Diagnoses  Problem List as of 2022 Date Reviewed: 2022           Anemia   Arthritis   CAD (coronary artery disease)   Chronic pain   DM type 2 causing renal disease ([facility]   DM type 2 causing vascular disease ([facility])   GERD (gastroesophageal reflux disease)   HFrEF (heart failure with reduced ejection fraction) ([facility])   Hx of completed stroke   Hx of deep venous thrombosis   Hypercholesterolemia   Pacemaker   CKD (chronic kidney disease), stage III ([facility])   Debility   Nausea and vomiting   Hypomagnesemia   Elevated troponin   Hypokalemia   * (Principal) Acute on chronic combined systolic and diastolic ACC/AHA stage C congestive heart failure ([facility]          Take Home Medications     · It is important that you take the medication exactly as they are prescribed. · Keep your medication in the bottles provided by the pharmacist and keep a list of the medication names, dosages, and times to be taken in your wallet. · Do not take other medications without consulting your doctor. What to do at Home    Recommended diet: Cardiac Diet, Diabetic Diet and Low fat, Low cholesterol    Recommended activity: Activity as tolerated and PT/OT per Home Health    If you experience worse symtoms, please follow up with your PCP. Follow-up with your PCP in 1 day    Follow-up Information     Follow up With Specialties Details Why Contact Info    Melina Cross MD Family Medicine Go on 2022 Hospital Follow Up at 2:30pm [address] [address]  [phone]             Information obtained by :  I understand that if any problems occur once I am at home I am to contact my physician. I understand and acknowledge receipt of the instructions indicated above. Physician's or R.N.'s Signature                                                                  Date/Time                                                                                                                                              Patient or Representative Signature                                                          Date/Time     Avoiding Triggers With Heart Failure: Care Instructions  Your Care Instructions     Triggers are anything that make your heart failure flare up. A flare-up is also called \"sudden heart failure\" or \"acute heart failure. \" When you have a flare-up, fluid builds up in your lungs, and you have problems breathing. You might need to go to the hospital. By watching for changes in your condition and avoiding triggers, you can prevent heart failure flare-ups. Follow-up care is a key part of your treatment and safety. Be sure to make and go to all appointments, and call your doctor if you are having problems. It's also a good idea to know your test results and keep a list of the medicines you take. How can you care for yourself at home? Watch for changes in your weight and condition  · Weigh yourself without clothing at the same time each day. Record your weight. Call your doctor if you have sudden weight gain, such as more than 2 to 3 pounds in a day or 5 pounds in a week. (Your doctor may suggest a different range of weight gain.) A sudden weight gain may mean that your heart failure is getting worse. · Keep a daily record of your symptoms. Write down any changes in how you feel, such as new shortness of breath, cough, or problems eating. Also record if your ankles are more swollen than usual and if you feel more tired than usual. Note anything that you ate or did that could have triggered these changes. Limit sodium  Sodium causes your body to hold on to extra water. This may cause your heart failure symptoms to get worse.  People get most of their sodium from processed foods. Fast food and restaurant meals also tend to be very high in sodium. · Your doctor may suggest that you limit sodium. Your doctor can tell you how much sodium is right for you. This includes limiting sodium in cooked and packaged foods. · Read food labels on cans and food packages. They tell you how much sodium you get in one serving. Check the serving size. If you eat more than one serving, you are getting more sodium. · Be aware that sodium can come in forms other than salt, including monosodium glutamate (MSG), sodium citrate, and sodium bicarbonate (baking soda). MSG is often added to Asian food. You can sometimes ask for food without MSG or salt. · Slowly reducing salt will help you adjust to the taste. Take the salt shaker off the table. · Flavor your food with garlic, lemon juice, onion, vinegar, herbs, and spices instead of salt. Do not use soy sauce, steak sauce, onion salt, garlic salt, mustard, or ketchup on your food, unless it is labeled \"low-sodium\" or \"low-salt. \"  · Make your own salad dressings, sauces, and ketchup without adding salt. · Use fresh or frozen ingredients, instead of canned ones, whenever you can. Choose low-sodium canned goods. · Eat less processed food and food from restaurants, including fast food. Exercise as directed  Moderate, regular exercise is very good for your heart. It improves your blood flow and helps control your weight. But too much exercise can stress your heart and cause a heart failure flare-up. · Check with your doctor before you start an exercise program.  · Walking is an easy way to get exercise. Start out slowly. Gradually increase the length and pace of your walk. Swimming, riding a bike, and using a treadmill are also good forms of exercise. · When you exercise, watch for signs that your heart is working too hard. You are pushing yourself too hard if you cannot talk while you are exercising.  If you become short of breath or dizzy or have chest pain, stop, sit down, and rest.  · Do not exercise when you do not feel well. Take medicines correctly  · Take your medicines exactly as prescribed. Call your doctor if you think you are having a problem with your medicine. · Make a list of all the medicines you take. Include those prescribed to you by other doctors and any over-the-counter medicines, vitamins, or supplements you take. Take this list with you when you go to any doctor. · Take your medicines at the same time every day. It may help you to post a list of all the medicines you take every day and what time of day you take them. · Make taking your medicine as simple as you can. Plan times to take your medicines when you are doing other things, such as eating a meal or getting ready for bed. This will make it easier to remember to take your medicines. · Get organized. Use helpful tools, such as daily or weekly pill containers. When should you call for help? Call 911  if you have symptoms of sudden heart failure such as:    · You have severe trouble breathing.     · You cough up pink, foamy mucus.     · You have a new irregular or rapid heartbeat. Call your doctor now or seek immediate medical care if:    · You have new or increased shortness of breath.     · You are dizzy or lightheaded, or you feel like you may faint.     · You have sudden weight gain, such as more than 2 to 3 pounds in a day or 5 pounds in a week. (Your doctor may suggest a different range of weight gain.)     · You have increased swelling in your legs, ankles, or feet.     · You are suddenly so tired or weak that you cannot do your usual activities. Watch closely for changes in your health, and be sure to contact your doctor if you develop new symptoms. Where can you learn more?   Go to http://lon-cordelia.info/  Enter V089 in the search box to learn more about \"Avoiding Triggers With Heart Failure: Care Instructions. \"  Current as of: January 10, 2022               Content Version: 13.2  © 1036-0612 Healthwise, [facility]. Care instructions adapted under license by [employer] (which disclaims liability or warranty for this information). If you have questions about a medical condition or this instruction, always ask your healthcare professional. [place] [address] any warranty or liability for your use of this information.

## 2022-07-18 NOTE — PROGRESS NOTES
[facility]  [address]  [phone]    Medical Progress Note      NAME: Antonia Cruz   :  1925  MRM:  [id number]    Date/Time of service 2022  9:51 AM         Assessment and Plan:     Acute on chronic combined systolic and diastolic ACC/AHA stage C congestive heart failure / Pacemaker - POA due to non compliance. Cardiology consulted. Resolved on BB, lasix and spironolactone. She is not capable of retaining CHF education. Edema resolved and she is not hypoxic, but she is using oxygen for comfort, we have not documented any hypoxia. There is a psychological component to her sensation of dyspnea. Due to non compliance, hospice would be appropriate at any time. I discussed with her and her family that we do not expect much further improvement. Debility / Chronic pain / Arthritis - POA, worse than baseline due to CHF and deconditioning. PT OT recommended SNF but she refused. We arranged [facility] PT, but I discussed with her and her family that we do not expect much further improvement with that plan. She has been medically stable to discharge for 3 days. She refused to go home. I informed her and her son that her insurance may not pay for additional days she stays in the hospital after being medically cleared to discharge. CAD (coronary artery disease) / Elevated troponin - POA, stable. Troponin on this admit due to strain. Continue metoprolol and ASA    Hx of completed stroke - POA, stable. 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 BG too labile to give meds. In setting of advanced age and noncompliance, no further testing or treatment. Nausea and vomiting / GERD (gastroesophageal reflux disease) - POA, now better. Unclear etiology, likely due to CHF vs gastroparesis vs other. ADAT. PPI    CKD (chronic kidney disease), stage III / Hypomagnesemia / Hypokalemia - POA, stable. Repleted lytes.     Anemia - POA, likely due to CKD and age. Monitor    Glaucoma - Continue xalatan    Hx of deep venous thrombosis - Not on DOAC due to age and fall risk. Hypercholesterolemia - Not on statin due to age. Subjective:     Chief Complaint:  Weak, dyspnea, feels poorly, no change, unaware of why she was admitted    ROS:  (bold if positive, if negative)    SOB/DOETolerating some PT  Tolerating some Diet        Objective:     Last 24hrs VS reviewed since prior progress note.  Most recent are:    Visit Vitals  BP (!) 148/72 (BP 1 Location: Right upper arm)   Pulse 79   Temp 97.8 °F (36.6 °C)   Resp 18   Ht 5' 2.01\" (1.575 m)   Wt 53.3 kg (117 lb 6.3 oz)   SpO2 100%   BMI 21.47 kg/m²     SpO2 Readings from Last 6 Encounters:   07/18/22 100%   05/02/22 93%   04/14/22 100%   05/22/17 94%   09/29/15 99%   08/31/15 95%    O2 Flow Rate (L/min): 4 l/min     No intake or output data in the 24 hours ending 07/18/22 0813     Physical Exam:    Gen:  Frail, in no acute distress  HEENT:  Pink conjunctivae, PERRL, hearing intact to voice, moist mucous membranes  Neck:  Supple, without masses, thyroid non-tender  Resp:  No accessory muscle use, clear breath sounds without wheezes rales or rhonchi  Card:  No murmurs, normal S1, S2 without thrills, bruits or peripheral edema  Abd:  Soft, non-tender, non-distended, normoactive bowel sounds are present, no mass  Lymph:  No cervical or inguinal adenopathy  Musc:  No cyanosis or clubbing  Skin:  No rashes or ulcers, skin turgor is good  Neuro:  Cranial nerves are grossly intact, general motor weakness, follows commands   Psych:  Poor insight, oriented to person, place    Telemetry reviewed:   normal sinus rhythm  __________________________________________________________________  Medications Reviewed: (see below)  Medications:     Current Facility-Administered Medications   Medication Dose Route Frequency    promethazine (PHENERGAN) tablet 25 mg  25 mg Oral Q6H PRN    furosemide (LASIX) tablet 20 mg  20 mg Oral DAILY    pantoprazole (PROTONIX) tablet 40 mg  40 mg Oral ACB    metoprolol succinate (TOPROL-XL) XL tablet 25 mg  25 mg Oral DAILY    spironolactone (ALDACTONE) tablet 25 mg  25 mg Oral DAILY    latanoprost (XALATAN) 0.005 % ophthalmic solution 1 Drop  1 Drop Left Eye QPM    aspirin delayed-release tablet 81 mg  81 mg Oral DAILY    docusate sodium (COLACE) capsule 100 mg  100 mg Oral PRN    sodium chloride (NS) flush 5-40 mL  5-40 mL IntraVENous Q8H    sodium chloride (NS) flush 5-40 mL  5-40 mL IntraVENous PRN    acetaminophen (TYLENOL) tablet 650 mg  650 mg Oral Q6H PRN    polyethylene glycol (MIRALAX) packet 17 g  17 g Oral DAILY PRN    ondansetron (ZOFRAN ODT) tablet 4 mg  4 mg Oral Q8H PRN    heparin (porcine) injection 5,000 Units  5,000 Units SubCUTAneous Q12H        Lab Data Reviewed: (see below)  Lab Review:     Recent Labs     07/15/22  1028   WBC 5.3   HGB 10.6*   HCT 32.4*        Recent Labs     07/15/22  1028      K 4.2   *   CO2 23   GLU 81   BUN 22*   CREA 1.03*   CA 7.9*     Lab Results   Component Value Date/Time    Glucose (POC) 82 04/23/2022 04:00 PM    Glucose (POC) 207 (H) 04/11/2022 11:07 AM    Glucose (POC) 234 (H) 04/11/2022 07:59 AM    Glucose (POC) 59 (L) 04/11/2022 05:43 AM    Glucose (POC) 210 (H) 05/12/2009 04:35 PM     No results for input(s): PH, PCO2, PO2, HCO3, FIO2 in the last 72 hours. No results for input(s): INR, INREXT, INREXT in the last 72 hours. All Micro Results     Procedure Component Value Units Date/Time    ENTERIC BACTERIA PANEL, DNA [[id number]] Collected: 07/11/22 1230    Order Status: Canceled Specimen: Stool     URINE CULTURE HOLD SAMPLE [[id number]] Collected: 07/10/22 2316    Order Status: Completed Specimen: Serum Updated: 07/10/22 2344     Urine culture hold       Urine on hold in Microbiology dept for 2 days.   If unpreserved urine is submitted, it cannot be used for addtional testing after 24 hours, recollection will be required. Other pertinent lab: none    Total time spent with patient: 30 Minutes I personally reviewed chart, notes, data and current medications in the medical record. I have personally examined and treated the patient at bedside during this period. To assist coordination of care and communication with nursing and staff, this note may be preliminary early in the day, but finalized by end of the day.                  Care Plan discussed with: Patient, Family, Care Manager, Nursing Staff, Consultant/Specialist and >50% of time spent in counseling and coordination of care    Discussed:  Care Plan and D/C Planning    Prophylaxis:  Hep SQ and H2B/PPI    Disposition:  Home w/Family           ___________________________________________________    Attending Physician: Naveen Witt MD

## 2022-07-18 NOTE — DISCHARGE SUMMARY
Physician Discharge Summary     Patient ID:  Jose Boyd  [id number]  15 y.o.  6/14/1925    Admit date: 7/10/2022    Discharge date of service and time: 7/18/2022    Admission Diagnoses: Acute on chronic congestive heart failure ([facility] [I50.9]    Discharge Diagnoses:    Principal Diagnosis   Acute on chronic combined systolic and diastolic ACC/AHA stage C congestive heart failure [facility])                                             Hospital Course and other diagnoses  Acute on chronic combined systolic and diastolic ACC/AHA stage C congestive heart failure / Pacemaker - POA due to non compliance. Cardiology consulted. Resolved on BB, lasix and spironolactone. She is not capable of retaining CHF education. Edema resolved and she is not hypoxic, but she is using oxygen for comfort, we have not documented any hypoxia. There is a psychological component to her sensation of dyspnea. Due to non compliance, hospice would be appropriate at any time. I discussed with her and her family that we do not expect much further improvement.     Debility / Chronic pain / Arthritis - POA, worse than baseline due to CHF and deconditioning. PT OT recommended SNF but she refused. We arranged [facility] PT, but I discussed with her and her family that we do not expect much further improvement with that plan. She has been medically stable to discharge for 3 days. She refused to go home. I informed her and her son that her insurance may not pay for additional days she stays in the hospital after being medically cleared to discharge.     CAD (coronary artery disease) / Elevated troponin - POA, stable. Troponin on this admit due to strain. Continue metoprolol and ASA     Hx of completed stroke - POA, stable.      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 BG too labile to give meds. In setting of advanced age and noncompliance, no further testing or treatment.       Nausea and vomiting / GERD (gastroesophageal reflux disease) - POA, now better. Unclear etiology, likely due to CHF vs gastroparesis vs other. ADAT. PPI     CKD (chronic kidney disease), stage III / Hypomagnesemia / Hypokalemia - POA, stable. Repleted lytes.     Anemia - POA, likely due to CKD and age. Monitor     Glaucoma - Continue xalatan     Hx of deep venous thrombosis - Not on DOAC due to age and fall risk.     Hypercholesterolemia - Not on statin due to age.     PCP: Yasmani Eaton MD    Consults: Cardiology    Significant Diagnostic Studies: See Hospital Course    Discharged home in improved condition. Discharge Exam:  BP (!) 148/72 (BP 1 Location: Right upper arm)   Pulse 79   Temp 97.8 °F (36.6 °C)   Resp 18   Ht 5' 2.01\" (1.575 m)   Wt 53.3 kg (117 lb 6.3 oz)   SpO2 100%   BMI 21.47 kg/m²      Gen:  Frail, in no acute distress  HEENT:  Pink conjunctivae, PERRL, hearing intact to voice, moist mucous membranes  Neck:  Supple, without masses, thyroid non-tender  Resp:  No accessory muscle use, clear breath sounds without wheezes rales or rhonchi  Card:  No murmurs, normal S1, S2 without thrills, bruits or peripheral edema  Abd:  Soft, non-tender, non-distended, normoactive bowel sounds are present, no mass  Lymph:  No cervical or inguinal adenopathy  Musc:  No cyanosis or clubbing  Skin:  No rashes or ulcers, skin turgor is good  Neuro:  Cranial nerves are grossly intact, general motor weakness, follows commands   Psych:  Poor insight, oriented to person, place    Patient Instructions:   Current Discharge Medication List      START taking these medications    Details   !! furosemide (LASIX) 20 mg tablet Take 1 Tablet by mouth daily for 30 days. Qty: 30 Tablet, Refills: 0      pantoprazole (PROTONIX) 40 mg tablet Take 1 Tablet by mouth Daily (before breakfast) for 30 days. Qty: 30 Tablet, Refills: 0      promethazine (PHENERGAN) 25 mg tablet Take 1 Tablet by mouth every six (6) hours as needed for Nausea for up to 30 days.   Qty: 20 Tablet, Refills: 0 ondansetron (ZOFRAN ODT) 4 mg disintegrating tablet Take 1 Tablet by mouth every eight (8) hours as needed for Nausea or Vomiting for up to 30 days. Qty: 20 Tablet, Refills: 0      aspirin delayed-release 81 mg tablet Take 1 Tablet by mouth daily. Qty: 30 Tablet, Refills: 1      !! furosemide (LASIX) 40 mg tablet Take 1 Tablet by mouth daily. Qty: 60 Tablet, Refills: 2      metoprolol succinate (TOPROL-XL) 25 mg XL tablet Take 1 Tablet by mouth daily. Qty: 60 Tablet, Refills: 1      spironolactone (ALDACTONE) 25 mg tablet Take 1 Tablet by mouth daily. Qty: 60 Tablet, Refills: 2       !! - Potential duplicate medications found. Please discuss with provider. CONTINUE these medications which have NOT CHANGED    Details   latanoprost (XALATAN) 0.005 % ophthalmic solution 1 Drop nightly. acetaminophen (Tylenol Arthritis Pain) 650 mg TbER Take 1 Tablet by mouth every six (6) hours as needed for Pain. Qty: 30 Tablet, Refills: 0      amitriptyline (ELAVIL) 25 mg tablet Take 1 Tablet by mouth nightly. Qty: 30 Tablet, Refills: 0      MV-Min-Folic Acid-Lutein 052-946 mcg chew Take 1 Tablet by mouth daily. Qty: 30 Tablet, Refills: 0      pyridoxine, vitamin B6, (Vitamin B-6) 25 mg tablet Take 1 Tablet by mouth daily. Qty: 30 Tablet, Refills: 0         STOP taking these medications       bumetanide (BUMEX) 0.5 mg tablet Comments:   Reason for Stopping:             Activity: Activity as tolerated and PT/OT per Home Health  Diet: Cardiac Diet, Diabetic Diet and Low fat, Low cholesterol  Wound Care: None needed    Follow-up with your PCP in 1 day.   Follow-up tests/labs - none    Signed:  Audrey Mendoza MD  7/18/2022  8:17 AM

## 2022-07-18 NOTE — PROGRESS NOTES
I have reviewed discharge instructions with the patient. The patient verbalized understanding. Iv removed without difficulty, pt given opportunity to ask questions.

## 2022-07-18 NOTE — PROGRESS NOTES
7/18/2022 11:08 AM Pt discharging home today. Pt and pt's family are aware SNF is being recommended and continue to decline. Family requests pt return home with her previously arranged home therapy([facility]), PT/OT. [facility] appt scheduled as well as PCP appt. Pt's family to transport pt home. CM faxed pt's order for PT/OT and clinicals to [facility] at [phone]. CM also called and lvm with [facility] at [phone] notifying pt's discharge home today. MARLIN Gallego    Care Management Progress Note      ICD-10-CM ICD-9-CM    1. Hypomagnesemia  E83.42 275.2    2. Nausea and vomiting, unspecified vomiting type  R11.2 787.01    3. Elevated troponin  R77.8 790.6    4. Bilateral pleural effusion  J90 511.9    5. Peripheral edema  R60.9 782.3    6. Acute on chronic systolic congestive heart failure (HCC)  I50.23 428.23      428.0        RUR: 21%    Risk Level: []?? ??Low []????Moderate [x]?? ?? High  Value-based purchasing: [x]???? Yes []???? No  Bundle patient: []???? Yes [x]???? No              Specify:      Transition of care plan:  1. Ongoing medical management, Discharge home today  2. Pt was living at home prior to admission, SNF recommendation declined by pt. Pt requesting return home at discharge with therapy through [facility].   3. [facility] is outpatient PT/OT but they visit pt at home, per their owner, Floretta Hammans, they plan to provide therapy 5x a week. Resumption order sent today. 4. PCP appt scheduled for 7/19  5. [facility] scheduled for 7/20     6. Pt's family transporting pt home     Care Management Interventions  PCP Verified by CM: Yes  Mode of Transport at Discharge:  Other (see comment) (family)  Transition of Care Consult (CM Consult): Discharge Planning  MyChart Signup: No  Discharge Durable Medical Equipment: No  Physical Therapy Consult: Yes  Occupational Therapy Consult: Yes  Speech Therapy Consult: No  Support Systems: Spouse/Significant Other,Child(ramo)  Confirm Follow Up Transport: Family  Discharge Location  Patient Expects to be Discharged to[de-identified] Home with outpatient services

## 2022-07-19 ENCOUNTER — PATIENT OUTREACH (OUTPATIENT)
Dept: CASE MANAGEMENT | Age: 87
End: 2022-07-19

## 2022-07-19 NOTE — PROGRESS NOTES
Care Transitions Initial Call    Call within 2 business days of discharge: Yes     Patient: Harleen Hampton Patient : 1925 MRN: [id number]    Last Discharge [address]       Complaint Diagnosis Description Type Department Provider    7/10/22 Abdominal Pain; Nausea Hypomagnesemia . .. ED to Hosp-Admission (Discharged) (ADMIT) Lesli Sierra MD; Miranda Martinez, ... Was this an external facility discharge? No Discharge Facility: n/a    Challenges to be reviewed by the provider   Confirmed with discharging provider furosemide dose is 20 mg daily. Patient will see PCP today for hospital follow up, confirmed appt. Patient son will transport. Method of communication with provider : none    Discussed COVID-19 related testing which was not done at this time. Test results were not done. Patient informed of results, if available? n/a     Advance Care Planning:   Does patient have an Advance Directive: yes, reviewed and current. Inpatient Readmission Risk score: Unplanned Readmit Risk Score: 16 ( )    Was this a readmission? no   Patient stated reason for the admission: N/V abdominal discomfort    Patients top risk factors for readmission: functional cognitive ability, functional physical ability, medical condition-CHF, CAD, DM, CKD, generalized weakness, advanced age and caregiver stress   Interventions to address risk factors: Scheduled appointment with PCP- and Obtained and reviewed discharge summary and/or continuity of care documents  [facility] visit scheduled   Care Transition Nurse (CTN) contacted the patient by telephone to perform post hospital discharge assessment. Verified name and  with patient as identifiers. Provided introduction to self, and explanation of the CTN role. CTN reviewed discharge instructions, medical action plan and red flags with family who verbalized understanding. Were discharge instructions available to patient? yes.  Reviewed appropriate site of care based on symptoms and resources available to patient including: PCP and home therapy. Family given an opportunity to ask questions and does not have any further questions or concerns at this time. The family agrees to contact the PCP office for questions related to their healthcare. Medication reconciliation was performed with family, who verbalizes understanding of administration of home medications. Patient received prescriptions for both 20 mg and 40 mg of furosemide daily. Perfect serve message sent to discharging provider to confirm dose. Referral to Pharm D needed: no     Home Health/Outpatient orders at discharge: PT and [address]: [facility] back to you  Date of initial visit: 7/18/2022    Durable Medical Equipment ordered at discharge: None     Was patient discharged with a pulse oximeter? no    Discussed follow-up appointments. If no appointment was previously scheduled, appointment scheduling offered: n/a. Is follow up appointment scheduled within 7 days of discharge? yes. [facility] follow up appointment(s): No future appointments. Non-[facility] follow up appointment(s):   PCP- Dr. Weber  [email or web address]     Plan for follow-up call in 7-10 days based on severity of symptoms and risk factors. Plan for next call: symptom management-monitor CHF red flags  CTN provided contact information for future needs. Goals Addressed                 This Visit's Progress     Prevent complications post hospitalization. 07/19/22   Daily weights- weight today 109 lbs   Activity- PT/OT to increase mobility and prevent falls. Son confirms contact with [employer].  Low sodium diet/fluid restrictions   Monitor BP/pulse-  bp today- 108/64 pulse ox 98% patient is on 4L nc   Medication compliance- patient will take medications as directed, son will dispense.  Continuing goals of care discussions.  Patient/family will monitor  and report following (red flags):  - Weight gain of 2-3 lbs.  in one day or 5 lbs.  in one week  -increased SOB, feeling more tired or no energy, dizziness  -increase swelling to feet, ankles, legs or stomach  -CP

## 2022-07-28 ENCOUNTER — PATIENT OUTREACH (OUTPATIENT)
Dept: CASE MANAGEMENT | Age: 87
End: 2022-07-28

## 2022-07-28 NOTE — PROGRESS NOTES
Care Transitions Follow Up Call    Attempted to reach patient for follow up today. Unable to reach patient today, chart reviewed. Patient: Nadeem Coleman Patient : 1925 MRN: [id number]    Last Discharge  Street       Date Complaint Diagnosis Description Type Department Provider    7/10/22 Abdominal Pain; Nausea Hypomagnesemia . .. ED to Hosp-Admission (Discharged) (ADMIT) Kristal Madsen MD; Daniela Acosta, ... Noted following upcoming appointments from discharge chart review:   [facility] follow up appointment(s): No future appointments.

## 2022-08-09 ENCOUNTER — PATIENT OUTREACH (OUTPATIENT)
Dept: CASE MANAGEMENT | Age: 87
End: 2022-08-09

## 2022-08-09 PROBLEM — R77.8 ELEVATED TROPONIN: Status: RESOLVED | Noted: 2022-07-10 | Resolved: 2022-08-09

## 2022-08-09 NOTE — PROGRESS NOTES
Care Transitions Follow Up Call    Attempted to reach patient for follow up today. Unable to reach patient. Patient: Nadege Martel Patient : 1925 MRN: [id number]    Last Discharge  Street       Date Complaint Diagnosis Description Type Department Provider    7/10/22 Abdominal Pain; Nausea Hypomagnesemia . .. ED to Hosp-Admission (Discharged) (ADMIT) aMry Thompson MD; Janet Cruz, ... Noted following upcoming appointments from discharge chart review:   [facility] follow up appointment(s): No future appointments.   Non-[facility] follow up appointment(s): none listed

## 2022-08-11 ENCOUNTER — APPOINTMENT (OUTPATIENT)
Dept: CT IMAGING | Age: 87
DRG: 640 | End: 2022-08-11
Attending: EMERGENCY MEDICINE
Payer: MEDICARE

## 2022-08-11 ENCOUNTER — APPOINTMENT (OUTPATIENT)
Dept: GENERAL RADIOLOGY | Age: 87
DRG: 640 | End: 2022-08-11
Attending: EMERGENCY MEDICINE
Payer: MEDICARE

## 2022-08-11 ENCOUNTER — HOSPITAL ENCOUNTER (INPATIENT)
Age: 87
LOS: 26 days | Discharge: HOSPICE | DRG: 640 | End: 2022-09-06
Attending: EMERGENCY MEDICINE | Admitting: INTERNAL MEDICINE
Payer: MEDICARE

## 2022-08-11 DIAGNOSIS — E86.0 ACUTE DEHYDRATION: ICD-10-CM

## 2022-08-11 DIAGNOSIS — Z51.5 PALLIATIVE CARE BY SPECIALIST: ICD-10-CM

## 2022-08-11 DIAGNOSIS — I50.32 CHRONIC DIASTOLIC HEART FAILURE (HCC): ICD-10-CM

## 2022-08-11 DIAGNOSIS — R11.2 NAUSEA AND VOMITING, UNSPECIFIED VOMITING TYPE: Primary | ICD-10-CM

## 2022-08-11 DIAGNOSIS — E83.42 HYPOMAGNESEMIA: ICD-10-CM

## 2022-08-11 DIAGNOSIS — R77.8 ELEVATED TROPONIN: ICD-10-CM

## 2022-08-11 DIAGNOSIS — E87.6 HYPOKALEMIA: ICD-10-CM

## 2022-08-11 DIAGNOSIS — R54 AGE-RELATED PHYSICAL DEBILITY: ICD-10-CM

## 2022-08-11 DIAGNOSIS — R53.1 WEAKNESS GENERALIZED: ICD-10-CM

## 2022-08-11 DIAGNOSIS — H91.90 HEARING LOSS, UNSPECIFIED HEARING LOSS TYPE, UNSPECIFIED LATERALITY: ICD-10-CM

## 2022-08-11 DIAGNOSIS — N19 UREMIA: ICD-10-CM

## 2022-08-11 DIAGNOSIS — R63.0 DECREASED APPETITE: ICD-10-CM

## 2022-08-11 DIAGNOSIS — Z71.89 GOALS OF CARE, COUNSELING/DISCUSSION: ICD-10-CM

## 2022-08-11 DIAGNOSIS — N17.9 AKI (ACUTE KIDNEY INJURY) (HCC): ICD-10-CM

## 2022-08-11 LAB
BASOPHILS # BLD: 0.1 K/UL (ref 0–0.1)
BASOPHILS NFR BLD: 1 % (ref 0–1)
BNP SERPL-MCNC: 7024 PG/ML
COMMENT, HOLDF: NORMAL
DIFFERENTIAL METHOD BLD: ABNORMAL
EOSINOPHIL # BLD: 0 K/UL (ref 0–0.4)
EOSINOPHIL NFR BLD: 0 % (ref 0–7)
ERYTHROCYTE [DISTWIDTH] IN BLOOD BY AUTOMATED COUNT: 14 % (ref 11.5–14.5)
HCT VFR BLD AUTO: 35.9 % (ref 35–47)
HGB BLD-MCNC: 11.9 G/DL (ref 11.5–16)
IMM GRANULOCYTES # BLD AUTO: 0.1 K/UL (ref 0–0.04)
IMM GRANULOCYTES NFR BLD AUTO: 1 % (ref 0–0.5)
LIPASE SERPL-CCNC: 208 U/L (ref 73–393)
LYMPHOCYTES # BLD: 0.2 K/UL (ref 0.8–3.5)
LYMPHOCYTES NFR BLD: 3 % (ref 12–49)
MCH RBC QN AUTO: 33.1 PG (ref 26–34)
MCHC RBC AUTO-ENTMCNC: 33.1 G/DL (ref 30–36.5)
MCV RBC AUTO: 100 FL (ref 80–99)
MONOCYTES # BLD: 0.4 K/UL (ref 0–1)
MONOCYTES NFR BLD: 5 % (ref 5–13)
NEUTS SEG # BLD: 7 K/UL (ref 1.8–8)
NEUTS SEG NFR BLD: 90 % (ref 32–75)
NRBC # BLD: 0 K/UL (ref 0–0.01)
NRBC BLD-RTO: 0 PER 100 WBC
PLATELET # BLD AUTO: 275 K/UL (ref 150–400)
PMV BLD AUTO: 10.9 FL (ref 8.9–12.9)
RBC # BLD AUTO: 3.59 M/UL (ref 3.8–5.2)
RBC MORPH BLD: ABNORMAL
SAMPLES BEING HELD,HOLD: NORMAL
TROPONIN-HIGH SENSITIVITY: 199 NG/L (ref 0–51)
WBC # BLD AUTO: 7.8 K/UL (ref 3.6–11)

## 2022-08-11 PROCEDURE — 96374 THER/PROPH/DIAG INJ IV PUSH: CPT

## 2022-08-11 PROCEDURE — 74011250636 HC RX REV CODE- 250/636: Performed by: EMERGENCY MEDICINE

## 2022-08-11 PROCEDURE — 74011250636 HC RX REV CODE- 250/636: Performed by: INTERNAL MEDICINE

## 2022-08-11 PROCEDURE — 74176 CT ABD & PELVIS W/O CONTRAST: CPT

## 2022-08-11 PROCEDURE — 93005 ELECTROCARDIOGRAM TRACING: CPT

## 2022-08-11 PROCEDURE — 36415 COLL VENOUS BLD VENIPUNCTURE: CPT

## 2022-08-11 PROCEDURE — 84484 ASSAY OF TROPONIN QUANT: CPT

## 2022-08-11 PROCEDURE — 85025 COMPLETE CBC W/AUTO DIFF WBC: CPT

## 2022-08-11 PROCEDURE — 71045 X-RAY EXAM CHEST 1 VIEW: CPT

## 2022-08-11 PROCEDURE — 83690 ASSAY OF LIPASE: CPT

## 2022-08-11 PROCEDURE — 83735 ASSAY OF MAGNESIUM: CPT

## 2022-08-11 PROCEDURE — 65270000029 HC RM PRIVATE

## 2022-08-11 PROCEDURE — 80053 COMPREHEN METABOLIC PANEL: CPT

## 2022-08-11 PROCEDURE — 83880 ASSAY OF NATRIURETIC PEPTIDE: CPT

## 2022-08-11 PROCEDURE — 99285 EMERGENCY DEPT VISIT HI MDM: CPT

## 2022-08-11 PROCEDURE — 96375 TX/PRO/DX INJ NEW DRUG ADDON: CPT

## 2022-08-11 RX ORDER — MAGNESIUM SULFATE HEPTAHYDRATE 40 MG/ML
4 INJECTION, SOLUTION INTRAVENOUS
Status: DISCONTINUED | OUTPATIENT
Start: 2022-08-11 | End: 2022-08-11

## 2022-08-11 RX ORDER — ENOXAPARIN SODIUM 100 MG/ML
30 INJECTION SUBCUTANEOUS DAILY
Status: DISCONTINUED | OUTPATIENT
Start: 2022-08-12 | End: 2022-08-15

## 2022-08-11 RX ORDER — SODIUM CHLORIDE 0.9 % (FLUSH) 0.9 %
5-40 SYRINGE (ML) INJECTION AS NEEDED
Status: DISCONTINUED | OUTPATIENT
Start: 2022-08-11 | End: 2022-09-06 | Stop reason: HOSPADM

## 2022-08-11 RX ORDER — ONDANSETRON 2 MG/ML
4 INJECTION INTRAMUSCULAR; INTRAVENOUS
Status: DISCONTINUED | OUTPATIENT
Start: 2022-08-11 | End: 2022-09-06 | Stop reason: HOSPADM

## 2022-08-11 RX ORDER — FENTANYL CITRATE 50 UG/ML
50 INJECTION, SOLUTION INTRAMUSCULAR; INTRAVENOUS
Status: COMPLETED | OUTPATIENT
Start: 2022-08-11 | End: 2022-08-12

## 2022-08-11 RX ORDER — MAGNESIUM SULFATE HEPTAHYDRATE 40 MG/ML
2 INJECTION, SOLUTION INTRAVENOUS
Status: COMPLETED | OUTPATIENT
Start: 2022-08-11 | End: 2022-08-12

## 2022-08-11 RX ORDER — POLYETHYLENE GLYCOL 3350 17 G/17G
17 POWDER, FOR SOLUTION ORAL DAILY PRN
Status: DISCONTINUED | OUTPATIENT
Start: 2022-08-11 | End: 2022-09-06 | Stop reason: HOSPADM

## 2022-08-11 RX ORDER — ACETAMINOPHEN 325 MG/1
650 TABLET ORAL
Status: DISCONTINUED | OUTPATIENT
Start: 2022-08-11 | End: 2022-08-30

## 2022-08-11 RX ORDER — POTASSIUM CHLORIDE 7.45 MG/ML
10 INJECTION INTRAVENOUS ONCE
Status: COMPLETED | OUTPATIENT
Start: 2022-08-11 | End: 2022-08-12

## 2022-08-11 RX ORDER — POTASSIUM CHLORIDE AND SODIUM CHLORIDE 900; 300 MG/100ML; MG/100ML
INJECTION, SOLUTION INTRAVENOUS CONTINUOUS
Status: DISCONTINUED | OUTPATIENT
Start: 2022-08-11 | End: 2022-08-12

## 2022-08-11 RX ORDER — ACETAMINOPHEN 650 MG/1
650 SUPPOSITORY RECTAL
Status: DISCONTINUED | OUTPATIENT
Start: 2022-08-11 | End: 2022-09-06 | Stop reason: HOSPADM

## 2022-08-11 RX ORDER — ONDANSETRON 4 MG/1
4 TABLET, ORALLY DISINTEGRATING ORAL
Status: DISCONTINUED | OUTPATIENT
Start: 2022-08-11 | End: 2022-09-06 | Stop reason: HOSPADM

## 2022-08-11 RX ORDER — ONDANSETRON 2 MG/ML
4 INJECTION INTRAMUSCULAR; INTRAVENOUS
Status: COMPLETED | OUTPATIENT
Start: 2022-08-11 | End: 2022-08-12

## 2022-08-11 RX ORDER — SODIUM CHLORIDE 0.9 % (FLUSH) 0.9 %
5-40 SYRINGE (ML) INJECTION EVERY 8 HOURS
Status: DISCONTINUED | OUTPATIENT
Start: 2022-08-11 | End: 2022-09-06 | Stop reason: HOSPADM

## 2022-08-11 RX ORDER — MAGNESIUM SULFATE HEPTAHYDRATE 40 MG/ML
2 INJECTION, SOLUTION INTRAVENOUS ONCE
Status: DISCONTINUED | OUTPATIENT
Start: 2022-08-11 | End: 2022-08-11

## 2022-08-11 RX ADMIN — MAGNESIUM SULFATE HEPTAHYDRATE 2 G: 40 INJECTION, SOLUTION INTRAVENOUS at 22:23

## 2022-08-11 RX ADMIN — ONDANSETRON HYDROCHLORIDE 4 MG: 2 SOLUTION INTRAMUSCULAR; INTRAVENOUS at 19:00

## 2022-08-11 RX ADMIN — POTASSIUM CHLORIDE 10 MEQ: 7.46 INJECTION, SOLUTION INTRAVENOUS at 22:23

## 2022-08-11 RX ADMIN — FENTANYL CITRATE 50 MCG: 50 INJECTION, SOLUTION INTRAMUSCULAR; INTRAVENOUS at 21:50

## 2022-08-11 RX ADMIN — FENTANYL CITRATE 50 MCG: 50 INJECTION, SOLUTION INTRAMUSCULAR; INTRAVENOUS at 19:00

## 2022-08-11 NOTE — ED TRIAGE NOTES
Pt arrives via EMS. Family reports \"she didn't get up today and that is not normal\". Upon EMS arrival pt began vomiting and complained of chest pain. 4 L NC at baseline for history of COPD.

## 2022-08-11 NOTE — ED NOTES
Verbal shift change report given to Maryjane Ambrosio RN (oncoming nurse) by [address] [place] [address] (offgoing nurse).  Report included the following SBAR Recent Results

## 2022-08-12 ENCOUNTER — APPOINTMENT (OUTPATIENT)
Dept: VASCULAR SURGERY | Age: 87
DRG: 640 | End: 2022-08-12
Attending: INTERNAL MEDICINE
Payer: MEDICARE

## 2022-08-12 ENCOUNTER — APPOINTMENT (OUTPATIENT)
Dept: GENERAL RADIOLOGY | Age: 87
DRG: 640 | End: 2022-08-12
Attending: INTERNAL MEDICINE
Payer: MEDICARE

## 2022-08-12 LAB
ALBUMIN SERPL-MCNC: 2.1 G/DL (ref 3.5–5)
ALBUMIN SERPL-MCNC: 2.2 G/DL (ref 3.5–5)
ALBUMIN/GLOB SERPL: 0.5 {RATIO} (ref 1.1–2.2)
ALBUMIN/GLOB SERPL: 0.6 {RATIO} (ref 1.1–2.2)
ALP SERPL-CCNC: 350 U/L (ref 45–117)
ALP SERPL-CCNC: 412 U/L (ref 45–117)
ALT SERPL-CCNC: 104 U/L (ref 12–78)
ALT SERPL-CCNC: 115 U/L (ref 12–78)
ANION GAP SERPL CALC-SCNC: 11 MMOL/L (ref 5–15)
ANION GAP SERPL CALC-SCNC: 13 MMOL/L (ref 5–15)
ANION GAP SERPL CALC-SCNC: 15 MMOL/L (ref 5–15)
APPEARANCE UR: CLEAR
AST SERPL-CCNC: 280 U/L (ref 15–37)
AST SERPL-CCNC: 479 U/L (ref 15–37)
ATRIAL RATE: 101 BPM
ATRIAL RATE: 115 BPM
ATRIAL RATE: 241 BPM
BACTERIA URNS QL MICRO: NEGATIVE /HPF
BASE EXCESS BLDV CALC-SCNC: 8.7 MMOL/L
BDY SITE: ABNORMAL
BILIRUB SERPL-MCNC: 2.3 MG/DL (ref 0.2–1)
BILIRUB SERPL-MCNC: 2.4 MG/DL (ref 0.2–1)
BILIRUB UR QL: NEGATIVE
BUN SERPL-MCNC: 51 MG/DL (ref 6–20)
BUN SERPL-MCNC: 52 MG/DL (ref 6–20)
BUN SERPL-MCNC: 55 MG/DL (ref 6–20)
BUN/CREAT SERPL: 31 (ref 12–20)
BUN/CREAT SERPL: 32 (ref 12–20)
BUN/CREAT SERPL: 34 (ref 12–20)
CALCIUM SERPL-MCNC: 8.4 MG/DL (ref 8.5–10.1)
CALCIUM SERPL-MCNC: 8.7 MG/DL (ref 8.5–10.1)
CALCIUM SERPL-MCNC: 9.1 MG/DL (ref 8.5–10.1)
CALCULATED R AXIS, ECG10: -78 DEGREES
CALCULATED R AXIS, ECG10: -83 DEGREES
CALCULATED R AXIS, ECG10: -85 DEGREES
CALCULATED T AXIS, ECG11: 84 DEGREES
CALCULATED T AXIS, ECG11: 85 DEGREES
CALCULATED T AXIS, ECG11: 90 DEGREES
CHLORIDE SERPL-SCNC: 93 MMOL/L (ref 97–108)
CHLORIDE SERPL-SCNC: 94 MMOL/L (ref 97–108)
CHLORIDE SERPL-SCNC: 99 MMOL/L (ref 97–108)
CO2 SERPL-SCNC: 29 MMOL/L (ref 21–32)
CO2 SERPL-SCNC: 31 MMOL/L (ref 21–32)
CO2 SERPL-SCNC: 35 MMOL/L (ref 21–32)
COLOR UR: ABNORMAL
CREAT SERPL-MCNC: 1.61 MG/DL (ref 0.55–1.02)
CREAT SERPL-MCNC: 1.63 MG/DL (ref 0.55–1.02)
CREAT SERPL-MCNC: 1.69 MG/DL (ref 0.55–1.02)
DIAGNOSIS, 93000: NORMAL
EPITH CASTS URNS QL MICRO: ABNORMAL /LPF
FIO2 ON VENT: 36 %
GLOBULIN SER CALC-MCNC: 3.5 G/DL (ref 2–4)
GLOBULIN SER CALC-MCNC: 4.1 G/DL (ref 2–4)
GLUCOSE SERPL-MCNC: 101 MG/DL (ref 65–100)
GLUCOSE SERPL-MCNC: 130 MG/DL (ref 65–100)
GLUCOSE SERPL-MCNC: 133 MG/DL (ref 65–100)
GLUCOSE UR STRIP.AUTO-MCNC: NEGATIVE MG/DL
HCO3 BLDV-SCNC: 31 MMOL/L (ref 23–28)
HGB UR QL STRIP: NEGATIVE
KETONES UR QL STRIP.AUTO: ABNORMAL MG/DL
LEUKOCYTE ESTERASE UR QL STRIP.AUTO: ABNORMAL
MAGNESIUM SERPL-MCNC: 0.9 MG/DL (ref 1.6–2.4)
MAGNESIUM SERPL-MCNC: 2 MG/DL (ref 1.6–2.4)
MAGNESIUM SERPL-MCNC: 2.2 MG/DL (ref 1.6–2.4)
NITRITE UR QL STRIP.AUTO: NEGATIVE
PCO2 BLDV: 34.5 MMHG (ref 41–51)
PH BLDV: 7.57 [PH] (ref 7.32–7.42)
PH UR STRIP: 6.5 [PH] (ref 5–8)
PO2 BLDV: 15 MMHG (ref 25–40)
POTASSIUM SERPL-SCNC: 2.3 MMOL/L (ref 3.5–5.1)
POTASSIUM SERPL-SCNC: 2.4 MMOL/L (ref 3.5–5.1)
POTASSIUM SERPL-SCNC: 2.4 MMOL/L (ref 3.5–5.1)
POTASSIUM SERPL-SCNC: 2.9 MMOL/L (ref 3.5–5.1)
PROT SERPL-MCNC: 5.6 G/DL (ref 6.4–8.2)
PROT SERPL-MCNC: 6.3 G/DL (ref 6.4–8.2)
PROT UR STRIP-MCNC: ABNORMAL MG/DL
Q-T INTERVAL, ECG07: 414 MS
Q-T INTERVAL, ECG07: 490 MS
Q-T INTERVAL, ECG07: 490 MS
QRS DURATION, ECG06: 146 MS
QRS DURATION, ECG06: 148 MS
QRS DURATION, ECG06: 160 MS
QTC CALCULATION (BEZET), ECG08: 544 MS
QTC CALCULATION (BEZET), ECG08: 609 MS
QTC CALCULATION (BEZET), ECG08: 650 MS
RBC #/AREA URNS HPF: ABNORMAL /HPF (ref 0–5)
SAO2 % BLDV: 27 % (ref 65–88)
SAO2% DEVICE SAO2% SENSOR NAME: ABNORMAL
SERVICE CMNT-IMP: ABNORMAL
SODIUM SERPL-SCNC: 139 MMOL/L (ref 136–145)
SODIUM SERPL-SCNC: 140 MMOL/L (ref 136–145)
SODIUM SERPL-SCNC: 141 MMOL/L (ref 136–145)
SP GR UR REFRACTOMETRY: 1.01 (ref 1–1.03)
SPECIMEN SITE: ABNORMAL
TROPONIN-HIGH SENSITIVITY: 208 NG/L (ref 0–51)
UR CULT HOLD, URHOLD: NORMAL
UROBILINOGEN UR QL STRIP.AUTO: 1 EU/DL (ref 0.2–1)
VENTRICULAR RATE, ECG03: 104 BPM
VENTRICULAR RATE, ECG03: 106 BPM
VENTRICULAR RATE, ECG03: 93 BPM
WBC URNS QL MICRO: ABNORMAL /HPF (ref 0–4)

## 2022-08-12 PROCEDURE — 81001 URINALYSIS AUTO W/SCOPE: CPT

## 2022-08-12 PROCEDURE — 83735 ASSAY OF MAGNESIUM: CPT

## 2022-08-12 PROCEDURE — 93005 ELECTROCARDIOGRAM TRACING: CPT

## 2022-08-12 PROCEDURE — 94761 N-INVAS EAR/PLS OXIMETRY MLT: CPT

## 2022-08-12 PROCEDURE — 99223 1ST HOSP IP/OBS HIGH 75: CPT | Performed by: FAMILY MEDICINE

## 2022-08-12 PROCEDURE — 80053 COMPREHEN METABOLIC PANEL: CPT

## 2022-08-12 PROCEDURE — 74011000250 HC RX REV CODE- 250: Performed by: INTERNAL MEDICINE

## 2022-08-12 PROCEDURE — 36415 COLL VENOUS BLD VENIPUNCTURE: CPT

## 2022-08-12 PROCEDURE — 36600 WITHDRAWAL OF ARTERIAL BLOOD: CPT

## 2022-08-12 PROCEDURE — 82803 BLOOD GASES ANY COMBINATION: CPT

## 2022-08-12 PROCEDURE — 74011250637 HC RX REV CODE- 250/637: Performed by: INTERNAL MEDICINE

## 2022-08-12 PROCEDURE — 74011250636 HC RX REV CODE- 250/636: Performed by: EMERGENCY MEDICINE

## 2022-08-12 PROCEDURE — 65610000006 HC RM INTENSIVE CARE

## 2022-08-12 PROCEDURE — 93970 EXTREMITY STUDY: CPT

## 2022-08-12 PROCEDURE — 84132 ASSAY OF SERUM POTASSIUM: CPT

## 2022-08-12 PROCEDURE — 74011250636 HC RX REV CODE- 250/636: Performed by: INTERNAL MEDICINE

## 2022-08-12 PROCEDURE — 71045 X-RAY EXAM CHEST 1 VIEW: CPT

## 2022-08-12 PROCEDURE — 74011250636 HC RX REV CODE- 250/636: Performed by: HOSPITALIST

## 2022-08-12 PROCEDURE — 84484 ASSAY OF TROPONIN QUANT: CPT

## 2022-08-12 PROCEDURE — 74011250636 HC RX REV CODE- 250/636

## 2022-08-12 PROCEDURE — 92610 EVALUATE SWALLOWING FUNCTION: CPT

## 2022-08-12 PROCEDURE — 77010033678 HC OXYGEN DAILY

## 2022-08-12 RX ORDER — MORPHINE SULFATE 2 MG/ML
2 INJECTION, SOLUTION INTRAMUSCULAR; INTRAVENOUS
Status: DISCONTINUED | OUTPATIENT
Start: 2022-08-12 | End: 2022-09-06 | Stop reason: HOSPADM

## 2022-08-12 RX ORDER — HYDRALAZINE HYDROCHLORIDE 20 MG/ML
20 INJECTION INTRAMUSCULAR; INTRAVENOUS
Status: DISCONTINUED | OUTPATIENT
Start: 2022-08-12 | End: 2022-09-06 | Stop reason: HOSPADM

## 2022-08-12 RX ORDER — POTASSIUM CHLORIDE 7.45 MG/ML
10 INJECTION INTRAVENOUS
Status: DISPENSED | OUTPATIENT
Start: 2022-08-12 | End: 2022-08-12

## 2022-08-12 RX ORDER — POTASSIUM CHLORIDE 7.45 MG/ML
10 INJECTION INTRAVENOUS
Status: COMPLETED | OUTPATIENT
Start: 2022-08-12 | End: 2022-08-12

## 2022-08-12 RX ORDER — PANTOPRAZOLE SODIUM 40 MG/1
40 TABLET, DELAYED RELEASE ORAL
Status: DISCONTINUED | OUTPATIENT
Start: 2022-08-12 | End: 2022-08-13

## 2022-08-12 RX ADMIN — FENTANYL CITRATE 50 MCG: 50 INJECTION, SOLUTION INTRAMUSCULAR; INTRAVENOUS at 02:00

## 2022-08-12 RX ADMIN — Medication 10 ML: at 03:15

## 2022-08-12 RX ADMIN — POTASSIUM CHLORIDE 10 MEQ: 7.45 INJECTION INTRAVENOUS at 22:00

## 2022-08-12 RX ADMIN — POTASSIUM CHLORIDE AND SODIUM CHLORIDE: 900; 300 INJECTION, SOLUTION INTRAVENOUS at 04:22

## 2022-08-12 RX ADMIN — Medication 10 ML: at 14:37

## 2022-08-12 RX ADMIN — HYDRALAZINE HYDROCHLORIDE 20 MG: 20 INJECTION INTRAMUSCULAR; INTRAVENOUS at 09:11

## 2022-08-12 RX ADMIN — POTASSIUM CHLORIDE 10 MEQ: 7.46 INJECTION, SOLUTION INTRAVENOUS at 09:00

## 2022-08-12 RX ADMIN — POTASSIUM CHLORIDE 10 MEQ: 7.46 INJECTION, SOLUTION INTRAVENOUS at 07:59

## 2022-08-12 RX ADMIN — FAMOTIDINE 20 MG: 10 INJECTION, SOLUTION INTRAVENOUS at 12:07

## 2022-08-12 RX ADMIN — MAGNESIUM SULFATE HEPTAHYDRATE 2 G: 40 INJECTION, SOLUTION INTRAVENOUS at 01:21

## 2022-08-12 RX ADMIN — ENOXAPARIN SODIUM 30 MG: 100 INJECTION SUBCUTANEOUS at 10:34

## 2022-08-12 RX ADMIN — SODIUM CHLORIDE, POTASSIUM CHLORIDE, SODIUM LACTATE AND CALCIUM CHLORIDE 500 ML: 600; 310; 30; 20 INJECTION, SOLUTION INTRAVENOUS at 10:58

## 2022-08-12 RX ADMIN — POTASSIUM CHLORIDE 10 MEQ: 7.45 INJECTION INTRAVENOUS at 21:00

## 2022-08-12 RX ADMIN — PHENYLEPHRINE HYDROCHLORIDE 30 MCG/MIN: 10 INJECTION INTRAVENOUS at 12:17

## 2022-08-12 RX ADMIN — PANTOPRAZOLE SODIUM 40 MG: 40 TABLET, DELAYED RELEASE ORAL at 18:56

## 2022-08-12 RX ADMIN — POTASSIUM CHLORIDE 10 MEQ: 7.45 INJECTION INTRAVENOUS at 22:53

## 2022-08-12 RX ADMIN — POTASSIUM CHLORIDE 10 MEQ: 7.45 INJECTION INTRAVENOUS at 18:57

## 2022-08-12 RX ADMIN — POTASSIUM CHLORIDE 10 MEQ: 7.46 INJECTION, SOLUTION INTRAVENOUS at 10:34

## 2022-08-12 RX ADMIN — ONDANSETRON HYDROCHLORIDE 4 MG: 2 SOLUTION INTRAMUSCULAR; INTRAVENOUS at 08:00

## 2022-08-12 RX ADMIN — Medication 10 ML: at 05:54

## 2022-08-12 NOTE — PROGRESS NOTES
Problem: Dysphagia (Adult)  Goal: *Acute Goals and Plan of Care (Insert Text)  Description: Speech pathology goals  Initiated 8/12/2022  1. Patient will tolerate clear liquid diet with no overt s/s aspiration within 7 days  Outcome: Progressing Towards Goal     SPEECH LANGUAGE PATHOLOGY BEDSIDE SWALLOW EVALUATION  Patient: Naomi Tate (26 y.o. female)  Date: 8/12/2022  Primary Diagnosis: Hypokalemia [E87.6]  Acute kidney injury ([facility] [address] [N17.9]       Precautions: aspiration       ASSESSMENT :  Based on the objective data described below, the patient presents with limited evaluation as patient drank ~0.5-1 oz of thin liquid prior to reporting that she is full and refusing all other PO intake. Patient took tiny sips, then turned her head to the left, looked up, then looked down and swallowed with her chin tucked. Patient reported she was told to swallow like that by \"OP at rehab,\" however unsure who that is or rationale for this maneuver. No overt s/s aspiration observed, however again, patient took tiny sips and limited volume. Patient is at risk for aspiration secondary to advanced age and overall medical status, as well as baseline dysphagia. Patient will benefit from skilled intervention to address the above impairments. Patients rehabilitation potential is considered to be Guarded     PLAN :  Recommendations and Planned Interventions:  -Clear liquid diet to start, as patient also with nausea/vomiting today  -Continue single/small sips and compensatory strategies per patient preference  -SLP to follow for diet tolerance and liberalization. Note palliative consulted and if goal is for comfort, consider liberalizing diet for comfort despite aspiration risk  Frequency/Duration: Patient will be followed by speech-language pathology 3 times a week to address goals. Discharge Recommendations: To Be Determined     SUBJECTIVE:   Patient stated I never wanted to get old.     OBJECTIVE:     Past Medical History: Diagnosis Date    Anemia     Arthritis     Atrial fibrillation (HCC)     CAD (coronary artery disease)     Chronic pain     Chronic respiratory failure with hypoxia (HCC)     On supplemental home oxygen at 4 L/min continuously    Cor pulmonale (HCC)     Mildly reduced right ventricular function per echocardiogram of 7/12/2022    Diabetes mellitus (HCC)     GERD (gastroesophageal reflux disease)     Glaucoma     Hx of completed stroke     MRI of the brain with and without contrast on 9/5/2015 showed right parietal and temporal encephalomalacia probably represents old infarctions    Hx of deep venous thrombosis     Hyperlipidemia     Macular degeneration     Mild mitral stenosis     Per echocardiogram of 7/12/2022    Osteoporosis, post-menopausal     Systolic heart failure (HCC)     LVEF 30-35% per echocardiogram of 7/12/2022     Past Surgical History:   Procedure Laterality Date    HX CHOLECYSTECTOMY      HX HEENT      HX HIP REPLACEMENT      HX PACEMAKER      HX ROTATOR CUFF REPAIR Bilateral     VASCULAR SURGERY PROCEDURE UNLIST       Prior Level of Function/Home Situation:      Diet prior to admission: unknown  Current Diet:  NPO   Cognitive and Communication Status:  Neurologic State: Alert  Orientation Level: Oriented to person  Cognition: Follows commands           Oral Assessment:  Oral Assessment  Oral Hygiene: dry  P.O. Trials:  Patient Position: upright in bed  Vocal quality prior to P.O.: No impairment  Consistency Presented: Ice chips; Thin liquid  How Presented: SLP-fed/presented;Spoon;Self-fed/presented;Cup/sip     Bolus Acceptance: No impairment  Bolus Formation/Control: Impaired  Type of Impairment: Delayed  Propulsion: Delayed (# of seconds)           Aspiration Signs/Symptoms: None     Effective Modifications: Chin tuck; Left head tilt;Small sips and bites;Cup/sip  Cues for Modifications: None               NOMS:   The NOMS functional outcome measure was used to quantify this patient's level of swallowing impairment. Based on the NOMS, the patient was determined to be at level 3 for swallow function       NOMS Swallowing Levels:  Level 1 (CN): NPO  Level 2 (CM): NPO but takes consistency in therapy  Level 3 (CL): Takes less than 50% of nutrition p.o. and continues with nonoral feedings; and/or safe with mod cues; and/or max diet restriction  Level 4 (CK): Safe swallow but needs mod cues; and/or mod diet restriction; and/or still requires some nonoral feeding/supplements  Level 5 (CJ): Safe swallow with min diet restriction; and/or needs min cues  Level 6 (CI): Independent with p.o.; rare cues; usually self cues; may need to avoid some foods or needs extra time  Level 7 ([address] [place] [address]): Independent for all p.o.  CRYSTAL. (2003). National Outcomes Measurement System (NOMS): Adult Speech-Language Pathology User's Guide. Pain:  Pain Scale 1: Numeric (0 - 10)  Pain Intensity 1: 0       After treatment:   Patient left in no apparent distress in bed, Call bell within reach, and Nursing notified    COMMUNICATION/EDUCATION:   Patient was educated regarding her deficit(s) of dysphagia as this relates to her diagnosis. She demonstrated Fair understanding as evidenced by verbalizing understanding. The patient's plan of care including recommendations, planned interventions, and recommended diet changes were discussed with: Registered nurse. Patient/family have participated as able in goal setting and plan of care. Patient/family agree to work toward stated goals and plan of care.     Thank you for this referral.  LARA Van  Time Calculation: 15 mins

## 2022-08-12 NOTE — PROGRESS NOTES
Speech pathology note  Reviewed chart and note patient with vomiting at 0759 this morning, and rapid response called ~0930. Patient now transferred to higher level of care on non-rebreather mask. GI consult pending due to dysphagia and vomiting. SLP will hold for now and follow as patient medically stable. Thank you.     Rosa Brock., CCC-SLP

## 2022-08-12 NOTE — PROGRESS NOTES
8/12/22  4:20 PM  CM reviewed EMR and attempted to meet with patient in the room to complete the initial evaluation. Patient sleeping soundly, CM called and spoke to patients son, Neil Sanches  Reason for Readmission:    Hypokalemia, Acute Kidney Injury     Patient had a recent admission from 7/10-7/18/22         RUR Score/Risk Level:    19%- Moderate risk of readmission    PAYOR: Medicare A and B/ [address]    PCP: First and Last name:  Carroll Marie   Name of Practice:    Are you a current patient: Yes/No: Yes   Approximate date of last visit: 7/19/22   Can you participate in a virtual visit with your PCP:     Is a Care Conference indicated:   Not at this time    Did you attend your follow up appointment (s): If not, why not:  Yes- PCP on 7/19/22 has a follow up appointment scheduled for 8/18/22       Resources/supports as identified by patient/family:  Son and spouse reside with patient. Her son is the main caregiver for her providing assistance with bathing, dressing, toileting, meal prep, etc.  She is able to transfer to the bed and sofa and normally is able to use her RW at home. When going longer distances and to appointments, she uses a wheelchair. Top Challenges facing patient (as identified by patient/family and CM): Finances/Medication cost?  No concerns- has prescription coverage     Transportation     Sn transports her to appointments   Support system or lack thereof? Good family support   Living arrangements? Private residence with a chair lift, 0 interior steps       Self-care/ADLs/Cognition? See above for ADL's  DME: RW, WC, Grab bars, shower chair, raised toilet seat, home O2 provided by [facility] at 4 L baseline.          Current Advanced Directive/Advance Care Plan:  DNR- has ACP documents           Plan for utilizing home health:   [facility] with [facility]- in home PT and OT- not home health- Will need a resumption order at dc- call Eileen Schmid at [phone] Transition of Care Plan:    Based on readmission, the patient's previous Plan of Care   has been evaluated and/or modified. The current Transition of Care Plan is:           1). Patient admitted for emergent care  2). SPT consulted  3). Palliative care consulted- discussed hospice with patient and son  4). TBD on transport at dc  5). CM following for dc needs    [facility] Management Interventions  PCP Verified by CM: Yes  Last Visit to PCP: 07/19/22  Mode of Transport at Discharge:  Other (see comment) (TBD depending on mobility at dc)  Transition of Care Consult (CM Consult): Discharge Planning  Discharge Durable Medical Equipment: No  Speech Therapy Consult: Yes  Support Systems: Spouse/Significant Other, Child(ramo)  Confirm Follow Up Transport: Family  Discharge Location  Patient Expects to be Discharged to[de-identified] Unable to determine at this time     Readmission Assessment  Number of days since last admission?: 8-30 days  Previous disposition: Outpatient Rehab (Outpatient rehab that comes to her home- five days a week)  What was the patient's/caregiver's perception as to why they think they needed to return back to the hospital?: Other (Comment) ([place]las she needed to come back due to medical concerns)  Did you visit your Primary Care Physician after you left the hospital, before you returned this time?: Yes  Did you see a specialist, such as Cardiac, Pulmonary, Orthopedic Physician, etc. after you left the hospital?: No  Who advised the patient to return to the hospital?: Self-referral  Does the patient report anything that got in the way of taking their medications?: No  In our efforts to provide the best possible care to you and others like you, can you think of anything that we could have done to help you after you left the hospital the first time, so that you might not have needed to return so soon?: Other (Comment) (Medical concerns)

## 2022-08-12 NOTE — ED NOTES
Patient report given to [facility] RN at this time. No questions or concerns.

## 2022-08-12 NOTE — PROGRESS NOTES
Physician Progress Note      Susan Cadet  CSN #:                  [id number]  :                       1925  ADMIT DATE:       2022 6:06 PM  [address] [place] DATE:  RESPONDING  PROVIDER #:        Gaurav Jernigan MD          QUERY TEXT:    Good afternoon. Pt admitted with EVERT and chronic hypoxemic respiratory failure and has severe malnutrition documented in RD note from . Please further specify type of malnutrition with documentation in the medical record. The medical record reflects the following:    Risk Factors: CHF, EVERT, chronic nausea, vomiting, abdominal pain, chronic respiratory failure, FTT, hypokalemia  Clinical Indicators: Per RD note : \"Severe malnutrition related to inadequate protein-energy intake as evidenced by BMI, Criteria as identified in malnutrition assessment\"; BMI 17.54; potassium-2.3, 2.4, 2.4; albumin 2.2, 2.1;  creatinine 1.69, 1.63, 1.61; magnesium 0.9, 2.2, 2.0  Treatment: IV fluids, serial labs, RD consult, potassium replacement, ICU level care, I&Os and vital signs per unit protocol      Thank you,  Ramona Rabago RN, [facility]  [phone]  _______________________________________________________________________________________________________________    ASPEN Criteria:  https://aspenjournals. onlinelibrary. bishop. com/doi/full/10.1177/6749793980141823  Options provided:  -- Mild Protein calorie malnutrition  -- Moderate Protein calorie malnutrition  -- Severe Protein calorie malnutrition  -- Other - I will add my own diagnosis  -- Disagree - Not applicable / Not valid  -- Disagree - Clinically unable to determine / Unknown  -- Refer to Clinical Documentation Reviewer    PROVIDER RESPONSE TEXT:    This patient has moderate protein calorie malnutrition.     Query created by: Daryle Perone on 2022 2:29 PM      Electronically signed by:  Gaurav Jernigan MD 2022 2:45 PM

## 2022-08-12 NOTE — ED PROVIDER NOTES
The history is provided by the patient. Vomiting   This is a new problem. The current episode started 12 to 24 hours ago. The problem occurs continuously. The problem has not changed since onset. The emesis has an appearance of stomach contents. There has been no fever. Associated symptoms include abdominal pain. Pertinent negatives include no fever and no cough. Her past medical history is significant for DM. Past medical history comments: recent admission for similar symptoms. Chest Pain (Angina)   This is a new problem. The current episode started 12 to 24 hours ago. The problem has not changed since onset. The problem occurs constantly. Associated with: vomiting. The pain is present in the substernal region. The pain is mild. The quality of the pain is described as dull. The pain radiates to the epigastrium. Exacerbated by: vomiting. Associated symptoms include abdominal pain, malaise/fatigue (unable to get out of bed today) and vomiting. Pertinent negatives include no cough, no fever and no shortness of breath. She has tried nothing for the symptoms. Her past medical history is significant for DM. Past medical history comments: recent admission for similar symptoms. Past Medical History:   Diagnosis Date    Anemia     Arthritis     CAD (coronary artery disease)     Chronic pain     DM type 2 causing renal disease (HCC)     DM type 2 causing vascular disease (HCC)     GERD (gastroesophageal reflux disease)     HFrEF (heart failure with reduced ejection fraction) ([facility])     EF 30%    Hx of completed stroke     Hx of deep venous thrombosis     Hypercholesterolemia     Osteoporosis, post-menopausal     Pacemaker        Past Surgical History:   Procedure Laterality Date    HX CHOLECYSTECTOMY      HX HEENT      HX ORTHOPAEDIC      VASCULAR SURGERY PROCEDURE UNLIST           No family history on file.     Social History     Socioeconomic History    Marital status:      Spouse name: Not on file    Number of children: Not on file    Years of education: Not on file    Highest education level: Not on file   Occupational History    Not on file   Tobacco Use    Smoking status: Never    Smokeless tobacco: Never   Substance and Sexual Activity    Alcohol use: No    Drug use: No    Sexual activity: Not on file   Other Topics Concern    Not on file   Social History Narrative    Not on file     Social Determinants of Health     Financial Resource Strain: Not on file   Food Insecurity: Not on file   Transportation Needs: Not on file   Physical Activity: Not on file   Stress: Not on file   Social Connections: Not on file   Intimate Partner Violence: Not on file   Housing Stability: Not on file         ALLERGIES: Cephalexin, Dilaudid [hydromorphone (bulk)], Egg, Fenofibrate, Gemfibrozil, Iodine, Oxycodone, Pcn [penicillins], Prilosec [omeprazole], Shellfish derived, and Sulfa (sulfonamide antibiotics)    Review of Systems   Constitutional:  Positive for malaise/fatigue (unable to get out of bed today). Negative for fever. Respiratory:  Negative for cough and shortness of breath. Cardiovascular:  Positive for chest pain. Gastrointestinal:  Positive for abdominal pain and vomiting. All other systems reviewed and are negative. Vitals:    08/11/22 1810   BP: 119/86   Pulse: 94   Resp: 16   Temp: 99.3 °F (37.4 °C)   SpO2: 96%   Weight: 52.2 kg (115 lb)   Height: 5' 2\" (1.575 m)            Physical Exam  Vitals and nursing note reviewed. Constitutional:       General: She is not in acute distress. Appearance: She is well-developed. HENT:      Head: Normocephalic and atraumatic. Eyes:      Conjunctiva/sclera: Conjunctivae normal.   Cardiovascular:      Rate and Rhythm: Regular rhythm. Tachycardia present. Heart sounds: Normal heart sounds. Pulmonary:      Effort: Pulmonary effort is normal. No respiratory distress. Breath sounds: Normal breath sounds. Abdominal:      General: There is no distension. Palpations: Abdomen is soft. Tenderness: There is abdominal tenderness (diffuse but especially epigastric and LLQ). There is no guarding or rebound. Musculoskeletal:         General: No deformity. Normal range of motion. Cervical back: Neck supple. Skin:     General: Skin is warm and dry. Capillary Refill: Capillary refill takes less than 2 seconds. Neurological:      Mental Status: She is alert. Cranial Nerves: No cranial nerve deficit. Psychiatric:         Behavior: Behavior normal.        [facility]    ED Course as of 08/12/22 0020   Thu Aug 11, 2022   1824 EKG 1812: Rate 103, ventricular pacing with occasional AV dual pacing. No ST/T changes. No significant change from last tracing.  [DK]      ED Course User Index  [DK] Maude Gregg MD     80year-old female presents with progressive vomiting today and diffuse abdominal pain and tenderness. She also reported chest pain but no EKG abnormalities compared to prior. I suspect her troponin elevation is secondary to dehydration and demand as well as metabolic dysregulation. Gently fluid resuscitated and electrolytes repleted. Will need admission for further stabilization. Procedures    Critical Care Time: 32 minutes  I personally performed critical care time separate of billable procedures which may include ordering and interpretation of testing, ordering of medications, direct patient assessment and reassessment, discussion with consultants or family, and documentation. Perfect Serve Consult for Admission  9:12 PM    ED Room Number: ON62/14  Patient Name and age:  Chinedu Garrido 80 y.o.  female  Working Diagnosis:   1. Nausea and vomiting, unspecified vomiting type    2. Acute dehydration    3. Hypomagnesemia    4. Hypokalemia    5. Uremia    6. EVERT (acute kidney injury) ([address]    7. Chronic diastolic heart failure ([facility] [address]    8.  Elevated troponin        COVID-19 Suspicion:  no  Sepsis present:  no  Reassessment needed: no  Code Status:  Do Not Resuscitate  Readmission: yes  Isolation Requirements:  no  Recommended Level of Care:  telemetry  Department:[facility] - [phone]

## 2022-08-12 NOTE — PROGRESS NOTES
Comprehensive Nutrition Assessment    Type and Reason for Visit: Initial, Positive nutrition screen    Nutrition Recommendations/Plan:   NPO at this time - advance per SLP   If able to take po, recommend Ensure Compact once daily to aid in meeting kcal needs (220 kcal, 32 g carbs, 9 g protein)     Malnutrition Assessment:  Malnutrition Status:  Severe malnutrition (08/12/22 1334)    Context:  Acute illness     Findings of the 6 clinical characteristics of malnutrition:   Energy Intake:  Unable to assess  Weight Loss:  Greater than 5% over 1 month     Body Fat Loss:  Mild body fat loss, Fat overlying ribs, Orbital   Muscle Mass Loss: Moderate muscle mass loss, Clavicles (pectoralis & deltoids), Temples (temporalis)  Fluid Accumulation:  No significant fluid accumulation,     Strength:  Not performed     Nutrition Assessment:     Pt is a 80year old female admitted with Hypokalemia [E87.6]  Acute kidney injury ([facility] [address] [N17.9]. She has an extensive past medical history which includes Atrial fibrillation), CAD, Chronic respiratory failure with hypoxia, Diabetes mellitus, GERD, deep venous thrombosis, Hyperlipidemia, and Systolic heart failure. BPA for unsure weight loss. Per documentation, patient has lost 22# (18%) within two months, clinically significant for timeframe. Patient had RRT this morning and transferred to ICU. NPO at this time. SLP deferred consult until patient more medically stable. Patient with noted allergies to shellfish and eggs. Patient alert and oriented in bed, able to answer some questions. States she has not had an appetite recently 2/2 feeling nauseated. Denies chewing/swallowing problems. Palliative meeting with patient now.      Wt Readings from Last 10 Encounters:   08/12/22 43.5 kg (95 lb 14.4 oz)   07/18/22 53.3 kg (117 lb 6.3 oz)   05/01/22 54 kg (119 lb 0.8 oz)   04/10/22 51.3 kg (113 lb)   05/22/17 62.3 kg (137 lb 6.4 oz)   09/29/15 58.6 kg (129 lb 1.6 oz)   09/05/15 58.1 kg (128 lb) 08/31/15 58.1 kg (128 lb)   05/15/12 57.2 kg (126 lb)   03/19/12 61.2 kg (135 lb)       Nutrition Related Findings:      Wound Type: Multiple, Wound consult pending  Last Bowel Movement Date: 08/11/22  Stool Appearance: Formed  Abdominal Assessment: Soft  Appetite: NPO  Bowel Sounds: Active   Edema:No data recorded    Nutr. Labs:    Lab Results   Component Value Date/Time    GFR est AA 35 (L) 08/12/2022 05:20 AM    GFR est non-AA 29 (L) 08/12/2022 05:20 AM    Creatinine (POC) 1.5 (H) 03/02/2016 12:08 PM    Creatinine 1.63 (H) 08/12/2022 05:20 AM    BUN 55 (H) 08/12/2022 05:20 AM    Sodium 140 08/12/2022 05:20 AM    Potassium 2.4 (LL) 08/12/2022 05:20 AM    Chloride 94 (L) 08/12/2022 05:20 AM    CO2 35 (H) 08/12/2022 05:20 AM       Lab Results   Component Value Date/Time    Glucose 133 (H) 08/12/2022 05:20 AM    Glucose (POC) 82 04/23/2022 04:00 PM       Lab Results   Component Value Date/Time    Hemoglobin A1c 5.1 04/23/2022 12:55 AM       Nutr. Meds:  Lovenox, Pepcid, zofran PRN, ruchi-syn*, miralax PRN      Current Nutrition Intake & Therapies:  Average Meal Intake: NPO  Average Supplement Intake: NPO  DIET NPO    Anthropometric Measures:  Height: 5' 2\" (157.5 cm)  Ideal Body Weight (IBW): 110 lbs (50 kg)  Admission Body Weight: 115 lb  Current Body Wt:  43.5 kg (95 lb 14.4 oz), 87.2 % IBW.  Bed scale  Current BMI (kg/m2): 17.5        Weight Adjustment: No adjustment                 BMI Category: Underweight (BMI less than 22) age over 72    Estimated Daily Nutrient Needs:  Energy Requirements Based On: Kcal/kg  Weight Used for Energy Requirements: Current  Energy (kcal/day): 8093-5002  Weight Used for Protein Requirements: Current  Protein (g/day): 52-62 (1.0-1.2 g/kg)  Method Used for Fluid Requirements: Other (comment)  Fluid (ml/day): 6438-1002    Nutrition Diagnosis:   Inadequate oral intake related to cognitive or neurological impairment, inadequate protein-energy intake as evidenced by NPO or clear liquid status due to medical condition  Severe malnutrition related to inadequate protein-energy intake as evidenced by BMI, Criteria as identified in malnutrition assessment    Nutrition Interventions:   Food and/or Nutrient Delivery: Start oral diet, Start oral nutrition supplement  Nutrition Education/Counseling: No recommendations at this time  Coordination of Nutrition Care: Continue to monitor while inpatient  Plan of Care discussed with: nursing    Goals:     Goals: Initiate PO diet, by next RD assessment       Nutrition Monitoring and Evaluation:   Behavioral-Environmental Outcomes: None identified  Food/Nutrient Intake Outcomes: Diet advancement/tolerance  Physical Signs/Symptoms Outcomes: Biochemical data, Weight, Skin, Nausea/vomiting    Discharge Planning:     Too soon to determine    Wally Villanueva MS, RD  Contact: Ext: [phone], or via [employer]

## 2022-08-12 NOTE — PROGRESS NOTES
Spiritual Care Assessment/Progress Note  [address]      NAME: Arlen Reyes      MRN: [id number]  AGE: 80 y.o.  SEX: female  Synagogue Affiliation: Muslim   Language: English     8/12/2022     Total Time (in minutes): 20     Spiritual Assessment begun in [facility] POST SURG ORT 1 through conversation with:         [x]Patient        [] Family    [] Friend(s)        Reason for Consult: Request by staff     Spiritual beliefs: (Please include comment if needed)     [x] Identifies with a sweetie tradition:  Felecia Rueda       [x] Supported by a sweetie community: Muslim           [] Claims no spiritual orientation:           [] Seeking spiritual identity:                [] Adheres to an individual form of spirituality:           [] Not able to assess:                           Identified resources for coping:      [x] Prayer                               [] Music                  [] Guided Imagery     [] Family/friends                 [] Pet visits     [] Devotional reading                         [] Unknown     [] Other:                                               Interventions offered during this visit: (See comments for more details)    Patient Interventions: Affirmation of emotions/emotional suffering, Affirmation of sweetie, Prayer (actual), Prayer (assurance of), Iconic (affirming the presence of God/Higher Power)           Plan of Care:     [] Support spiritual and/or cultural needs    [] Support AMD and/or advance care planning process      [] Support grieving process   [] Coordinate Rites and/or Rituals    [] Coordination with community clergy   [] No spiritual needs identified at this time   [] Detailed Plan of Care below (See Comments)  [] Make referral to Music Therapy  [] Make referral to Pet Therapy     [] Make referral to Addiction services  [] Make referral to [facility]  [] Make referral to Spiritual Care Partner  [] No future visits requested        [x] Follow up visits as needed Responded to page from patient's nurse. Offered a calming presence and prayer at her request,for her as the medical team was attending to her. She is known to  from a previous hospital stay. Her chart was consulted.    Sheryle Stapler, Chaplain, MDiv, MS, [facility]

## 2022-08-12 NOTE — PROGRESS NOTES
0420 provider was contacted as night Rn told me that she should be DNR but I see pt is still on full code. 0800 Pt complaining of nausea and PRN was given by Night RN this morning. Pt. Looks pale to me and complaining of right side pain and shivering. Took VS and they are very elevated. 4220 Hydralazine ordered by MS and given. MD thinks that VS are incorrect on the monitor. Tried multiple machines but still they are quite elevated. Pt is very very anxious. Contacted provider and call code RRT RN, Respiratory and Rapid. Provider changed pt code status to DNR now.   9.45 am transported pt to ICU after given repost to [facility]

## 2022-08-12 NOTE — H&P
History and Physical  NAME: Kely Haas  MRN: [id number]  YOB: 1925  AGE: 80 y.o.  female  SOCIAL SECURITY NUMBER: [id number]  Primary Care Provider: Familia Dumont MD  CODE STATUS: Full Code      ASSESSMENT/PLAN:  Severe electrolyte abnormalities with hypomagnesia/hypokalemia. Patient has been given 2 doses of magnesium sulfate 2 g IV in the emergency department. Patient received just 10 mEq KCl IV in the emergency department. Further replacement with normal saline + 40 meq in each 1 liter bag running at 125 cc/h for the next 24 hours. Acute kidney injury. Likely secondary to dehydration. Baseline creatinine is < 1.0. IV fluids as above. We are keeping in mind that patient also has a history of systolic heart failure, and will be monitoring for volume overload carefully. Dysphagia. Request gastroenterology consultation, speech therapy evaluation. Strict n.p.o. at this time. Chronic systolic and diastolic heart failure. Monitor for volume overload while patient is being given IV fluids for dehydration. Hold home diuretics of Lasix 20 mg p.o. daily and spironolactone 25 mg p.o. daily for now. Chronic respiratory failure with hypoxia. Oxygen requirements are at baseline. As discussed per discharge summary of 7/28/2022, there may be a psychological component to patient's dyspnea. Atrial fibrillation. Patient is strictly n.p.o. at this time until evaluation by speech therapy as there is concern for aspiration. We will resume Toprol-XL 25 mg p.o. daily once she is taking p.o. again. History of Presenting Illness:   Kely Haas is a 80 y.o. female who lives at home with her  and son. She presents with nausea. Patient states that the nausea has been present for quite some time, perhaps months. For the last few weeks, the nausea has gotten worse. She has not been able to eat anything over the past 1 week.      Patient is Patient presents to urgent care with cold symptoms of cough, headache, body aches that started Wednesday  Daughter had a COVID exposure at school  Took 200 mg ibuprofen around 0830    PPE worn by nurse - yes   Level 1 procedure mask worn by patient - yes   oriented X.3. She states that she does have trouble swallowing, at times, as it gets stuck in her throat. She has refused some of her pills due to the inability to swallow. She states that her son would give them to her, but she did not want to take them because they were too big. Patient states that it feels like theres regurgitation in her throat, but she does not have actual vomiting. She has been constipated, and has ball-like stools. Patient states that the stools and tina colored. She denies any black stools, coffee ground emesis, red blood in the stools or red blood in the vomiting. She states that she actually does not vomit. Upon arrival to the medical floor, nurse did bedside swallow test, and noticed that patient would cough with drinking water. Well patient does not permit, she states that she brings up phlegm. She states that when she tries to cough, there is a heaviness to the chest. Patient is also pointing to a epigastrium, stating that she has a soreness there when she takes notice of it. Patient states that her son tries very hard to Get her to eat, but things just dont taste right, it tastes nasty like medicine. Patient states that she has gotten weaker and weaker to the point where she is now using a wheelchair. Prior to that, she was using a cane or a walker. Patient states that she is so weak that she has to hold onto her walker to pull her self up and then get into her wheelchair. Patient states that she has been drinking copious amounts of water at home, along with ice. She has lost about 15 pounds, going down to 97 pounds from 112 pounds. Patient uses oxygen continuously at home, at 4 L per minute. She could not confirm whether she has a history of emphysema/COPD. She states that she used to smoke \"as a youngster. \" She states that she has been exposed to secondhand smoke almost all of her life, including from her  who is a heavy smoker.       [facility] until 7/18/2022 for acute on chronic heart failure: Per discharge summary, there are issues with compliance and subjective dyspnea. Excerpts from the discharge summary are as follows:  Acute on chronic combined systolic and diastolic ACC/AHA stage C congestive heart failure / Pacemaker - POA due to non compliance. Cardiology consulted. Resolved on BB, lasix and spironolactone. She is not capable of retaining CHF education. Edema resolved and she is not hypoxic, but she is using oxygen for comfort, we have not documented any hypoxia. There is a psychological component to her sensation of dyspnea. Due to non compliance, hospice would be appropriate at any time. I [hospitalist ] discussed with her and her family that we do not expect much further improvement. Debility / Chronic pain / Arthritis - POA, worse than baseline due to CHF and deconditioning. PT OT recommended SNF but she refused. We arranged [facility] PT, but I discussed with her and her family that we do not expect much further improvement with that plan. She has been medically stable to discharge for 3 days. She refused to go home. I informed her and her son that her insurance may not pay for additional days she stays in the hospital after being medically cleared to discharge. DM type 2  - POA, stable. BG too labile to give meds. In setting of advanced age and noncompliance, no further testing or treatment. Review of Systems:  A comprehensive review of systems was negative except for that written in the History of Present Illness.      Past Medical History:   Diagnosis Date    Anemia     Arthritis     Atrial fibrillation (HCC)     CAD (coronary artery disease)     Chronic pain     Chronic respiratory failure with hypoxia (HCC)     On supplemental home oxygen at 4 L/min continuously    Cor pulmonale (HCC)     Mildly reduced right ventricular function per echocardiogram of 7/12/2022    Diabetes mellitus ([facility] [address]     GERD (gastroesophageal reflux disease)     Glaucoma     Hx of completed stroke     MRI of the brain with and without contrast on 9/5/2015 showed right parietal and temporal encephalomalacia probably represents old infarctions    Hx of deep venous thrombosis     Hyperlipidemia     Macular degeneration     Mild mitral stenosis     Per echocardiogram of 7/12/2022    Osteoporosis, post-menopausal     Systolic heart failure (HCC)     LVEF 30-35% per echocardiogram of 7/12/2022        PAST SURGICAL HISTORY:   Past Surgical History:   Procedure Laterality Date    HX CHOLECYSTECTOMY      HX HEENT      HX HIP REPLACEMENT      HX PACEMAKER      HX ROTATOR CUFF REPAIR Bilateral     VASCULAR SURGERY PROCEDURE UNLIST         Prior to Admission medications    Medication Sig Start Date End Date Taking? Authorizing Provider   furosemide (LASIX) 20 mg tablet Take 1 Tablet by mouth daily for 30 days. 7/18/22 8/17/22  Alycia Rae MD   pantoprazole (PROTONIX) 40 mg tablet Take 1 Tablet by mouth Daily (before breakfast) for 30 days. 7/19/22 8/18/22  Alycia Rae MD   promethazine (PHENERGAN) 25 mg tablet Take 1 Tablet by mouth every six (6) hours as needed for Nausea for up to 30 days. 7/18/22 8/17/22  Alycia Rae MD   ondansetron (ZOFRAN ODT) 4 mg disintegrating tablet Take 1 Tablet by mouth every eight (8) hours as needed for Nausea or Vomiting for up to 30 days. 7/16/22 8/15/22  Alycia Rae MD   aspirin delayed-release 81 mg tablet Take 1 Tablet by mouth daily. 7/15/22   Camilo Aviles MD   metoprolol succinate (TOPROL-XL) 25 mg XL tablet Take 1 Tablet by mouth daily. 7/15/22   Michael Schaefer MD   spironolactone (ALDACTONE) 25 mg tablet Take 1 Tablet by mouth daily. 7/15/22   Michael Schaefer MD   latanoprost (XALATAN) 0.005 % ophthalmic solution 1 Drop nightly.     Angy Sarabia MD   acetaminophen (Tylenol Arthritis Pain) 650 mg TbER Take 1 Tablet by mouth every six (6) hours as needed for Pain. 5/2/22   Yanira Aguayo MD   amitriptyline (ELAVIL) 25 mg tablet Take 1 Tablet by mouth nightly. 5/2/22   Yanira Aguayo MD   MV-Min-Folic Acid-Lutein 274-814 mcg chew Take 1 Tablet by mouth daily. 5/2/22   Yanira Aguayo MD   pyridoxine, vitamin B6, (Vitamin B-6) 25 mg tablet Take 1 Tablet by mouth daily. 5/2/22   Yanira Aguayo MD       Allergies   Allergen Reactions    Cephalexin Unknown (comments)    Dilaudid [Hydromorphone (Bulk)] Rash, Nausea and Vomiting and Swelling    Egg Unknown (comments)    Fenofibrate Nausea Only    Gemfibrozil Unknown (comments)    Iodine Rash, Nausea and Vomiting and Swelling    Oxycodone Unknown (comments)    Pcn [Penicillins] Rash, Nausea and Vomiting, Swelling and Contact Dermatitis    Prilosec [Omeprazole] Nausea Only    Shellfish Derived Swelling    Sulfa (Sulfonamide Antibiotics) Rash, Nausea and Vomiting and Swelling        Family History   Problem Relation Age of Onset    Heart Failure Father         Social History     Tobacco Use    Smoking status: Never    Smokeless tobacco: Never   Substance Use Topics    Alcohol use: No    Drug use: No       Physical exam  Patient Vitals for the past 24 hrs:   BP Temp Pulse Resp SpO2 Oxygen therapy   08/12/22 0314 (!) 106/39 -- 71 -- 98 % 4 L/min   08/12/22 0100 (!) 92/48 -- 79 -- 100 % 4 L/min   08/12/22 0000 (!) 99/41 -- 98 18 99 % 4 L/min   08/11/22 2300 -- -- 97 -- --    08/11/22 2257 -- -- -- -- 100 % 4 L/min   08/11/22 2232 (!) 112/51 -- 98 -- --    08/11/22 1830 131/69 -- 90 21 99 % 4 L/min   08/11/22 1810 119/86 99.3 °F (37.4 °C) 94 16 96 % 4 L/min     Estimated body mass index is 21.03 kg/m² as calculated from the following:    Height as of this encounter: 5' 2\" (1.575 m). Weight as of this encounter: 52.2 kg (115 lb). General: In no acute distress. Well developed, well nourished. Head: Normocephalic, atraumatic. Eyes: Anicteric sclera. PERRL. Extraocular muscles intact.   ENT: External ears and nose appear normal. Oral mucosa moist.  Markedly hearing impaired. Neck: Supple. No jugular venous distention. Heart: Paced  rhythm. Grade 1/6 diastolic murmur. Chest: Symmetrical excursion. Clear to auscultation bilaterally. Abdomen: Soft, patient states that while her abdomen is not tender, the epigastrium is \" sensitive\". No abnormal distention. Bowel sounds are present throughout. Extremities: No gross deformities. No edema, no cyanosis. Feet are warm to touch. Neurological: No lateralizing deficits. Alert, oriented X3. Skin: No jaundice. No rashes. Recent Results (from the past 24 hour(s))   CBC WITH AUTOMATED DIFF    Collection Time: 08/11/22  6:28 PM   Result Value Ref Range    WBC 7.8 3.6 - 11.0 K/uL    RBC 3.59 (L) 3.80 - 5.20 M/uL    HGB 11.9 11.5 - 16.0 g/dL    HCT 35.9 35.0 - 47.0 %    .0 (H) 80.0 - 99.0 FL    MCH 33.1 26.0 - 34.0 PG    MCHC 33.1 30.0 - 36.5 g/dL    RDW 14.0 11.5 - 14.5 %    PLATELET 867 869 - 760 K/uL    MPV 10.9 8.9 - 12.9 FL    NRBC 0.0 0  WBC    ABSOLUTE NRBC 0.00 0.00 - 0.01 K/uL    NEUTROPHILS 90 (H) 32 - 75 %    LYMPHOCYTES 3 (L) 12 - 49 %    MONOCYTES 5 5 - 13 %    EOSINOPHILS 0 0 - 7 %    BASOPHILS 1 0 - 1 %    IMMATURE GRANULOCYTES 1 (H) 0.0 - 0.5 %    ABS. NEUTROPHILS 7.0 1.8 - 8.0 K/UL    ABS. LYMPHOCYTES 0.2 (L) 0.8 - 3.5 K/UL    ABS. MONOCYTES 0.4 0.0 - 1.0 K/UL    ABS. EOSINOPHILS 0.0 0.0 - 0.4 K/UL    ABS. BASOPHILS 0.1 0.0 - 0.1 K/UL    ABS. IMM.  GRANS. 0.1 (H) 0.00 - 0.04 K/UL    DF SMEAR SCANNED      RBC COMMENTS NORMOCYTIC, NORMOCHROMIC     METABOLIC PANEL, COMPREHENSIVE    Collection Time: 08/11/22  6:28 PM   Result Value Ref Range    Sodium 139 136 - 145 mmol/L    Potassium 2.3 (LL) 3.5 - 5.1 mmol/L    Chloride 93 (L) 97 - 108 mmol/L    CO2 31 21 - 32 mmol/L    Anion gap 15 5 - 15 mmol/L    Glucose 130 (H) 65 - 100 mg/dL    BUN 52 (H) 6 - 20 MG/DL    Creatinine 1.69 (H) 0.55 - 1.02 MG/DL    BUN/Creatinine ratio 31 (H) 12 - 20      GFR est AA 34 (L) >60 ml/min/1.73m2    GFR est non-AA 28 (L) >60 ml/min/1.73m2    Calcium 9.1 8.5 - 10.1 MG/DL    Bilirubin, total 2.3 (H) 0.2 - 1.0 MG/DL    ALT (SGPT) 115 (H) 12 - 78 U/L    AST (SGOT) 479 (H) 15 - 37 U/L    Alk. phosphatase 412 (H) 45 - 117 U/L    Protein, total 6.3 (L) 6.4 - 8.2 g/dL    Albumin 2.2 (L) 3.5 - 5.0 g/dL    Globulin 4.1 (H) 2.0 - 4.0 g/dL    A-G Ratio 0.5 (L) 1.1 - 2.2     LIPASE    Collection Time: 08/11/22  6:28 PM   Result Value Ref Range    Lipase 208 73 - 393 U/L   TROPONIN-HIGH SENSITIVITY    Collection Time: 08/11/22  6:28 PM   Result Value Ref Range    Troponin-High Sensitivity 199 (HH) 0 - 51 ng/L   NT-PRO BNP    Collection Time: 08/11/22  6:28 PM   Result Value Ref Range    NT pro-BNP 7,024 (H) <450 PG/ML   MAGNESIUM    Collection Time: 08/11/22  6:28 PM   Result Value Ref Range    Magnesium 0.9 (LL) 1.6 - 2.4 mg/dL   SAMPLES BEING HELD    Collection Time: 08/11/22  6:28 PM   Result Value Ref Range    SAMPLES BEING HELD  1RED, 1SST, 1BLU     COMMENT        Add-on orders for these samples will be processed based on acceptable specimen integrity and analyte stability, which may vary by analyte. METABOLIC PANEL, COMPREHENSIVE    Collection Time: 08/12/22  5:20 AM   Result Value Ref Range    Sodium 140 136 - 145 mmol/L    Potassium 2.4 (LL) 3.5 - 5.1 mmol/L    Chloride 94 (L) 97 - 108 mmol/L    CO2 35 (H) 21 - 32 mmol/L    Anion gap 11 5 - 15 mmol/L    Glucose 133 (H) 65 - 100 mg/dL    BUN 55 (H) 6 - 20 MG/DL    Creatinine 1.63 (H) 0.55 - 1.02 MG/DL    BUN/Creatinine ratio 34 (H) 12 - 20      GFR est AA 35 (L) >60 ml/min/1.73m2    GFR est non-AA 29 (L) >60 ml/min/1.73m2    Calcium 8.7 8.5 - 10.1 MG/DL    Bilirubin, total 2.4 (H) 0.2 - 1.0 MG/DL    ALT (SGPT) 104 (H) 12 - 78 U/L    AST (SGOT) 280 (H) 15 - 37 U/L    Alk.  phosphatase 350 (H) 45 - 117 U/L    Protein, total 5.6 (L) 6.4 - 8.2 g/dL    Albumin 2.1 (L) 3.5 - 5.0 g/dL    Globulin 3.5 2.0 - 4.0 g/dL    A-G Ratio 0.6 (L) 1.1 - 2.2 MAGNESIUM    Collection Time: 08/12/22  5:20 AM   Result Value Ref Range    Magnesium 2.2 1.6 - 2.4 mg/dL       CT ABD PELV WO CONT  Narrative: EXAM:  CT ABDOMEN PELVIS WITHOUT CONTRAST  INDICATION:  diffuse abdominal pain, vomiting. Additional history:  COMPARISON: CT of the abdomen and pelvis, 9/1/2009, 4/22/2022 and 7/10/2022. .  TECHNIQUE:   Unenhanced multislice helical CT was performed from the diaphragm to the  symphysis pubis without intravenous contrast administration. Contiguous 5 mm  axial images were reconstructed and lung and soft tissue windows were generated. Coronal and sagittal reformations were generated. CT dose reduction was achieved through use of a standardized protocol tailored  for this examination and automatic exposure control for dose modulation. Seth Ra FINDINGS:  INCIDENTALLY IMAGED CHEST:  Heart/vessels: There is a cardiac lead in the right ventricle and incompletely  imaged lesion in the right atrium. Diminished attenuation in the blood pool  suggesting anemia. Lungs/Pleura: Bilateral pleural effusions, left greater right bibasilar  scarring/atelectasis. .  ABDOMEN:  Liver: Within normal limits. Gallbladder/Biliary: There is post cholecystectomy. There is pneumobilia. Spleen: Within normal limits. Pancreas: Within normal limits. Adrenals: Within normal limits. Kidneys: Rounded, complex lesion extending posteriorly and superiorly off the  upper pole of the right kidney that measures approximately 3.1 cm in diameter. There is a large, low-attenuation lesion extending inferiorly from the right  kidney that measures up to approximately 9 cm. Small, low-attenuation lesions in  the left kidney which are likely unchanged  Peritoneum/Mesenteries: Within normal limits. Extraperitoneum: Within normal limits. Gastrointestinal tract: Diverticulosis in the ascending, transverse, descending  and sigmoid colon. Rectal distention with stool measuring up to 7.1 cm.  There is  a short segment of junction of the descending and sigmoid colon within the left  inguinal canal.  Vascular: Calcifications in the aorta and superior mesenteric artery. Xiomara Ellamore PELVIS:  Extraperitoneum: Within normal limits. Ureters: Within normal limits. Bladder: Geoffrey Orange decompressed and not well-visualized. Reproductive System: Within normal limits. .  MSK:   Left inguinal hernia containing a short segment of large bowel. Left hip  arthroplasty. Degenerative changes in the spine, most pronounced in the lower  lumbar spine  . Impression: 1. No CT explanation for the patient's diffuse abdominal pain and emesis. 2. The patient status post cholecystectomy with pneumobilia. 3. Diverticulosis without evidence of diverticulitis. 4. There is a short segment of the junction of the descending and sigmoid colon  within a left inguinal hernia. 5. Rectal distention with stool measuring up to 7.1 cm.   6. Right renal masslike lesion which is incompletely characterized  7. Incidental findings as above. XR CHEST PORT  Narrative: INDICATION: . history of CHF, aspiration risk with recent vomiting  Additional history:  COMPARISON: Previous chest xray, 7/10/2022, 4/27/2022 and 4/25/2020. LIMITATIONS: Portable technique. Xiomara Ellamore FINDINGS: Single frontal view of the chest.   .  Lines/tubes/surgical: Cardiac assist device in the left chest has leads that  project to terminate in the right atrium and right ventricle, likely unchanged. Heart/mediastinum: Calcifications in the aorta. Lungs/pleura: Bibasilar hazy opacities with obscured hemidiaphragms. No visible  focal consolidation. No visible pneumothorax. Additional Comments: Chronic fracture deformity in the left humeral surgical  neck. Degenerative changes in both shoulders. .  Impression: 1. Bilateral pleural effusions with associated passive atelectasis and/or  consolidation.          07/10/22    ECHO ADULT COMPLETE 07/12/2022 7/12/2022    Interpretation Summary  Formatting of this result is different from the original.      Left Ventricle: Moderately reduced left ventricular systolic function with a visually estimated EF of 30 - 35%. Left ventricle size is normal. Normal wall thickness. See diagram for wall motion findings. Right Ventricle: Mildly reduced systolic function. Pacer wire present. Aortic Valve: Not well visualized. Mildly calcified cusp. Mitral Valve: Mildly calcified leaflet. Mild annular calcification of the mitral valve. Mildly calcified subvalvular apparatus. Mild stenosis noted. Left Atrium: Left atrium is dilated. Right Atrium: Multiple leads present in the right atrium. Right atrium is mildly dilated. Technical qualifiers: Echo study was limited due to patient's condition and limited due to patient tolerance.     Signed by: Elma Layton DO on 7/12/2022  1:53 PM

## 2022-08-12 NOTE — CONSULTS
Palliative Medicine Consult  Devin: [phone] ([phone])    Patient Name: Anny Stevenson  YOB: 1925    Date of Initial Consult: 8/12/2022  Reason for Consult: Care Decisions  Requesting Provider: Dr. Gwen Palmer   Primary Care Physician: Hernandez Chun MD     SUMMARY:   Anny Stevenson is a 80 y.o. with CHF, AF, CAD, chronic respiratory failure on baseline 4L NC, who was admitted on 8/11/2022 from home with a diagnosis of electrolyte abnormalities and EVERT felt to be related to GI losses and diuretic use. She is being treated with IVF and electrolyte repletion. She was also found to have moderate stool burden on CT A/P. With multiple hospital admissions in the last several months and overall FTT, she is considered to be a good candidate for hospice. Current medical issues leading to Palliative Medicine involvement include: Care Decisions. Social:  Patient lives with  Arron Nascimento and son Carlyn Montez who is her primary caregiver. Patient has 4 children. Son says his mom is sharp as a tack but also that she has dementia. She has very limited mobility and mostly uses a WC. She can help lift herself to transfer from couch to walker or from walker to [facility]. She can sometimes take a few steps with walker. He says her appetite has been terrible, she doesn't eat much and has been losing weight. She takes more liquids such as boost, buttermilk, tea, etc. He thinks she has more of a fear of swallowing rather than true difficulty swallowing. PALLIATIVE DIAGNOSES:   Encounter for Palliative Care  Goals of Care discussion  Debility/Generalized weakness  Decreased appetite  Thirsty  Hard of hearing     PLAN:   I met patient at bedside this afternoon in the ICU. I spoke with bedside RN regarding patient status. Patient is awake and able to participate in conversation.  When asked about her multiple recent hospitalizations and how she feels about going to the hospital, she tells me she doesn't enjoy going to the hospital. I shared with her that I thought hospice could be a really good option for her to focus on comfort and quality of life at home so that she doesn't have to go to and from the hospital anymore. She can have nursing and aide visits at her home to help support her and treat any symptoms she may have at home. I let her know she can think about this and doesn't have to decide right now. She told me that she would like for me to discuss this with her son Jeanette Rodriguez. Patient has AMD on file listing her son Jeanette Rodrgiuez as her mPOA. AMD indicates she would not want life prolonging measures in the event of a terminal condition. Patient has a DDNR on file. I spoke with son this afternoon. He was at the hospital earlier today but was unable to visit with her d/t unit policy. We discussed his mom's current status. Discussed that she has been transferred to the ICU and that he should be able to visit her there. Son provided social history above. Discussed that given her physical decline, decline in appetite, multiple recent hospitalizations, I think hospice would be a very good option for her to focus on comfort and quality of life at home. Son says the people he knows who have been on hospice  within a couple days. Discussed that hospice does not hasten death but instead helps patients to have as many good days as possible at home. Since his mom expressed not enjoying hospital visits, this is another option for her. Hospice philosophy and delivery of care discussed in detail. Son says he will think about hospice and will discuss it with his mom when he visits tomorrow. Thank you for asking our team to participate in the care of Yoanna Denise. Our team will return on Monday.      GOALS OF CARE / TREATMENT PREFERENCES:     GOALS OF CARE:       TREATMENT PREFERENCES:   Code Status: DNR    Patient and family's personal goals include: to be able to drink water    Important upcoming milestones or family events: n/a    The patient identifies the following as important for living well: states she doesn't like going to the hospital      Advance Care Planning:  [] The [place] [id number] Team has updated the ACP Navigator with [place] and Patient Capacity      Primary Decision Maker: Keara Benitez - [phone]  Advance Care Planning 8/12/2022   Patient's Healthcare Decision Maker is: -   Confirm Advance Directive (No Data)   Does the patient have other document types -               Other:    As far as possible, the palliative care team has discussed with patient / health care proxy about goals of care / treatment preferences for patient. HISTORY:     History obtained from: chart, patient    CHIEF COMPLAINT: Thirsty    HPI/SUBJECTIVE:    The patient is:   [x] Verbal and participatory  [] Non-participatory due to:     8/12: patient is awake. States she is thirsty. Otherwise no major complaints at this time.      Clinical Pain Assessment (nonverbal scale for severity on nonverbal patients):   Clinical Pain Assessment  Severity: 0     Activity (Movement): Lying quietly, normal position    Duration: for how long has pt been experiencing pain (e.g., 2 days, 1 month, years)  Frequency: how often pain is an issue (e.g., several times per day, once every few days, constant)     FUNCTIONAL ASSESSMENT:     Palliative Performance Scale (PPS):  PPS: 40       PSYCHOSOCIAL/SPIRITUAL SCREENING:     Palliative IDT has assessed this patient for cultural preferences / practices and a referral made as appropriate to needs (Cultural Services, Patient Advocacy, Ethics, etc.)    Any spiritual / Sikhism concerns:  [] Yes /  [x] No   If \"Yes\" to discuss with pastoral care during IDT     Does caregiver feel burdened by caring for their loved one:   [] Yes /  [] No /  [x] No Caregiver Present/Available [] No Caregiver [] Pt Lives at Facility  If \"Yes\" to discuss with social work during IDT    Anticipatory grief assessment:   [x] Normal  / [] Maladaptive     If \"Maladaptive\" to discuss with social work during IDT    ESAS Anxiety: Anxiety: 0    ESAS Depression:          REVIEW OF SYSTEMS:     Positive and pertinent negative findings in ROS are noted above in HPI. The following systems were [x] reviewed / [] unable to be reviewed as noted in HPI  Other findings are noted below. Systems: constitutional, ears/nose/mouth/throat, respiratory, gastrointestinal, genitourinary, musculoskeletal, integumentary, neurologic, psychiatric, endocrine. Positive findings noted below. Modified ESAS Completed by: provider           Pain: 0   Anxiety: 0 Nausea: 0     Dyspnea: 0                    PHYSICAL EXAM:     From RN flowsheet:  Wt Readings from Last 3 Encounters:   08/12/22 95 lb 14.4 oz (43.5 kg)   07/18/22 117 lb 6.3 oz (53.3 kg)   05/01/22 119 lb 0.8 oz (54 kg)     Blood pressure (!) 127/55, pulse (!) 107, temperature 98.1 °F (36.7 °C), resp. rate 20, height 5' 2\" (1.575 m), weight 95 lb 14.4 oz (43.5 kg), last menstrual period 11/16/1975, SpO2 98 %.     Pain Scale 1: Numeric (0 - 10)  Pain Intensity 1: 0                 Last bowel movement, if known:     General: appears elderly and frail  Eyes: lids normal, no drainage  Nose: nares normal, no drainage  Mouth: mmm, no drainage  Pulm: rate is normal, respirations are unlabored  Neuro: alert, responds to questions, moves extremities  Psych: calm, speech and behavior seem appropriate     HISTORY:     Active Problems:    Hypokalemia (7/10/2022)      Acute kidney injury ([facility] [address] (8/11/2022)    Past Medical History:   Diagnosis Date    Anemia     Arthritis     Atrial fibrillation (HCC)     CAD (coronary artery disease)     Chronic pain     Chronic respiratory failure with hypoxia (HCC)     On supplemental home oxygen at 4 L/min continuously    Cor pulmonale (HCC)     Mildly reduced right ventricular function per echocardiogram of 7/12/2022    Diabetes mellitus (HCC)     GERD (gastroesophageal reflux disease)     Glaucoma Hx of completed stroke     MRI of the brain with and without contrast on 9/5/2015 showed right parietal and temporal encephalomalacia probably represents old infarctions    Hx of deep venous thrombosis     Hyperlipidemia     Macular degeneration     Mild mitral stenosis     Per echocardiogram of 7/12/2022    Osteoporosis, post-menopausal     Systolic heart failure (HCC)     LVEF 30-35% per echocardiogram of 7/12/2022      Past Surgical History:   Procedure Laterality Date    HX CHOLECYSTECTOMY      HX HEENT      HX HIP REPLACEMENT      HX PACEMAKER      HX ROTATOR CUFF REPAIR Bilateral     VASCULAR SURGERY PROCEDURE UNLIST        Family History   Problem Relation Age of Onset    Heart Failure Father       History reviewed, no pertinent family history.   Social History     Tobacco Use    Smoking status: Never    Smokeless tobacco: Never   Substance Use Topics    Alcohol use: No     Allergies   Allergen Reactions    Cephalexin Unknown (comments)    Dilaudid [Hydromorphone (Bulk)] Rash, Nausea and Vomiting and Swelling    Egg Unknown (comments)    Fenofibrate Nausea Only    Gemfibrozil Unknown (comments)    Iodine Rash, Nausea and Vomiting and Swelling    Oxycodone Unknown (comments)    Pcn [Penicillins] Rash, Nausea and Vomiting, Swelling and Contact Dermatitis    Prilosec [Omeprazole] Nausea Only    Shellfish Derived Swelling    Sulfa (Sulfonamide Antibiotics) Rash, Nausea and Vomiting and Swelling      Current Facility-Administered Medications   Medication Dose Route Frequency    hydrALAZINE (APRESOLINE) 20 mg/mL injection 20 mg  20 mg IntraVENous Q6H PRN    famotidine (PF) (PEPCID) 20 mg in 0.9% sodium chloride 10 mL injection  20 mg IntraVENous Q12H    morphine injection 2 mg  2 mg IntraVENous Q3H PRN    PHENYLephrine (POORNIMA-SYNEPHRINE) 30 mg in 0.9% sodium chloride 250 mL infusion   mcg/min IntraVENous TITRATE    sodium chloride (NS) flush 5-40 mL  5-40 mL IntraVENous Q8H    sodium chloride (NS) flush 5-40 mL  5-40 mL IntraVENous PRN    acetaminophen (TYLENOL) tablet 650 mg  650 mg Oral Q6H PRN    Or    acetaminophen (TYLENOL) suppository 650 mg  650 mg Rectal Q6H PRN    polyethylene glycol (MIRALAX) packet 17 g  17 g Oral DAILY PRN    ondansetron (ZOFRAN ODT) tablet 4 mg  4 mg Oral Q8H PRN    Or    ondansetron (ZOFRAN) injection 4 mg  4 mg IntraVENous Q6H PRN    enoxaparin (LOVENOX) injection 30 mg  30 mg SubCUTAneous DAILY          LAB AND IMAGING FINDINGS:     Lab Results   Component Value Date/Time    WBC 7.8 08/11/2022 06:28 PM    HGB 11.9 08/11/2022 06:28 PM    PLATELET 219 17/97/9897 06:28 PM     Lab Results   Component Value Date/Time    Sodium 141 08/12/2022 12:20 PM    Potassium 2.4 (LL) 08/12/2022 12:20 PM    Chloride 99 08/12/2022 12:20 PM    CO2 29 08/12/2022 12:20 PM    BUN 51 (H) 08/12/2022 12:20 PM    Creatinine 1.61 (H) 08/12/2022 12:20 PM    Calcium 8.4 (L) 08/12/2022 12:20 PM    Magnesium 2.0 08/12/2022 12:20 PM    Phosphorus 3.5 04/23/2022 12:55 AM      Lab Results   Component Value Date/Time    Alk.  phosphatase 350 (H) 08/12/2022 05:20 AM    Protein, total 5.6 (L) 08/12/2022 05:20 AM    Albumin 2.1 (L) 08/12/2022 05:20 AM    Globulin 3.5 08/12/2022 05:20 AM     Lab Results   Component Value Date/Time    INR 1.1 04/10/2022 03:46 AM    Prothrombin time 11.9 (H) 04/10/2022 03:46 AM    aPTT 28.1 05/06/2009 12:00 PM      Lab Results   Component Value Date/Time    Iron 8 (L) 04/10/2022 03:46 AM    TIBC 109 (L) 04/10/2022 03:46 AM    Iron % saturation 7 (L) 04/10/2022 03:46 AM      Lab Results   Component Value Date/Time    pH 7.52 (H) 05/13/2009 05:23 AM    PCO2 28 (LL) 05/13/2009 05:23 AM    PO2 100 05/13/2009 05:23 AM     No components found for: GLPOC   No results found for: CPK, CKMB             Total time: 70 mins  Counseling / coordination time, spent as noted above: 45 mins  > 50% counseling / coordination?: yes    Prolonged service was provided for  []30 min   []75 min in face to face time in the presence of the patient, spent as noted above. Time Start:   Time End:   Note: this can only be billed with 76256 (initial) or 12680 (follow up). If multiple start / stop times, list each separately.

## 2022-08-12 NOTE — PROGRESS NOTES
08/12/22 1130   Vitals   Pulse (Heart Rate) 98   Resp Rate 24   BP (!) 75/50   MAP (Monitor) (!) 57   MAP (Calculated) (!) 58      BP has been consistent last few cycles. Dr Elias Ty notified and notified that private room obtained in ICU main and will be transferring.

## 2022-08-12 NOTE — PROGRESS NOTES
Palliative Care Consult ordered by Dr Kvng Tubbs. Perfect Serve used and stated Amarjit Piña was covering provider for Palliative. Perfect serve message sent stating patient was changing room locations and could be found in ICU main room 3015    Per Padmini Teran MD is covering palliative consults at [facility].  Padmini Hutchinson sent consult via perfect serve 1200

## 2022-08-12 NOTE — PROGRESS NOTES
1000 Pt transferred to ASU 7 for electrolyte imbalance. Unable to obtain accurate VS. A&O x4. Follows commands. Anxious. Generalized weakness. Breath sounds diminished throughout Ant. Shallow tachypnic resp. Difficult to obtain sat. Appropriate pleth on Rt ear. 98%. Remove 100% NRB and applied 02 4L/NC per home 02. Dry cough. Sore mouth. V paced. Low BP. No edema, CP. Abd soft, round. BS active x4. Nausea and dry heaving. Received Zofran IV Purewick repositioned. BPPP. Erythema to sacrum, buttocks, heels. Mepelex on sacrum. See assessment. 1055 SBP 70s-80s. Notified Dr. Onel Lantigua. Order for 500ml LR bolus IV.   1200 Bolus completed. IVF discontinued. BP 89/39 (52) Notified Dr. Onel Lantigua. Neosynephrine IV ordered. 1217 Started Neosynephrine at 30mcg/min. BP 89/39 (52)  1230  /55 (69)TRANSFER - OUT REPORT:    Verbal report given to Ramirez Aparicio RN(name) on [facility]  being transferred to ICU(unit) for routine progression of care       Report consisted of patients Situation, Background, Assessment and   Recommendations(SBAR). Information from the following report(s) SBAR, Kardex, ED Summary, Intake/Output, MAR, Recent Results, and Cardiac Rhythm vpaced  was reviewed with the receiving nurse. Lines:   Peripheral IV 08/12/22 Right;Upper Arm (Active)   Site Assessment Clean, dry, & intact 08/12/22 1031   Phlebitis Assessment 0 08/12/22 1031   Infiltration Assessment 0 08/12/22 1031   Dressing Status Clean, dry, & intact 08/12/22 1031   Dressing Type Transparent 08/12/22 1031   Hub Color/Line Status Pink; Infusing 08/12/22 1031   Alcohol Cap Used Yes 08/12/22 1031        Opportunity for questions and clarification was provided. Patient transported with:   Monitor  O2 @ 4 liters  Registered Nurse  Tech  Troponin, UA collected.

## 2022-08-12 NOTE — PROGRESS NOTES
Hugo Jensen demarcus [place] [address]  [address] [facility], [address] [place] [address]  [phone]      Medical Progress Note      NAME: Merly Roy   :  1925  MRM:  [id number]    Date/Time: 2022           Assessment / Plan:     #Chronic Hypoxemic Respiratory Failure: She remains on her baseline 4L nc. She has no e/o pulmonary edema, CHF. #Nausea/vomiting/abd pain: Per review, this seems to be a chronic issue. CT abd does show moderate stool burden, but otherwise nothing acute. - Supportive care with IVF, anti-emetics, low dose morphine    #Hypokalemia: 2/2 GI losses and diuretic use   - Replete; PM check   - Tele   - Hold diuretic     #EVERT: baseline normal creatinine she presents with creatinine 1.69, azotemia, clinically volume down. Due to GI losses and diuretic use   - Gentle IVF; known reduced EF   - Renally dose meds, avoid nephrotoxins    #HFrEF: Echo 2022 with EF 30-35%. ?Haleigh Lease? Presently volume down   - Hold furosemide, manas, metop    #CAD: Trop slightly up, but down from prior. Will trend for now    #Hx DVT: Could consider PE; with EVERT will check dopplers    #FTT: With readmission and overwhelming symptoms, do think she is Hospice appropriate. Will have Palliative assist with care. Total CC time responding to RRT, managing tachycardia, AHRF, hypotension; exclusive of procedures: 35 min                Care Plan discussed with: Patient, Care Manager, Nursing Staff, and Consultant/Specialist    Discussed:  Care Plan    Prophylaxis:  Lovenox    Disposition:  SNF/LTC           ___________________________________________________    Attending Physician: Eladio Boston MD        Subjective:     Chief Complaint:  Called to eval pt for RRT this morning due to hypoxemia, tachycardia. Perceived to be tachycardic to 170s, hypoxic to 60s. BP 220s.  However, after much endeavoring, more valid vitals were obtained, indicating normal oxygen saturations on her baseline 4L, slight tachycardia to 110, and mild hypotension, for which I gave 500cc LR. Pt uncomfortable, dry heaving, but no SOB. Lungs clear. ROS:  Abd pain, nausea, vomiting  Denies CP, cough, sob          Objective:       Vitals:          Last 24hrs VS reviewed since prior progress note.  Most recent are:    Visit Vitals  BP 99/84   Pulse 99   Temp 97.6 °F (36.4 °C)   Resp 25   Ht 5' 2\" (1.575 m)   Wt 52.2 kg (115 lb)   SpO2 92%   BMI 21.03 kg/m²     SpO2 Readings from Last 6 Encounters:   08/12/22 92%   07/18/22 100%   05/02/22 93%   04/14/22 100%   05/22/17 94%   09/29/15 99%    O2 Flow Rate (L/min): 4 l/min     Intake/Output Summary (Last 24 hours) at 8/12/2022 1107  Last data filed at 8/12/2022 1034  Gross per 24 hour   Intake 400 ml   Output --   Net 400 ml          Exam:     Physical Exam:    Gen:  Frail, elderly, uncomfortable  HEENT:  Pink conjunctivae, PERRL, hearing intact to voice, moist mucous membranes  Neck:  Supple, without masses, thyroid non-tender  Resp:  No accessory muscle use, clear breath sounds without wheezes rales or rhonchi  Card:  No murmurs, normal S1, S2 without thrills, bruits or peripheral edema  Abd:  Soft, non-tender, non-distended, normoactive bowel sounds are present  Musc:  No cyanosis or clubbing  Skin:  No rashes or ulcers, skin turgor is good  Neuro:  Cranial nerves 3-12 are grossly intact,  strength is 5/5 bilaterally and dorsi / plantarflexion is 5/5 bilaterally, follows commands appropriately  Psych:  Good insight, oriented to person, place and time, alert       Medications Reviewed: (see below)    Lab Data Reviewed: (see below)    ______________________________________________________________________    Medications:     Current Facility-Administered Medications   Medication Dose Route Frequency    hydrALAZINE (APRESOLINE) 20 mg/mL injection 20 mg  20 mg IntraVENous Q6H PRN    lactated ringers bolus infusion 500 mL  500 mL IntraVENous ONCE    sodium chloride (NS) flush 5-40 mL  5-40 mL IntraVENous Q8H    sodium chloride (NS) flush 5-40 mL  5-40 mL IntraVENous PRN    acetaminophen (TYLENOL) tablet 650 mg  650 mg Oral Q6H PRN    Or    acetaminophen (TYLENOL) suppository 650 mg  650 mg Rectal Q6H PRN    polyethylene glycol (MIRALAX) packet 17 g  17 g Oral DAILY PRN    ondansetron (ZOFRAN ODT) tablet 4 mg  4 mg Oral Q8H PRN    Or    ondansetron (ZOFRAN) injection 4 mg  4 mg IntraVENous Q6H PRN    enoxaparin (LOVENOX) injection 30 mg  30 mg SubCUTAneous DAILY    0.9% sodium chloride with KCl 40 mEq/L infusion   IntraVENous CONTINUOUS            Lab Review:     Recent Labs     08/11/22  1828   WBC 7.8   HGB 11.9   HCT 35.9        Recent Labs     08/12/22  0520 08/11/22  1828    139   K 2.4* 2.3*   CL 94* 93*   CO2 35* 31   * 130*   BUN 55* 52*   CREA 1.63* 1.69*   CA 8.7 9.1   MG 2.2 0.9*   ALB 2.1* 2.2*   * 115*     No components found for: [place]

## 2022-08-12 NOTE — WOUND CARE
Wound Nurse Note    Initial consult received to see patient regarding sacral redness. Spoke with nursing; patient currently resting on a [employer] bed; alert and oriented and turns well with minimal assist.    Assessment:  Sacral area/buttocks - redness at mid-sacrum with delaying blanching; skin otherwise intact. Bilateral heels - intact; dry skin. Recommendations:  Turn q2h and float heels. Foam dressing to sacral area MWF. Frequent incontinence care using barrier wipes. Moisturizer daily to dry skin on feet. Plan: Will notify MD of visit findings; will follow. Reconsult as needed.     Julian Grace RN [facility]  [facility] Wound [facility]  [phone]

## 2022-08-12 NOTE — PROGRESS NOTES
1037 Perfect serve sent to Dr. Garry Palm regarding pts BP 77/35 MAP 45. Pt now has 1 PIV. 1049 BP 81/54 MAP 60, Notified Dr. Garry Palm. 1054 Per Dr. Garry Palm give 500ml LR bolus. Orders placed.

## 2022-08-12 NOTE — PROGRESS NOTES
0500 Patient seen by Dr Aram Schroeder, to take labs and send asap. Informed her patient have coughing episodes with drinking water. For SLP consult. To apply pulse oximteer and telemetry monitoring.    3399 Received a call from Ashely Osuna, critical potassium at 2.4. [address] [facility] [address] Prakash Sanders. 0730 noted order from Dr Prakash Sanders, patient needs attended. Will start premix potassium. Bedside shift change report given to Elissa (oncoming nurse) by [facility] (offgoing nurse). Report included the following information SBAR, Kardex, Intake/Output, MAR, Recent Results, Cardiac Rhythm Ventricular Paced, and Alarm Parameters . 0759 Onset of vomiting, Zofran given as ordered. Hooked bag of potassium as ordered. As per ED nurse Maryjane Ambrosio, patient DNR?? Will verify code status with the team.    Handed over accordingly.

## 2022-08-12 NOTE — PROGRESS NOTES
1900 - Bedside shift change report given to Shaggy Archer RN (oncoming nurse) by Ashkan Redd RN (offgoing nurse). Report included the following information SBAR, ED Summary, Recent Results, and Cardiac Rhythm Vpaced . Runs of potassium given late due to decreased IV rate due to pt being intolerant of standard rate. 0126 - Attempted to draw labs off pts IV - no blood return on either. - Will attempt again in a few hours to allow pt to sleep. 0330 - attempted to stick pt for labs - pt refusing blood draw yelling and stated \"you are evil\", also, pt did not have any good veins partially due to being dehydrated.

## 2022-08-12 NOTE — CONSULTS
Gastroenterology Consult     Referring Physician: Faina Walker    Consult Date: 8/12/2022     Subjective:     Chief Complaint: Vomiting    History of Present Illness: Ghada Dey is a 80 y.o. female who is seen in consultation for vomiting. while she has a myriad of medical problems her GI issues have included recent hospital admission for pancreatitis; she has also previously had dilation of EG junction stricture. When seen here in April was taking daily pantoprazole, daily famotidine; GERD and esophageal stricture did not appear to be an issue. Liver enzymes in April were normal.    For this current hospital admit ER notes again indicate daily use of famotidine, pantoprazole. Patient her self supplies little if any meaningful information. Here in the hospital noted to have vomiting; it is thought probable that she has some degree of dysphagia.    Palliative care consult has been obtained; there has been some discussion pertaining to hospice  Past Medical History:   Diagnosis Date    Anemia     Arthritis     Atrial fibrillation (HCC)     CAD (coronary artery disease)     Chronic pain     Chronic respiratory failure with hypoxia (HCC)     On supplemental home oxygen at 4 L/min continuously    Cor pulmonale (HCC)     Mildly reduced right ventricular function per echocardiogram of 7/12/2022    Diabetes mellitus (HCC)     GERD (gastroesophageal reflux disease)     Glaucoma     Hx of completed stroke     MRI of the brain with and without contrast on 9/5/2015 showed right parietal and temporal encephalomalacia probably represents old infarctions    Hx of deep venous thrombosis     Hyperlipidemia     Macular degeneration     Mild mitral stenosis     Per echocardiogram of 7/12/2022    Osteoporosis, post-menopausal     Systolic heart failure (HCC)     LVEF 30-35% per echocardiogram of 7/12/2022     Past Surgical History:   Procedure Laterality Date    HX CHOLECYSTECTOMY      HX HEENT      HX HIP REPLACEMENT HX PACEMAKER      HX ROTATOR CUFF REPAIR Bilateral     VASCULAR SURGERY PROCEDURE UNLIST        Family History   Problem Relation Age of Onset    Heart Failure Father      Social History     Tobacco Use    Smoking status: Never    Smokeless tobacco: Never   Substance Use Topics    Alcohol use: No      Allergies   Allergen Reactions    Cephalexin Unknown (comments)    Dilaudid [Hydromorphone (Bulk)] Rash, Nausea and Vomiting and Swelling    Egg Unknown (comments)    Fenofibrate Nausea Only    Gemfibrozil Unknown (comments)    Iodine Rash, Nausea and Vomiting and Swelling    Oxycodone Unknown (comments)    Pcn [Penicillins] Rash, Nausea and Vomiting, Swelling and Contact Dermatitis    Prilosec [Omeprazole] Nausea Only    Shellfish Derived Swelling    Sulfa (Sulfonamide Antibiotics) Rash, Nausea and Vomiting and Swelling     Current Facility-Administered Medications   Medication Dose Route Frequency    hydrALAZINE (APRESOLINE) 20 mg/mL injection 20 mg  20 mg IntraVENous Q6H PRN    famotidine (PF) (PEPCID) 20 mg in 0.9% sodium chloride 10 mL injection  20 mg IntraVENous Q12H    morphine injection 2 mg  2 mg IntraVENous Q3H PRN    PHENYLephrine (POORNIMA-SYNEPHRINE) 30 mg in 0.9% sodium chloride 250 mL infusion   mcg/min IntraVENous TITRATE    sodium chloride (NS) flush 5-40 mL  5-40 mL IntraVENous Q8H    sodium chloride (NS) flush 5-40 mL  5-40 mL IntraVENous PRN    acetaminophen (TYLENOL) tablet 650 mg  650 mg Oral Q6H PRN    Or    acetaminophen (TYLENOL) suppository 650 mg  650 mg Rectal Q6H PRN    polyethylene glycol (MIRALAX) packet 17 g  17 g Oral DAILY PRN    ondansetron (ZOFRAN ODT) tablet 4 mg  4 mg Oral Q8H PRN    Or    ondansetron (ZOFRAN) injection 4 mg  4 mg IntraVENous Q6H PRN    enoxaparin (LOVENOX) injection 30 mg  30 mg SubCUTAneous DAILY        Review of Systems:  Not obtainable    Objective:     Physical Exam:  Visit Vitals  BP (!) 90/52   Pulse (!) 114   Temp 98.1 °F (36.7 °C)   Resp 18   Ht 5' 2\" (1.575 m)   Wt 43.5 kg (95 lb 14.4 oz)   LMP 11/16/1975   SpO2 98%   BMI 17.54 kg/m²        Skin:  Extremities and face reveal no rashes. No way erythema. No telangiectasias on the chest wall. HEENT: Sclerae anicteric. Extra-occular muscles are intact. No oral ulcers. Cardiovascular: Irregular rate and rhythm. No murmurs, gallops, or rubs. Respiratory:  Comfortable breathing with no accessory muscle use. Clear breath sounds with no wheezes, rales, or rhonchi. GI:  Abdomen nondistended, soft, and mild periumbilical  tender. Normal active bowel sounds. No enlargement of the liver or spleen. No masses palpable. Musculoskeletal:  No pitting edema of the lower legs. Extremities have good range of motion. Neurological:    Patient is alert and comfortable. Psychiatric:  Mood appears appropriate with judgement intact. Lymphatic:  No cervical or supraclavicular adenopathy.     Lab/Data Review:  CMP:   Lab Results   Component Value Date/Time     08/12/2022 12:20 PM    K 2.4 (LL) 08/12/2022 12:20 PM    CL 99 08/12/2022 12:20 PM    CO2 29 08/12/2022 12:20 PM    AGAP 13 08/12/2022 12:20 PM     (H) 08/12/2022 12:20 PM    BUN 51 (H) 08/12/2022 12:20 PM    CREA 1.61 (H) 08/12/2022 12:20 PM    GFRAA 36 (L) 08/12/2022 12:20 PM    GFRNA 30 (L) 08/12/2022 12:20 PM    CA 8.4 (L) 08/12/2022 12:20 PM    MG 2.0 08/12/2022 12:20 PM    ALB 2.1 (L) 08/12/2022 05:20 AM    TP 5.6 (L) 08/12/2022 05:20 AM    GLOB 3.5 08/12/2022 05:20 AM    AGRAT 0.6 (L) 08/12/2022 05:20 AM     (H) 08/12/2022 05:20 AM     CBC:   Lab Results   Component Value Date/Time    WBC 7.8 08/11/2022 06:28 PM    HGB 11.9 08/11/2022 06:28 PM    HCT 35.9 08/11/2022 06:28 PM     08/11/2022 06:28 PM           Assessment/Plan:     Active Problems:    Hypokalemia (7/10/2022)      Acute kidney injury ([facility] [address] (8/11/2022)    Vomiting  Abdominal tenderness  Possible dysphagia    There are number of potential contributing factors towards vomiting; including GERD, active pancreatitis, stone disease or ischemic hepatopathy. Fortunately at the moment she appears to be comfortable; she is neither vomiting or having dry heaves. Would increase pantoprazole to twice daily to cover the possibility of GERD. If hospice care is decided upon for that status I would suspend additional evaluation. If it is decided against hospice care would proceed with abdominal ultrasound likely on the 15th. GI on call available as needed over the weekend.

## 2022-08-12 NOTE — PROGRESS NOTES
Responded to RRT for Respiratory Distress    Provider at bedside: yes    Interventions ordered: Other (Comment) provider entering orders    Sepsis Suspected: no    Transfer to Higher Level of Care: yes       Visit Vitals  /63 (BP 1 Location: Right lower arm, BP Patient Position: At rest)   Pulse 94   Temp 98 °F (36.7 °C)   Resp 21   Ht 5' 2\" (1.575 m)   Wt 52.2 kg (115 lb)   SpO2 93%   BMI 21.03 kg/m²        Don Lloyd, RN        RRT RN to room for hypoxia. Dr. Edil Mcnally already at bedside placing orders for STAT chest x-ray, EKG and repeat labs. RRT assisted with transferring pt to higher level of care. Pt stable upon transport with O2 sats of 95% on 15 L NRB, /42, temp 97.1 axillary, pulse 88. Pt anxious. Unable to obtain labs prior to transport. ICU RN obtaining additional IV access and labs. No further interventions needed by RRT at this time.

## 2022-08-13 ENCOUNTER — HOSPICE ADMISSION (OUTPATIENT)
Dept: HOSPICE | Facility: HOSPICE | Age: 87
End: 2022-08-13

## 2022-08-13 LAB
ALBUMIN SERPL-MCNC: 2 G/DL (ref 3.5–5)
ALBUMIN/GLOB SERPL: 0.6 {RATIO} (ref 1.1–2.2)
ALP SERPL-CCNC: 273 U/L (ref 45–117)
ALT SERPL-CCNC: 65 U/L (ref 12–78)
ANION GAP SERPL CALC-SCNC: 12 MMOL/L (ref 5–15)
AST SERPL-CCNC: 92 U/L (ref 15–37)
BILIRUB SERPL-MCNC: 0.9 MG/DL (ref 0.2–1)
BUN SERPL-MCNC: 44 MG/DL (ref 6–20)
BUN/CREAT SERPL: 34 (ref 12–20)
CALCIUM SERPL-MCNC: 8 MG/DL (ref 8.5–10.1)
CHLORIDE SERPL-SCNC: 105 MMOL/L (ref 97–108)
CO2 SERPL-SCNC: 25 MMOL/L (ref 21–32)
CREAT SERPL-MCNC: 1.28 MG/DL (ref 0.55–1.02)
GLOBULIN SER CALC-MCNC: 3.6 G/DL (ref 2–4)
GLUCOSE SERPL-MCNC: 61 MG/DL (ref 65–100)
MAGNESIUM SERPL-MCNC: 1.8 MG/DL (ref 1.6–2.4)
POTASSIUM SERPL-SCNC: 3.5 MMOL/L (ref 3.5–5.1)
PROT SERPL-MCNC: 5.6 G/DL (ref 6.4–8.2)
SODIUM SERPL-SCNC: 142 MMOL/L (ref 136–145)

## 2022-08-13 PROCEDURE — 2709999900 HC NON-CHARGEABLE SUPPLY

## 2022-08-13 PROCEDURE — 83735 ASSAY OF MAGNESIUM: CPT

## 2022-08-13 PROCEDURE — 65610000006 HC RM INTENSIVE CARE

## 2022-08-13 PROCEDURE — 36415 COLL VENOUS BLD VENIPUNCTURE: CPT

## 2022-08-13 PROCEDURE — 74011250636 HC RX REV CODE- 250/636: Performed by: INTERNAL MEDICINE

## 2022-08-13 PROCEDURE — 80053 COMPREHEN METABOLIC PANEL: CPT

## 2022-08-13 PROCEDURE — 77030038269 HC DRN EXT URIN PURWCK BARD -A

## 2022-08-13 PROCEDURE — C9113 INJ PANTOPRAZOLE SODIUM, VIA: HCPCS | Performed by: INTERNAL MEDICINE

## 2022-08-13 PROCEDURE — 74011000250 HC RX REV CODE- 250: Performed by: INTERNAL MEDICINE

## 2022-08-13 PROCEDURE — 74011250637 HC RX REV CODE- 250/637: Performed by: INTERNAL MEDICINE

## 2022-08-13 RX ORDER — MIDODRINE HYDROCHLORIDE 5 MG/1
10 TABLET ORAL
Status: DISCONTINUED | OUTPATIENT
Start: 2022-08-13 | End: 2022-09-06 | Stop reason: HOSPADM

## 2022-08-13 RX ORDER — PROCHLORPERAZINE EDISYLATE 5 MG/ML
5 INJECTION INTRAMUSCULAR; INTRAVENOUS
Status: DISCONTINUED | OUTPATIENT
Start: 2022-08-13 | End: 2022-09-06 | Stop reason: HOSPADM

## 2022-08-13 RX ADMIN — Medication 10 ML: at 01:49

## 2022-08-13 RX ADMIN — Medication 10 ML: at 14:08

## 2022-08-13 RX ADMIN — PHENYLEPHRINE HYDROCHLORIDE 40 MCG/MIN: 10 INJECTION INTRAVENOUS at 20:18

## 2022-08-13 RX ADMIN — Medication 10 ML: at 05:00

## 2022-08-13 RX ADMIN — PHENYLEPHRINE HYDROCHLORIDE 35 MCG/MIN: 10 INJECTION INTRAVENOUS at 03:13

## 2022-08-13 RX ADMIN — Medication 10 ML: at 23:45

## 2022-08-13 RX ADMIN — SODIUM CHLORIDE, PRESERVATIVE FREE 40 MG: 5 INJECTION INTRAVENOUS at 16:31

## 2022-08-13 RX ADMIN — MIDODRINE HYDROCHLORIDE 10 MG: 5 TABLET ORAL at 14:08

## 2022-08-13 RX ADMIN — MIDODRINE HYDROCHLORIDE 10 MG: 5 TABLET ORAL at 16:31

## 2022-08-13 RX ADMIN — PROCHLORPERAZINE EDISYLATE 5 MG: 5 INJECTION INTRAMUSCULAR; INTRAVENOUS at 23:46

## 2022-08-13 RX ADMIN — ONDANSETRON 4 MG: 2 INJECTION INTRAMUSCULAR; INTRAVENOUS at 20:23

## 2022-08-13 RX ADMIN — ONDANSETRON 4 MG: 2 INJECTION INTRAMUSCULAR; INTRAVENOUS at 14:08

## 2022-08-13 NOTE — PROGRESS NOTES
Brief ICU Nutrition Assessment    Type and Reason for Visit: Reassess    Nutrition Recommendations/Plan:   Brief follow up. Noted patient advanced to clear liquid. RD to order ONS to provide additional protein.     Provide Ensure Clear BID to increase kcal/protein intake (480 kcal, 104 g carbs, 16 g protein)     Estimated Nutrition Needs:   Energy: 4520-0374  Wt used: Current  Protein: 52-62 (1.0-1.2 g/kg)  Wt used: Current   Fluid: [id number]       Electronically signed by Chandra Bran MS, RD   Contact: [phone] or via [email or web address]

## 2022-08-13 NOTE — PROGRESS NOTES
Verbal shift change report given to Akila Perdomo rn (oncoming nurse) by Janelle Sanders (offgoing nurse). Report included the following information SBAR, ED Summary, Intake/Output, and Cardiac Rhythm v paced . 0800 - morning assessment, patient unable to clear secretions so po protonix held, md at bedside, hospice to be consulted. Bmp/repeat mag labs sent. Patient stating she wishes to be left alone to die in peace. Lovenox subcutaneous injection refused    Bedside and Verbal shift change report given to Devyn Carson rn (oncoming nurse) by Akila Perdomo (offgoing nurse). Report included the following information SBAR, MAR, and Cardiac Rhythm v paced .

## 2022-08-13 NOTE — PROGRESS NOTES
Speech Therapy    Chart reviewed. Note goals of care pending. Will follow as appropriate. Christin Mart M.S., CCC-SLP

## 2022-08-13 NOTE — PROGRESS NOTES
Today:    Attending has requested [address] please evaluate pt today. Referral sent to [address] through the cc link. Nurse & charge nurse updated.     Keren Alvarez Serve

## 2022-08-13 NOTE — HOSPICE
[facility]  [phone]     Patient Name: Javan Wright  YOB: 1925  Age: 80 y.o. [facility] RN Note:  Hospice consult received, reviewing chart. Will follow up with Unit Nurse and Care Manager to discuss plan of care, patient status and discharge disposition. Pt is outside our service area for home hospice. 11:25: Call to pt's son Camila Aguirre who is POA. He says he is at bedside. Offered to have hospice information meeting today. He says he needs to speak with patient first and agreed to let me bring him hospice general information. 11:45:  Brief visit to patient and son. He is feeding her small sips with a spoon. He says his mother is getting better and responding to medical care. She does not meeting inpatient criteria today. He accepted hospice brochure and states we can call him later to arrange a hospice meeting. Thank you for the opportunity to be of service to this patient.      Francesca Corea RN [facility]  [phone]  Or Katelyn

## 2022-08-13 NOTE — PROGRESS NOTES
Hugo [facility] [address]  [address] [facility], [address] [place] [address]  [phone]      Medical Progress Note      NAME: Noe Shearer   :  1925  MRM:  [id number]    Date/Time: 2022           Assessment / Plan:     Chronic Hypoxemic Respiratory Failure: remains on baseline 4L nc. She has no e/o pulmonary edema, CHF. Nausea/vomiting/abd pain: seems to be a chronic issue. CT abd does show moderate stool burden, but otherwise nothing acute. Supportive care with IVF, IV anti-emetics, low dose IV morphine prn. Bowel regimen when can tolerate PO. Hypokalemia: Severe. 2/2 GI losses and diuretic use. Replete IV PRN. Follow Mg     Hypotension: continue ruchi drip. Midodrine as able. EVERT: baseline normal creatinine she presents with creatinine 1.69, azotemia, clinically volume down. Due to GI losses and diuretic use. Gentle IVF; known reduced EF. Renally dose meds, avoid nephrotoxins    HFrEF: Echo 2022 with EF 30-35%. ?Maxcine Aravind? Presently volume down. Hold furosemide, manas, metop    CAD: Trop slightly up, but down from prior. Will trend for now    Hx DVT: Could consider PE but low s/f PE given neg LE dopplers     FTT: With readmission and overwhelming symptoms, do think she is Hospice appropriate. Discussed with patient this am who is willing to speak with hospice                    Care Plan discussed with: Patient, Care Manager, Nursing Staff, and Consultant/Specialist    Discussed:  Care Plan    Prophylaxis:  Lovenox    Disposition:  SNF/LTC           ___________________________________________________    Attending Physician: Aurora Dennis DO        Subjective:     Chief Complaint:  Transient hypotension this am. Discussed goals of care and patient willing to speak to hospice today. ROS:  Endorses Abd pain, nausea; no cp          Objective:       Vitals:          Last 24hrs VS reviewed since prior progress note.  Most recent are:    Visit Vitals  BP (!) 124/105   Pulse 97   Temp 97.9 °F (36.6 °C)   Resp 18   Ht 5' 2\" (1.575 m)   Wt 43.5 kg (95 lb 14.4 oz)   SpO2 100%   BMI 17.54 kg/m²     SpO2 Readings from Last 6 Encounters:   08/13/22 100%   07/18/22 100%   05/02/22 93%   04/14/22 100%   05/22/17 94%   09/29/15 99%    O2 Flow Rate (L/min): 4 l/min     Intake/Output Summary (Last 24 hours) at 8/13/2022 0908  Last data filed at 8/13/2022 0600  Gross per 24 hour   Intake 1595.8 ml   Output 50 ml   Net 1545.8 ml            Exam:     Physical Exam:    Gen:  Frail, elderly, uncomfortable  HEENT:  Pink conjunctivae, PERRL, hard of hearing   Resp:  No accessory muscle use, clear breath sounds without wheezes rales or rhonchi  Card:  No murmurs, normal S1, S2 without thrills, bruits or peripheral edema  Abd:  Soft, diff TTP, non-distended, normoactive bowel sounds are present  Musc:  No cyanosis or clubbing  Skin:  No rashes or ulcers, skin turgor is good  Neuro:  Cranial nerves 3-12 are grossly intact, diffuse weakness, follows commands appropriately  Psych:  Good insight, oriented to person, place and time, alert       Medications Reviewed: (see below)    Lab Data Reviewed: (see below)    ______________________________________________________________________    Medications:     Current Facility-Administered Medications   Medication Dose Route Frequency    hydrALAZINE (APRESOLINE) 20 mg/mL injection 20 mg  20 mg IntraVENous Q6H PRN    morphine injection 2 mg  2 mg IntraVENous Q3H PRN    PHENYLephrine (POORNIMA-SYNEPHRINE) 30 mg in 0.9% sodium chloride 250 mL infusion   mcg/min IntraVENous TITRATE    pantoprazole (PROTONIX) tablet 40 mg  40 mg Oral ACB&D    sodium chloride (NS) flush 5-40 mL  5-40 mL IntraVENous Q8H    sodium chloride (NS) flush 5-40 mL  5-40 mL IntraVENous PRN    acetaminophen (TYLENOL) tablet 650 mg  650 mg Oral Q6H PRN    Or    acetaminophen (TYLENOL) suppository 650 mg  650 mg Rectal Q6H PRN    polyethylene glycol (MIRALAX) packet 17 g  17 g Oral DAILY PRN    ondansetron (ZOFRAN ODT) tablet 4 mg  4 mg Oral Q8H PRN    Or    ondansetron (ZOFRAN) injection 4 mg  4 mg IntraVENous Q6H PRN    enoxaparin (LOVENOX) injection 30 mg  30 mg SubCUTAneous DAILY            Lab Review:     Recent Labs     08/11/22 1828   WBC 7.8   HGB 11.9   HCT 35.9          Recent Labs     08/12/22  1713 08/12/22  1220 08/12/22  0520 08/11/22 1828   NA  --  141 140 139   K 2.9* 2.4* 2.4* 2.3*   CL  --  99 94* 93*   CO2  --  29 35* 31   GLU  --  101* 133* 130*   BUN  --  51* 55* 52*   CREA  --  1.61* 1.63* 1.69*   CA  --  8.4* 8.7 9.1   MG  --  2.0 2.2 0.9*   ALB  --   --  2.1* 2.2*   ALT  --   --  104* 115*       No components found for: [place]

## 2022-08-14 LAB
ALBUMIN SERPL-MCNC: 1.9 G/DL (ref 3.5–5)
ALBUMIN/GLOB SERPL: 0.5 {RATIO} (ref 1.1–2.2)
ALP SERPL-CCNC: 216 U/L (ref 45–117)
ALT SERPL-CCNC: 43 U/L (ref 12–78)
ANION GAP SERPL CALC-SCNC: 9 MMOL/L (ref 5–15)
AST SERPL-CCNC: 44 U/L (ref 15–37)
BILIRUB SERPL-MCNC: 0.9 MG/DL (ref 0.2–1)
BUN SERPL-MCNC: 32 MG/DL (ref 6–20)
BUN/CREAT SERPL: 32 (ref 12–20)
CALCIUM SERPL-MCNC: 7.7 MG/DL (ref 8.5–10.1)
CHLORIDE SERPL-SCNC: 107 MMOL/L (ref 97–108)
CO2 SERPL-SCNC: 27 MMOL/L (ref 21–32)
CREAT SERPL-MCNC: 1.01 MG/DL (ref 0.55–1.02)
GLOBULIN SER CALC-MCNC: 3.5 G/DL (ref 2–4)
GLUCOSE SERPL-MCNC: 112 MG/DL (ref 65–100)
MAGNESIUM SERPL-MCNC: 1.5 MG/DL (ref 1.6–2.4)
POTASSIUM SERPL-SCNC: 3 MMOL/L (ref 3.5–5.1)
PROT SERPL-MCNC: 5.4 G/DL (ref 6.4–8.2)
SODIUM SERPL-SCNC: 143 MMOL/L (ref 136–145)

## 2022-08-14 PROCEDURE — 65270000046 HC RM TELEMETRY

## 2022-08-14 PROCEDURE — 74011000250 HC RX REV CODE- 250: Performed by: INTERNAL MEDICINE

## 2022-08-14 PROCEDURE — 36415 COLL VENOUS BLD VENIPUNCTURE: CPT

## 2022-08-14 PROCEDURE — 74011250637 HC RX REV CODE- 250/637: Performed by: INTERNAL MEDICINE

## 2022-08-14 PROCEDURE — 74011250636 HC RX REV CODE- 250/636: Performed by: INTERNAL MEDICINE

## 2022-08-14 PROCEDURE — 80053 COMPREHEN METABOLIC PANEL: CPT

## 2022-08-14 PROCEDURE — 83735 ASSAY OF MAGNESIUM: CPT

## 2022-08-14 PROCEDURE — C9113 INJ PANTOPRAZOLE SODIUM, VIA: HCPCS | Performed by: INTERNAL MEDICINE

## 2022-08-14 PROCEDURE — 77010033678 HC OXYGEN DAILY

## 2022-08-14 RX ADMIN — MIDODRINE HYDROCHLORIDE 10 MG: 5 TABLET ORAL at 09:52

## 2022-08-14 RX ADMIN — Medication 10 ML: at 17:42

## 2022-08-14 RX ADMIN — Medication 10 ML: at 06:02

## 2022-08-14 RX ADMIN — PHENYLEPHRINE HYDROCHLORIDE 65 MCG/MIN: 10 INJECTION INTRAVENOUS at 06:35

## 2022-08-14 RX ADMIN — SODIUM CHLORIDE, PRESERVATIVE FREE 40 MG: 5 INJECTION INTRAVENOUS at 09:52

## 2022-08-14 RX ADMIN — Medication 10 ML: at 21:45

## 2022-08-14 RX ADMIN — ENOXAPARIN SODIUM 30 MG: 100 INJECTION SUBCUTANEOUS at 09:52

## 2022-08-14 RX ADMIN — SODIUM CHLORIDE, PRESERVATIVE FREE 40 MG: 5 INJECTION INTRAVENOUS at 21:45

## 2022-08-14 RX ADMIN — MIDODRINE HYDROCHLORIDE 10 MG: 5 TABLET ORAL at 12:25

## 2022-08-14 RX ADMIN — MIDODRINE HYDROCHLORIDE 10 MG: 5 TABLET ORAL at 17:00

## 2022-08-14 NOTE — PROGRESS NOTES
TRANSFER - IN REPORT:    Verbal report received from [facility]) on Anusha Olson  being received from ICU(unit) for routine progression of care      Report consisted of patients Situation, Background, Assessment and   Recommendations(SBAR). Information from the following report(s) SBAR, Kardex, ED Summary, Intake/Output, MAR, and Recent Results was reviewed with the receiving nurse. Opportunity for questions and clarification was provided. Assessment completed upon patients arrival to unit and care assumed. Bedside shift change report given to Claritza CARRILLO (oncoming nurse) by Lizbeth Fairbanks RN (offgoing nurse). Report included the following information SBAR, Kardex, ED Summary, Intake/Output, MAR, and Recent Results.

## 2022-08-14 NOTE — PROGRESS NOTES
Accessed chart to assist primary nurse.    2334 Titrated phenylephrine up based on BP 87/47  2347 Titrated phenylephrine up based on BP 82/44

## 2022-08-14 NOTE — PROGRESS NOTES
Problem: Falls - Risk of  Goal: *Absence of Falls  Description: Document Bard Oseguera Fall Risk and appropriate interventions in the flowsheet. Outcome: Progressing Towards Goal  Note: Fall Risk Interventions:  Mobility Interventions: Strengthening exercises (ROM-active/passive), PT Consult for mobility concerns, PT Consult for assist device competence    Mentation Interventions: More frequent rounding, Room close to nurse's station    Medication Interventions: Patient to call before getting OOB, Evaluate medications/consider consulting pharmacy    Elimination Interventions: Call light in reach, Bed/chair exit alarm              Problem: Patient Education: Go to Patient Education Activity  Goal: Patient/Family Education  Outcome: Progressing Towards Goal     Problem: Pressure Injury - Risk of  Goal: *Prevention of pressure injury  Description: Document Destin Scale and appropriate interventions in the flowsheet.   Outcome: Progressing Towards Goal  Note: Pressure Injury Interventions:  Sensory Interventions: Minimize linen layers    Moisture Interventions: Minimize layers    Activity Interventions: PT/OT evaluation, Pressure redistribution bed/mattress(bed type)    Mobility Interventions: HOB 30 degrees or less, Pressure redistribution bed/mattress (bed type)    Nutrition Interventions: Document food/fluid/supplement intake, Discuss nutritional consult with provider    Friction and Shear Interventions: Lift sheet, Minimize layers                Problem: Patient Education: Go to Patient Education Activity  Goal: Patient/Family Education  Outcome: Progressing Towards Goal     Problem: Nutrition Deficit  Goal: *Optimize nutritional status  Outcome: Progressing Towards Goal     Problem: Patient Education: Go to Patient Education Activity  Goal: Patient/Family Education  Outcome: Progressing Towards Goal

## 2022-08-14 NOTE — PROGRESS NOTES
Bedside and Verbal shift change report given to Luly [address] (oncoming nurse) by [address] (offgoing nurse). Report included the following information SBAR, Kardex, Intake/Output, MAR, Recent Results, Med Rec Status, and Cardiac Rhythm V-Paced . 1930-Patient resting quietly in bed with eyes closed. 2000-Full assessment completed, see flow sheets    2023-PRN Zofran given due to nausea. 2311-Contact on-call doctor. Patient stated that Zofran did not help. Doctor order compazine and was given     0000-Reassessment complete, no changes noted    0200-Patient resting quietly in bed with eyes closed. 0400-Reassessment complete, no changes noted    0536-The above patient is currently on Kyree @ 70 mcg. Since the start of the shift, I have been titrating up based on her MAP. Her MAPs have been fluctuating as low as 40s and then as high as 90s. Her most recent MAP is 43 at 70 mcg. Contacted doctor. No further actions at this time    0600-Patient refused morning labs. Patient requested a cup of ice water and to adjust the thermastat to a cooler setting    0700-Handoff report given to [employer]

## 2022-08-15 ENCOUNTER — APPOINTMENT (OUTPATIENT)
Dept: GENERAL RADIOLOGY | Age: 87
DRG: 640 | End: 2022-08-15
Attending: INTERNAL MEDICINE
Payer: MEDICARE

## 2022-08-15 LAB
ALBUMIN SERPL-MCNC: 1.8 G/DL (ref 3.5–5)
ALBUMIN/GLOB SERPL: 0.5 {RATIO} (ref 1.1–2.2)
ALP SERPL-CCNC: 203 U/L (ref 45–117)
ALT SERPL-CCNC: 37 U/L (ref 12–78)
ANION GAP SERPL CALC-SCNC: 8 MMOL/L (ref 5–15)
AST SERPL-CCNC: 38 U/L (ref 15–37)
BILIRUB SERPL-MCNC: 0.7 MG/DL (ref 0.2–1)
BUN SERPL-MCNC: 28 MG/DL (ref 6–20)
BUN/CREAT SERPL: 29 (ref 12–20)
CALCIUM SERPL-MCNC: 7.7 MG/DL (ref 8.5–10.1)
CHLORIDE SERPL-SCNC: 107 MMOL/L (ref 97–108)
CO2 SERPL-SCNC: 24 MMOL/L (ref 21–32)
CREAT SERPL-MCNC: 0.95 MG/DL (ref 0.55–1.02)
GLOBULIN SER CALC-MCNC: 3.5 G/DL (ref 2–4)
GLUCOSE SERPL-MCNC: 79 MG/DL (ref 65–100)
POTASSIUM SERPL-SCNC: 3.2 MMOL/L (ref 3.5–5.1)
PROT SERPL-MCNC: 5.3 G/DL (ref 6.4–8.2)
SODIUM SERPL-SCNC: 139 MMOL/L (ref 136–145)

## 2022-08-15 PROCEDURE — 74018 RADEX ABDOMEN 1 VIEW: CPT

## 2022-08-15 PROCEDURE — C9113 INJ PANTOPRAZOLE SODIUM, VIA: HCPCS | Performed by: INTERNAL MEDICINE

## 2022-08-15 PROCEDURE — 74011250636 HC RX REV CODE- 250/636: Performed by: INTERNAL MEDICINE

## 2022-08-15 PROCEDURE — 36415 COLL VENOUS BLD VENIPUNCTURE: CPT

## 2022-08-15 PROCEDURE — 74011000250 HC RX REV CODE- 250: Performed by: INTERNAL MEDICINE

## 2022-08-15 PROCEDURE — 80053 COMPREHEN METABOLIC PANEL: CPT

## 2022-08-15 PROCEDURE — 74011250637 HC RX REV CODE- 250/637: Performed by: INTERNAL MEDICINE

## 2022-08-15 PROCEDURE — 65270000046 HC RM TELEMETRY

## 2022-08-15 PROCEDURE — 92526 ORAL FUNCTION THERAPY: CPT

## 2022-08-15 RX ORDER — FACIAL-BODY WIPES
10 EACH TOPICAL DAILY PRN
Status: DISCONTINUED | OUTPATIENT
Start: 2022-08-15 | End: 2022-09-06 | Stop reason: HOSPADM

## 2022-08-15 RX ORDER — DOCUSATE SODIUM 50 MG/5ML
50 LIQUID ORAL DAILY
Status: DISCONTINUED | OUTPATIENT
Start: 2022-08-15 | End: 2022-08-18

## 2022-08-15 RX ORDER — SENNOSIDES 8.8 MG/5ML
5 LIQUID ORAL DAILY
Status: DISCONTINUED | OUTPATIENT
Start: 2022-08-15 | End: 2022-08-18

## 2022-08-15 RX ORDER — NYSTATIN 100000 [USP'U]/ML
500000 SUSPENSION ORAL 4 TIMES DAILY
Status: DISCONTINUED | OUTPATIENT
Start: 2022-08-15 | End: 2022-09-06 | Stop reason: HOSPADM

## 2022-08-15 RX ORDER — HEPARIN SODIUM 5000 [USP'U]/ML
5000 INJECTION, SOLUTION INTRAVENOUS; SUBCUTANEOUS EVERY 8 HOURS
Status: DISCONTINUED | OUTPATIENT
Start: 2022-08-16 | End: 2022-08-28

## 2022-08-15 RX ORDER — AMOXICILLIN 250 MG
1 CAPSULE ORAL DAILY
Status: DISCONTINUED | OUTPATIENT
Start: 2022-08-15 | End: 2022-08-15

## 2022-08-15 RX ORDER — PANTOPRAZOLE SODIUM 40 MG/1
40 TABLET, DELAYED RELEASE ORAL 2 TIMES DAILY
Status: DISCONTINUED | OUTPATIENT
Start: 2022-08-15 | End: 2022-09-06 | Stop reason: HOSPADM

## 2022-08-15 RX ORDER — POLYETHYLENE GLYCOL 3350 17 G/17G
17 POWDER, FOR SOLUTION ORAL DAILY
Status: DISCONTINUED | OUTPATIENT
Start: 2022-08-15 | End: 2022-09-06 | Stop reason: HOSPADM

## 2022-08-15 RX ADMIN — PANTOPRAZOLE SODIUM 40 MG: 40 TABLET, DELAYED RELEASE ORAL at 17:50

## 2022-08-15 RX ADMIN — SENNOSIDES 8.8 MG: 8.8 SYRUP ORAL at 15:05

## 2022-08-15 RX ADMIN — POTASSIUM BICARBONATE 40 MEQ: 782 TABLET, EFFERVESCENT ORAL at 08:49

## 2022-08-15 RX ADMIN — Medication 10 ML: at 15:04

## 2022-08-15 RX ADMIN — MIDODRINE HYDROCHLORIDE 10 MG: 5 TABLET ORAL at 17:50

## 2022-08-15 RX ADMIN — MIDODRINE HYDROCHLORIDE 10 MG: 5 TABLET ORAL at 12:10

## 2022-08-15 RX ADMIN — MIDODRINE HYDROCHLORIDE 10 MG: 5 TABLET ORAL at 08:49

## 2022-08-15 RX ADMIN — ENOXAPARIN SODIUM 30 MG: 100 INJECTION SUBCUTANEOUS at 08:48

## 2022-08-15 RX ADMIN — Medication 10 ML: at 21:42

## 2022-08-15 RX ADMIN — NYSTATIN 500000 UNITS: 100000 SUSPENSION ORAL at 21:42

## 2022-08-15 RX ADMIN — SODIUM CHLORIDE, PRESERVATIVE FREE 40 MG: 5 INJECTION INTRAVENOUS at 08:52

## 2022-08-15 RX ADMIN — DOCUSATE SODIUM 50 MG: 50 LIQUID ORAL at 12:36

## 2022-08-15 RX ADMIN — NYSTATIN 500000 UNITS: 100000 SUSPENSION ORAL at 17:50

## 2022-08-15 RX ADMIN — PHENYLEPHRINE HYDROCHLORIDE 85 MCG/MIN: 10 INJECTION INTRAVENOUS at 03:41

## 2022-08-15 RX ADMIN — Medication 10 ML: at 05:48

## 2022-08-15 RX ADMIN — NYSTATIN 500000 UNITS: 100000 SUSPENSION ORAL at 15:04

## 2022-08-15 NOTE — PROGRESS NOTES
0730- Bedside and Verbal shift change report given to Christiano Damon RN (oncoming nurse) by Greg Mcgovern RN (offgoing nurse). Report included the following information SBAR, Kardex, ED Summary, OR Summary, Procedure Summary, Intake/Output, MAR, Accordion, and Recent Results. This patient was assisted with Intentional Toileting every 2 hours during this shift. Documentation of ambulation and output reflected on Flowsheet.

## 2022-08-15 NOTE — PROGRESS NOTES
Comprehensive Nutrition Assessment    Type and Reason for Visit: Reassess    Nutrition Recommendations/Plan:   Full Liquids - per SLP recs      RD adding ONS to aid in meeting needs:   Ensure Compact once daily (220 kcal, 32 g carbs, 9 g protein)   Low Calorie/High Protein supplement daily (160 abdoulaye, 19 g carb & 16g pro)     Malnutrition Assessment:  Malnutrition Status:  Severe malnutrition (08/12/22 1334)    Context:  Acute illness     Findings of the 6 clinical characteristics of malnutrition:   Energy Intake:  Unable to assess  Weight Loss:  Greater than 5% over 1 month     Body Fat Loss:  Mild body fat loss, Fat overlying ribs, Orbital   Muscle Mass Loss: Moderate muscle mass loss, Clavicles (pectoralis & deltoids), Temples (temporalis)  Fluid Accumulation:  No significant fluid accumulation,     Strength:  Not performed     Nutrition Assessment:     8/15: follow up. SLP evaluation today recommends advancing to full liquids. Wt is down from last week likely with inadequate diet order & limited PO. Advanced diet to full liquids & added ONS BID as above. Pt was asleep when RD checked & DW RN. Encourage frequent offers of PO when safe and alert. Last 3 Recorded Weights in this Encounter    08/11/22 1810 08/12/22 1251 08/14/22 0604   Weight: 52.2 kg (115 lb) 43.5 kg (95 lb 14.4 oz) 42.1 kg (92 lb 13 oz)     8/12: Pt is a 80year old female admitted with Hypokalemia [E87.6]  Acute kidney injury ([facility] [address] [N17.9]. She has an extensive past medical history which includes Atrial fibrillation), CAD, Chronic respiratory failure with hypoxia, Diabetes mellitus, GERD, deep venous thrombosis, Hyperlipidemia, and Systolic heart failure. BPA for unsure weight loss. Per documentation, patient has lost 22# (18%) within two months, clinically significant for timeframe. Patient had RRT this morning and transferred to ICU. NPO at this time. SLP deferred consult until patient more medically stable.  Patient with noted allergies to shellfish and eggs. Patient alert and oriented in bed, able to answer some questions. States she has not had an appetite recently 2/2 feeling nauseated. Denies chewing/swallowing problems. Palliative meeting with patient now. Wt Readings from Last 10 Encounters:   08/14/22 42.1 kg (92 lb 13 oz)   07/18/22 53.3 kg (117 lb 6.3 oz)   05/01/22 54 kg (119 lb 0.8 oz)   04/10/22 51.3 kg (113 lb)   05/22/17 62.3 kg (137 lb 6.4 oz)   09/29/15 58.6 kg (129 lb 1.6 oz)   09/05/15 58.1 kg (128 lb)   08/31/15 58.1 kg (128 lb)   05/15/12 57.2 kg (126 lb)   03/19/12 61.2 kg (135 lb)     Nutrition Related Findings:      Wound Type: Stage I (redness, otherwise intact per wound care 8/12)  Last Bowel Movement Date: 08/15/22  Stool Appearance: Soft  Abdominal Assessment: Intact  Appetite: Poor  Bowel Sounds: Active   Edema:Generalized: No Edema (8/14/2022  7:35 PM)    Nutr. Labs:    Lab Results   Component Value Date/Time    GFR est AA >60 08/15/2022 12:26 AM    GFR est non-AA 54 (L) 08/15/2022 12:26 AM    Creatinine (POC) 1.5 (H) 03/02/2016 12:08 PM    Creatinine 0.95 08/15/2022 12:26 AM    BUN 28 (H) 08/15/2022 12:26 AM    Sodium 139 08/15/2022 12:26 AM    Potassium 3.2 (L) 08/15/2022 12:26 AM    Chloride 107 08/15/2022 12:26 AM    CO2 24 08/15/2022 12:26 AM     Lab Results   Component Value Date/Time    Glucose 79 08/15/2022 12:26 AM    Glucose (POC) 82 04/23/2022 04:00 PM     Lab Results   Component Value Date/Time    Hemoglobin A1c 5.1 04/23/2022 12:55 AM     Nutr. Meds:  Lovenox, Pepcid, colace, Miralax, midodrine, ruchi-syn*   PRN: Zofran, Miralax    Current Nutrition Intake & Therapies:  Average Meal Intake: NPO  Average Supplement Intake: NPO  ADULT DIET Clear Liquid  ADULT ORAL NUTRITION SUPPLEMENT Lunch, Dinner; Clear Liquid    Anthropometric Measures:  Height: 5' 2\" (157.5 cm)  Ideal Body Weight (IBW): 110 lbs (50 kg)  Admission Body Weight: 115 lb  Current Body Wt:  42.1 kg (92 lb 13 oz), 84.4 % IBW.  Bed scale  Current BMI (kg/m2): 17        Weight Adjustment: No adjustment                 BMI Category: Underweight (BMI less than 22) age over 72    Estimated Daily Nutrient Needs:  Energy Requirements Based On: Kcal/kg  Weight Used for Energy Requirements: Current  Energy (kcal/day): 6483-2602  Weight Used for Protein Requirements: Current  Protein (g/day): 52-62 (1.0-1.2 g/kg)  Method Used for Fluid Requirements: Other (comment)  Fluid (ml/day): 9169-9659    Nutrition Diagnosis:   Inadequate oral intake related to cognitive or neurological impairment, inadequate protein-energy intake as evidenced by NPO or clear liquid status due to medical condition  Severe malnutrition related to inadequate protein-energy intake as evidenced by BMI, Criteria as identified in malnutrition assessment    Nutrition Interventions:   Food and/or Nutrient Delivery: Start oral diet, Start oral nutrition supplement  Nutrition Education/Counseling: No recommendations at this time  Coordination of Nutrition Care: Continue to monitor while inpatient  Plan of Care discussed with: nursing    Goals:     Goals: Initiate PO diet, by next RD assessment       Nutrition Monitoring and Evaluation:   Behavioral-Environmental Outcomes: None identified  Food/Nutrient Intake Outcomes: Diet advancement/tolerance  Physical Signs/Symptoms Outcomes: Biochemical data, Weight, Skin, Nausea/vomiting    Discharge Planning:     Too soon to determine    Doc MS Meri, RD  Contact: Ext: [phone], or via [employer]

## 2022-08-15 NOTE — PROGRESS NOTES
Care Management follow up    Patient re-admitted for electrolyte disturbance, acute kidney injury, dysphagia. Respiratory failure. History of: COPD, CHF, CAD, DVT, failure to thrive. RUR 20 (Score %) high   Is This a Readmission YES  Is this a Bundle NO    Current status  Patient discussed during interdisciplinary rounds. Patient continues to require medical management including ongoing assessment and monitoring. Patient continues on ruchi-synephrine drip, monitoring blood pressure. Palliative consult. Patient accepted by [facility] but unsure if family wants Hospice. Transition of Care Plan  Monitor patient status and response to treatment. Patient continues to require medical management. CM needs: unsure at this time  [facility]  Will monitor progress. CM to monitor progress and recommendations.     Uyen Saravia, RN, MSN/Care manager

## 2022-08-15 NOTE — PROGRESS NOTES
Problem: Dysphagia (Adult)  Goal: *Acute Goals and Plan of Care (Insert Text)  Description: Speech pathology goals  Initiated 8/12/2022  1. Patient will tolerate clear liquid diet with no overt s/s aspiration within 7 days-met  2. Tolerate full liquids without s/s aspiration by 8-18-22  Outcome: Progressing Towards Goal   SPEECH LANGUAGE PATHOLOGY DYSPHAGIA TREATMENT  Patient: Marco Fairbanks (64 y.o. female)  Date: 8/15/2022  Diagnosis: Hypokalemia [E87.6]  Acute kidney injury ([facility] [address] [N17.9] <principal problem not specified>      Precautions: aspiration      ASSESSMENT:  Patient taking small bites and sips with throat clearing issues. Suspect pharyngeal weakness, but swallowing Is complicated by possible thrush, recent pill dysphagia with possible irritation. May have weakened with reduced PO and then dysphagia increased. Son does not perceive the dysphagia; appears to consider his mother's decreased PO a mental issue. PLAN:  Recommendations and Planned Interventions:  Advance to full liquids. Small sips. She is a DNR, so no MBS unless requested by patient or family. Patient continues to benefit from skilled intervention to address the above impairments. Continue treatment per established plan of care. Discharge Recommendations:  hospice     SUBJECTIVE:   Patient stated Severiano Grieves got the pill stuck in my pipes. I thought I was a goner. Then it got stuck here (chest) and oh, it hurt so bad. Finally it went down, but it hurt'. Son reports no PNA. OBJECTIVE:   Cognitive and Communication Status:  Neurologic State: Alert  Orientation Level: Oriented to person  Cognition: Follows commands           Dysphagia Treatment:  Oral Assessment:     P.O. Trials:  Patient Position: upright in bed  Vocal quality prior to P.O.: No impairment  Consistency Presented:  Thin liquid;Puree  How Presented: Self-fed/presented;Spoon;Straw   ORAL PHASE:   Bolus Acceptance: Impaired (she takes tiny sips and bites)  Bolus Formation/Control: Impaired (slow a-p propulsion)     Propulsion: Delayed (# of seconds)  Oral Residue: None  PHARYNGEAL PHASE:   Initiation of Swallow: Delayed (# of seconds)  Laryngeal Elevation: Weak (moderate; double swallows; effortful swallow.)                         Exercises:  Laryngeal Exercises:                                                                                                                                   Pain:  Pain Scale 1: Numeric (0 - 10)  Pain Intensity 1: 0       After treatment:   Patient left in no apparent distress in bed, Nursing notified, and Caregiver / family present    COMMUNICATION/EDUCATION:   Patient was educated regarding her deficit(s) of DYSPHAGIA,  as this relates to her diagnosis of HYPOKALEMIA. She demonstrated Fair understanding as evidenced by DISCUSSION. SON with fair understanding as well. .    The patient's plan of care including recommendations, planned interventions, and recommended diet changes were discussed with: Registered nurse.      LARA Richmond  Time Calculation: 15 mins

## 2022-08-15 NOTE — ACP (ADVANCE CARE PLANNING)
Primary Decision Maker: Evy Valerio - [phone]  Advance Care Planning 8/15/2022   Patient's Healthcare Decision Maker is: Named in scanned ACP document   Confirm Advance Directive Yes, on file   Does the patient have other document types Do Not Resuscitate      Pt has AMD on file dated 4/11/2022 which appoints judd Reyes as sole Medical POA. Pt has DDNR on file dated 4/14/2022.

## 2022-08-15 NOTE — NURSE NAVIGATOR
Chart reviewed by Heart Failure Nurse Navigator. Heart Failure database completed. Patient admitted with acute electrolyte abnormalities, EVERT, vomiting, and chronic HFrEF. She has had frequent admissions with last admission 7/10/22 to 7/18/22 with acute on chronic HFrEF with debility and chronic pain. She was discharged home with [facility]. The Transitions Care Navigator was unable to reach her by telephone. She had PCP appointment scheduled at discharge. Previous admissions 4/9/22 to 4/14/22 with acute pancreatitis and 4/22/22 to 5/2/22 with sepsis and acute on chronic HFrEF with EVERT. Both of these discharges were to a rehab facility. She has been unable to tolerate GDMT and is currently on midodrine TID. Palliative care has been consulted with each admission for [address] discussion which was previously for restorative measures. Palliative care remains involved with continues discussions with patient and family. EF:  30 to 35%     ACEi/ARB/ARNi: unable to tolerate due to hypotension    BB: unable to tolerate due to hypotension    Aldosterone Antagonist: unable to tolerate due to hypotension    Obstructive Sleep Apnea Screening:   Referred to Sleep Medicine:     CRT not indicated    NYHA Functional Class **. Heart Failure Teach Back in Patient Education. Heart Failure Avoiding Triggers on Discharge Instructions. Cardiologist: [facility] has followed in the past.  [facility] Cardiology was consulted during July admission    Post discharge follow up phone call to be made within 48-72 hours of discharge.

## 2022-08-15 NOTE — PROGRESS NOTES
1915 Bedside and Verbal shift change report given to Claritza RN (oncoming nurse) by GERARDO Ambrose (offgoing nurse). Report included the following information SBAR, Kardex, Intake/Output, MAR, Recent Results, and Cardiac Rhythm Vpaced . This patient was assisted with Intentional Toileting every 2 hours during this shift as appropriate. Documentation of ambulation and output reflected on Flowsheet as appropriate. Purposeful hourly rounding was completed using AIDET and 5Ps. Outcomes of PHR documented as they occurred. Bed alarm in use as appropriate. Dual Suction and ambubag in place. 0715 Bedside and Verbal shift change report given to Bao Hazel RN (oncoming nurse) by Amie Woods RN (offgoing nurse). Report included the following information SBAR, Kardex, Intake/Output, MAR, Recent Results, and Cardiac Rhythm vpaced .

## 2022-08-15 NOTE — PROGRESS NOTES
[facility] [address]  [address] [facility], [address] [place] [address]  [phone]      Medical Progress Note      NAME: Kely Haas   :  1925  MRM:  [id number]    Date/Time: 8/15/2022           Assessment / Plan:     Chronic Hypoxemic Respiratory Failure: remains on baseline 4L nc. She has no e/o pulmonary edema, CHF. Nausea/vomiting/abd pain: acute on chronic. Improving. CT abd does show moderate stool burden, but otherwise nothing acute. Follow up KUB today. Supportive care with IVF, IV anti-emetics, low dose IV morphine prn. Bowel regimen as tolerated. Hypokalemia: Severe. Resolved. 2/2 GI losses, poor oral intake and diuretic use. Hypotension: continue ruchi drip; wean as able. Continue Midodrine. EVERT: baseline normal creatinine she presents with creatinine 1.69, azotemia, clinically volume down. Due to GI losses and diuretic use. Renally dose meds, avoid nephrotoxins    HFrEF: Echo 2022 with EF 30-35%. ?[place] [employer]? Presently volume down. Hold furosemide, amnas, metop    CAD: Trop slightly up, but down from prior. No pans for aggressive interventions at this time    Hx DVT: Could consider PE but low s/f PE given neg LE dopplers     FTT: With readmission and overwhelming symptoms, do think she is Hospice appropriate. Patient willing to speak with hospice but family seems not ready                    Care Plan discussed with: Patient, Care Manager, Nursing Staff, and Consultant/Specialist    Discussed:  Care Plan    Prophylaxis:  Lovenox    Disposition:  SNF/LTC           ___________________________________________________    Attending Physician: David Guardado DO        Subjective:     Chief Complaint:  Remains hypotensive. ROS:  Some Abd pain, nausea;/ no vomiting           Objective:       Vitals:          Last 24hrs VS reviewed since prior progress note.  Most recent are:    Visit Vitals  /73   Pulse 90   Temp 97.8 °F (36.6 °C)   Resp 21   Ht 5' 2\" (1.575 m)   Wt 42.1 kg (92 lb 13 oz)   SpO2 100%   BMI 16.98 kg/m²     SpO2 Readings from Last 6 Encounters:   08/15/22 100%   07/18/22 100%   05/02/22 93%   04/14/22 100%   05/22/17 94%   09/29/15 99%    O2 Flow Rate (L/min): 2 l/min     Intake/Output Summary (Last 24 hours) at 8/15/2022 0935  Last data filed at 8/14/2022 1729  Gross per 24 hour   Intake 25 ml   Output --   Net 25 ml            Exam:     Physical Exam:    Gen:  Frail, elderly  HEENT:  Pink conjunctivae, PERRL, hard of hearing   Resp:  No accessory muscle use, clear breath sounds without wheezes rales or rhonchi  Card:  No murmurs, normal S1, S2 without thrills, bruits or peripheral edema  Abd:  Soft, diff TTP, non-distended, normoactive bowel sounds are present  Musc:  No cyanosis or clubbing  Skin:  No rashes or ulcers, skin turgor is good  Neuro:  Cranial nerves 3-12 are grossly intact, diffuse weakness, follows commands appropriately  Psych:  Good insight, oriented to person, place and time, alert       Medications Reviewed: (see below)    Lab Data Reviewed: (see below)    ______________________________________________________________________    Medications:     Current Facility-Administered Medications   Medication Dose Route Frequency    midodrine (PROAMATINE) tablet 10 mg  10 mg Oral TID WITH MEALS    pantoprazole (PROTONIX) 40 mg in 0.9% sodium chloride 10 mL injection  40 mg IntraVENous Q12H    prochlorperazine (COMPAZINE) injection 5 mg  5 mg IntraVENous Q4H PRN    hydrALAZINE (APRESOLINE) 20 mg/mL injection 20 mg  20 mg IntraVENous Q6H PRN    morphine injection 2 mg  2 mg IntraVENous Q3H PRN    PHENYLephrine (POORNIMA-SYNEPHRINE) 30 mg in 0.9% sodium chloride 250 mL infusion   mcg/min IntraVENous TITRATE    sodium chloride (NS) flush 5-40 mL  5-40 mL IntraVENous Q8H    sodium chloride (NS) flush 5-40 mL  5-40 mL IntraVENous PRN    acetaminophen (TYLENOL) tablet 650 mg  650 mg Oral Q6H PRN    Or    acetaminophen (TYLENOL) suppository 650 mg  650 mg Rectal Q6H PRN    polyethylene glycol (MIRALAX) packet 17 g  17 g Oral DAILY PRN    ondansetron (ZOFRAN ODT) tablet 4 mg  4 mg Oral Q8H PRN    Or    ondansetron (ZOFRAN) injection 4 mg  4 mg IntraVENous Q6H PRN    enoxaparin (LOVENOX) injection 30 mg  30 mg SubCUTAneous DAILY            Lab Review:     No results for input(s): WBC, HGB, HCT, PLT, HGBEXT, HCTEXT, PLTEXT, HGBEXT, HCTEXT, PLTEXT in the last 72 hours. Recent Labs     08/15/22  0026 08/14/22  1100 08/13/22  0814 08/12/22  1713 08/12/22  1220    143 142  --  141   K 3.2* 3.0* 3.5   < > 2.4*    107 105  --  99   CO2 24 27 25  --  29   GLU 79 112* 61*  --  101*   BUN 28* 32* 44*  --  51*   CREA 0.95 1.01 1.28*  --  1.61*   CA 7.7* 7.7* 8.0*  --  8.4*   MG  --  1.5* 1.8  --  2.0   ALB 1.8* 1.9* 2.0*  --   --    ALT 37 43 65  --   --     < > = values in this interval not displayed.        No components found for: [place]

## 2022-08-15 NOTE — PROGRESS NOTES
1900  Bedside and Verbal shift change report given to Reba Lopez RN (oncoming nurse) by GERARDO Momin (offgoing nurse). Report included the following information SBAR, Kardex, Intake/Output, MAR, Recent Results, and Cardiac Rhythm vpaced . 1627: pt refusing morning labs at this time. Will attempt again later. This patient was assisted with Intentional Toileting every 2 hours during this shift as appropriate. Documentation of ambulation and output reflected on Flowsheet as appropriate. Purposeful hourly rounding was completed using AIDET and 5Ps. Outcomes of PHR documented as they occurred. Bed alarm in use as appropriate. Dual Suction and ambubag in place. 0700  Bedside and Verbal shift change report given to GERARDO Momin (oncoming nurse) by Reba Lopez RN (offgoing nurse). Report included the following information SBAR, Kardex, Intake/Output, MAR, Recent Results, and Cardiac Rhythm vpaced .

## 2022-08-15 NOTE — PROGRESS NOTES
Medicare pt has received, reviewed, and signed 2nd IM letter informing them of their right to appeal the discharge. Signed copy has been placed on pt bedside chart.   Pedro Steward CMS

## 2022-08-16 LAB
ALBUMIN SERPL-MCNC: 1.7 G/DL (ref 3.5–5)
ALBUMIN/GLOB SERPL: 0.5 {RATIO} (ref 1.1–2.2)
ALP SERPL-CCNC: 172 U/L (ref 45–117)
ALT SERPL-CCNC: 24 U/L (ref 12–78)
ANION GAP SERPL CALC-SCNC: 8 MMOL/L (ref 5–15)
AST SERPL-CCNC: 18 U/L (ref 15–37)
BILIRUB SERPL-MCNC: 0.8 MG/DL (ref 0.2–1)
BUN SERPL-MCNC: 25 MG/DL (ref 6–20)
BUN/CREAT SERPL: 27 (ref 12–20)
CALCIUM SERPL-MCNC: 8 MG/DL (ref 8.5–10.1)
CHLORIDE SERPL-SCNC: 109 MMOL/L (ref 97–108)
CO2 SERPL-SCNC: 25 MMOL/L (ref 21–32)
CREAT SERPL-MCNC: 0.92 MG/DL (ref 0.55–1.02)
GLOBULIN SER CALC-MCNC: 3.1 G/DL (ref 2–4)
GLUCOSE BLD STRIP.AUTO-MCNC: 91 MG/DL (ref 65–117)
GLUCOSE SERPL-MCNC: 53 MG/DL (ref 65–100)
MAGNESIUM SERPL-MCNC: 1.5 MG/DL (ref 1.6–2.4)
POTASSIUM SERPL-SCNC: 3 MMOL/L (ref 3.5–5.1)
PROT SERPL-MCNC: 4.8 G/DL (ref 6.4–8.2)
SERVICE CMNT-IMP: NORMAL
SODIUM SERPL-SCNC: 142 MMOL/L (ref 136–145)

## 2022-08-16 PROCEDURE — 74011250636 HC RX REV CODE- 250/636: Performed by: INTERNAL MEDICINE

## 2022-08-16 PROCEDURE — 97530 THERAPEUTIC ACTIVITIES: CPT

## 2022-08-16 PROCEDURE — 97161 PT EVAL LOW COMPLEX 20 MIN: CPT

## 2022-08-16 PROCEDURE — 74011250637 HC RX REV CODE- 250/637: Performed by: INTERNAL MEDICINE

## 2022-08-16 PROCEDURE — 80053 COMPREHEN METABOLIC PANEL: CPT

## 2022-08-16 PROCEDURE — 65270000046 HC RM TELEMETRY

## 2022-08-16 PROCEDURE — 36415 COLL VENOUS BLD VENIPUNCTURE: CPT

## 2022-08-16 PROCEDURE — 82962 GLUCOSE BLOOD TEST: CPT

## 2022-08-16 PROCEDURE — 92526 ORAL FUNCTION THERAPY: CPT

## 2022-08-16 PROCEDURE — 74011000250 HC RX REV CODE- 250: Performed by: INTERNAL MEDICINE

## 2022-08-16 PROCEDURE — 77010033678 HC OXYGEN DAILY

## 2022-08-16 PROCEDURE — 83735 ASSAY OF MAGNESIUM: CPT

## 2022-08-16 RX ORDER — POTASSIUM CHLORIDE 7.45 MG/ML
10 INJECTION INTRAVENOUS
Status: COMPLETED | OUTPATIENT
Start: 2022-08-16 | End: 2022-08-16

## 2022-08-16 RX ORDER — POTASSIUM CHLORIDE 750 MG/1
40 TABLET, FILM COATED, EXTENDED RELEASE ORAL
Status: DISCONTINUED | OUTPATIENT
Start: 2022-08-16 | End: 2022-08-16

## 2022-08-16 RX ORDER — BISACODYL 5 MG
5 TABLET, DELAYED RELEASE (ENTERIC COATED) ORAL
Status: ACTIVE | OUTPATIENT
Start: 2022-08-16 | End: 2022-08-16

## 2022-08-16 RX ORDER — DEXTROSE AND POTASSIUM CHLORIDE 5; .15 G/100ML; G/100ML
SOLUTION INTRAVENOUS CONTINUOUS
Status: DISCONTINUED | OUTPATIENT
Start: 2022-08-16 | End: 2022-08-17

## 2022-08-16 RX ORDER — DEXTROSE MONOHYDRATE 100 MG/ML
0-250 INJECTION, SOLUTION INTRAVENOUS AS NEEDED
Status: DISCONTINUED | OUTPATIENT
Start: 2022-08-16 | End: 2022-09-06 | Stop reason: HOSPADM

## 2022-08-16 RX ADMIN — NYSTATIN 500000 UNITS: 100000 SUSPENSION ORAL at 08:57

## 2022-08-16 RX ADMIN — MIDODRINE HYDROCHLORIDE 10 MG: 5 TABLET ORAL at 12:20

## 2022-08-16 RX ADMIN — POTASSIUM CHLORIDE 10 MEQ: 7.46 INJECTION, SOLUTION INTRAVENOUS at 16:47

## 2022-08-16 RX ADMIN — NYSTATIN 500000 UNITS: 100000 SUSPENSION ORAL at 12:20

## 2022-08-16 RX ADMIN — MIDODRINE HYDROCHLORIDE 10 MG: 5 TABLET ORAL at 08:56

## 2022-08-16 RX ADMIN — PANTOPRAZOLE SODIUM 40 MG: 40 TABLET, DELAYED RELEASE ORAL at 17:15

## 2022-08-16 RX ADMIN — Medication 10 ML: at 06:19

## 2022-08-16 RX ADMIN — POTASSIUM CHLORIDE 10 MEQ: 7.46 INJECTION, SOLUTION INTRAVENOUS at 14:52

## 2022-08-16 RX ADMIN — NYSTATIN 500000 UNITS: 100000 SUSPENSION ORAL at 23:02

## 2022-08-16 RX ADMIN — HEPARIN SODIUM 5000 UNITS: 5000 INJECTION INTRAVENOUS; SUBCUTANEOUS at 06:19

## 2022-08-16 RX ADMIN — MIDODRINE HYDROCHLORIDE 10 MG: 5 TABLET ORAL at 16:48

## 2022-08-16 RX ADMIN — HEPARIN SODIUM 5000 UNITS: 5000 INJECTION INTRAVENOUS; SUBCUTANEOUS at 23:02

## 2022-08-16 RX ADMIN — HEPARIN SODIUM 5000 UNITS: 5000 INJECTION INTRAVENOUS; SUBCUTANEOUS at 14:51

## 2022-08-16 RX ADMIN — PANTOPRAZOLE SODIUM 40 MG: 40 TABLET, DELAYED RELEASE ORAL at 08:55

## 2022-08-16 RX ADMIN — POTASSIUM BICARBONATE 40 MEQ: 782 TABLET, EFFERVESCENT ORAL at 16:47

## 2022-08-16 RX ADMIN — DEXTROSE AND POTASSIUM CHLORIDE: 5; .15 SOLUTION INTRAVENOUS at 14:51

## 2022-08-16 RX ADMIN — Medication 10 ML: at 23:03

## 2022-08-16 RX ADMIN — Medication 10 ML: at 16:47

## 2022-08-16 NOTE — PROGRESS NOTES
Care Management follow up, LOS 5 days     Patient re-admitted for electrolyte disturbance, acute kidney injury, dysphagia. Respiratory failure. History of: COPD, CHF, CAD, DVT, failure to thrive. RUR 19 (Score %) moderate           Is This a Readmission YES  Is this a Bundle NO     Current status  Patient discussed during interdisciplinary rounds. Patient continues to require medical management including ongoing assessment and monitoring. Patient weaned from ruchi-synephrine drip, monitoring blood pressure. Palliative consult. Patient accepted by [facility] but unsure if family wants Hospice. Await PT/OT evaluations to assist with DC disposition. Transition of Care Plan  Monitor patient status and response to treatment. Patient continues to require medical management. CM needs: unsure at this time  Accepted by [facility] but await PT/OT evaluations. Will monitor progress.   CM to monitor progress and recommendations    Suzanna Jerez RN, MSN/Care manager  [phone]

## 2022-08-16 NOTE — PROGRESS NOTES
1243: TRANSFER - IN REPORT:    Verbal report received from [address] (name) on Alyse Feritas  being received from [facility] (unit) for routine progression of care      Report consisted of patients Situation, Background, Assessment and   Recommendations(SBAR). Information from the following report(s) SBAR, Kardex, Intake/Output, MAR, and Recent Results was reviewed with the receiving nurse. Opportunity for questions and clarification was provided. Assessment completed upon patients arrival to unit and care assumed.

## 2022-08-16 NOTE — PROGRESS NOTES
Problem: Mobility Impaired (Adult and Pediatric)  Goal: *Acute Goals and Plan of Care (Insert Text)  Description: FUNCTIONAL STATUS PRIOR TO ADMISSION: Pt reports w/c level mobility with pull to stand on RW with assist to complete transfer. Pt states that she is able to propel w/c in home at times, requires family assist at times. Notes recent functional decline due to progressive weakness. States that outside physician told her she has six months to live, and that information has taken away \"all my motivation. \"    HOME SUPPORT PRIOR TO ADMISSION: The patient lived with  and son who provides assist.    Physical Therapy Goals  Initiated 8/16/2022  1. Patient will move from supine to sit and sit to supine  in bed with moderate assistance  within 7 day(s). 2.  Patient will transfer from bed to chair and chair to bed with moderate assistance  using the least restrictive device within 7 day(s). 3.  Patient will perform sit to stand with moderate assistance  within 7 day(s). 4.  Patient will complete functional dynamic sitting activities EOB with supervision/set-up within 7 day(s). Outcome: Not Met   PHYSICAL THERAPY EVALUATION  Patient: Julien Graft (55 y.o. female)  Date: 8/16/2022  Primary Diagnosis: Hypokalemia [E87.6]  Acute kidney injury ([facility] [N17.9]       Precautions: fall       ASSESSMENT  Based on the objective data described below, the patient presents with [facility], general weakness, joint stiffness bilat shoulders, decreased activity tolerance, decline in function with mobility following admission for acute respiratory failure and n/v/abdominal pain. Pt received in semi-reclined position with 4L O2. Pt required increased time to complete all functional tasks due to need for rest breaks, slow movement, increased time to initiate movement. Noted mild REDDY, SpO2 high 90s with 4L in place. Pt required max A with bed mob. Tolerated EOB x several min, requested defer standing this session.  Endorses feeling anxious. Pt rolled R/L multiple times with bed rail and assist for facilitation of bowel incontinence care. Pt remains below reported functional baseline. Will benefit from mobility progression as tolerated. Will con't to assess follow up needs for home with assist and [facility] v SNF pending GOC and pt progress. Current Level of Function Impacting Discharge (mobility/balance): bed mob max A    Other factors to consider for discharge: requires O2 at baseline, family providing assist at baseline     Patient will benefit from skilled therapy intervention to address the above noted impairments. PLAN :  Recommendations and Planned Interventions: bed mobility training, transfer training, gait training, therapeutic exercises, patient and family training/education, and therapeutic activities      Frequency/Duration: Patient will be followed by physical therapy:  4 times a week to address goals.     Recommendation for discharge: (in order for the patient to meet his/her long term goals)  To be determined: home with 24/7 assist and [facility] PT v SNF pending GOC and pt progress    This discharge recommendation:  Has not yet been discussed the attending provider and/or case management    IF patient discharges home will need the following DME: to be determined (TBD)         SUBJECTIVE:   Patient stated \"My doctor told me I have six months to live, and it took away all my motivation re outside appointment prior to admission    OBJECTIVE DATA SUMMARY:   HISTORY:    Past Medical History:   Diagnosis Date    Anemia     Arthritis     Atrial fibrillation ([address]     CAD (coronary artery disease)     Chronic pain     Chronic respiratory failure with hypoxia ([address]     On supplemental home oxygen at 4 L/min continuously    Cor pulmonale ([address]     Mildly reduced right ventricular function per echocardiogram of 7/12/2022    Diabetes mellitus ([address]     GERD (gastroesophageal reflux disease)     Glaucoma     Hx of completed stroke MRI of the brain with and without contrast on 9/5/2015 showed right parietal and temporal encephalomalacia probably represents old infarctions    Hx of deep venous thrombosis     Hyperlipidemia     Macular degeneration     Mild mitral stenosis     Per echocardiogram of 7/12/2022    Osteoporosis, post-menopausal     Systolic heart failure (HCC)     LVEF 30-35% per echocardiogram of 7/12/2022     Past Surgical History:   Procedure Laterality Date    HX CHOLECYSTECTOMY      HX HEENT      HX HIP REPLACEMENT      HX PACEMAKER      HX ROTATOR CUFF REPAIR Bilateral     VASCULAR SURGERY PROCEDURE UNLIST         Personal factors and/or comorbidities impacting plan of care: reps failure, arthritis, hearing loss, CHF    Home Situation  Home Environment: Private residence  # Steps to Enter:  (chair lift access to home)  One/Two Story Residence: One story  Living Alone: No  Support Systems: Spouse/Significant Other, Child(ramo) ( and son)  Patient Expects to be Discharged to[de-identified] Unable to determine at this time  Current DME Used/Available at Home: julee Correia, Wheelchair    EXAMINATION/PRESENTATION/DECISION MAKING:   Critical Behavior:  Neurologic State: Alert  Orientation Level: Oriented to person, Oriented to place  Cognition: Impaired decision making  Safety/Judgement: Decreased insight into deficits  Hearing: Auditory  Auditory Impairment: (P) Hard of hearing, bilateral  Range Of Motion:  AROM: Generally decreased, functional (R ankle DF grossly neutral)           PROM: Generally decreased, functional           Strength:    Strength: Generally decreased, functional                    Tone & Sensation:   Tone: Normal              Sensation: Intact               Coordination:  Coordination: Generally decreased, functional  Vision:      Functional Mobility:  Bed Mobility:  Rolling: Moderate assistance; Additional time  Supine to Sit: Maximum assistance; Additional time;Bed Modified  Sit to Supine: Maximum assistance; Additional time  Scooting: Maximum assistance (for scoot to EOB)  Transfers:      Deferred per pt request              Balance:   Sitting: Impaired  Sitting - Static: Good (unsupported)  Sitting - Dynamic: Fair (occasional)       Physical Therapy Evaluation Charge Determination   History Examination Presentation Decision-Making   HIGH Complexity :3+ comorbidities / personal factors will impact the outcome/ POC  MEDIUM Complexity : 3 Standardized tests and measures addressing body structure, function, activity limitation and / or participation in recreation  MEDIUM Complexity : Evolving with changing characteristics  LOW Complexity : FOTO score of       Based on the above components, the patient evaluation is determined to be of the following complexity level: LOW     Pain Rating:  Pt reports pain anterior lower LE    Activity Tolerance:   Fair, desaturates with exertion and requires oxygen, and requires frequent rest breaks    After treatment patient left in no apparent distress:   Heels elevated for pressure relief, Call bell within reach, Bed / chair alarm activated, Side rails x 3, and HOB elevated    COMMUNICATION/EDUCATION:   The patients plan of care was discussed with: Registered nurse. Fall prevention education was provided and the patient/caregiver indicated understanding., Patient/family have participated as able in goal setting and plan of care. , and Patient/family agree to work toward stated goals and plan of care.     Thank you for this referral.  Rosy Cuevas, PT   Time Calculation: 50 mins

## 2022-08-16 NOTE — PROGRESS NOTES
1915: Bedside and Verbal shift change report given to Lata Ramírez  (oncoming nurse) by Klye Izaguirre RN  (offgoing nurse). Report included the following information SBAR, Kardex, Intake/Output, MAR, and Recent Results.

## 2022-08-16 NOTE — PROGRESS NOTES
TRANSFER - OUT REPORT:    Verbal report given to Em Painter (name) on Richar Felix  (patient name)  being transferred to [phone] (unit) for routine progression of care   Report consisted of patients Situation, Background, Assessment and   Recommendations(SBAR).      Maria Dolores Leiva, RN, MSN/Care manager  [phone]

## 2022-08-16 NOTE — PROGRESS NOTES
0730- Bedside and Verbal shift change report given to Ramonita Thayer RN (oncoming nurse) by Sydnie Hastings RN (offgoing nurse). Report included the following information SBAR, Kardex, ED Summary, OR Summary, Procedure Summary, Intake/Output, MAR, Accordion, and Recent Results. This patient was assisted with Intentional Toileting every 2 hours during this shift. Documentation of ambulation and output reflected on Flowsheet. 1245-TRANSFER - OUT REPORT:    Verbal report given to GERARDO Kelley(name) on Alyse Freitas  being transferred to [facility](unit) for routine progression of care       Report consisted of patients Situation, Background, Assessment and   Recommendations(SBAR). Information from the following report(s) SBAR, Kardex, ED Summary, OR Summary, Procedure Summary, Intake/Output, MAR, Accordion, and Recent Results was reviewed with the receiving nurse. Lines:   Peripheral IV 08/12/22 Right;Upper Arm (Active)   Site Assessment Clean, dry, & intact 08/16/22 0348   Phlebitis Assessment 0 08/16/22 0348   Infiltration Assessment 0 08/16/22 0348   Dressing Status Clean, dry, & intact 08/16/22 0348   Dressing Type Transparent 08/16/22 0348   Hub Color/Line Status Pink;Capped;Flushed 08/16/22 0348   Action Taken Open ports on tubing capped 08/16/22 0348   Alcohol Cap Used Yes 08/16/22 0348       Peripheral IV 08/12/22 Anterior;Right (Active)   Site Assessment Clean, dry, & intact 08/16/22 0348   Phlebitis Assessment 0 08/16/22 0348   Infiltration Assessment 0 08/16/22 0348   Dressing Status Clean, dry, & intact 08/16/22 0348   Dressing Type Transparent 08/16/22 0348   Hub Color/Line Status Capped;Flushed 08/16/22 0348   Action Taken Open ports on tubing capped 08/16/22 0348   Alcohol Cap Used Yes 08/16/22 0348        Opportunity for questions and clarification was provided.       Patient transported with:   Monitor  O2 @ 2 liters  Registered Nurse  [facility]

## 2022-08-16 NOTE — PROGRESS NOTES
Problem: Dysphagia (Adult)  Goal: *Acute Goals and Plan of Care (Insert Text)  Description: Speech pathology goals  Initiated 8/12/2022  1. Patient will tolerate clear liquid diet with no overt s/s aspiration within 7 days-met  2. Tolerate full liquids without s/s aspiration by 8-18-22  3> tolerate purees, thins without s/s aspiraiton by 8-19-22  Outcome: Not Progressing Towards Goal   SPEECH LANGUAGE PATHOLOGY DYSPHAGIA TREATMENT  Patient: Emory Ortiz (35 y.o. female)  Date: 8/16/2022  Diagnosis: Hypokalemia [E87.6]  Acute kidney injury ([facility] [N17.9] <principal problem not specified>      Precautions: aspiration      ASSESSMENT:  Patient continues to struggle with significant dysphagia with all consistencies intermitttently, characterized by effortful swallow, double swallow, weak swallows, throat clearing, coughing. Patient has serious nutritional and hydration issues. PLAN:  Recommendations and Planned Interventions:  Upgrade diet to purees to allow more choices without milk products. Careful with pills. Upright for all PO. Patient continues to benefit from skilled intervention to address the above impairments. Continue treatment per established plan of care. Discharge Recommendations:  hospice     SUBJECTIVE:   Patient stated I'm just so dry; water is a blessing. OBJECTIVE:   Cognitive and Communication Status:  Neurologic State: Alert  Orientation Level: Oriented to person, Oriented to place  Cognition: Impaired decision making  Perception:  (severly [place])     Safety/Judgement: Decreased insight into deficits  Dysphagia Treatment:  Oral Assessment:     P.O. Trials:  Patient Position: upright in bed  Vocal quality prior to P.O.: No impairment  Consistency Presented: Thin liquid;Puree  How Presented: SLP-fed/presented;Cup/sip;Straw;Spoon   ORAL PHASE:   Bolus Acceptance: Impaired (attemtping to take PO, but takes mini sips and bites.  nutritional concerns are significant)  Bolus Formation/Control: No impairment     Propulsion: No impairment  Oral Residue: None  PHARYNGEAL PHASE:   Initiation of Swallow: Delayed (# of seconds) (effortful)  Laryngeal Elevation: Weak (weak plus double swallows.)  Aspiration Signs/Symptoms:  (intermittent throat clearing with all consistencies, thins worse at times.)  Pharyngeal Phase Characteristics:  (she c/o milk products make more phlegm. She had coughing and hawking on these, but also water at times.)      She did tka her pills with water eventually with small sips. Exercises:  Laryngeal Exercises:                                                                                                                                   Pain:  Pain Scale 1: Numeric (0 - 10)  Pain Intensity 1: 0       After treatment:   Patient left in no apparent distress in bed and Nursing notified    COMMUNICATION/EDUCATION:   Patient was educated regarding her deficit(s) of dysphagia  as this relates to her diagnosis of hypokalemia. She demonstrated Guarded understanding as evidenced by discussion. .    The patient's plan of care including recommendations, planned interventions, and recommended diet changes were discussed with: Registered nurse.      Cleophas Lanes, SLP  Time Calculation: 15 mins

## 2022-08-16 NOTE — PROGRESS NOTES
[facility] visit. Mrs. Nadja Jasso was in bed. She has difficulty hearing. Prayer and communion offered.     Lazarus Boone, SBS, RN, ACSW, LCSW   Page:  [phone]

## 2022-08-16 NOTE — PROGRESS NOTES
[facility]  [address] [facility], [address] [place] [address]  [phone]      Medical Progress Note      NAME: Nadeem Coleman   :  1925  MRM:  [id number]    Date/Time: 2022           Assessment / Plan:     Chronic Hypoxemic Respiratory Failure: remains on baseline 4L nc. She has no e/o pulmonary edema, CHF. Nausea/vomiting/abd pain: acute on chronic. Improving but still present. CT abd does show moderate stool burden, but otherwise nothing acute. Follow up KUB 8/15 showing stool burden; give suppository x1 today. Supportive care with anti-emetics, low dose IV morphine prn. Bowel regimen. Hypokalemia:  2/2 GI losses, poor oral intake and diuretic use. Replete prn    Hypotension: weaned off ruchi drip. Continue Midodrine. EVERT: baseline normal creatinine she presents with creatinine 1.69, azotemia, clinically volume down. Due to GI losses and diuretic use. Renally dose meds, avoid nephrotoxins    HFrEF: Echo 2022 with EF 30-35%. ?Ysabel Liu? Presently volume down. Hold furosemide, manas, metop    CAD: Trop slightly up, but down from prior. No pans for aggressive interventions at this time    Hx DVT: Could consider PE but low s/f PE given neg LE dopplers     FTT: With readmission and overwhelming symptoms, do think she is Hospice appropriate. Patient willing to speak with hospice but family seems not ready                    Care Plan discussed with: Patient, Care Manager, Nursing Staff, and Consultant/Specialist    Discussed:  Care Plan    Prophylaxis:  Lovenox    Disposition:  SNF/LTC           ___________________________________________________    Attending Physician: Jessica Grant DO        Subjective:     Chief Complaint:  Blood pressure improved. ROS:  Some Abd pain, nausea/ no vomiting           Objective:       Vitals:          Last 24hrs VS reviewed since prior progress note.  Most recent are:    Visit Vitals  /76 (BP 1 Location: Left arm, BP Patient Position: At rest)   Pulse 83   Temp 97.4 °F (36.3 °C)   Resp 22   Ht 5' 2\" (1.575 m)   Wt 42.1 kg (92 lb 13 oz)   SpO2 97%   BMI 16.98 kg/m²     SpO2 Readings from Last 6 Encounters:   08/16/22 97%   07/18/22 100%   05/02/22 93%   04/14/22 100%   05/22/17 94%   09/29/15 99%    O2 Flow Rate (L/min): 2 l/min     Intake/Output Summary (Last 24 hours) at 8/16/2022 0901  Last data filed at 8/16/2022 0348  Gross per 24 hour   Intake 240 ml   Output --   Net 240 ml            Exam:     Physical Exam:    Gen:  Frail, elderly  HEENT:  Pink conjunctivae, PERRL, hard of hearing   Resp:  No accessory muscle use, clear breath sounds without wheezes rales or rhonchi  Card:  No murmurs, normal S1, S2 without thrills, bruits or peripheral edema  Abd:  Soft, diff TTP, non-distended, normoactive bowel sounds are present  Musc:  No cyanosis or clubbing  Skin:  No rashes or ulcers, skin turgor is good  Neuro:  Cranial nerves 3-12 are grossly intact, diffuse weakness, follows commands appropriately  Psych:  Good insight, oriented to person, place and time, alert       Medications Reviewed: (see below)    Lab Data Reviewed: (see below)    ______________________________________________________________________    Medications:     Current Facility-Administered Medications   Medication Dose Route Frequency    bisacodyL (DULCOLAX) tablet 5 mg  5 mg Oral NOW    polyethylene glycol (MIRALAX) packet 17 g  17 g Oral DAILY    pantoprazole (PROTONIX) tablet 40 mg  40 mg Oral BID    heparin (porcine) injection 5,000 Units  5,000 Units SubCUTAneous Q8H    sennosides (SENOKOT) 8.8 mg/5 mL syrup 8.8 mg  5 mL Oral DAILY    docusate (COLACE) 50 mg/5 mL oral liquid 50 mg  50 mg Oral DAILY    nystatin (MYCOSTATIN) 100,000 unit/mL oral suspension 500,000 Units  500,000 Units Oral QID    bisacodyL (DULCOLAX) suppository 10 mg  10 mg Rectal DAILY PRN    midodrine (PROAMATINE) tablet 10 mg  10 mg Oral TID WITH MEALS    prochlorperazine (COMPAZINE) injection 5 mg  5 mg IntraVENous Q4H PRN    hydrALAZINE (APRESOLINE) 20 mg/mL injection 20 mg  20 mg IntraVENous Q6H PRN    morphine injection 2 mg  2 mg IntraVENous Q3H PRN    sodium chloride (NS) flush 5-40 mL  5-40 mL IntraVENous Q8H    sodium chloride (NS) flush 5-40 mL  5-40 mL IntraVENous PRN    acetaminophen (TYLENOL) tablet 650 mg  650 mg Oral Q6H PRN    Or    acetaminophen (TYLENOL) suppository 650 mg  650 mg Rectal Q6H PRN    polyethylene glycol (MIRALAX) packet 17 g  17 g Oral DAILY PRN    ondansetron (ZOFRAN ODT) tablet 4 mg  4 mg Oral Q8H PRN    Or    ondansetron (ZOFRAN) injection 4 mg  4 mg IntraVENous Q6H PRN            Lab Review:     No results for input(s): WBC, HGB, HCT, PLT, HGBEXT, HCTEXT, PLTEXT, HGBEXT, HCTEXT, PLTEXT in the last 72 hours.     Recent Labs     08/15/22  0026 08/14/22  1100    143   K 3.2* 3.0*    107   CO2 24 27   GLU 79 112*   BUN 28* 32*   CREA 0.95 1.01   CA 7.7* 7.7*   MG  --  1.5*   ALB 1.8* 1.9*   ALT 37 43       No components found for: [place]

## 2022-08-17 LAB
ALBUMIN SERPL-MCNC: 1.7 G/DL (ref 3.5–5)
ALBUMIN/GLOB SERPL: 0.6 {RATIO} (ref 1.1–2.2)
ALP SERPL-CCNC: 159 U/L (ref 45–117)
ALT SERPL-CCNC: 22 U/L (ref 12–78)
ANION GAP SERPL CALC-SCNC: 7 MMOL/L (ref 5–15)
ANION GAP SERPL CALC-SCNC: 9 MMOL/L (ref 5–15)
AST SERPL-CCNC: 31 U/L (ref 15–37)
BILIRUB SERPL-MCNC: 1 MG/DL (ref 0.2–1)
BUN SERPL-MCNC: 21 MG/DL (ref 6–20)
BUN SERPL-MCNC: 24 MG/DL (ref 6–20)
BUN/CREAT SERPL: 25 (ref 12–20)
BUN/CREAT SERPL: 27 (ref 12–20)
CALCIUM SERPL-MCNC: 7.3 MG/DL (ref 8.5–10.1)
CALCIUM SERPL-MCNC: 9.1 MG/DL (ref 8.5–10.1)
CHLORIDE SERPL-SCNC: 104 MMOL/L (ref 97–108)
CHLORIDE SERPL-SCNC: 109 MMOL/L (ref 97–108)
CO2 SERPL-SCNC: 18 MMOL/L (ref 21–32)
CO2 SERPL-SCNC: 24 MMOL/L (ref 21–32)
CREAT SERPL-MCNC: 0.78 MG/DL (ref 0.55–1.02)
CREAT SERPL-MCNC: 0.96 MG/DL (ref 0.55–1.02)
GLOBULIN SER CALC-MCNC: 2.8 G/DL (ref 2–4)
GLUCOSE SERPL-MCNC: 109 MG/DL (ref 65–100)
GLUCOSE SERPL-MCNC: 131 MG/DL (ref 65–100)
POTASSIUM SERPL-SCNC: 3.4 MMOL/L (ref 3.5–5.1)
POTASSIUM SERPL-SCNC: 6.5 MMOL/L (ref 3.5–5.1)
PROT SERPL-MCNC: 4.5 G/DL (ref 6.4–8.2)
SODIUM SERPL-SCNC: 134 MMOL/L (ref 136–145)
SODIUM SERPL-SCNC: 137 MMOL/L (ref 136–145)

## 2022-08-17 PROCEDURE — 36415 COLL VENOUS BLD VENIPUNCTURE: CPT

## 2022-08-17 PROCEDURE — 74011000250 HC RX REV CODE- 250: Performed by: INTERNAL MEDICINE

## 2022-08-17 PROCEDURE — 74011250637 HC RX REV CODE- 250/637: Performed by: INTERNAL MEDICINE

## 2022-08-17 PROCEDURE — 92526 ORAL FUNCTION THERAPY: CPT | Performed by: SPEECH-LANGUAGE PATHOLOGIST

## 2022-08-17 PROCEDURE — 77010033678 HC OXYGEN DAILY

## 2022-08-17 PROCEDURE — 94761 N-INVAS EAR/PLS OXIMETRY MLT: CPT

## 2022-08-17 PROCEDURE — 80053 COMPREHEN METABOLIC PANEL: CPT

## 2022-08-17 PROCEDURE — 65270000046 HC RM TELEMETRY

## 2022-08-17 PROCEDURE — 74011250636 HC RX REV CODE- 250/636: Performed by: INTERNAL MEDICINE

## 2022-08-17 RX ORDER — DEXTROSE AND POTASSIUM CHLORIDE 5; .15 G/100ML; G/100ML
SOLUTION INTRAVENOUS CONTINUOUS
Status: DISCONTINUED | OUTPATIENT
Start: 2022-08-17 | End: 2022-08-29

## 2022-08-17 RX ADMIN — NYSTATIN 500000 UNITS: 100000 SUSPENSION ORAL at 10:23

## 2022-08-17 RX ADMIN — SENNOSIDES 8.8 MG: 8.8 SYRUP ORAL at 10:23

## 2022-08-17 RX ADMIN — NYSTATIN 500000 UNITS: 100000 SUSPENSION ORAL at 12:39

## 2022-08-17 RX ADMIN — HEPARIN SODIUM 5000 UNITS: 5000 INJECTION INTRAVENOUS; SUBCUTANEOUS at 20:57

## 2022-08-17 RX ADMIN — Medication 10 ML: at 12:40

## 2022-08-17 RX ADMIN — Medication 10 ML: at 20:57

## 2022-08-17 RX ADMIN — MIDODRINE HYDROCHLORIDE 10 MG: 5 TABLET ORAL at 12:39

## 2022-08-17 RX ADMIN — HEPARIN SODIUM 5000 UNITS: 5000 INJECTION INTRAVENOUS; SUBCUTANEOUS at 06:11

## 2022-08-17 RX ADMIN — MIDODRINE HYDROCHLORIDE 10 MG: 5 TABLET ORAL at 10:23

## 2022-08-17 RX ADMIN — HEPARIN SODIUM 5000 UNITS: 5000 INJECTION INTRAVENOUS; SUBCUTANEOUS at 14:27

## 2022-08-17 RX ADMIN — Medication 10 ML: at 06:11

## 2022-08-17 RX ADMIN — DEXTROSE MONOHYDRATE AND POTASSIUM CHLORIDE INJECTION, SOLUTION: 5; .149 INJECTION, SOLUTION INTRAVENOUS at 18:00

## 2022-08-17 RX ADMIN — DOCUSATE SODIUM 50 MG: 50 LIQUID ORAL at 10:23

## 2022-08-17 RX ADMIN — PANTOPRAZOLE SODIUM 40 MG: 40 TABLET, DELAYED RELEASE ORAL at 10:23

## 2022-08-17 RX ADMIN — PANTOPRAZOLE SODIUM 40 MG: 40 TABLET, DELAYED RELEASE ORAL at 18:05

## 2022-08-17 RX ADMIN — DEXTROSE AND POTASSIUM CHLORIDE: 5; .15 SOLUTION INTRAVENOUS at 10:24

## 2022-08-17 RX ADMIN — MIDODRINE HYDROCHLORIDE 10 MG: 5 TABLET ORAL at 16:58

## 2022-08-17 NOTE — PROGRESS NOTES
Problem: Dysphagia (Adult)  Goal: *Acute Goals and Plan of Care (Insert Text)  Description: Speech pathology goals  Initiated 8/12/2022  1. Patient will tolerate clear liquid diet with no overt s/s aspiration within 7 days-met  2. Tolerate full liquids without s/s aspiration by 8-18-22  3> tolerate purees, thins without s/s aspiraiton by 8-19-22  Outcome: Progressing Towards Goal     SPEECH LANGUAGE PATHOLOGY DYSPHAGIA TREATMENT  Patient: Asa Payne (74 y.o. female)  Date: 8/17/2022  Diagnosis: Hypokalemia [E87.6]  Acute kidney injury ([facility] [N17.9] <principal problem not specified>      Precautions: Aspiration      ASSESSMENT:  Patient tolerated sips of thin liquids via straw without overt s/s aspiration. Patient took small single sips and demonstrated limited trials. Patient declined pureed trials. She reports no choking with breakfast but occasional cough due to phlegm in her throat. Suspect limited intake of PO and patient at risk nutritionally. PLAN:  Recommendations and Planned Interventions:  Pureed diet  Thin liquids  Sit up for all PO  Patient continues to benefit from skilled intervention to address the above impairments. Continue treatment per established plan of care. Discharge Recommendations: To Be Determined     SUBJECTIVE:   Patient stated I'm afraid I won't be awake much today. I didn't sleep well last night. .    OBJECTIVE:   Cognitive and Communication Status:  Neurologic State: Alert  Orientation Level: Oriented X4  Cognition: Follows commands  Perception:  (severly [place])     Safety/Judgement: Decreased insight into deficits  Dysphagia Treatment:  Oral Assessment:     P.O. Trials:  Patient Position: Upright in bed  Vocal quality prior to P.O.: No impairment  Consistency Presented:  Thin liquid  How Presented: Straw     Bolus Acceptance: No impairment  Bolus Formation/Control: No impairment     Propulsion: No impairment  Oral Residue: None                               After treatment:   Patient left in no apparent distress in bed and Call bell within reach    COMMUNICATION/EDUCATION:   Patient was educated regarding role of SLP, diet and POC. Patient nodded.     The patient's plan of care including recommendations, planned interventions, and recommended diet changes were discussed with:    LARA Webb  Time Calculation: 15 mins

## 2022-08-17 NOTE — PROGRESS NOTES
8/17/2022 10:48 AM Per MD pt agreeable to hospice information session but wanting son to be present. CM called to [address] [place] they were following pt is pt was GIP but they cannot service pt's home address in [place]. CM called to pt's son, Monica Tillman and relayed pt is in agreement with meeting with a hospice agency. Pt's son Monica Tillman reported he had spoken with his mother this morning and she relayed she did not want hospice at this time. Monica Tillman reported preference for discharge is pt to return home with resumption of already arranged home therapy. Pt's son reported he is concerned regarding pt discharging from the hospital too soon as pt has already readmitted to the hospital.   CM relayed to pt's attending. CM will follow. MARLIN Delarosa     Care Management Progress Note      ICD-10-CM ICD-9-CM    1. Nausea and vomiting, unspecified vomiting type  R11.2 787.01       2. Acute dehydration  E86.0 276.51       3. Hypomagnesemia  E83.42 275.2       4. Hypokalemia  E87.6 276.8       5. Uremia  N19 586       6. EVERT (acute kidney injury) ([facility] [address]  N17.9 584.9       7. Chronic diastolic heart failure (HCC)  I50.32 428.32       8. Elevated troponin  R77.8 790.6       9. Palliative care by specialist [Z51.5 (ICD-10-CM)]  Z51.5 V66.7       10. Goals of care, counseling/discussion [Z71.89 (ICD-10-CM)]  Z71.89 V65.49       11. Age-related physical debility [R54 (ICD-10-CM)]  R54 797       12. Weakness generalized [R53.1 (ICD-10-CM)]  R53.1 780.79       13. Decreased appetite [R63.0 (ICD-10-CM)]  R63.0 783.0       14.  Hearing loss, unspecified hearing loss type, unspecified laterality [H91.90 (ICD-10-CM)]  H91.90 389.9         Readmission   RUR:  20%  Risk Level: []Low []Moderate [x]High  Value-based purchasing: [] Yes [x] No  Bundle patient: [] Yes [x] No   Specify:     Transition of care plan:  Ongoing medical management  Primary team recommending hospice, pt and family declining at this time   Family prefers pt to return home with resumption of home health PT and OT through \"[facility]\"([phone])  Outpatient follow-up.   TBD on transport

## 2022-08-17 NOTE — PROGRESS NOTES
Physical Therapy    Attempted PT treatment. Pt requested defer session due to n/v; states that she is waiting on medication to address. Will con't to follow.     Fam Mcgee, PT, MPT

## 2022-08-17 NOTE — PROGRESS NOTES
Occupational Therapy Contact Note  08/17/22    1055  Orders received and acknowledged. Chart reviewed and spoke with RN, patient is appropriate for eval today. However, upon room entry pt sitting with HOB elevated displaying s/s of aspirating pills (coughing pills back up, unable to swallow, coughing, attempting to vomit, coughing up phlegm). Assisted pt with small sips of water but attempts at swallowing final pill unsuccessful. OT alerted treating SLP and left message for RN. Pt declining therapy further stating she is nauseous and needs to rest.   Will follow up as able to complete OT eval.    1320  Pt continuing to decline at this check-in. States she \"did not sleep at all last night\".  Will follow up as able    Thank you for this referral,  ISAIHA Ivey, OTR/L  12 minutes

## 2022-08-17 NOTE — PROGRESS NOTES
Rounded on [facility]ian[facility] patients and provided Anointing of the Sick at request of patient.     Marco Patient

## 2022-08-17 NOTE — PROGRESS NOTES
Bedside and Verbal shift change report given to [address] [place] [address],[address] (oncoming nurse) by Shanda Russ (offgoing nurse). Report included the following information Kardex, Intake/Output, MAR, and Recent Results.

## 2022-08-17 NOTE — PROGRESS NOTES
Physician Progress Note      Jim Boykin  [facility] #:                  [id number]  :                       1925  ADMIT DATE:       2022 6:06 PM  [address] DATE:  RESPONDING  PROVIDER #:        SAUNDRA DELGADO DO          QUERY TEXT:    Good afternoon. Pt admitted with chronic hypoxemic respiratory failure. Pt noted to have hypotension requiring IV Kyree-synephrine on . If possible, please further clarify  if you are evaluating and/or treating any of the following: The medical record reflects the following:  Risk Factors: HFrEF, Chronic hypoxemic Respiratory Failure, EVERT, Hypokalemia, FTT, nausea/vomiting/abdominal pain, CAD  Clinical Indicators: Per NN from : SBP 70s-80s, despite fluid bolus; Kyree-synephrine IV ordered, patient transferred to ICU; Echo from : \"Moderately reduced left ventricular systolic function with a visually estimated EF of 30 - 35%\"; Crea 1.69, 1.63, 1.61, 1.28, 1.01, 0.95, 0.92, 0.78, 0.96; Troponin: 199, 208  Treatment: Kyree-synephrine gtt; IV fluids, ICU level care, midodrine, I&Os, vital signs, serial labs    Thank you,  Sammi Snellen, RN, CDI  [phone]  Options provided:  -- Cardiogenic Shock  -- Hypovolemic Shock  -- Hypovolemia without Shock  -- Hypotension without Shock  -- Other - I will add my own diagnosis  -- Disagree - Not applicable / Not valid  -- Disagree - Clinically unable to determine / Unknown  -- Refer to Clinical Documentation Reviewer    PROVIDER RESPONSE TEXT:    This patient has Hypovolemic Shock.     Query created by: Mishel Martinez on 2022 5:04 PM      Electronically signed by:  Jacqueline Sanches DO 2022 5:22 PM

## 2022-08-18 LAB
ALBUMIN SERPL-MCNC: 1.8 G/DL (ref 3.5–5)
ALBUMIN/GLOB SERPL: 0.6 {RATIO} (ref 1.1–2.2)
ALP SERPL-CCNC: 156 U/L (ref 45–117)
ALT SERPL-CCNC: 18 U/L (ref 12–78)
ANION GAP SERPL CALC-SCNC: 7 MMOL/L (ref 5–15)
AST SERPL-CCNC: 19 U/L (ref 15–37)
BILIRUB SERPL-MCNC: 0.7 MG/DL (ref 0.2–1)
BUN SERPL-MCNC: 23 MG/DL (ref 6–20)
BUN/CREAT SERPL: 22 (ref 12–20)
CALCIUM SERPL-MCNC: 8.8 MG/DL (ref 8.5–10.1)
CHLORIDE SERPL-SCNC: 104 MMOL/L (ref 97–108)
CO2 SERPL-SCNC: 26 MMOL/L (ref 21–32)
CREAT SERPL-MCNC: 1.04 MG/DL (ref 0.55–1.02)
GLOBULIN SER CALC-MCNC: 3.1 G/DL (ref 2–4)
GLUCOSE SERPL-MCNC: 105 MG/DL (ref 65–100)
POTASSIUM SERPL-SCNC: 3.7 MMOL/L (ref 3.5–5.1)
PROT SERPL-MCNC: 4.9 G/DL (ref 6.4–8.2)
SODIUM SERPL-SCNC: 137 MMOL/L (ref 136–145)

## 2022-08-18 PROCEDURE — 94761 N-INVAS EAR/PLS OXIMETRY MLT: CPT

## 2022-08-18 PROCEDURE — 74011250636 HC RX REV CODE- 250/636: Performed by: INTERNAL MEDICINE

## 2022-08-18 PROCEDURE — 2709999900 HC NON-CHARGEABLE SUPPLY

## 2022-08-18 PROCEDURE — 77010033678 HC OXYGEN DAILY

## 2022-08-18 PROCEDURE — 65270000046 HC RM TELEMETRY

## 2022-08-18 PROCEDURE — 74011250637 HC RX REV CODE- 250/637: Performed by: INTERNAL MEDICINE

## 2022-08-18 PROCEDURE — 80053 COMPREHEN METABOLIC PANEL: CPT

## 2022-08-18 PROCEDURE — 74011000250 HC RX REV CODE- 250: Performed by: INTERNAL MEDICINE

## 2022-08-18 PROCEDURE — 92526 ORAL FUNCTION THERAPY: CPT

## 2022-08-18 PROCEDURE — 77030038269 HC DRN EXT URIN PURWCK BARD -A

## 2022-08-18 PROCEDURE — 36415 COLL VENOUS BLD VENIPUNCTURE: CPT

## 2022-08-18 RX ORDER — DOCUSATE SODIUM 50 MG/5ML
50 LIQUID ORAL 2 TIMES DAILY
Status: DISCONTINUED | OUTPATIENT
Start: 2022-08-18 | End: 2022-09-06 | Stop reason: HOSPADM

## 2022-08-18 RX ORDER — SENNOSIDES 8.8 MG/5ML
5 LIQUID ORAL 2 TIMES DAILY
Status: DISCONTINUED | OUTPATIENT
Start: 2022-08-18 | End: 2022-09-06 | Stop reason: HOSPADM

## 2022-08-18 RX ADMIN — PANTOPRAZOLE SODIUM 40 MG: 40 TABLET, DELAYED RELEASE ORAL at 18:14

## 2022-08-18 RX ADMIN — HEPARIN SODIUM 5000 UNITS: 5000 INJECTION INTRAVENOUS; SUBCUTANEOUS at 13:07

## 2022-08-18 RX ADMIN — MIDODRINE HYDROCHLORIDE 10 MG: 5 TABLET ORAL at 18:14

## 2022-08-18 RX ADMIN — MORPHINE SULFATE 2 MG: 2 INJECTION, SOLUTION INTRAMUSCULAR; INTRAVENOUS at 01:06

## 2022-08-18 RX ADMIN — MIDODRINE HYDROCHLORIDE 10 MG: 5 TABLET ORAL at 09:18

## 2022-08-18 RX ADMIN — Medication 10 ML: at 05:08

## 2022-08-18 RX ADMIN — MIDODRINE HYDROCHLORIDE 10 MG: 5 TABLET ORAL at 13:06

## 2022-08-18 RX ADMIN — HEPARIN SODIUM 5000 UNITS: 5000 INJECTION INTRAVENOUS; SUBCUTANEOUS at 23:03

## 2022-08-18 RX ADMIN — NYSTATIN 500000 UNITS: 100000 SUSPENSION ORAL at 23:03

## 2022-08-18 RX ADMIN — DOCUSATE SODIUM 50 MG: 50 LIQUID ORAL at 09:19

## 2022-08-18 RX ADMIN — PANTOPRAZOLE SODIUM 40 MG: 40 TABLET, DELAYED RELEASE ORAL at 09:18

## 2022-08-18 RX ADMIN — Medication 10 ML: at 13:07

## 2022-08-18 RX ADMIN — Medication 10 ML: at 23:03

## 2022-08-18 RX ADMIN — HEPARIN SODIUM 5000 UNITS: 5000 INJECTION INTRAVENOUS; SUBCUTANEOUS at 05:35

## 2022-08-18 RX ADMIN — ONDANSETRON 4 MG: 2 INJECTION INTRAMUSCULAR; INTRAVENOUS at 01:06

## 2022-08-18 NOTE — WOUND CARE
Wound Consult: Follow Up Visit. Chart reviewed. Consulted for sacral area and in to follow up. Spoke with patients nurse,  Dirk Mcdowell to hand off on visit. Patient is resting on a Versacare bed with accumax mattress at time of visit, transferred to Orient with hercules mattress and FRANCOIS blower. Heels off loaded with pillows. Patient is alert, oriented x 4; most pleasant; [place]; requires one person assist to move side to side in bed. Destin score 12, pure wick in use. Assessment:  Sacral area - has light dyschromia of intact skin, blanching throughout, tender over coccyx area; using sacral foam and we transferred patient to Orient bed with FRANCOIS blower while with her. Heels blanching pink. Treatment:  Incontinence skin care with BM. Sacral foam to protect bony coccyx. FRANCOIS blower on new bed. Wound Recommendations:  No active wounds - using preventative foam.  Skin Care / PI Prevention Recommendations:  1. Minimize friction/shear: minimize layers of linen/pads under patient. 2. Off load pressure/reposition:  turn and reposition approximately every 2 hours; float heels with pillows or use off loading heel boots; waffle cushion for sitting; FRANCOIS blower. 3. Manage Moisture - keep skin folds dry; incontinence skin care with incontinence wipes; Pure wick in use to help contain urine. 4. Continue to monitor nutrition, pain, and skin risk scale, and skin assessment. Plan: We will continue to reassess routinely and as needed. Please re-consult should concerns arise despite continued skin/PI prevention measures.     João Lynne, MSN, RN, [facility], Wound / [facility] Office [phone]

## 2022-08-18 NOTE — PROGRESS NOTES
8/18/2022 3:50 PM   Care Management Progress Note         ICD-10-CM ICD-9-CM     1. Nausea and vomiting, unspecified vomiting type  R11.2 787.01         2. Acute dehydration  E86.0 276.51         3. Hypomagnesemia  E83.42 275.2         4. Hypokalemia  E87.6 276.8         5. Uremia  N19 586         6. EVERT (acute kidney injury) ([facility] [address]  N17.9 584.9         7. Chronic diastolic heart failure (HCC)  I50.32 428.32         8. Elevated troponin  R77.8 790.6         9. Palliative care by specialist [Z51.5 (ICD-10-CM)]  Z51.5 V66.7         10. Goals of care, counseling/discussion [Z71.89 (ICD-10-CM)]  Z71.89 V65.49         11. Age-related physical debility [R54 (ICD-10-CM)]  R54 797         12. Weakness generalized [R53.1 (ICD-10-CM)]  R53.1 780.79         13. Decreased appetite [R63.0 (ICD-10-CM)]  R63.0 783.0         14. Hearing loss, unspecified hearing loss type, unspecified laterality [H91.90 (ICD-10-CM)]  H91.90 389. 9            Readmission   RUR:  20%  Risk Level: []Low []Moderate [x]High  Value-based purchasing: [] Yes [x] No  Bundle patient: [] Yes [x] No              Specify:      Transition of care plan:  Ongoing medical management  Primary team recommending hospice, pt and family declining at this time   Family prefers pt to return home with resumption of home health PT and OT through \"[facility]\"([phone])  Outpatient follow-up.   TBD on transport

## 2022-08-18 NOTE — PROGRESS NOTES
Occupational Therapy Note  8/18/2022    OT eval order received and acknowledged and attempted at [phone] (patient receiving treatment from another service) and again at [phone] (pt declined at this time requesting to eat lunch). Will continue to follow pt and attempt OT eval at a later time as able.      Thank you,  Melina Mckay OTR/CR

## 2022-08-18 NOTE — PROGRESS NOTES
Bedside and Verbal shift change report given to Don RN (oncoming nurse) by Manny Villareal RN (offgoing nurse). Report included the following information SBAR, Kardex, and ED Summary.

## 2022-08-18 NOTE — PROGRESS NOTES
Provided pastoral care visit to [facility] 5 patient. Did not include sacramental care.      Bob Campbell

## 2022-08-18 NOTE — PROGRESS NOTES
[facility] visit. Mrs. Freida Goldmann was sitting up in bed. She was delighted to have Fr. Camilo Rowley visit her and said she had received communion today. Her son was visiting her  today. Prayer offered.       SATURNINO Bradshaw, RN, ACSW, LCSW   Page:  [phone]

## 2022-08-18 NOTE — CONSULTS
Comprehensive Nutrition Assessment    Type and Reason for Visit: Reassess    Nutrition Recommendations/Plan:   Purees/thin liquids - per SLP recs      Adjusted ONS - pt doesn't like/tolerate milky Ensure. Ensure Clear added TID for trial.      Malnutrition Assessment:  Malnutrition Status:  Severe malnutrition (08/12/22 1334)    Context:  Acute illness     Findings of the 6 clinical characteristics of malnutrition:   Energy Intake:  Unable to assess  Weight Loss:  Greater than 5% over 1 month     Body Fat Loss:  Mild body fat loss, Fat overlying ribs, Orbital   Muscle Mass Loss: Moderate muscle mass loss, Clavicles (pectoralis & deltoids), Temples (temporalis)  Fluid Accumulation:  No significant fluid accumulation,     Strength:  Not performed     Nutrition Assessment:    8/18: MD consult for calorie count. Pt with poor PO. Wt documented as stable. Diet upgraded to Pureed/thin liquids. Adjusted ONS. Pt declined ice cream or any ONS like ice cream, and doesn't like/eat pudding. Added Ensure clear for trial in hopes to increase PO. Pt doesn't like tomato soup \"too acidic\" and nothing with cream, so no cream of mushroom. Pt likes broth, which is very low calorie. States she feels like she's going to throw up, but more spit up.        8/15: follow up. SLP evaluation today recommends advancing to full liquids. Wt is down from last week likely with inadequate diet order & limited PO. Advanced diet to full liquids & added ONS BID as above. Pt was asleep when RD checked & DW RN. Encourage frequent offers of PO when safe and alert. Last 3 Recorded Weights in this Encounter    08/14/22 0604 08/16/22 0342 08/17/22 0700   Weight: 42.1 kg (92 lb 13 oz) 42.1 kg (92 lb 13 oz) 53.1 kg (117 lb 1 oz)     8/12: Pt is a 80year old female admitted with Hypokalemia [E87.6]  Acute kidney injury ([facility] [address] [N17.9].  She has an extensive past medical history which includes Atrial fibrillation), CAD, Chronic respiratory failure with hypoxia, Diabetes mellitus, GERD, deep venous thrombosis, Hyperlipidemia, and Systolic heart failure. BPA for unsure weight loss. Per documentation, patient has lost 22# (18%) within two months, clinically significant for timeframe. Patient had RRT this morning and transferred to ICU. NPO at this time. SLP deferred consult until patient more medically stable. Patient with noted allergies to shellfish and eggs. Patient alert and oriented in bed, able to answer some questions. States she has not had an appetite recently 2/2 feeling nauseated. Denies chewing/swallowing problems. Palliative meeting with patient now. Wt Readings from Last 10 Encounters:   08/17/22 53.1 kg (117 lb 1 oz)   07/18/22 53.3 kg (117 lb 6.3 oz)   05/01/22 54 kg (119 lb 0.8 oz)   04/10/22 51.3 kg (113 lb)   05/22/17 62.3 kg (137 lb 6.4 oz)   09/29/15 58.6 kg (129 lb 1.6 oz)   09/05/15 58.1 kg (128 lb)   08/31/15 58.1 kg (128 lb)   05/15/12 57.2 kg (126 lb)   03/19/12 61.2 kg (135 lb)     Nutrition Related Findings:      Wound Type: Stage I (redness, otherwise intact per wound care 8/12)  Last Bowel Movement Date: 08/18/22  Stool Appearance: Loose  Abdominal Assessment: Intact  Appetite: Good  Bowel Sounds: Active   Edema:Generalized: No Edema (8/17/2022 11:30 PM)    Nutr. Labs:    Lab Results   Component Value Date/Time    GFR est AA 59 (L) 08/18/2022 05:53 AM    GFR est non-AA 49 (L) 08/18/2022 05:53 AM    Creatinine (POC) 1.5 (H) 03/02/2016 12:08 PM    Creatinine 1.04 (H) 08/18/2022 05:53 AM    BUN 23 (H) 08/18/2022 05:53 AM    Sodium 137 08/18/2022 05:53 AM    Potassium 3.7 08/18/2022 05:53 AM    Chloride 104 08/18/2022 05:53 AM    CO2 26 08/18/2022 05:53 AM     Lab Results   Component Value Date/Time    Glucose 105 (H) 08/18/2022 05:53 AM    Glucose (POC) 91 08/16/2022 09:13 AM     Lab Results   Component Value Date/Time    Hemoglobin A1c 5.1 04/23/2022 12:55 AM     Nutr.  Meds:  Current Facility-Administered Medications   Medication Dose Route Frequency    dextrose 5% with KCl 20 mEq/L infusion   IntraVENous CONTINUOUS    polyethylene glycol (MIRALAX) packet 17 g  17 g Oral DAILY    pantoprazole (PROTONIX) tablet 40 mg  40 mg Oral BID    heparin (porcine) injection 5,000 Units  5,000 Units SubCUTAneous Q8H    sennosides (SENOKOT) 8.8 mg/5 mL syrup 8.8 mg  5 mL Oral DAILY    docusate (COLACE) 50 mg/5 mL oral liquid 50 mg  50 mg Oral DAILY    nystatin (MYCOSTATIN) 100,000 unit/mL oral suspension 500,000 Units  500,000 Units Oral QID    midodrine (PROAMATINE) tablet 10 mg  10 mg Oral TID WITH MEALS    sodium chloride (NS) flush 5-40 mL  5-40 mL IntraVENous Q8H         Current Nutrition Intake & Therapies:  Average Meal Intake: NPO  Average Supplement Intake: NPO  ADULT ORAL NUTRITION SUPPLEMENT Dinner; Low Calorie/High Protein  ADULT ORAL NUTRITION SUPPLEMENT Breakfast; Standard 4 oz  ADULT DIET Dysphagia - Pureed    Anthropometric Measures:  Height: 5' 2\" (157.5 cm)  Ideal Body Weight (IBW): 110 lbs (50 kg)  Admission Body Weight: 115 lb  Current Body Wt:  42.1 kg (92 lb 13 oz), 84.4 % IBW.  Bed scale  Current BMI (kg/m2): 17        Weight Adjustment: No adjustment                 BMI Category: Underweight (BMI less than 22) age over 72    Estimated Daily Nutrient Needs:  Energy Requirements Based On: Kcal/kg  Weight Used for Energy Requirements: Current  Energy (kcal/day): 5338-6694  Weight Used for Protein Requirements: Current  Protein (g/day): 52-62 (1.0-1.2 g/kg)  Method Used for Fluid Requirements: Other (comment)  Fluid (ml/day): 9977-1893    Nutrition Diagnosis:   Inadequate oral intake related to cognitive or neurological impairment, inadequate protein-energy intake as evidenced by NPO or clear liquid status due to medical condition  Severe malnutrition related to inadequate protein-energy intake as evidenced by BMI, Criteria as identified in malnutrition assessment    Nutrition Interventions:   Food and/or Nutrient Delivery: Start oral diet, Start oral nutrition supplement  Nutrition Education/Counseling: No recommendations at this time  Coordination of Nutrition Care: Continue to monitor while inpatient  Plan of Care discussed with: nursing    Goals:     Goals: Initiate PO diet, by next RD assessment       Nutrition Monitoring and Evaluation:   Behavioral-Environmental Outcomes: None identified  Food/Nutrient Intake Outcomes: Diet advancement/tolerance  Physical Signs/Symptoms Outcomes: Biochemical data, Weight, Skin, Nausea/vomiting    Discharge Planning:     Too soon to determine    Maria Guadalupe Chamberlain RD  Contact: Ext: [phone], or via [employer]

## 2022-08-18 NOTE — PROGRESS NOTES
Problem: Dysphagia (Adult)  Goal: *Acute Goals and Plan of Care (Insert Text)  Description: Speech pathology goals  Initiated 8/12/2022  1. Patient will tolerate clear liquid diet with no overt s/s aspiration within 7 days-met  2. Tolerate full liquids without s/s aspiration by 8-18-22-met  3> tolerate purees, thins without s/s aspiraiton by 8-19-22; continue to 8-26-22  Outcome: Progressing Towards Goal   SPEECH LANGUAGE PATHOLOGY DYSPHAGIA TREATMENT: WEEKLY REASSESSMENT  Patient: Naomi Tate (89 y.o. female)  Date: 8/18/2022  Diagnosis: Hypokalemia [E87.6]  Acute kidney injury ([facility] [address] [N17.9] <principal problem not specified>      Precautions: aspiration      ASSESSMENT:  Patient alert today and taking sips without overt s/s aspiration. Patient's progression toward goals since last assessment: patient with very slight progress this week. She frequently has s/s dysphagia/aspiration with all consistencies. Suspect pharyngeal-esophageal dysphagia related to aging swallow at age 80. She frequently struggles with pills and had choked on large ones. Currently on purees, thins. Doubt she will be able to tolerate solids. PLAN:  Goals have been updated based on progression since last assessment. Patient continues to benefit from skilled intervention to address the above impairments. Continue to follow the patient 2 times a week to address goals. Recommendations and Planned Interventions:  Continue purees, thins. Crush large meds or chop. Small sips. Discharge Recommendations: hospice     SUBJECTIVE:   Patient stated Marie Avilez is a system to it. You have to take a big sip of water before putting the pill in your mouth.     OBJECTIVE:   Cognitive and Communication Status:  Neurologic State: Alert  Orientation Level: Oriented to person, Oriented to place  Cognition: Follows commands  Perception:  ([place])     Safety/Judgement: Decreased insight into deficits  Dysphagia Treatment:  Oral Assessment:     P.O. Trials:  Patient Position: upright in bed  Vocal quality prior to P.O.: No impairment  Consistency Presented: Thin liquid  How Presented: Self-fed/presented;Straw;Cup/sip   ORAL PHASE:   Bolus Acceptance: No impairment  Bolus Formation/Control: No impairment     Propulsion: No impairment  Oral Residue: None  PHARYNGEAL PHASE:   Initiation of Swallow: No impairment  Laryngeal Elevation: Functional  Aspiration Signs/Symptoms: None                    Exercises:  Laryngeal Exercises:                                                                                                                                   Pain:  Pain Scale 1: Numeric (0 - 10)  Pain Intensity 1: 0  Pain Location 1: Head;Generalized    After treatment patient left in no apparent distress:   Nursing notified    COMMUNICATION/EDUCATION:   Patient was educated regarding her deficit(s) of DYSPHAGIA as this relates to her diagnosis of Dehydration. She demonstrated fair understanding as evidenced by discussion. .    The patients plan of care including recommendations, planned interventions, and recommended diet changes were discussed with: Registered nurse.      Eric Sainz SLP  Time Calculation: 10 mins

## 2022-08-18 NOTE — PROGRESS NOTES
4793 Received report from [address]. Patient noted to be resting quietly with eyes closed. No c/o pain. No acute distress noted. Callbell within reach.

## 2022-08-18 NOTE — PROGRESS NOTES
Problem: Pressure Injury - Risk of  Goal: *Prevention of pressure injury  Description: Document Destin Scale and appropriate interventions in the flowsheet. Outcome: Progressing Towards Goal  Note: Pressure Injury Interventions:  Sensory Interventions: Assess changes in LOC, Check visual cues for pain, Float heels, Monitor skin under medical devices, Turn and reposition approx. every two hours (pillows and wedges if needed)    Moisture Interventions: Absorbent underpads, Check for incontinence Q2 hours and as needed, Internal/External urinary devices, Limit adult briefs, Offer toileting Q_hr    Activity Interventions: Pressure redistribution bed/mattress(bed type), PT/OT evaluation    Mobility Interventions: HOB 30 degrees or less, Pressure redistribution bed/mattress (bed type), PT/OT evaluation, Turn and reposition approx.  every two hours(pillow and wedges)    Nutrition Interventions: Document food/fluid/supplement intake    Friction and Shear Interventions: HOB 30 degrees or less, Lift sheet

## 2022-08-18 NOTE — PROGRESS NOTES
Physical Therapy Note:    11:10 PT treatment deferred. Pt receiving treatment from another service for extended period of time and is unavailable to participate at this time. 12:37 PT treatment again attempted and deferred. Pt declining participation in PT treatment until after she has lunch. Will continue to follow per POC.     Nick Cervantes, PT, DPT, [facility]

## 2022-08-19 PROCEDURE — 97165 OT EVAL LOW COMPLEX 30 MIN: CPT

## 2022-08-19 PROCEDURE — 74011250636 HC RX REV CODE- 250/636: Performed by: INTERNAL MEDICINE

## 2022-08-19 PROCEDURE — 74011250637 HC RX REV CODE- 250/637: Performed by: FAMILY MEDICINE

## 2022-08-19 PROCEDURE — 97530 THERAPEUTIC ACTIVITIES: CPT

## 2022-08-19 PROCEDURE — 74011250637 HC RX REV CODE- 250/637: Performed by: INTERNAL MEDICINE

## 2022-08-19 PROCEDURE — 65270000046 HC RM TELEMETRY

## 2022-08-19 PROCEDURE — 74011000250 HC RX REV CODE- 250: Performed by: INTERNAL MEDICINE

## 2022-08-19 PROCEDURE — 94761 N-INVAS EAR/PLS OXIMETRY MLT: CPT

## 2022-08-19 PROCEDURE — 77010033678 HC OXYGEN DAILY

## 2022-08-19 RX ADMIN — MIDODRINE HYDROCHLORIDE 10 MG: 5 TABLET ORAL at 10:34

## 2022-08-19 RX ADMIN — PANTOPRAZOLE SODIUM 40 MG: 40 TABLET, DELAYED RELEASE ORAL at 10:33

## 2022-08-19 RX ADMIN — SENNOSIDES 8.8 MG: 8.8 LIQUID ORAL at 18:00

## 2022-08-19 RX ADMIN — NYSTATIN 500000 UNITS: 100000 SUSPENSION ORAL at 18:45

## 2022-08-19 RX ADMIN — Medication 10 ML: at 12:42

## 2022-08-19 RX ADMIN — HEPARIN SODIUM 5000 UNITS: 5000 INJECTION INTRAVENOUS; SUBCUTANEOUS at 14:49

## 2022-08-19 RX ADMIN — Medication 10 ML: at 22:25

## 2022-08-19 RX ADMIN — MIDODRINE HYDROCHLORIDE 10 MG: 5 TABLET ORAL at 18:45

## 2022-08-19 RX ADMIN — DOCUSATE SODIUM 50 MG: 50 LIQUID ORAL at 18:45

## 2022-08-19 RX ADMIN — NYSTATIN 500000 UNITS: 100000 SUSPENSION ORAL at 12:42

## 2022-08-19 RX ADMIN — HEPARIN SODIUM 5000 UNITS: 5000 INJECTION INTRAVENOUS; SUBCUTANEOUS at 06:24

## 2022-08-19 RX ADMIN — Medication 10 ML: at 06:24

## 2022-08-19 RX ADMIN — LACTULOSE 45 ML: 20 SOLUTION ORAL at 18:45

## 2022-08-19 RX ADMIN — MIDODRINE HYDROCHLORIDE 10 MG: 5 TABLET ORAL at 12:42

## 2022-08-19 RX ADMIN — HEPARIN SODIUM 5000 UNITS: 5000 INJECTION INTRAVENOUS; SUBCUTANEOUS at 22:26

## 2022-08-19 RX ADMIN — PANTOPRAZOLE SODIUM 40 MG: 40 TABLET, DELAYED RELEASE ORAL at 18:46

## 2022-08-19 RX ADMIN — SENNOSIDES 8.8 MG: 8.8 LIQUID ORAL at 10:34

## 2022-08-19 RX ADMIN — ONDANSETRON 4 MG: 2 INJECTION INTRAMUSCULAR; INTRAVENOUS at 15:34

## 2022-08-19 NOTE — PROGRESS NOTES
8/19/2022 11:55 AM   Care Management Progress Note         ICD-10-CM ICD-9-CM     1. Nausea and vomiting, unspecified vomiting type  R11.2 787.01         2. Acute dehydration  E86.0 276.51         3. Hypomagnesemia  E83.42 275.2         4. Hypokalemia  E87.6 276.8         5. Uremia  N19 586         6. EVERT (acute kidney injury) ([facility] [address]  N17.9 584.9         7. Chronic diastolic heart failure (HCC)  I50.32 428.32         8. Elevated troponin  R77.8 790.6         9. Palliative care by specialist [Z51.5 (ICD-10-CM)]  Z51.5 V66.7         10. Goals of care, counseling/discussion [Z71.89 (ICD-10-CM)]  Z71.89 V65.49         11. Age-related physical debility [R54 (ICD-10-CM)]  R54 797         12. Weakness generalized [R53.1 (ICD-10-CM)]  R53.1 780.79         13. Decreased appetite [R63.0 (ICD-10-CM)]  R63.0 783.0         14. Hearing loss, unspecified hearing loss type, unspecified laterality [H91.90 (ICD-10-CM)]  H91.90 389. 9            Readmission   RUR:  20%  Risk Level: []Low []Moderate [x]High  Value-based purchasing: [] Yes [x] No  Bundle patient: [] Yes [x] No              Specify:      Transition of care plan:  Ongoing medical management  Primary team recommending hospice, pt and family declining at this time   Family prefers pt to return home with resumption of home health PT and OT through \"[facility]\"([phone]). LVM for Cari Corral with [facility] to confirm they can accept pt back. Received call back from Cari Corral who confirmed they can accept pt back at discharge. Outpatient follow-up.   TBD on transport

## 2022-08-19 NOTE — PROGRESS NOTES
Problem: Mobility Impaired (Adult and Pediatric)  Goal: *Acute Goals and Plan of Care (Insert Text)  Description: FUNCTIONAL STATUS PRIOR TO ADMISSION: Pt reports w/c level mobility with pull to stand on RW with assist to complete transfer. Pt states that she is able to propel w/c in home at times, requires family assist at times. Notes recent functional decline due to progressive weakness. States that outside physician told her she has six months to live, and that information has taken away \"all my motivation. \"    HOME SUPPORT PRIOR TO ADMISSION: The patient lived with  and son who provides assist.    Physical Therapy Goals  Initiated 8/16/2022  1. Patient will move from supine to sit and sit to supine  in bed with moderate assistance  within 7 day(s). 2.  Patient will transfer from bed to chair and chair to bed with moderate assistance  using the least restrictive device within 7 day(s). 3.  Patient will perform sit to stand with moderate assistance  within 7 day(s). 4.  Patient will complete functional dynamic sitting activities EOB with supervision/set-up within 7 day(s). Outcome: Progressing Towards Goal   PHYSICAL THERAPY TREATMENT  Patient: Mark Prince (64 y.o. female)  Date: 8/19/2022  Diagnosis: Hypokalemia [E87.6]  Acute kidney injury ([facility] [address] [N17.9] <principal problem not specified>      Precautions: DNR  Chart, physical therapy assessment, plan of care and goals were reviewed. ASSESSMENT  Patient continues with skilled PT services and is slowly progressing towards goals. Pt received supine in bed need maximal encouragement to participate. Pt rolling with Min A. Supine to sit with Mod to Max Ax2 with HOB elevated. Limited AROM B shoulders. L 25% and R 50% flexion. Sitting balance on EOB is good with high guard statically, yet compromised needing constant support with dynamics. Leaning posteriorly if challenged. Sit to 3/4 stand with RW with Mod to Max Ax2. COG is posterior.   Pt unable to ambulate at this time due to weakness and decreased standing balance. She reports limited gait at home and likely transferred to /c for transport by family. Could be [facility] candidate. Pt Total Ax2 with sit to supine and positioned for comfort. She will need SNF for rehab or HHPT with 24hr A.     Current Level of Function Impacting Discharge (mobility/balance): Max A x2/good static sit/all others poor    Other factors to consider for discharge: per above         PLAN :  Patient continues to benefit from skilled intervention to address the above impairments. Continue treatment per established plan of care. to address goals. Recommendation for discharge: (in order for the patient to meet his/her long term goals)  Therapy up to 5 days/week in SNF setting or HHPT with 24hr A    This discharge recommendation:  Has been made in collaboration with the attending provider and/or case management    IF patient discharges home will need the following DME: has RW       SUBJECTIVE:   Patient stated I have lost all control over my life.     OBJECTIVE DATA SUMMARY:   Critical Behavior:  Neurologic State: Alert  Orientation Level: Oriented X4  Cognition: Follows commands, Appropriate decision making, Appropriate safety awareness, Appropriate for age attention/concentration  Safety/Judgement: Decreased insight into deficits  Functional Mobility Training:  Bed Mobility:  Rolling: Minimum assistance  Supine to Sit: Assist x2;Maximum assistance; Moderate assistance; Additional time  Sit to Supine: Total assistance;Assist x2  Scooting:  Total assistance;Assist x2        Transfers:    Unable;  might be able to use [facility]                                Balance:  Sitting: Impaired  Sitting - Static: Good (unsupported)  Sitting - Dynamic: Fair (occasional)  Standing: Impaired  Standing - Static: Constant support;Fair;Poor (COG posterior)  Standing - Dynamic : Constant support;Poor  Ambulation/Gait Training:  Distance (ft): 0 Feet (ft)                       Base of Support: Narrowed; Center of gravity altered (posterior)                       Pain Rating:  shoulders    Activity Tolerance:   Fair and Poor    After treatment patient left in no apparent distress:   Supine in bed, Patient positioned in R sidelying for pressure relief, Call bell within reach, Bed / chair alarm activated, and Side rails x 3    COMMUNICATION/COLLABORATION:   The patients plan of care was discussed with: Occupational therapist, Registered nurse, and Case management.      Юлия Zamudio, PT   Time Calculation: 30 mins

## 2022-08-19 NOTE — PROGRESS NOTES
Occupational Therapy Note  8/19/2022    OT eval order received and acknowledged and attempted at 0930 however pt stating she is not feeling well and had a rough night with a lot of spitting up and requesting to rest and stay in bed. Pt educated on importance of working with therapy however pt continuing to decline at this time. Will continue to follow pt and attempt OT eval at a later time as able.      Thank you,  Bridgette Guido OTR/CR

## 2022-08-19 NOTE — PROGRESS NOTES
Problem: Self Care Deficits Care Plan (Adult)  Goal: *Acute Goals and Plan of Care (Insert Text)  Description: FUNCTIONAL STATUS PRIOR TO ADMISSION: Per chart review and CM notes, pt required assist for self care tasks at home and was able to ambulate short distances with RW and used WC for longer distances. HOME SUPPORT: The patient lived with son and spouse and required assistance for self care and functional transfers/mobility. Occupational Therapy Goals  Initiated 8/19/2022  1. Patient will perform grooming with modified independence within 7 day(s). 2.  Patient will perform upper body dressing with supervision/set-up within 7 day(s). 3.  Patient will perform upper body bathing with minimal assistance/contact guard assist within 7 day(s). 4.  Patient will perform toilet transfers with minimal assistance/contact guard assist within 7 day(s). 5.  Patient will perform all aspects of toileting with minimal assistance/contact guard assist within 7 day(s). 6.  Patient will participate in upper extremity therapeutic exercise/activities with modified independence for 10 minutes within 7 day(s). 7.  Patient will utilize energy conservation techniques during functional activities with verbal cues within 7 day(s). Outcome: Not Met     OCCUPATIONAL THERAPY EVALUATION  Patient: Marlo Cardoso (41 y.o. female)  Date: 8/19/2022  Primary Diagnosis: Hypokalemia [E87.6]  Acute kidney injury ([facility] [address] [N17.9]       Precautions:   DNR    ASSESSMENT  Pt received semi supine in bed A&Ox4 and agreeable for OT eval and OT/PT tx. Per pt report, pt lives with spouse and son in one story home and requires assist for self care and functional transfers/mobility (pt initially stating she walks then stating using WC, per CM note uses RW short distances and WC long distances).      Pt currently presents with decreased balance, decreased activity tolerance, generalized weakness, decreased safety awareness, fearful of falling, limited wero shoulder flexion (pt demonstrating significantly limited L shoulder flexion with pt declining letting therapy attempt PROM bilaterally) and increased need for assist with self care (SBA simple grooming semi supine in bed, total A toileting/cloth mgmt simulated, total A LB dressing) and functional transfers/mobility (mod/max Ax2 sup->sit, total A scooting EOB and sit->sup, max Ax2 sit<->stand with pt leaning on bed). Pt would benefit from continued skilled OT services while at [facility] in order to increase safety and independence with self care and functional transfers/mobility. D/C recommendation TBD between SNF vs HHOT pending progress with therapy (if returns home with require additional assist and 24/7 care). Functional Outcome Measure: The patient scored Total: 5/100 on the Barthel Index outcome measure     Other factors to consider for discharge: time since onset, severity of deficits, PLOF        PLAN :  Recommendations and Planned Interventions: self care training, functional mobility training, therapeutic exercise, balance training, therapeutic activities, endurance activities, patient education, and home safety training    Frequency/Duration: Patient will be followed by occupational therapy 3 times a week to address goals. Recommendation for discharge: (in order for the patient to meet his/her long term goals)  TBD SNF vs HHOT pending progress    This discharge recommendation:  Has been made in collaboration with the attending provider and/or case management    IF patient discharges home will need the following DME: TBD       SUBJECTIVE:   Patient stated I don't want to do anything but I will try.     OBJECTIVE DATA SUMMARY:   HISTORY:   Past Medical History:   Diagnosis Date    Anemia     Arthritis     Atrial fibrillation (HCC)     CAD (coronary artery disease)     Chronic pain     Chronic respiratory failure with hypoxia (HCC)     On supplemental home oxygen at 4 L/min continuously    Cor pulmonale (HCC)     Mildly reduced right ventricular function per echocardiogram of 7/12/2022    Diabetes mellitus (HCC)     GERD (gastroesophageal reflux disease)     Glaucoma     Hx of completed stroke     MRI of the brain with and without contrast on 9/5/2015 showed right parietal and temporal encephalomalacia probably represents old infarctions    Hx of deep venous thrombosis     Hyperlipidemia     Macular degeneration     Mild mitral stenosis     Per echocardiogram of 7/12/2022    Osteoporosis, post-menopausal     Systolic heart failure (HCC)     LVEF 30-35% per echocardiogram of 7/12/2022     Past Surgical History:   Procedure Laterality Date    HX CHOLECYSTECTOMY      HX HEENT      HX HIP REPLACEMENT      HX PACEMAKER      HX ROTATOR CUFF REPAIR Bilateral     VASCULAR SURGERY PROCEDURE UNLIST         Expanded or extensive additional review of patient history:     Home Situation  Home Environment: Private residence  # Steps to Enter: 0  One/Two Story Residence: One story  Living Alone: No  Support Systems: Spouse/Significant Other, Child(ramo) (son)  Patient Expects to be Discharged to[de-identified] Unable to determine at this time  Current DME Used/Available at Home: Walker, rolling, Wheelchair, [address] chair, Grab bars, Raised toilet seat    EXAMINATION OF PERFORMANCE DEFICITS:  Cognitive/Behavioral Status:  Neurologic State: Alert  Orientation Level: Oriented X4     Hearing:   Auditory  Auditory Impairment: (P) Hard of hearing, bilateral    Range of Motion:  AROM: Generally decreased, functional (L shoulder 1/4 25% flexion; 50% R shoulder)     Strength:  Mickey UE grossly 2-/5     Balance:  Sitting: Impaired  Sitting - Static: Good (unsupported)  Sitting - Dynamic: Fair (occasional)  Standing: Impaired  Standing - Static: Constant support;Fair;Poor (COG posterior)  Standing - Dynamic : Constant support;Poor    Functional Mobility and Transfers for ADLs:  Bed Mobility:  Rolling: Minimum assistance  Supine to Sit: Assist x2;Maximum assistance; Moderate assistance; Additional time  Sit to Supine: Total assistance;Assist x2  Scooting: Total assistance;Assist x2    ADL Assessment:     Oral Facial Hygiene/Grooming: Stand-by assistance (semi supine in bed)    Lower Body Dressing: Total assistance (bed level)    Toileting:  (simulated)    ADL Intervention and task modifications:     Grooming  Grooming Assistance: Stand-by assistance  Position Performed:  (semi supine in bed)  Washing Face: Total assistance (dependent)  Washing Hands: Total assistance (dependent)    Lower Body Dressing Assistance  Socks: Total assistance (dependent)  Position Performed: Supine     Functional Measure:    Barthel Index:  Bathin  Bladder: 0  Bowels: 0  Groomin  Dressin  Feedin  Mobility: 0  Stairs: 0  Toilet Use: 0  Transfer (Bed to Chair and Back): 0  Total: 5/100      The Barthel ADL Index: Guidelines  1. The index should be used as a record of what a patient does, not as a record of what a patient could do. 2. The main aim is to establish degree of independence from any help, physical or verbal, however minor and for whatever reason. 3. The need for supervision renders the patient not independent. 4. A patient's performance should be established using the best available evidence. Asking the patient, friends/relatives and nurses are the usual sources, but direct observation and common sense are also important. However direct testing is not needed. 5. Usually the patient's performance over the preceding 24-48 hours is important, but occasionally longer periods will be relevant. 6. Middle categories imply that the patient supplies over 50 per cent of the effort. 7. Use of aids to be independent is allowed. Score Interpretation (from [address] [place] [address])    Independent   60-79 Minimally independent   40-59 Partially dependent   20-39 Very dependent   <20 Totally dependent     -Daya Medina., Barthel, D.W. (1965).  Functional evaluation: the Barthel Index. [address] [facility] ([address] (1997). The Barthel activities of daily living index: self-reporting versus actual performance in the old (> or = 75 years). Journal of [address] [place] [address] 45(7), [facility], MONIKA, Giulia Herron., Fermin Heaton. (1999). Measuring the change in disability after inpatient rehabilitation; comparison of the responsiveness of the Barthel Index and Functional [place] Measure. Journal of Neurology, Neurosurgery, and Psychiatry, 66(4), 329-172. HUMBERTO Ronquillo Caro, JEN Painting, & Clarita Foley M.A. (2004) Assessment of post-stroke quality of life in cost-effectiveness studies: The usefulness of the Barthel Index and the EuroQoL-5D. Quality of Life Research, 15, 392-51     Occupational Therapy Evaluation Charge Determination   History Examination Decision-Making   LOW Complexity : Brief history review  MEDIUM Complexity : 3-5 performance deficits relating to physical, cognitive , or psychosocial skils that result in activity limitations and / or participation restrictions HIGH Complexity : Patient presents with comorbidities that affect occupational performance. Signifigant modification of tasks or assistance (eg, physical or verbal) with assessment (s) is necessary to enable patient to complete evaluation       Based on the above components, the patient evaluation is determined to be of the following complexity level: LOW   Pain Rating:  Pt reporting bottom  pain however not rating pain at this time    Activity Tolerance:   Fair    After treatment patient left in no apparent distress:    Supine in bed, Call bell within reach, Bed / chair alarm activated, and Side rails x 3    COMMUNICATION/EDUCATION:   The patients plan of care was discussed with: Physical therapist and Registered nurse. PT/OT sessions occurred together for increased safety of pt and clinician.       Home safety education was provided and the patient/caregiver indicated understanding. and Patient/family have participated as able in goal setting and plan of care. This patients plan of care is appropriate for delegation to [facility].     Thank you for this referral.  Trice Avery  Time Calculation: 24 mins

## 2022-08-20 ENCOUNTER — APPOINTMENT (OUTPATIENT)
Dept: GENERAL RADIOLOGY | Age: 87
DRG: 640 | End: 2022-08-20
Attending: FAMILY MEDICINE
Payer: MEDICARE

## 2022-08-20 LAB
ALBUMIN SERPL-MCNC: 1.7 G/DL (ref 3.5–5)
ALBUMIN/GLOB SERPL: 0.6 {RATIO} (ref 1.1–2.2)
ALP SERPL-CCNC: 137 U/L (ref 45–117)
ALT SERPL-CCNC: 15 U/L (ref 12–78)
ANION GAP SERPL CALC-SCNC: 6 MMOL/L (ref 5–15)
AST SERPL-CCNC: 17 U/L (ref 15–37)
BILIRUB SERPL-MCNC: 0.7 MG/DL (ref 0.2–1)
BUN SERPL-MCNC: 25 MG/DL (ref 6–20)
BUN/CREAT SERPL: 21 (ref 12–20)
CALCIUM SERPL-MCNC: 8.7 MG/DL (ref 8.5–10.1)
CHLORIDE SERPL-SCNC: 106 MMOL/L (ref 97–108)
CO2 SERPL-SCNC: 25 MMOL/L (ref 21–32)
CREAT SERPL-MCNC: 1.18 MG/DL (ref 0.55–1.02)
GLOBULIN SER CALC-MCNC: 2.7 G/DL (ref 2–4)
GLUCOSE SERPL-MCNC: 77 MG/DL (ref 65–100)
POTASSIUM SERPL-SCNC: 4.2 MMOL/L (ref 3.5–5.1)
PROT SERPL-MCNC: 4.4 G/DL (ref 6.4–8.2)
SODIUM SERPL-SCNC: 137 MMOL/L (ref 136–145)

## 2022-08-20 PROCEDURE — 36415 COLL VENOUS BLD VENIPUNCTURE: CPT

## 2022-08-20 PROCEDURE — 74011250637 HC RX REV CODE- 250/637: Performed by: INTERNAL MEDICINE

## 2022-08-20 PROCEDURE — 65270000046 HC RM TELEMETRY

## 2022-08-20 PROCEDURE — 74018 RADEX ABDOMEN 1 VIEW: CPT

## 2022-08-20 PROCEDURE — 77010033678 HC OXYGEN DAILY

## 2022-08-20 PROCEDURE — 74011250636 HC RX REV CODE- 250/636: Performed by: INTERNAL MEDICINE

## 2022-08-20 PROCEDURE — 74011000250 HC RX REV CODE- 250: Performed by: INTERNAL MEDICINE

## 2022-08-20 PROCEDURE — 80053 COMPREHEN METABOLIC PANEL: CPT

## 2022-08-20 PROCEDURE — 74011250636 HC RX REV CODE- 250/636: Performed by: FAMILY MEDICINE

## 2022-08-20 RX ADMIN — HEPARIN SODIUM 5000 UNITS: 5000 INJECTION INTRAVENOUS; SUBCUTANEOUS at 13:51

## 2022-08-20 RX ADMIN — MIDODRINE HYDROCHLORIDE 10 MG: 5 TABLET ORAL at 11:05

## 2022-08-20 RX ADMIN — NYSTATIN 500000 UNITS: 100000 SUSPENSION ORAL at 13:51

## 2022-08-20 RX ADMIN — HEPARIN SODIUM 5000 UNITS: 5000 INJECTION INTRAVENOUS; SUBCUTANEOUS at 21:11

## 2022-08-20 RX ADMIN — POLYETHYLENE GLYCOL 3350 17 G: 17 POWDER, FOR SOLUTION ORAL at 11:05

## 2022-08-20 RX ADMIN — HEPARIN SODIUM 5000 UNITS: 5000 INJECTION INTRAVENOUS; SUBCUTANEOUS at 06:59

## 2022-08-20 RX ADMIN — SENNOSIDES 8.8 MG: 8.8 LIQUID ORAL at 11:05

## 2022-08-20 RX ADMIN — Medication 10 ML: at 06:00

## 2022-08-20 RX ADMIN — Medication 10 ML: at 13:52

## 2022-08-20 RX ADMIN — Medication 10 ML: at 21:12

## 2022-08-20 RX ADMIN — PANTOPRAZOLE SODIUM 40 MG: 40 TABLET, DELAYED RELEASE ORAL at 11:05

## 2022-08-20 RX ADMIN — NYSTATIN 500000 UNITS: 100000 SUSPENSION ORAL at 09:00

## 2022-08-20 RX ADMIN — DEXTROSE MONOHYDRATE AND POTASSIUM CHLORIDE INJECTION, SOLUTION: 5; .149 INJECTION, SOLUTION INTRAVENOUS at 15:27

## 2022-08-20 RX ADMIN — MIDODRINE HYDROCHLORIDE 10 MG: 5 TABLET ORAL at 13:51

## 2022-08-20 RX ADMIN — DOCUSATE SODIUM 50 MG: 50 LIQUID ORAL at 11:05

## 2022-08-20 NOTE — PROGRESS NOTES
[facility]  [address] [facility], [address] [place] [address]  [phone]      Medical Progress Note      NAME: David Matthews   :  1925  MRM:  [id number]    Date/Time: 2022           Assessment / Plan:     Chronic Hypoxemic Respiratory Failure: remains on baseline 4L nc. She has no e/o pulmonary edema, CHF. Nausea/vomiting/abd pain: acute on chronic. Improved. CT abd does show moderate stool burden, but otherwise nothing acute. Follow up KUB 8/15 showing stool burden s/p suppository 8/15; repeat KUB . Supportive care with anti-emetics, low dose IV morphine prn. Cont bowel regimen. Had large BM overnight with lactulose and patient now feeling better. Encourage more PO and mobilize. Hypoglycemia: having to continue D5 to maintain Bgs. Continue nutrition supplements. Discussed calorie count with nutritionist     Hypokalemia:  2/2 GI losses, poor oral intake and diuretic use. Replete prn. Hypotension: weaned off ruchi drip. Continue Midodrine. EVERT: baseline normal creatinine she presents with creatinine 1.69, azotemia, clinically volume down. Due to GI losses and diuretic use. Renally dose meds, avoid nephrotoxins    HFrEF: Echo 2022 with EF 30-35%. ?[facility]? Presently volume down. Hold furosemide, manas, metop    CAD: Trop slightly up, but down from prior. No pans for aggressive interventions at this time    Hx DVT: Could consider PE but low s/f PE given neg LE dopplers     Debility: needs SNF             Discussed:  Care Plan    Prophylaxis:  Lovenox    Disposition:  SNF/LTC           ___________________________________________________    Attending Physician: Andrews Hernandez MD        Subjective:     Abd pain improved, nausea improved. Tried eating a little more but doesn't enjoy pureed diet. Objective:       Vitals:          Last 24hrs VS reviewed since prior progress note.  Most recent are:    Visit Vitals  /66 (BP 1 Location: Left upper arm, BP Patient Position: At rest)   Pulse 91   Temp 98.2 °F (36.8 °C)   Resp 18   Ht 5' 2\" (1.575 m)   Wt 49.3 kg (108 lb 11 oz)   SpO2 95%   BMI 19.88 kg/m²     SpO2 Readings from Last 6 Encounters:   08/20/22 95%   07/18/22 100%   05/02/22 93%   04/14/22 100%   05/22/17 94%   09/29/15 99%    O2 Flow Rate (L/min): 4 l/min     Intake/Output Summary (Last 24 hours) at 8/20/2022 1346  Last data filed at 8/20/2022 1226  Gross per 24 hour   Intake 198 ml   Output 200 ml   Net -2 ml            Exam:     Physical Exam:    Gen:  Frail, elderly  HEENT:  Pink conjunctivae, PERRL, hard of hearing   Resp:  No accessory muscle use, clear breath sounds without wheezes rales or rhonchi  Card:  No murmurs, normal S1, S2 without thrills, bruits or peripheral edema  Abd:  Soft, improved diff TTP, non-distended, normoactive bowel sounds are present  Musc:  No cyanosis or clubbing  Skin:  No rashes or ulcers, skin turgor is good  Neuro:  Cranial nerves 3-12 are grossly intact, diffuse weakness, follows commands appropriately  Psych:  Good insight, oriented to person, place and time, alert       Medications Reviewed: (see below)    Lab Data Reviewed: (see below)    ______________________________________________________________________    Medications:     Current Facility-Administered Medications   Medication Dose Route Frequency    sennosides (SENOKOT) 8.8 mg/5 mL syrup 8.8 mg  5 mL Oral BID    docusate (COLACE) 50 mg/5 mL oral liquid 50 mg  50 mg Oral BID    dextrose 5% with KCl 20 mEq/L infusion   IntraVENous CONTINUOUS    dextrose 10% infusion 0-250 mL  0-250 mL IntraVENous PRN    polyethylene glycol (MIRALAX) packet 17 g  17 g Oral DAILY    pantoprazole (PROTONIX) tablet 40 mg  40 mg Oral BID    heparin (porcine) injection 5,000 Units  5,000 Units SubCUTAneous Q8H    nystatin (MYCOSTATIN) 100,000 unit/mL oral suspension 500,000 Units  500,000 Units Oral QID    bisacodyL (DULCOLAX) suppository 10 mg  10 mg Rectal DAILY PRN midodrine (PROAMATINE) tablet 10 mg  10 mg Oral TID WITH MEALS    prochlorperazine (COMPAZINE) injection 5 mg  5 mg IntraVENous Q4H PRN    hydrALAZINE (APRESOLINE) 20 mg/mL injection 20 mg  20 mg IntraVENous Q6H PRN    morphine injection 2 mg  2 mg IntraVENous Q3H PRN    sodium chloride (NS) flush 5-40 mL  5-40 mL IntraVENous Q8H    sodium chloride (NS) flush 5-40 mL  5-40 mL IntraVENous PRN    acetaminophen (TYLENOL) tablet 650 mg  650 mg Oral Q6H PRN    Or    acetaminophen (TYLENOL) suppository 650 mg  650 mg Rectal Q6H PRN    polyethylene glycol (MIRALAX) packet 17 g  17 g Oral DAILY PRN    ondansetron (ZOFRAN ODT) tablet 4 mg  4 mg Oral Q8H PRN    Or    ondansetron (ZOFRAN) injection 4 mg  4 mg IntraVENous Q6H PRN            Lab Review:     No results for input(s): WBC, HGB, HCT, PLT, HGBEXT, HCTEXT, PLTEXT, HGBEXT, HCTEXT, PLTEXT in the last 72 hours.     Recent Labs     08/20/22  0247 08/18/22  0553 08/17/22  1356    137 137   K 4.2 3.7 3.4*    104 104   CO2 25 26 24   GLU 77 105* 109*   BUN 25* 23* 24*   CREA 1.18* 1.04* 0.96   CA 8.7 8.8 9.1   ALB 1.7* 1.8*  --    ALT 15 18  --        No components found for: [place]

## 2022-08-21 PROCEDURE — 74011250637 HC RX REV CODE- 250/637: Performed by: INTERNAL MEDICINE

## 2022-08-21 PROCEDURE — 74011250636 HC RX REV CODE- 250/636: Performed by: INTERNAL MEDICINE

## 2022-08-21 PROCEDURE — 65270000046 HC RM TELEMETRY

## 2022-08-21 PROCEDURE — 74011250636 HC RX REV CODE- 250/636: Performed by: FAMILY MEDICINE

## 2022-08-21 PROCEDURE — 74011000250 HC RX REV CODE- 250: Performed by: INTERNAL MEDICINE

## 2022-08-21 RX ADMIN — PANTOPRAZOLE SODIUM 40 MG: 40 TABLET, DELAYED RELEASE ORAL at 10:26

## 2022-08-21 RX ADMIN — HEPARIN SODIUM 5000 UNITS: 5000 INJECTION INTRAVENOUS; SUBCUTANEOUS at 21:41

## 2022-08-21 RX ADMIN — MIDODRINE HYDROCHLORIDE 10 MG: 5 TABLET ORAL at 10:26

## 2022-08-21 RX ADMIN — HEPARIN SODIUM 5000 UNITS: 5000 INJECTION INTRAVENOUS; SUBCUTANEOUS at 06:03

## 2022-08-21 RX ADMIN — Medication 10 ML: at 21:40

## 2022-08-21 RX ADMIN — DEXTROSE MONOHYDRATE AND POTASSIUM CHLORIDE INJECTION, SOLUTION: 5; .149 INJECTION, SOLUTION INTRAVENOUS at 18:54

## 2022-08-21 RX ADMIN — Medication 10 ML: at 06:03

## 2022-08-21 RX ADMIN — Medication 10 ML: at 15:21

## 2022-08-21 RX ADMIN — HEPARIN SODIUM 5000 UNITS: 5000 INJECTION INTRAVENOUS; SUBCUTANEOUS at 15:20

## 2022-08-21 RX ADMIN — DEXTROSE MONOHYDRATE AND POTASSIUM CHLORIDE INJECTION, SOLUTION: 5; .149 INJECTION, SOLUTION INTRAVENOUS at 06:04

## 2022-08-21 NOTE — PROGRESS NOTES
Problem: Falls - Risk of  Goal: *Absence of Falls  Description: Document Aliza Hickey Fall Risk and appropriate interventions in the flowsheet. Outcome: Progressing Towards Goal  Note: Fall Risk Interventions:  Mobility Interventions: Bed/chair exit alarm, Strengthening exercises (ROM-active/passive), Utilize walker, cane, or other assistive device    Mentation Interventions:  Toileting rounds, Increase mobility    Medication Interventions: Bed/chair exit alarm, Patient to call before getting OOB, Teach patient to arise slowly    Elimination Interventions: Call light in reach, Elevated toilet seat, Toileting schedule/hourly rounds    History of Falls Interventions: Bed/chair exit alarm, Utilize gait belt for transfer/ambulation         Problem: Patient Education: Go to Patient Education Activity  Goal: Patient/Family Education  Outcome: Progressing Towards Goal

## 2022-08-21 NOTE — PROGRESS NOTES
Hugo Jensen demarcus [place] [address]  [address] [facility], [address] [place] [address]  [phone]      Medical Progress Note      NAME: Sanju Knapp   :  1925  MRM:  [id number]    Date/Time: 2022           Assessment / Plan:     Chronic Hypoxemic Respiratory Failure: remains on baseline 4L nc. She has no e/o pulmonary edema, CHF. Nausea/vomiting/abd pain: acute on chronic. Improved. CT abd does show moderate stool burden, but otherwise nothing acute. Follow up KUB 8/15 showing stool burden s/p suppository 8/15; repeat KUB . Supportive care with anti-emetics, low dose IV morphine prn. Cont bowel regimen. Having good bowel movements now and stool burden significantly better. Encourage more PO and mobilize. Hypoglycemia: having to continue D5 to maintain Bgs. Continue nutrition supplements. Discussed calorie count with nutritionist     Hypokalemia:  2/2 GI losses, poor oral intake and diuretic use. Replete prn. Hypotension: weaned off ruchi drip. Continue Midodrine. EVERT: baseline normal creatinine she presents with creatinine 1.69, azotemia, clinically volume down. Due to GI losses and diuretic use. Renally dose meds, avoid nephrotoxins    HFrEF: Echo 2022 with EF 30-35%. ?Hair Irvin? Presently volume down. Hold furosemide, manas, metop    CAD: Trop slightly up, but down from prior. No pans for aggressive interventions at this time    Hx DVT: Could consider PE but low s/f PE given neg LE dopplers. Debility: needs SNF, but patient declining at this time. Discussed:  Care Plan    Prophylaxis:  Lovenox    Disposition:  SNF/LTC           ___________________________________________________    Attending Physician: Jiles Sacks, MD        Subjective:     Abd pain improved, nausea improved. Tried eating a little more but doesn't enjoy pureed diet. Objective:       Vitals:          Last 24hrs VS reviewed since prior progress note.  Most recent are:    Visit Vitals  /81 (BP 1 Location: Left upper arm, BP Patient Position: At rest)   Pulse 99   Temp 97.4 °F (36.3 °C)   Resp 16   Ht 5' 2\" (1.575 m)   Wt 49.3 kg (108 lb 11 oz)   SpO2 94%   BMI 19.88 kg/m²     SpO2 Readings from Last 6 Encounters:   08/21/22 94%   07/18/22 100%   05/02/22 93%   04/14/22 100%   05/22/17 94%   09/29/15 99%    O2 Flow Rate (L/min): 4 l/min     Intake/Output Summary (Last 24 hours) at 8/21/2022 1242  Last data filed at 8/21/2022 0392  Gross per 24 hour   Intake 198 ml   Output --   Net 198 ml            Exam:     Physical Exam:    Gen:  Frail, elderly  HEENT:  Pink conjunctivae, PERRL, hard of hearing   Resp:  No accessory muscle use, clear breath sounds without wheezes rales or rhonchi  Card:  No murmurs, normal S1, S2 without thrills, bruits or peripheral edema  Abd:  Soft, improved diff TTP, non-distended, normoactive bowel sounds are present  Musc:  No cyanosis or clubbing  Skin:  No rashes or ulcers, skin turgor is good  Neuro:  Cranial nerves 3-12 are grossly intact, diffuse weakness, follows commands appropriately  Psych:  Good insight, oriented to person, place and time, alert       Medications Reviewed: (see below)    Lab Data Reviewed: (see below)    ______________________________________________________________________    Medications:     Current Facility-Administered Medications   Medication Dose Route Frequency    sennosides (SENOKOT) 8.8 mg/5 mL syrup 8.8 mg  5 mL Oral BID    docusate (COLACE) 50 mg/5 mL oral liquid 50 mg  50 mg Oral BID    dextrose 5% with KCl 20 mEq/L infusion   IntraVENous CONTINUOUS    dextrose 10% infusion 0-250 mL  0-250 mL IntraVENous PRN    polyethylene glycol (MIRALAX) packet 17 g  17 g Oral DAILY    pantoprazole (PROTONIX) tablet 40 mg  40 mg Oral BID    heparin (porcine) injection 5,000 Units  5,000 Units SubCUTAneous Q8H    nystatin (MYCOSTATIN) 100,000 unit/mL oral suspension 500,000 Units  500,000 Units Oral QID    bisacodyL (DULCOLAX) suppository 10 mg  10 mg Rectal DAILY PRN    midodrine (PROAMATINE) tablet 10 mg  10 mg Oral TID WITH MEALS    prochlorperazine (COMPAZINE) injection 5 mg  5 mg IntraVENous Q4H PRN    hydrALAZINE (APRESOLINE) 20 mg/mL injection 20 mg  20 mg IntraVENous Q6H PRN    morphine injection 2 mg  2 mg IntraVENous Q3H PRN    sodium chloride (NS) flush 5-40 mL  5-40 mL IntraVENous Q8H    sodium chloride (NS) flush 5-40 mL  5-40 mL IntraVENous PRN    acetaminophen (TYLENOL) tablet 650 mg  650 mg Oral Q6H PRN    Or    acetaminophen (TYLENOL) suppository 650 mg  650 mg Rectal Q6H PRN    polyethylene glycol (MIRALAX) packet 17 g  17 g Oral DAILY PRN    ondansetron (ZOFRAN ODT) tablet 4 mg  4 mg Oral Q8H PRN    Or    ondansetron (ZOFRAN) injection 4 mg  4 mg IntraVENous Q6H PRN            Lab Review:     No results for input(s): WBC, HGB, HCT, PLT, HGBEXT, HCTEXT, PLTEXT, HGBEXT, HCTEXT, PLTEXT in the last 72 hours.     Recent Labs     08/20/22  0247      K 4.2      CO2 25   GLU 77   BUN 25*   CREA 1.18*   CA 8.7   ALB 1.7*   ALT 15       No components found for: [place]

## 2022-08-22 LAB
ANION GAP SERPL CALC-SCNC: 4 MMOL/L (ref 5–15)
BUN SERPL-MCNC: 27 MG/DL (ref 6–20)
BUN/CREAT SERPL: 19 (ref 12–20)
CALCIUM SERPL-MCNC: 8.3 MG/DL (ref 8.5–10.1)
CHLORIDE SERPL-SCNC: 108 MMOL/L (ref 97–108)
CO2 SERPL-SCNC: 26 MMOL/L (ref 21–32)
CREAT SERPL-MCNC: 1.39 MG/DL (ref 0.55–1.02)
ERYTHROCYTE [DISTWIDTH] IN BLOOD BY AUTOMATED COUNT: 15.8 % (ref 11.5–14.5)
GLUCOSE SERPL-MCNC: 102 MG/DL (ref 65–100)
HCT VFR BLD AUTO: 29.1 % (ref 35–47)
HGB BLD-MCNC: 9.3 G/DL (ref 11.5–16)
MCH RBC QN AUTO: 32.4 PG (ref 26–34)
MCHC RBC AUTO-ENTMCNC: 32 G/DL (ref 30–36.5)
MCV RBC AUTO: 101.4 FL (ref 80–99)
NRBC # BLD: 0 K/UL (ref 0–0.01)
NRBC BLD-RTO: 0 PER 100 WBC
PLATELET # BLD AUTO: 337 K/UL (ref 150–400)
PMV BLD AUTO: 10.7 FL (ref 8.9–12.9)
POTASSIUM SERPL-SCNC: 4.7 MMOL/L (ref 3.5–5.1)
RBC # BLD AUTO: 2.87 M/UL (ref 3.8–5.2)
SODIUM SERPL-SCNC: 138 MMOL/L (ref 136–145)
WBC # BLD AUTO: 5.4 K/UL (ref 3.6–11)

## 2022-08-22 PROCEDURE — 80048 BASIC METABOLIC PNL TOTAL CA: CPT

## 2022-08-22 PROCEDURE — 74011000250 HC RX REV CODE- 250: Performed by: INTERNAL MEDICINE

## 2022-08-22 PROCEDURE — 94761 N-INVAS EAR/PLS OXIMETRY MLT: CPT

## 2022-08-22 PROCEDURE — 77010033678 HC OXYGEN DAILY

## 2022-08-22 PROCEDURE — 74011250636 HC RX REV CODE- 250/636: Performed by: FAMILY MEDICINE

## 2022-08-22 PROCEDURE — 65270000046 HC RM TELEMETRY

## 2022-08-22 PROCEDURE — 85027 COMPLETE CBC AUTOMATED: CPT

## 2022-08-22 PROCEDURE — 77030037878 HC DRSG MEPILEX >48IN BORD MOLN -B

## 2022-08-22 PROCEDURE — 74011250637 HC RX REV CODE- 250/637: Performed by: INTERNAL MEDICINE

## 2022-08-22 PROCEDURE — 74011250636 HC RX REV CODE- 250/636: Performed by: INTERNAL MEDICINE

## 2022-08-22 PROCEDURE — 36415 COLL VENOUS BLD VENIPUNCTURE: CPT

## 2022-08-22 RX ADMIN — Medication 10 ML: at 06:21

## 2022-08-22 RX ADMIN — HEPARIN SODIUM 5000 UNITS: 5000 INJECTION INTRAVENOUS; SUBCUTANEOUS at 14:22

## 2022-08-22 RX ADMIN — Medication 10 ML: at 14:28

## 2022-08-22 RX ADMIN — MIDODRINE HYDROCHLORIDE 10 MG: 5 TABLET ORAL at 18:14

## 2022-08-22 RX ADMIN — HEPARIN SODIUM 5000 UNITS: 5000 INJECTION INTRAVENOUS; SUBCUTANEOUS at 20:35

## 2022-08-22 RX ADMIN — DOCUSATE SODIUM 50 MG: 50 LIQUID ORAL at 09:00

## 2022-08-22 RX ADMIN — MIDODRINE HYDROCHLORIDE 10 MG: 5 TABLET ORAL at 14:22

## 2022-08-22 RX ADMIN — Medication 10 ML: at 20:36

## 2022-08-22 RX ADMIN — HEPARIN SODIUM 5000 UNITS: 5000 INJECTION INTRAVENOUS; SUBCUTANEOUS at 06:21

## 2022-08-22 RX ADMIN — NYSTATIN 500000 UNITS: 100000 SUSPENSION ORAL at 21:00

## 2022-08-22 RX ADMIN — SODIUM CHLORIDE 500 ML: 9 INJECTION, SOLUTION INTRAVENOUS at 09:19

## 2022-08-22 RX ADMIN — PANTOPRAZOLE SODIUM 40 MG: 40 TABLET, DELAYED RELEASE ORAL at 09:07

## 2022-08-22 RX ADMIN — DEXTROSE MONOHYDRATE AND POTASSIUM CHLORIDE INJECTION, SOLUTION: 5; .149 INJECTION, SOLUTION INTRAVENOUS at 06:32

## 2022-08-22 RX ADMIN — PANTOPRAZOLE SODIUM 40 MG: 40 TABLET, DELAYED RELEASE ORAL at 18:14

## 2022-08-22 RX ADMIN — MIDODRINE HYDROCHLORIDE 10 MG: 5 TABLET ORAL at 09:07

## 2022-08-22 NOTE — PROGRESS NOTES
Problem: Falls - Risk of  Goal: *Absence of Falls  Description: Document Aliza Hickey Fall Risk and appropriate interventions in the flowsheet.   Outcome: Progressing Towards Goal  Note: Fall Risk Interventions:  Mobility Interventions: Bed/chair exit alarm, Utilize walker, cane, or other assistive device    Mentation Interventions: Increase mobility, Toileting rounds    Medication Interventions: Bed/chair exit alarm, Patient to call before getting OOB, Teach patient to arise slowly    Elimination Interventions: Call light in reach, Toileting schedule/hourly rounds    History of Falls Interventions: Utilize gait belt for transfer/ambulation         Problem: Patient Education: Go to Patient Education Activity  Goal: Patient/Family Education  Outcome: Progressing Towards Goal

## 2022-08-22 NOTE — PROGRESS NOTES
8/22/2022 4:05 PM   Care Management Progress Note         ICD-10-CM ICD-9-CM     1. Nausea and vomiting, unspecified vomiting type  R11.2 787.01         2. Acute dehydration  E86.0 276.51         3. Hypomagnesemia  E83.42 275.2         4. Hypokalemia  E87.6 276.8         5. Uremia  N19 586         6. EVERT (acute kidney injury) ([facility] [address]  N17.9 584.9         7. Chronic diastolic heart failure (HCC)  I50.32 428.32         8. Elevated troponin  R77.8 790.6         9. Palliative care by specialist [Z51.5 (ICD-10-CM)]  Z51.5 V66.7         10. Goals of care, counseling/discussion [Z71.89 (ICD-10-CM)]  Z71.89 V65.49         11. Age-related physical debility [R54 (ICD-10-CM)]  R54 797         12. Weakness generalized [R53.1 (ICD-10-CM)]  R53.1 780.79         13. Decreased appetite [R63.0 (ICD-10-CM)]  R63.0 783.0         14. Hearing loss, unspecified hearing loss type, unspecified laterality [H91.90 (ICD-10-CM)]  H91.90 389. 9            Readmission   RUR:  20%  Risk Level: []Low []Moderate [x]High  Value-based purchasing: [] Yes [x] No  Bundle patient: [] Yes [x] No              Specify:      Transition of care plan:  Ongoing medical management, Palliative re consulted for [address] discussion. Primary team recommending hospice, pt and family declining at this time   Family prefers pt to return home with resumption of home health PT and OT through \"[facility]\"([phone]). LVM for Lindy Spicer with [facility] to confirm they can accept pt back. Received call back from Lindy Spicer who confirmed they can accept pt back at discharge. Outpatient follow-up.   TBD on transport

## 2022-08-22 NOTE — PROGRESS NOTES
Hugo Jensen demarcus [employer] [address]  [address] [place] [address], [address] [place] [address]  [phone]      Medical Progress Note      NAME: Diego Varela   :  1925  MRM:  [id number]    Date/Time: 2022           Assessment / Plan:     Chronic Hypoxemic Respiratory Failure: remains on baseline 4L nc. She has no e/o pulmonary edema, CHF. Nausea/vomiting/abd pain: acute on chronic. Improved. CT abd does show moderate stool burden, but otherwise nothing acute. Follow up KUB 8/15 showing stool burden s/p suppository 8/15; repeat KUB . Supportive care with anti-emetics, low dose IV morphine prn. Cont bowel regimen. Having good bowel movements now and stool burden significantly better. Encourage more PO and mobilize. Hypoglycemia: having to continue D5 to maintain Bgs. Continue nutrition supplements. Discussed calorie count with nutritionist     Hypokalemia:  2/2 GI losses, poor oral intake and diuretic use. Replete prn. Hypotension: weaned off ruchi drip. Continue Midodrine. EVERT: baseline normal creatinine she presents with creatinine 1.69, azotemia, clinically volume down. Due to GI losses and diuretic use. Renally dose meds, avoid nephrotoxins    HFrEF: Echo 2022 with EF 30-35%. ?Vivica He? Presently volume down. Hold furosemide, manas, metop    CAD: Trop slightly up, but down from prior. No pans for aggressive interventions at this time    Hx DVT: Could consider PE but low s/f PE given neg LE dopplers. Debility: needs SNF, but patient declining at this time. Hospice appropriate but declining this as well             Discussed:  Care Plan    Prophylaxis:  Lovenox    Disposition:  SNF/LTC           ___________________________________________________    Attending Physician: Eduard Campos MD        Subjective:     Abd pain improved, nausea improved. Tried eating a little more but doesn't enjoy pureed diet.            Objective:       Vitals:          Last 24hrs VS reviewed since prior progress note.  Most recent are:    Visit Vitals  BP (!) 127/58 (BP 1 Location: Left upper arm, BP Patient Position: At rest)   Pulse 88   Temp 97.6 °F (36.4 °C)   Resp 18   Ht 5' 2\" (1.575 m)   Wt 49.3 kg (108 lb 11 oz)   SpO2 99%   BMI 19.88 kg/m²     SpO2 Readings from Last 6 Encounters:   08/22/22 99%   07/18/22 100%   05/02/22 93%   04/14/22 100%   05/22/17 94%   09/29/15 99%    O2 Flow Rate (L/min): 4 l/min     Intake/Output Summary (Last 24 hours) at 8/22/2022 1442  Last data filed at 8/22/2022 1235  Gross per 24 hour   Intake 515 ml   Output --   Net 515 ml            Exam:     Physical Exam:    Gen:  Frail, elderly  HEENT:  Pink conjunctivae, PERRL, hard of hearing   Resp:  No accessory muscle use, clear breath sounds without wheezes rales or rhonchi  Card:  No murmurs, normal S1, S2 without thrills, bruits or peripheral edema  Abd:  Soft, improved diff TTP, non-distended, normoactive bowel sounds are present  Musc:  No cyanosis or clubbing  Skin:  No rashes or ulcers, skin turgor is good  Neuro:  Cranial nerves 3-12 are grossly intact, diffuse weakness, follows commands appropriately  Psych:  Good insight, oriented to person, place and time, alert       Medications Reviewed: (see below)    Lab Data Reviewed: (see below)    ______________________________________________________________________    Medications:     Current Facility-Administered Medications   Medication Dose Route Frequency    sennosides (SENOKOT) 8.8 mg/5 mL syrup 8.8 mg  5 mL Oral BID    docusate (COLACE) 50 mg/5 mL oral liquid 50 mg  50 mg Oral BID    dextrose 5% with KCl 20 mEq/L infusion   IntraVENous CONTINUOUS    dextrose 10% infusion 0-250 mL  0-250 mL IntraVENous PRN    polyethylene glycol (MIRALAX) packet 17 g  17 g Oral DAILY    pantoprazole (PROTONIX) tablet 40 mg  40 mg Oral BID    heparin (porcine) injection 5,000 Units  5,000 Units SubCUTAneous Q8H    nystatin (MYCOSTATIN) 100,000 unit/mL oral suspension 500,000 Units  500,000 Units Oral QID    bisacodyL (DULCOLAX) suppository 10 mg  10 mg Rectal DAILY PRN    midodrine (PROAMATINE) tablet 10 mg  10 mg Oral TID WITH MEALS    prochlorperazine (COMPAZINE) injection 5 mg  5 mg IntraVENous Q4H PRN    hydrALAZINE (APRESOLINE) 20 mg/mL injection 20 mg  20 mg IntraVENous Q6H PRN    morphine injection 2 mg  2 mg IntraVENous Q3H PRN    sodium chloride (NS) flush 5-40 mL  5-40 mL IntraVENous Q8H    sodium chloride (NS) flush 5-40 mL  5-40 mL IntraVENous PRN    acetaminophen (TYLENOL) tablet 650 mg  650 mg Oral Q6H PRN    Or    acetaminophen (TYLENOL) suppository 650 mg  650 mg Rectal Q6H PRN    polyethylene glycol (MIRALAX) packet 17 g  17 g Oral DAILY PRN    ondansetron (ZOFRAN ODT) tablet 4 mg  4 mg Oral Q8H PRN    Or    ondansetron (ZOFRAN) injection 4 mg  4 mg IntraVENous Q6H PRN            Lab Review:     Recent Labs     08/22/22  0359   WBC 5.4   HGB 9.3*   HCT 29.1*          Recent Labs     08/22/22  0359 08/20/22  0247    137   K 4.7 4.2    106   CO2 26 25   * 77   BUN 27* 25*   CREA 1.39* 1.18*   CA 8.3* 8.7   ALB  --  1.7*   ALT  --  15       No components found for: [place]

## 2022-08-22 NOTE — PROGRESS NOTES
Comprehensive Nutrition Assessment    Type and Reason for Visit: Reassess    Nutrition Recommendations/Plan:   Purees/thin liquids - per SLP recs      Continue ONS - Ensure Clear TID. Malnutrition Assessment:  Malnutrition Status:  Severe malnutrition (08/12/22 1334)    Context:  Acute illness     Findings of the 6 clinical characteristics of malnutrition:   Energy Intake:  Unable to assess  Weight Loss:  Greater than 5% over 1 month     Body Fat Loss:  Mild body fat loss, Fat overlying ribs, Orbital   Muscle Mass Loss: Moderate muscle mass loss, Clavicles (pectoralis & deltoids), Temples (temporalis)  Fluid Accumulation:  No significant fluid accumulation,     Strength:  Not performed     Nutrition Assessment:    8/22: Pt seen eating lunch - pt moving food around the plate and poking at it more than actually eating it. Pt still doesn't like the pureed foods, and doesn't want milk/pudding/yogurt because she reports it causes more phlegm. Pt drinking some of the Ensure Clear - apple. Prefers orange juice - she says it clears her throat better. Son in room encouraging pt to eat. 8/18: MD consult for calorie count. Pt with poor PO. Wt documented as stable. Diet upgraded to Pureed/thin liquids. Adjusted ONS. Pt declined ice cream or any ONS like ice cream, and doesn't like/eat pudding. Added Ensure clear for trial in hopes to increase PO. Pt doesn't like tomato soup \"too acidic\" and nothing with cream, so no cream of mushroom. Pt likes broth, which is very low calorie. States she feels like she's going to throw up, but more spit up.        8/15: follow up. SLP evaluation today recommends advancing to full liquids. Wt is down from last week likely with inadequate diet order & limited PO. Advanced diet to full liquids & added ONS BID as above. Pt was asleep when RD checked & DW RN. Encourage frequent offers of PO when safe and alert.      Last 3 Recorded Weights in this Encounter    08/17/22 0700 08/19/22 3421 08/20/22 0659   Weight: 53.1 kg (117 lb 1 oz) 49.9 kg (110 lb 1.7 oz) 49.3 kg (108 lb 11 oz)     8/12: Pt is a 80year old female admitted with Hypokalemia [E87.6]  Acute kidney injury ([facility] [address] [N17.9]. She has an extensive past medical history which includes Atrial fibrillation), CAD, Chronic respiratory failure with hypoxia, Diabetes mellitus, GERD, deep venous thrombosis, Hyperlipidemia, and Systolic heart failure. BPA for unsure weight loss. Per documentation, patient has lost 22# (18%) within two months, clinically significant for timeframe. Patient had RRT this morning and transferred to ICU. NPO at this time. SLP deferred consult until patient more medically stable. Patient with noted allergies to shellfish and eggs. Patient alert and oriented in bed, able to answer some questions. States she has not had an appetite recently 2/2 feeling nauseated. Denies chewing/swallowing problems. Palliative meeting with patient now. Wt Readings from Last 10 Encounters:   08/20/22 49.3 kg (108 lb 11 oz)   07/18/22 53.3 kg (117 lb 6.3 oz)   05/01/22 54 kg (119 lb 0.8 oz)   04/10/22 51.3 kg (113 lb)   05/22/17 62.3 kg (137 lb 6.4 oz)   09/29/15 58.6 kg (129 lb 1.6 oz)   09/05/15 58.1 kg (128 lb)   08/31/15 58.1 kg (128 lb)   05/15/12 57.2 kg (126 lb)   03/19/12 61.2 kg (135 lb)     Nutrition Related Findings:      Wound Type: Stage I (redness, otherwise intact per wound care 8/12)  Last Bowel Movement Date: 08/21/22  Stool Appearance: Loose  Abdominal Assessment: Intact, Soft  Appetite: Fair  Bowel Sounds: Active   Edema:No data recorded    Nutr.  Labs:    Lab Results   Component Value Date/Time    GFR est AA 43 (L) 08/22/2022 03:59 AM    GFR est non-AA 35 (L) 08/22/2022 03:59 AM    Creatinine (POC) 1.5 (H) 03/02/2016 12:08 PM    Creatinine 1.39 (H) 08/22/2022 03:59 AM    BUN 27 (H) 08/22/2022 03:59 AM    Sodium 138 08/22/2022 03:59 AM    Potassium 4.7 08/22/2022 03:59 AM    Chloride 108 08/22/2022 03:59 AM    CO2 26 08/22/2022 03:59 AM     Lab Results   Component Value Date/Time    Glucose 102 (H) 08/22/2022 03:59 AM    Glucose (POC) 91 08/16/2022 09:13 AM     Lab Results   Component Value Date/Time    Hemoglobin A1c 5.1 04/23/2022 12:55 AM     Nutr. Meds:  Current Facility-Administered Medications   Medication Dose Route Frequency    sennosides (SENOKOT) 8.8 mg/5 mL syrup 8.8 mg  5 mL Oral BID    docusate (COLACE) 50 mg/5 mL oral liquid 50 mg  50 mg Oral BID    dextrose 5% with KCl 20 mEq/L infusion   IntraVENous CONTINUOUS    polyethylene glycol (MIRALAX) packet 17 g  17 g Oral DAILY    pantoprazole (PROTONIX) tablet 40 mg  40 mg Oral BID    heparin (porcine) injection 5,000 Units  5,000 Units SubCUTAneous Q8H    nystatin (MYCOSTATIN) 100,000 unit/mL oral suspension 500,000 Units  500,000 Units Oral QID    midodrine (PROAMATINE) tablet 10 mg  10 mg Oral TID WITH MEALS    sodium chloride (NS) flush 5-40 mL  5-40 mL IntraVENous Q8H         Current Nutrition Intake & Therapies:  Average Meal Intake: 26-50%  Average Supplement Intake: 26-50%  ADULT DIET Dysphagia - Pureed  ADULT ORAL NUTRITION SUPPLEMENT Dinner, Lunch, Breakfast; Clear Liquid  DIET ONE TIME MESSAGE    Anthropometric Measures:  Height: 5' 2\" (157.5 cm)  Ideal Body Weight (IBW): 110 lbs (50 kg)  Admission Body Weight: 115 lb  Current Body Wt:  42.1 kg (92 lb 13 oz), 84.4 % IBW.  Bed scale  Current BMI (kg/m2): 17        Weight Adjustment: No adjustment                 BMI Category: Underweight (BMI less than 22) age over 72    Estimated Daily Nutrient Needs:  Energy Requirements Based On: Kcal/kg  Weight Used for Energy Requirements: Current  Energy (kcal/day): 4730-8791  Weight Used for Protein Requirements: Current  Protein (g/day): 52-62 (1.0-1.2 g/kg)  Method Used for Fluid Requirements: Other (comment)  Fluid (ml/day): 8899-1655    Nutrition Diagnosis:   Inadequate oral intake related to cognitive or neurological impairment, inadequate protein-energy intake as evidenced by NPO or clear liquid status due to medical condition  Severe malnutrition related to inadequate protein-energy intake as evidenced by BMI, Criteria as identified in malnutrition assessment    Nutrition Interventions:   Food and/or Nutrient Delivery: Start oral diet, Start oral nutrition supplement  Nutrition Education/Counseling: No recommendations at this time  Coordination of Nutrition Care: Continue to monitor while inpatient  Plan of Care discussed with: nursing    Goals:  Previous Goal Met: Progressing toward goal(s)  Goals: Initiate PO diet, by next RD assessment       Nutrition Monitoring and Evaluation:   Behavioral-Environmental Outcomes: None identified  Food/Nutrient Intake Outcomes: Diet advancement/tolerance  Physical Signs/Symptoms Outcomes: Biochemical data, Weight, Skin, Nausea/vomiting    Discharge Planning:    Continue oral nutrition supplement, Continue current diet    Earnestine Potts RD  Contact: Ext: [phone], or via [employer]

## 2022-08-22 NOTE — PROGRESS NOTES
08/22/22      Medicare pt has received, reviewed, and signed 2nd IM letter informing them of their right to appeal the discharge. Signed copied has been placed on pt bedside chart.

## 2022-08-22 NOTE — ACP (ADVANCE CARE PLANNING)
Hugo Jensen demarcus [place] [address]  [address]  [id number] [facility] Adult  Hospitalist Group      Advance Care Planning Note    Name: Harleen Hampton  YOB: 1925  MRN: [id number]  Admission Date: 8/11/2022  6:06 PM    Date of discussion: 8/22/2022    Active Diagnoses:    Hospital Problems  Date Reviewed: 7/16/2022            Codes Class Noted POA    Acute kidney injury [facility]) ICD-10-CM: N17.9  ICD-9-CM: 584.9  8/11/2022 Unknown        Hypokalemia ICD-10-CM: E87.6  ICD-9-CM: 276.8  7/10/2022 Unknown           These active diagnoses are of sufficient risk that focused discussion on advance care planning is indicated in order to allow the patient to thoughtfully consider personal goals of care, and if situations arise that prevent the ability to personally give input, to ensure appropriate representation of their personal desires for different levels and aggressiveness of care. Discussion:     Persons present and participating in discussion: Catherine Reeves MD    Discussion: Discussed hospice philosophy with patient. She remains fearful that this means death. She continues to say \"my body is failing me\" and \"I don't want to be a burden on my family\". She is appropriate for SNF but she states those facilities are lonely and she doesn't want to go. I worry that her needs would be too much for her 80year old  and her 79year old son to manage alone. Explained to her the enrolling in hospice can provide additional support for her not only physically but also mentally and spiritually and also support for her family as well. I do not believe that the home health services she receives at home are helpful to her at this point.  She is unable to state exactly what her apprehension is with hospice services despite detailed explanations of the services that they would be able to provide her and also that she can be eligible for these services for 6 months or even longer. She became tearful and stated that she doesn't want the stress of this decision to be put on her and asked that I speak with her son Aracely Ardon. Attempted to call her son Aracely Ardon x2 with no answer. At this point will have palliative care assist [facility]. Time Spent:     Total time spent face-to-face in education and discussion: 17 minutes.      Royal Rosenda MD  Date of Service:  8/22/2022  2:46 PM

## 2022-08-23 PROCEDURE — 74011250636 HC RX REV CODE- 250/636: Performed by: INTERNAL MEDICINE

## 2022-08-23 PROCEDURE — 74011250636 HC RX REV CODE- 250/636: Performed by: FAMILY MEDICINE

## 2022-08-23 PROCEDURE — 74011250637 HC RX REV CODE- 250/637: Performed by: INTERNAL MEDICINE

## 2022-08-23 PROCEDURE — 94761 N-INVAS EAR/PLS OXIMETRY MLT: CPT

## 2022-08-23 PROCEDURE — 65270000046 HC RM TELEMETRY

## 2022-08-23 PROCEDURE — 74011250637 HC RX REV CODE- 250/637: Performed by: HOSPITALIST

## 2022-08-23 PROCEDURE — 77010033678 HC OXYGEN DAILY

## 2022-08-23 PROCEDURE — 74011000250 HC RX REV CODE- 250: Performed by: INTERNAL MEDICINE

## 2022-08-23 RX ORDER — DICYCLOMINE HYDROCHLORIDE 10 MG/1
10 CAPSULE ORAL 4 TIMES DAILY
Status: DISCONTINUED | OUTPATIENT
Start: 2022-08-23 | End: 2022-08-25

## 2022-08-23 RX ADMIN — NYSTATIN 500000 UNITS: 100000 SUSPENSION ORAL at 08:35

## 2022-08-23 RX ADMIN — NYSTATIN 500000 UNITS: 100000 SUSPENSION ORAL at 18:33

## 2022-08-23 RX ADMIN — DEXTROSE MONOHYDRATE AND POTASSIUM CHLORIDE INJECTION, SOLUTION: 5; .149 INJECTION, SOLUTION INTRAVENOUS at 04:42

## 2022-08-23 RX ADMIN — DICYCLOMINE HYDROCHLORIDE 10 MG: 10 CAPSULE ORAL at 11:05

## 2022-08-23 RX ADMIN — DICYCLOMINE HYDROCHLORIDE 10 MG: 10 CAPSULE ORAL at 18:33

## 2022-08-23 RX ADMIN — MIDODRINE HYDROCHLORIDE 10 MG: 5 TABLET ORAL at 11:05

## 2022-08-23 RX ADMIN — DEXTROSE MONOHYDRATE AND POTASSIUM CHLORIDE INJECTION, SOLUTION: 5; .149 INJECTION, SOLUTION INTRAVENOUS at 17:13

## 2022-08-23 RX ADMIN — NYSTATIN 500000 UNITS: 100000 SUSPENSION ORAL at 21:00

## 2022-08-23 RX ADMIN — PANTOPRAZOLE SODIUM 40 MG: 40 TABLET, DELAYED RELEASE ORAL at 18:33

## 2022-08-23 RX ADMIN — HEPARIN SODIUM 5000 UNITS: 5000 INJECTION INTRAVENOUS; SUBCUTANEOUS at 13:35

## 2022-08-23 RX ADMIN — Medication 10 ML: at 04:44

## 2022-08-23 RX ADMIN — DICYCLOMINE HYDROCHLORIDE 10 MG: 10 CAPSULE ORAL at 13:35

## 2022-08-23 RX ADMIN — HEPARIN SODIUM 5000 UNITS: 5000 INJECTION INTRAVENOUS; SUBCUTANEOUS at 21:00

## 2022-08-23 RX ADMIN — HEPARIN SODIUM 5000 UNITS: 5000 INJECTION INTRAVENOUS; SUBCUTANEOUS at 04:43

## 2022-08-23 RX ADMIN — Medication 10 ML: at 21:00

## 2022-08-23 RX ADMIN — MIDODRINE HYDROCHLORIDE 10 MG: 5 TABLET ORAL at 18:33

## 2022-08-23 RX ADMIN — NYSTATIN 500000 UNITS: 100000 SUSPENSION ORAL at 13:35

## 2022-08-23 RX ADMIN — Medication 10 ML: at 15:56

## 2022-08-23 RX ADMIN — DICYCLOMINE HYDROCHLORIDE 10 MG: 10 CAPSULE ORAL at 21:01

## 2022-08-23 NOTE — PROGRESS NOTES
Problem: Pressure Injury - Risk of  Goal: *Prevention of pressure injury  Description: Document Destin Scale and appropriate interventions in the flowsheet. Outcome: Progressing Towards Goal  Note: Pressure Injury Interventions:  Sensory Interventions: Assess changes in LOC, Keep linens dry and wrinkle-free, Maintain/enhance activity level, Turn and reposition approx. every two hours (pillows and wedges if needed), Discuss PT/OT consult with provider, Float heels, Avoid rigorous massage over bony prominences, Monitor skin under medical devices, Minimize linen layers    Moisture Interventions: Absorbent underpads, Check for incontinence Q2 hours and as needed, Moisture barrier, Maintain skin hydration (lotion/cream), Apply protective barrier, creams and emollients    Activity Interventions: Pressure redistribution bed/mattress(bed type)    Mobility Interventions: Pressure redistribution bed/mattress (bed type), Turn and reposition approx.  every two hours(pillow and wedges)    Nutrition Interventions: Document food/fluid/supplement intake    Friction and Shear Interventions: HOB 30 degrees or less, Lift sheet, Minimize layers

## 2022-08-23 NOTE — PROGRESS NOTES
19:30   Bedside and Verbal shift change report given to Dionna Landeros (oncoming nurse) by Meir Canas (offgoing nurse). Report included the following information SBAR, Intake/Output, MAR, and Recent Results.

## 2022-08-23 NOTE — PROGRESS NOTES
.Bedside shift change report given to [address] (oncoming nurse) by Dorothy Rodriguez (offgoing nurse). Report included the following information SBAR.

## 2022-08-23 NOTE — PROGRESS NOTES
8/23/2022 2:22 PM   Care Management Progress Note         ICD-10-CM ICD-9-CM     1. Nausea and vomiting, unspecified vomiting type  R11.2 787.01         2. Acute dehydration  E86.0 276.51         3. Hypomagnesemia  E83.42 275.2         4. Hypokalemia  E87.6 276.8         5. Uremia  N19 586         6. EVERT (acute kidney injury) ([facility] [address]  N17.9 584.9         7. Chronic diastolic heart failure (HCC)  I50.32 428.32         8. Elevated troponin  R77.8 790.6         9. Palliative care by specialist [Z51.5 (ICD-10-CM)]  Z51.5 V66.7         10. Goals of care, counseling/discussion [Z71.89 (ICD-10-CM)]  Z71.89 V65.49         11. Age-related physical debility [R54 (ICD-10-CM)]  R54 797         12. Weakness generalized [R53.1 (ICD-10-CM)]  R53.1 780.79         13. Decreased appetite [R63.0 (ICD-10-CM)]  R63.0 783.0         14. Hearing loss, unspecified hearing loss type, unspecified laterality [H91.90 (ICD-10-CM)]  H91.90 389. 9            Readmission   RUR:  20%  Risk Level: []Low []Moderate [x]High  Value-based purchasing: [] Yes [x] No  Bundle patient: [] Yes [x] No              Specify:      Transition of care plan:  Ongoing medical management, Palliative re consulted for [address] discussion. Following for Doc Denis discussion. Primary team recommending hospice, pt and family declining at this time   Family prefers pt to return home with resumption of home health PT and OT through \"[facility]\"([phone]). LVM for Cari Corral with [facility] to confirm they can accept pt back. Received call back from Cari Corral who confirmed they can accept pt back at discharge. Outpatient follow-up.   TBD on transport

## 2022-08-23 NOTE — PROGRESS NOTES
[facility] [place] [facility]  [address] [facility], [facility] 19  [phone]       Hospitalist Progress Note      NAME: Yoanna Denise   :  1925   MRN:  [id number]     Date/Time:  2022     Patient PCP: Tamar Huston MD    Emergency Contact:    Extended Emergency Contact Information  Primary Emergency Contact: Jayme Love  Address: [address] [place] [address], [facility] [address] [place] [address] Phone: [phone]  Mobile Phone: [phone]  Relation: Spouse  Secondary Emergency Contact: Mihai Gamez  Address: [address], [address], [address] [facility]  Mobile Phone: [phone]  Relation: Son      Code: DNR     Isolation Precautions: There are currently no Active Isolations        Subjective:     REASON FOR VISIT:  Recheck SOB      HPI & INTERVAL HISTORY:       : Patient was seen and examined. She was resting comfortably had no complaints. Reports that her shortness of breath is improving but is still on 4 L nasal cannula baseline. Palliative on board.         Allergies   Allergen Reactions    Cephalexin Unknown (comments)    Dilaudid [Hydromorphone (Bulk)] Rash, Nausea and Vomiting and Swelling    Egg Unknown (comments)    Fenofibrate Nausea Only    Gemfibrozil Unknown (comments)    Iodine Rash, Nausea and Vomiting and Swelling    Oxycodone Unknown (comments)    Pcn [Penicillins] Rash, Nausea and Vomiting, Swelling and Contact Dermatitis    Prilosec [Omeprazole] Nausea Only    Shellfish Derived Swelling    Sulfa (Sulfonamide Antibiotics) Rash, Nausea and Vomiting and Swelling         ROS:  Gen:   Negative  Eyes:  negative  ENT:    negative  Resp:  shortness of breath  CVS:   negative  GI:       negative           Objective:      Visit Vitals  BP (!) 118/55 (BP 1 Location: Left upper arm, BP Patient Position: At rest)   Pulse 97   Temp 98.1 °F (36.7 °C)   Resp 18   Ht 5' 2\" (1.575 m)   Wt 49.3 kg (108 lb 11 oz)   SpO2 98%   BMI 19.88 kg/m²       Physical Exam:  General: alert, well appearing, and no distress  Head: Normocephalic, without obvious abnormality, atraumatic  Eyes: PERRL, EOMI, anicteric sclerae, and conjuntiva clear  ENT: lips, mucosa, and tongue normal  Neck: normal, supple, and no tenderness  Lungs: clear to auscultation with good breath sounds and normal respiratory effort  Heart: S1, S2 normal, regular rate, and regular rhythm  Abd: not distended, soft, nontender, BS present and normactive  Ext: no cyanosis and no edema  Skin: normal skin color, no rashes, and texture normal  Neuro:  Alert to self.  CN grossly intact  Psych: not anxious, cooperative, appropriate affect      Medications:  Current Facility-Administered Medications   Medication Dose Route Frequency    sennosides (SENOKOT) 8.8 mg/5 mL syrup 8.8 mg  5 mL Oral BID    docusate (COLACE) 50 mg/5 mL oral liquid 50 mg  50 mg Oral BID    dextrose 5% with KCl 20 mEq/L infusion   IntraVENous CONTINUOUS    dextrose 10% infusion 0-250 mL  0-250 mL IntraVENous PRN    polyethylene glycol (MIRALAX) packet 17 g  17 g Oral DAILY    pantoprazole (PROTONIX) tablet 40 mg  40 mg Oral BID    heparin (porcine) injection 5,000 Units  5,000 Units SubCUTAneous Q8H    nystatin (MYCOSTATIN) 100,000 unit/mL oral suspension 500,000 Units  500,000 Units Oral QID    bisacodyL (DULCOLAX) suppository 10 mg  10 mg Rectal DAILY PRN    midodrine (PROAMATINE) tablet 10 mg  10 mg Oral TID WITH MEALS    prochlorperazine (COMPAZINE) injection 5 mg  5 mg IntraVENous Q4H PRN    hydrALAZINE (APRESOLINE) 20 mg/mL injection 20 mg  20 mg IntraVENous Q6H PRN    morphine injection 2 mg  2 mg IntraVENous Q3H PRN    sodium chloride (NS) flush 5-40 mL  5-40 mL IntraVENous Q8H    sodium chloride (NS) flush 5-40 mL  5-40 mL IntraVENous PRN    acetaminophen (TYLENOL) tablet 650 mg  650 mg Oral Q6H PRN    Or    acetaminophen (TYLENOL) suppository 650 mg  650 mg Rectal Q6H PRN    polyethylene glycol (MIRALAX) packet 17 g 17 g Oral DAILY PRN    ondansetron (ZOFRAN ODT) tablet 4 mg  4 mg Oral Q8H PRN    Or    ondansetron (ZOFRAN) injection 4 mg  4 mg IntraVENous Q6H PRN        Labs:  Recent Labs     08/22/22  0359   WBC 5.4   HGB 9.3*   HCT 29.1*        Recent Labs     08/22/22  0359      K 4.7      CO2 26   *   BUN 27*   CREA 1.39*   CA 8.3*       Radiology:  No results found. I personally reviewed and interpreted the previous imaging studies and agree with official reading    The labs, imaging studies, and medications was reviewed by me on: August 23, 2022         Assessment/Plan:      Chronic Hypoxemic Respiratory Failure: remains on baseline 4L nc. She has no e/o pulmonary edema, CHF. Nausea/vomiting/abd pain: acute on chronic. Improved. CT abd does show moderate stool burden, but otherwise nothing acute. Follow up KUB 8/15 showing stool burden s/p suppository 8/15; repeat KUB 8/18. Supportive care with anti-emetics, low dose IV morphine prn. Cont bowel regimen. Having good bowel movements now and stool burden significantly better. Encourage more PO and mobilize. Hypoglycemia: having to continue D5 to maintain Bgs. Continue nutrition supplements. Discussed calorie count with nutritionist      Hypokalemia:  2/2 GI losses, poor oral intake and diuretic use. Replete prn. Hypotension: weaned off ruchi drip. Continue Midodrine. EVERT: baseline normal creatinine she presents with creatinine 1.69, azotemia, clinically volume down. Due to GI losses and diuretic use. Renally dose meds, avoid nephrotoxins     HFrEF: Echo 7/2022 with EF 30-35%. ?Smith Javan? Presently volume down. Hold furosemide, manas, metop    CAD: Trop slightly up, but down from prior. No plans for aggressive interventions at this time     Hx DVT: Could consider PE but low s/f PE given neg LE dopplers. Debility: needs SNF, but patient declining at this time.  Hospice appropriate but declining this as well    Body mass index is 19.88 kg/m².:  18.5 - 24.9:  Normal weight        Discussed:  Pt's condition, Imaging findings, Lab findings, Assessment, and Care Plan discussed with: Patient, RN, and Care Manager    Prophylaxis:  Lovenox SQ    Probable disposition:  [facility] PT declining SNF and Hospice      Total time: 30 minutes **I personally saw and examined the patient during this time period**      Date of service:    8/23/2022                ___________________________________________________    Admitting Physician: Katty Fernandez MD

## 2022-08-23 NOTE — PROGRESS NOTES
[address] visit. Mrs James Foy was in bed. She declined communion today because she is having difficulty swallowing. She is discouraged. Prayer for spiritual communion offered. Spent sometime watching the men working on the [employer] which see can see from her bed. She lives with her  and son. She talked about being in control during most of her life and now she isn't in control or feels like she isn't. Talked about her circumstances are now asked her to practice patience with herself and to receive help from others. Supported her in that it is not an easy lesson to learn or practice. She asked to be remembered in prayer and assured her I remember everyone I visit in prayer in the evening Divine Office.     SATURNINO Zhao, RN, ACSW, LCSW   Page:  [phone]

## 2022-08-23 NOTE — PROGRESS NOTES
Occupational Therapy Note  8/23/2022    OT treatment attempted at [phone] however pt requesting to rest today as pt not feeling well and multiple BM today per pt and nursing. Will continue to follow pt and attempt OT tx at a later time as able.      Thank you,  Kylah Sequeira OTR/L

## 2022-08-23 NOTE — PROGRESS NOTES
Problem: Falls - Risk of  Goal: *Absence of Falls  Description: Document Ishaan Ellington Fall Risk and appropriate interventions in the flowsheet. Outcome: Progressing Towards Goal  Note: Fall Risk Interventions:  Mobility Interventions: Bed/chair exit alarm    Mentation Interventions: Bed/chair exit alarm    Medication Interventions: Bed/chair exit alarm, Patient to call before getting OOB, Teach patient to arise slowly    Elimination Interventions: Patient to call for help with toileting needs, Call light in reach, Bed/chair exit alarm, Elevated toilet seat, Toilet paper/wipes in reach, Toileting schedule/hourly rounds    History of Falls Interventions: Bed/chair exit alarm, Door open when patient unattended         Problem: Patient Education: Go to Patient Education Activity  Goal: Patient/Family Education  Outcome: Progressing Towards Goal     Problem: Pressure Injury - Risk of  Goal: *Prevention of pressure injury  Description: Document Destin Scale and appropriate interventions in the flowsheet.   Outcome: Progressing Towards Goal  Note: Pressure Injury Interventions:  Sensory Interventions: Assess changes in LOC, Assess need for specialty bed, Discuss PT/OT consult with provider, Check visual cues for pain, Keep linens dry and wrinkle-free    Moisture Interventions: Limit adult briefs, Maintain skin hydration (lotion/cream), Minimize layers, Moisture barrier, Check for incontinence Q2 hours and as needed, Apply protective barrier, creams and emollients, Absorbent underpads    Activity Interventions: Assess need for specialty bed, Increase time out of bed, PT/OT evaluation, Pressure redistribution bed/mattress(bed type)    Mobility Interventions: Pressure redistribution bed/mattress (bed type), PT/OT evaluation    Nutrition Interventions: Document food/fluid/supplement intake, Offer support with meals,snacks and hydration    Friction and Shear Interventions: Apply protective barrier, creams and emollients, Minimize layers                Problem: Patient Education: Go to Patient Education Activity  Goal: Patient/Family Education  Outcome: Progressing Towards Goal     Problem: Nutrition Deficit  Goal: *Optimize nutritional status  Outcome: Progressing Towards Goal     Problem: Patient Education: Go to Patient Education Activity  Goal: Patient/Family Education  Outcome: Progressing Towards Goal     Problem: Hypotension  Goal: *Blood pressure within specified parameters  Outcome: Progressing Towards Goal  Goal: *Fluid volume balance  Outcome: Progressing Towards Goal  Goal: *Labs within defined limits  Outcome: Progressing Towards Goal     Problem: Patient Education: Go to Patient Education Activity  Goal: Patient/Family Education  Outcome: Progressing Towards Goal     Problem: Patient Education: Go to Patient Education Activity  Goal: Patient/Family Education  Outcome: Progressing Towards Goal     Problem: Patient Education: Go to Patient Education Activity  Goal: Patient/Family Education  Outcome: Progressing Towards Goal

## 2022-08-24 LAB
ANION GAP SERPL CALC-SCNC: 4 MMOL/L (ref 5–15)
BASOPHILS # BLD: 0.1 K/UL (ref 0–0.1)
BASOPHILS NFR BLD: 1 % (ref 0–1)
BUN SERPL-MCNC: 25 MG/DL (ref 6–20)
BUN/CREAT SERPL: 19 (ref 12–20)
CALCIUM SERPL-MCNC: 8.7 MG/DL (ref 8.5–10.1)
CHLORIDE SERPL-SCNC: 107 MMOL/L (ref 97–108)
CO2 SERPL-SCNC: 23 MMOL/L (ref 21–32)
CREAT SERPL-MCNC: 1.3 MG/DL (ref 0.55–1.02)
DIFFERENTIAL METHOD BLD: ABNORMAL
EOSINOPHIL # BLD: 0.2 K/UL (ref 0–0.4)
EOSINOPHIL NFR BLD: 5 % (ref 0–7)
ERYTHROCYTE [DISTWIDTH] IN BLOOD BY AUTOMATED COUNT: 15.9 % (ref 11.5–14.5)
GLUCOSE SERPL-MCNC: 100 MG/DL (ref 65–100)
HCT VFR BLD AUTO: 35.5 % (ref 35–47)
HGB BLD-MCNC: 11.5 G/DL (ref 11.5–16)
IMM GRANULOCYTES # BLD AUTO: 0.1 K/UL (ref 0–0.04)
IMM GRANULOCYTES NFR BLD AUTO: 1 % (ref 0–0.5)
LYMPHOCYTES # BLD: 1.9 K/UL (ref 0.8–3.5)
LYMPHOCYTES NFR BLD: 36 % (ref 12–49)
MCH RBC QN AUTO: 33 PG (ref 26–34)
MCHC RBC AUTO-ENTMCNC: 32.4 G/DL (ref 30–36.5)
MCV RBC AUTO: 102 FL (ref 80–99)
MONOCYTES # BLD: 0.5 K/UL (ref 0–1)
MONOCYTES NFR BLD: 10 % (ref 5–13)
NEUTS SEG # BLD: 2.4 K/UL (ref 1.8–8)
NEUTS SEG NFR BLD: 47 % (ref 32–75)
NRBC # BLD: 0 K/UL (ref 0–0.01)
NRBC BLD-RTO: 0 PER 100 WBC
PLATELET # BLD AUTO: 349 K/UL (ref 150–400)
PMV BLD AUTO: 9.8 FL (ref 8.9–12.9)
POTASSIUM SERPL-SCNC: 5 MMOL/L (ref 3.5–5.1)
RBC # BLD AUTO: 3.48 M/UL (ref 3.8–5.2)
SODIUM SERPL-SCNC: 134 MMOL/L (ref 136–145)
WBC # BLD AUTO: 5.2 K/UL (ref 3.6–11)

## 2022-08-24 PROCEDURE — 77030038269 HC DRN EXT URIN PURWCK BARD -A

## 2022-08-24 PROCEDURE — 74011250636 HC RX REV CODE- 250/636: Performed by: FAMILY MEDICINE

## 2022-08-24 PROCEDURE — 65270000046 HC RM TELEMETRY

## 2022-08-24 PROCEDURE — 74011250637 HC RX REV CODE- 250/637: Performed by: HOSPITALIST

## 2022-08-24 PROCEDURE — 80048 BASIC METABOLIC PNL TOTAL CA: CPT

## 2022-08-24 PROCEDURE — 77010033678 HC OXYGEN DAILY

## 2022-08-24 PROCEDURE — 85025 COMPLETE CBC W/AUTO DIFF WBC: CPT

## 2022-08-24 PROCEDURE — 94761 N-INVAS EAR/PLS OXIMETRY MLT: CPT

## 2022-08-24 PROCEDURE — 74011250636 HC RX REV CODE- 250/636: Performed by: INTERNAL MEDICINE

## 2022-08-24 PROCEDURE — 74011250637 HC RX REV CODE- 250/637: Performed by: INTERNAL MEDICINE

## 2022-08-24 PROCEDURE — 97530 THERAPEUTIC ACTIVITIES: CPT

## 2022-08-24 PROCEDURE — 36415 COLL VENOUS BLD VENIPUNCTURE: CPT

## 2022-08-24 PROCEDURE — 97535 SELF CARE MNGMENT TRAINING: CPT

## 2022-08-24 PROCEDURE — 74011000250 HC RX REV CODE- 250: Performed by: INTERNAL MEDICINE

## 2022-08-24 RX ADMIN — DEXTROSE MONOHYDRATE AND POTASSIUM CHLORIDE INJECTION, SOLUTION: 5; .149 INJECTION, SOLUTION INTRAVENOUS at 21:34

## 2022-08-24 RX ADMIN — MIDODRINE HYDROCHLORIDE 10 MG: 5 TABLET ORAL at 18:24

## 2022-08-24 RX ADMIN — DEXTROSE MONOHYDRATE AND POTASSIUM CHLORIDE INJECTION, SOLUTION: 5; .149 INJECTION, SOLUTION INTRAVENOUS at 07:48

## 2022-08-24 RX ADMIN — PANTOPRAZOLE SODIUM 40 MG: 40 TABLET, DELAYED RELEASE ORAL at 08:55

## 2022-08-24 RX ADMIN — NYSTATIN 500000 UNITS: 100000 SUSPENSION ORAL at 13:27

## 2022-08-24 RX ADMIN — HEPARIN SODIUM 5000 UNITS: 5000 INJECTION INTRAVENOUS; SUBCUTANEOUS at 13:28

## 2022-08-24 RX ADMIN — MIDODRINE HYDROCHLORIDE 10 MG: 5 TABLET ORAL at 08:55

## 2022-08-24 RX ADMIN — DICYCLOMINE HYDROCHLORIDE 10 MG: 10 CAPSULE ORAL at 21:28

## 2022-08-24 RX ADMIN — HEPARIN SODIUM 5000 UNITS: 5000 INJECTION INTRAVENOUS; SUBCUTANEOUS at 08:55

## 2022-08-24 RX ADMIN — Medication 10 ML: at 21:28

## 2022-08-24 RX ADMIN — HEPARIN SODIUM 5000 UNITS: 5000 INJECTION INTRAVENOUS; SUBCUTANEOUS at 21:28

## 2022-08-24 RX ADMIN — Medication 10 ML: at 13:28

## 2022-08-24 RX ADMIN — MIDODRINE HYDROCHLORIDE 10 MG: 5 TABLET ORAL at 13:27

## 2022-08-24 RX ADMIN — NYSTATIN 500000 UNITS: 100000 SUSPENSION ORAL at 08:55

## 2022-08-24 RX ADMIN — DICYCLOMINE HYDROCHLORIDE 10 MG: 10 CAPSULE ORAL at 13:27

## 2022-08-24 RX ADMIN — PANTOPRAZOLE SODIUM 40 MG: 40 TABLET, DELAYED RELEASE ORAL at 18:25

## 2022-08-24 RX ADMIN — DICYCLOMINE HYDROCHLORIDE 10 MG: 10 CAPSULE ORAL at 18:24

## 2022-08-24 RX ADMIN — DICYCLOMINE HYDROCHLORIDE 10 MG: 10 CAPSULE ORAL at 08:55

## 2022-08-24 NOTE — WOUND CARE
Wound Nurse Note    Reconsulted to see patient regarding pressure injury to sacrum. Patient currently resting on a [employer] with air surface; alert and oriented; turns well with minimal assist.  Patient last seen by Wound Dept 8/18/22 with orders ongoing to sacral area for foam dressing. Assessment:  Sacral area/buttocks - denuding of skin on left buttock noted approx 1.8cm x 1.8cm; skin open to air with zinc barrier cream in place; tender on palpation. Bilateral heels - blanching pink. Recommendations:  Sacral - continue orders as placed 8/12/22 for foam dressing to pad and protect sacral area; change MWF. Turn q2h and float heels. Plan: Will follow; reconsult as needed.     Mirlande Holland RN [facility], [facility].  [facility] Wound Dept  [phone]

## 2022-08-24 NOTE — PROGRESS NOTES
8/24/2022 4:47 PM   Care Management Progress Note         ICD-10-CM ICD-9-CM     1. Nausea and vomiting, unspecified vomiting type  R11.2 787.01         2. Acute dehydration  E86.0 276.51         3. Hypomagnesemia  E83.42 275.2         4. Hypokalemia  E87.6 276.8         5. Uremia  N19 586         6. EVERT (acute kidney injury) ([facility] [address]  N17.9 584.9         7. Chronic diastolic heart failure (HCC)  I50.32 428.32         8. Elevated troponin  R77.8 790.6         9. Palliative care by specialist [Z51.5 (ICD-10-CM)]  Z51.5 V66.7         10. Goals of care, counseling/discussion [Z71.89 (ICD-10-CM)]  Z71.89 V65.49         11. Age-related physical debility [R54 (ICD-10-CM)]  R54 797         12. Weakness generalized [R53.1 (ICD-10-CM)]  R53.1 780.79         13. Decreased appetite [R63.0 (ICD-10-CM)]  R63.0 783.0         14. Hearing loss, unspecified hearing loss type, unspecified laterality [H91.90 (ICD-10-CM)]  H91.90 389. 9            Readmission   RUR:  20%  Risk Level: []Low []Moderate [x]High  Value-based purchasing: [] Yes [x] No  Bundle patient: [] Yes [x] No              Specify:      Transition of care plan:  Ongoing medical management, Palliative re consulted for [address] discussion. Following for Doc 64 discussion. Primary team recommending hospice, pt and family declining at this time   Family prefers pt to return home with resumption of home health PT and OT through \"[facility]\"([phone]). Outpatient follow-up.   TBD on transport

## 2022-08-24 NOTE — PROGRESS NOTES
Hugo Jensen [facility]  [address] [facility], [facility] 19  [phone]       Hospitalist Progress Note      NAME: Chinedu Garrido   :  1925   MRN:  [id number]     Date/Time:  2022     Patient PCP: Pina Shah MD    Emergency Contact:    Extended Emergency Contact Information  Primary Emergency Contact: Alesia Hall  Address: [address] [place] [address], [address] [place] [address] [address] [place] [address] Phone: [phone]  Mobile Phone: [phone]  Relation: Spouse  Secondary Emergency Contact: Shawna Jade  Address: [address], [address], [address] [facility]  Mobile Phone: [phone]  Relation: Son      Code: DNR     Isolation Precautions: There are currently no Active Isolations        Subjective:     REASON FOR VISIT:  Recheck SOB      HPI & INTERVAL HISTORY:       : Patient seen and examined. She reports that the oral secretions have improved with the Bentyl. No new complaints. Denies chest pain or shortness of breath at this time. Son Omar Canes at bedside and agree to reconsult palliative care. : Patient was seen and examined. She was resting comfortably had no complaints. Reports that her shortness of breath is improving but is still on 4 L nasal cannula baseline. Palliative on board.         Allergies   Allergen Reactions    Cephalexin Unknown (comments)    Dilaudid [Hydromorphone (Bulk)] Rash, Nausea and Vomiting and Swelling    Egg Unknown (comments)    Fenofibrate Nausea Only    Gemfibrozil Unknown (comments)    Iodine Rash, Nausea and Vomiting and Swelling    Oxycodone Unknown (comments)    Pcn [Penicillins] Rash, Nausea and Vomiting, Swelling and Contact Dermatitis    Prilosec [Omeprazole] Nausea Only    Shellfish Derived Swelling    Sulfa (Sulfonamide Antibiotics) Rash, Nausea and Vomiting and Swelling         ROS:  Gen:   Negative  Resp:  negative  CVS:   negative  GI:       negative           Objective:      Visit Vitals  /67 (BP 1 Location: Left upper arm, BP Patient Position: At rest)   Pulse 92   Temp 97.7 °F (36.5 °C)   Resp 16   Ht 5' 2\" (1.575 m)   Wt 43.4 kg (95 lb 10.9 oz)   SpO2 99%   BMI 17.50 kg/m²       Physical Exam:  General: alert, well appearing, and NAD  Head: Normocephalic, without obvious abnormality, atraumatic  Eyes: anicteric sclerae and conjuntiva clear  ENT: lips, mucosa, and tongue normal  Neck: normal, supple, and no tenderness  Lungs: clear to auscultation  Heart: S1, S2 normal, regular rate, and regular rhythm  Abd: not distended, soft, nontender, BS present and normactive  Ext: no cyanosis and no edema  Skin: normal skin color, no rashes, and texture normal  Neuro:  Alert to self.  CN grossly intact  Psych: not anxious, cooperative, appropriate affect      Medications:  Current Facility-Administered Medications   Medication Dose Route Frequency    dicyclomine (BENTYL) capsule 10 mg  10 mg Oral QID    sennosides (SENOKOT) 8.8 mg/5 mL syrup 8.8 mg  5 mL Oral BID    docusate (COLACE) 50 mg/5 mL oral liquid 50 mg  50 mg Oral BID    dextrose 5% with KCl 20 mEq/L infusion   IntraVENous CONTINUOUS    dextrose 10% infusion 0-250 mL  0-250 mL IntraVENous PRN    polyethylene glycol (MIRALAX) packet 17 g  17 g Oral DAILY    pantoprazole (PROTONIX) tablet 40 mg  40 mg Oral BID    heparin (porcine) injection 5,000 Units  5,000 Units SubCUTAneous Q8H    nystatin (MYCOSTATIN) 100,000 unit/mL oral suspension 500,000 Units  500,000 Units Oral QID    bisacodyL (DULCOLAX) suppository 10 mg  10 mg Rectal DAILY PRN    midodrine (PROAMATINE) tablet 10 mg  10 mg Oral TID WITH MEALS    prochlorperazine (COMPAZINE) injection 5 mg  5 mg IntraVENous Q4H PRN    hydrALAZINE (APRESOLINE) 20 mg/mL injection 20 mg  20 mg IntraVENous Q6H PRN    morphine injection 2 mg  2 mg IntraVENous Q3H PRN    sodium chloride (NS) flush 5-40 mL  5-40 mL IntraVENous Q8H    sodium chloride (NS) flush 5-40 mL  5-40 mL IntraVENous PRN acetaminophen (TYLENOL) tablet 650 mg  650 mg Oral Q6H PRN    Or    acetaminophen (TYLENOL) suppository 650 mg  650 mg Rectal Q6H PRN    polyethylene glycol (MIRALAX) packet 17 g  17 g Oral DAILY PRN    ondansetron (ZOFRAN ODT) tablet 4 mg  4 mg Oral Q8H PRN    Or    ondansetron (ZOFRAN) injection 4 mg  4 mg IntraVENous Q6H PRN        Labs:  Recent Labs     08/24/22  0820   WBC 5.2   HGB 11.5   HCT 35.5          Recent Labs     08/24/22  0820   *   K 5.0      CO2 23      BUN 25*   CREA 1.30*   CA 8.7         Radiology:  No results found. I personally reviewed and interpreted the previous imaging studies and agree with official reading    The labs, imaging studies, and medications was reviewed by me on: August 24, 2022         Assessment/Plan:      Chronic Hypoxemic Respiratory Failure:   Continue with home remains on baseline 4LNC. Improving. Nausea/vomiting/abd pain: acute on chronic. Has improved and tolerating diet well  CT of the abdomen showed moderate stool burden  Continue with bowel regimen  Mobilize patient      Hypoglycemia:  Stable use D5 as needed  Continue nutritional supplements     Hypokalemia due to GI losses poor oral intake and diuretic  Monitor and replace as needed    Hypotension requiring vasopressor:   Now off synephrine  Continue midodrine    EVERT likely prerenal due to poor oral intake and GI loss resulting in IVVD   Continues to slowly improve  Avoid nephrotoxins monitor labs     HFrEF:   Echo 7/2022 with EF 30-35%. ?Haleigh Lease? Hold furosemide, manas, metop    CAD:   Trop slightly up, but down from prior. No plans for aggressive interventions at this time     Hx DVT:   Could consider PE but low s/f PE given neg LE dopplers. Debility:   SNF recommended however patient declining will prefer home health care with PT OT  Hospice also recommended but declining.     Reconsulted palliative    Body mass index is 17.5 kg/m².:  18.5 - 24.9:  Normal weight        Discussed:  Pt's condition, Imaging findings, Lab findings, Assessment, and Care Plan discussed with: Patient, [facility] at [facility], RN, and Care Manager    Prophylaxis:  Lovenox SQ    Probable disposition:  [facility] PT declining SNF and Hospice      Total time: -25- minutes **I personally saw and examined the patient during this time period**      Date of service:    8/24/2022                ___________________________________________________    Admitting Physician: Bhaskar Cueto MD

## 2022-08-24 NOTE — PROGRESS NOTES
Problem: Self Care Deficits Care Plan (Adult)  Goal: *Acute Goals and Plan of Care (Insert Text)  Description: FUNCTIONAL STATUS PRIOR TO ADMISSION: Per chart review and CM notes, pt required assist for self care tasks at home and was able to ambulate short distances with RW and used WC for longer distances. HOME SUPPORT: The patient lived with son and spouse and required assistance for self care and functional transfers/mobility. Occupational Therapy Goals  Initiated 8/19/2022  1. Patient will perform grooming with modified independence within 7 day(s). 2.  Patient will perform upper body dressing with supervision/set-up within 7 day(s). 3.  Patient will perform upper body bathing with minimal assistance/contact guard assist within 7 day(s). 4.  Patient will perform toilet transfers with minimal assistance/contact guard assist within 7 day(s). 5.  Patient will perform all aspects of toileting with minimal assistance/contact guard assist within 7 day(s). 6.  Patient will participate in upper extremity therapeutic exercise/activities with modified independence for 10 minutes within 7 day(s). 7.  Patient will utilize energy conservation techniques during functional activities with verbal cues within 7 day(s). Outcome: Progressing Towards Goal     OCCUPATIONAL THERAPY TREATMENT  Patient: Yoanna Denise (89 y.o. female)  Date: 8/24/2022  Diagnosis: Hypokalemia [E87.6]  Acute kidney injury ([facility] [address] [N17.9] <principal problem not specified>      Precautions: DNR  Chart, occupational therapy assessment, plan of care, and goals were reviewed. ASSESSMENT  Patient continues with skilled OT services and is progressing towards goals. Patient requires increased encouragement and education and initially agreeable to minimal activity with therapy this date. She continues to present with decreased activity tolerance, globally decreased strength, ROM impairments in UEs (severe in shoulders), and high fall risk. After brief exercises in chair position, pt agreeable to transition to EOB, requiring increased time and cues for improved technique. Upon performing initial stand to RW with max A x 1, noted incontinence of bowels and pt is able to perform 3 stands for dependent bowel hygiene. Pt visibly fatigued after activity and is returned to supine. She tolerates activity on 4L via NC and denies symptoms of lightheadedness/dizziness throughout. Pt continues to express desire to return home at discharge. If so, she will require 24/7 physical support/hands-on assistance and total care. Current Level of Function Impacting Discharge (ADLs): max A for mobility; set up to max/total A for ADLs (bed level)    Other factors to consider for discharge: high fall risk; lives with family         PLAN :  Patient continues to benefit from skilled intervention to address the above impairments. Continue treatment per established plan of care to address goals. Recommend with staff: bed in chair position 3x/day; encourage participation in ADLs; bed level toileting    Recommend next OT session: BSC t/f; ADLs at EOB    Recommendation for discharge: (in order for the patient to meet his/her long term goals)  To be determined: SNF vs. [facility]    This discharge recommendation:  Has been made in collaboration with the attending provider and/or case management    IF patient discharges home will need the following DME: hospital bed; lift; 24 hour care and support       SUBJECTIVE:   Patient stated I don't know if I can do much today.     OBJECTIVE DATA SUMMARY:   Cognitive/Behavioral Status:  Neurologic State: Alert  Orientation Level: Oriented X4  Cognition: Follows commands  Perception: Cues to maintain midline in sitting;Cues to maintain midline in standing; Tactile;Verbal;Visual  Perseveration: No perseveration noted  Safety/Judgement: Decreased insight into deficits    Functional Mobility and Transfers for ADLs:  Bed Mobility:  Supine to Sit: Maximum assistance  Sit to Supine: Maximum assistance;Assist x2  Scooting: Maximum assistance    Transfers:  Sit to Stand: Maximum assistance;Assist x1    Balance:  Sitting: Impaired  Sitting - Static: Fair (occasional)  Sitting - Dynamic: Poor (constant support)  Standing: Impaired  Standing - Static: Constant support;Poor  Standing - Dynamic : Not tested    ADL Intervention:    Lower Body Dressing Assistance  Socks: Total assistance (dependent)  Position Performed: Other (comment) (semi-ojeda's)  Cues: Physical assistance    Toileting  Toileting Assistance: Total assistance(dependent)  Bladder Hygiene: Total assistance (dependent)  Bowel Hygiene: Total assistance (dependent)  Clothing Management: Total assistance (dependent)    Cognitive Retraining  Safety/Judgement: Decreased insight into deficits    Pain:  Pt reporting minimal pain    Activity Tolerance:   Poor    After treatment patient left in no apparent distress:   Supine in bed, Heels elevated for pressure relief, Call bell within reach, Bed / chair alarm activated, and Side rails x 3    COMMUNICATION/COLLABORATION:   The patients plan of care was discussed with: Physical therapist and Registered nurse.      Surya Benito OT  Time Calculation: 29 mins

## 2022-08-24 NOTE — PROGRESS NOTES
Problem: Falls - Risk of  Goal: *Absence of Falls  Description: Document Bret Mcgarrytoo Fall Risk and appropriate interventions in the flowsheet. Outcome: Progressing Towards Goal  Note: Fall Risk Interventions:  Mobility Interventions: Bed/chair exit alarm, Patient to call before getting OOB    Mentation Interventions: Bed/chair exit alarm, Toileting rounds    Medication Interventions: Bed/chair exit alarm, Patient to call before getting OOB    Elimination Interventions: Bed/chair exit alarm, Call light in reach, Elevated toilet seat, Patient to call for help with toileting needs    History of Falls Interventions: Bed/chair exit alarm         Problem: Patient Education: Go to Patient Education Activity  Goal: Patient/Family Education  Outcome: Progressing Towards Goal     Problem: Pressure Injury - Risk of  Goal: *Prevention of pressure injury  Description: Document Destin Scale and appropriate interventions in the flowsheet.   Outcome: Progressing Towards Goal  Note: Pressure Injury Interventions:  Sensory Interventions: Assess changes in LOC, Discuss PT/OT consult with provider    Moisture Interventions: Limit adult briefs, Minimize layers, Apply protective barrier, creams and emollients    Activity Interventions: Assess need for specialty bed, PT/OT evaluation    Mobility Interventions: Assess need for specialty bed, Chair cushion, PT/OT evaluation    Nutrition Interventions: Document food/fluid/supplement intake    Friction and Shear Interventions: Apply protective barrier, creams and emollients                Problem: Patient Education: Go to Patient Education Activity  Goal: Patient/Family Education  Outcome: Progressing Towards Goal     Problem: Nutrition Deficit  Goal: *Optimize nutritional status  Outcome: Progressing Towards Goal     Problem: Patient Education: Go to Patient Education Activity  Goal: Patient/Family Education  Outcome: Progressing Towards Goal     Problem: Hypotension  Goal: *Blood pressure within specified parameters  Outcome: Progressing Towards Goal  Goal: *Fluid volume balance  Outcome: Progressing Towards Goal  Goal: *Labs within defined limits  Outcome: Progressing Towards Goal     Problem: Patient Education: Go to Patient Education Activity  Goal: Patient/Family Education  Outcome: Progressing Towards Goal     Problem: Patient Education: Go to Patient Education Activity  Goal: Patient/Family Education  Outcome: Progressing Towards Goal     Problem: Patient Education: Go to Patient Education Activity  Goal: Patient/Family Education  Outcome: Progressing Towards Goal

## 2022-08-24 NOTE — PROGRESS NOTES
Problem: Mobility Impaired (Adult and Pediatric)  Goal: *Acute Goals and Plan of Care (Insert Text)  Description: FUNCTIONAL STATUS PRIOR TO ADMISSION: Pt reports w/c level mobility with pull to stand on RW with assist to complete transfer. Pt states that she is able to propel w/c in home at times, requires family assist at times. Notes recent functional decline due to progressive weakness. States that outside physician told her she has six months to live, and that information has taken away \"all my motivation. \"    HOME SUPPORT PRIOR TO ADMISSION: The patient lived with  and son who provides assist.    Physical Therapy Goals  Initiated 8/16/2022  1. Patient will move from supine to sit and sit to supine  in bed with moderate assistance  within 7 day(s). 2.  Patient will transfer from bed to chair and chair to bed with moderate assistance  using the least restrictive device within 7 day(s). 3.  Patient will perform sit to stand with moderate assistance  within 7 day(s). 4.  Patient will complete functional dynamic sitting activities EOB with supervision/set-up within 7 day(s). Outcome: Progressing Towards Goal   PHYSICAL THERAPY TREATMENT  Patient: Candelaria Goodwin (10 y.o. female)  Date: 8/24/2022  Diagnosis: Hypokalemia [E87.6]  Acute kidney injury ([facility] [address] [N17.9] <principal problem not specified>      Precautions: DNR  Chart, physical therapy assessment, plan of care and goals were reviewed. ASSESSMENT  Patient continues with skilled PT services and is slowly progressing towards goals. Pt received supine in bed on 4L of oxygen and agreeable to therapy with encouragement. Pt tolerated session fairly well. Pt continues to be limited by generalized weakness, decreased functional mobility, decreased tolerance to activity, impaired seated and standing balance, inability to take steps, and increased risk for falls. Pt completed bed mobility with max A x 1 and additional time and effort to complete.  Pt performed sit<>stands (x 3 trials) with RW with max A X 1. Pt only able to tolerate static standing with RW ~ 15 seconds for each trial. Pt incontinent of stool and required total care for pericare in standing. Pt assisted back to L sidelying position with pillows placed for comfort. Pt will continue to benefit from PT to progress mobility as tolerated. Recommend SNF placement upon discharge. Pt voiced she wanted to be able to go home upon discharge. Pt will need total care at bed level if pt were to d/c home with family assist.    Current Level of Function Impacting Discharge (mobility/balance): max A supine<>sit, max A sit<>stand with RW; poor balance, and standing tolerance; unable to take steps    Other factors to consider for discharge:          PLAN :  Patient continues to benefit from skilled intervention to address the above impairments. Continue treatment per established plan of care. to address goals. Recommendation for discharge: (in order for the patient to meet his/her long term goals)  Therapy up to 5 days/week in SNF setting. If pt+family prefer home, recommend HHPT and total care at bed level    This discharge recommendation:  Has been made in collaboration with the attending provider and/or case management    IF patient discharges home will need the following DME: hospital bed and mechanical lift       SUBJECTIVE:   Patient stated Anabel Lose do you want me to do? Jaky Leader    OBJECTIVE DATA SUMMARY:   Critical Behavior:  Neurologic State: Alert  Orientation Level: Oriented X4  Cognition: Appropriate decision making, Appropriate for age attention/concentration, Appropriate safety awareness, Follows commands  Safety/Judgement: Decreased insight into deficits  Functional Mobility Training:  Bed Mobility:     Supine to Sit: Maximum assistance  Sit to Supine: Maximum assistance;Assist x2  Scooting: Maximum assistance        Transfers:  Sit to Stand: Maximum assistance;Assist x1  Stand to Sit: Maximum assistance;Assist x1                             Balance:  Sitting: Impaired  Sitting - Static: Fair (occasional)  Sitting - Dynamic: Poor (constant support)  Standing: Impaired  Standing - Static: Constant support;Poor  Standing - Dynamic : Not tested  Ambulation/Gait Training:     NT--max A to maintain static standing              Therapeutic Exercises: Ankle pumps  Pain Rating:  Pt denied pain    Activity Tolerance:   Fair and observed SOB with activity    After treatment patient left in no apparent distress:   Supine in bed, Call bell within reach, and Side rails x 3    COMMUNICATION/COLLABORATION:   The patients plan of care was discussed with: Occupational therapist and Registered nurse.      Champ Workman PT, DPT   Time Calculation: 24 mins

## 2022-08-25 PROCEDURE — 77030037877 HC DRSG MEPILEX >48IN BORD MOLN -A

## 2022-08-25 PROCEDURE — 74011250636 HC RX REV CODE- 250/636: Performed by: INTERNAL MEDICINE

## 2022-08-25 PROCEDURE — 94761 N-INVAS EAR/PLS OXIMETRY MLT: CPT

## 2022-08-25 PROCEDURE — 74011000250 HC RX REV CODE- 250: Performed by: INTERNAL MEDICINE

## 2022-08-25 PROCEDURE — 77010033678 HC OXYGEN DAILY

## 2022-08-25 PROCEDURE — 74011000250 HC RX REV CODE- 250: Performed by: HOSPITALIST

## 2022-08-25 PROCEDURE — 77030038269 HC DRN EXT URIN PURWCK BARD -A

## 2022-08-25 PROCEDURE — 65270000046 HC RM TELEMETRY

## 2022-08-25 PROCEDURE — 74011250636 HC RX REV CODE- 250/636: Performed by: FAMILY MEDICINE

## 2022-08-25 PROCEDURE — 74011250637 HC RX REV CODE- 250/637: Performed by: INTERNAL MEDICINE

## 2022-08-25 PROCEDURE — 74011250637 HC RX REV CODE- 250/637: Performed by: HOSPITALIST

## 2022-08-25 PROCEDURE — 74011250636 HC RX REV CODE- 250/636: Performed by: HOSPITALIST

## 2022-08-25 RX ORDER — LEVOFLOXACIN 250 MG/1
250 TABLET ORAL
Status: COMPLETED | OUTPATIENT
Start: 2022-08-27 | End: 2022-08-31

## 2022-08-25 RX ORDER — SCOLOPAMINE TRANSDERMAL SYSTEM 1 MG/1
1 PATCH, EXTENDED RELEASE TRANSDERMAL
Status: DISCONTINUED | OUTPATIENT
Start: 2022-08-25 | End: 2022-09-06 | Stop reason: HOSPADM

## 2022-08-25 RX ORDER — GLYCOPYRROLATE 0.2 MG/ML
0.1 INJECTION INTRAMUSCULAR; INTRAVENOUS ONCE
Status: COMPLETED | OUTPATIENT
Start: 2022-08-25 | End: 2022-08-25

## 2022-08-25 RX ORDER — LEVOFLOXACIN 5 MG/ML
500 INJECTION, SOLUTION INTRAVENOUS ONCE
Status: COMPLETED | OUTPATIENT
Start: 2022-08-25 | End: 2022-08-25

## 2022-08-25 RX ADMIN — MIDODRINE HYDROCHLORIDE 10 MG: 5 TABLET ORAL at 08:06

## 2022-08-25 RX ADMIN — DICYCLOMINE HYDROCHLORIDE 10 MG: 10 CAPSULE ORAL at 13:30

## 2022-08-25 RX ADMIN — MIDODRINE HYDROCHLORIDE 10 MG: 5 TABLET ORAL at 17:51

## 2022-08-25 RX ADMIN — LEVOFLOXACIN 500 MG: 5 INJECTION, SOLUTION INTRAVENOUS at 08:57

## 2022-08-25 RX ADMIN — Medication 10 ML: at 13:31

## 2022-08-25 RX ADMIN — Medication 10 ML: at 22:32

## 2022-08-25 RX ADMIN — DEXTROSE MONOHYDRATE AND POTASSIUM CHLORIDE INJECTION, SOLUTION: 5; .149 INJECTION, SOLUTION INTRAVENOUS at 11:40

## 2022-08-25 RX ADMIN — PANTOPRAZOLE SODIUM 40 MG: 40 TABLET, DELAYED RELEASE ORAL at 08:06

## 2022-08-25 RX ADMIN — Medication 10 ML: at 05:46

## 2022-08-25 RX ADMIN — GLYCOPYRROLATE 0.1 MG: 0.2 INJECTION, SOLUTION INTRAMUSCULAR; INTRAVENOUS at 17:52

## 2022-08-25 RX ADMIN — HEPARIN SODIUM 5000 UNITS: 5000 INJECTION INTRAVENOUS; SUBCUTANEOUS at 05:46

## 2022-08-25 RX ADMIN — Medication 10 ML: at 05:14

## 2022-08-25 RX ADMIN — HEPARIN SODIUM 5000 UNITS: 5000 INJECTION INTRAVENOUS; SUBCUTANEOUS at 13:31

## 2022-08-25 RX ADMIN — PANTOPRAZOLE SODIUM 40 MG: 40 TABLET, DELAYED RELEASE ORAL at 17:52

## 2022-08-25 RX ADMIN — HEPARIN SODIUM 5000 UNITS: 5000 INJECTION INTRAVENOUS; SUBCUTANEOUS at 22:30

## 2022-08-25 RX ADMIN — DICYCLOMINE HYDROCHLORIDE 10 MG: 10 CAPSULE ORAL at 08:06

## 2022-08-25 RX ADMIN — NYSTATIN 500000 UNITS: 100000 SUSPENSION ORAL at 08:05

## 2022-08-25 RX ADMIN — MIDODRINE HYDROCHLORIDE 10 MG: 5 TABLET ORAL at 13:30

## 2022-08-25 NOTE — PROGRESS NOTES
Provided pastoral care visit to [facility] patient. Did not include sacramental care.      Génesis Jamil

## 2022-08-25 NOTE — PROGRESS NOTES
Comprehensive Nutrition Assessment    Type and Reason for Visit: Reassess    Nutrition Recommendations/Plan:   Purees/thin liquids - per SLP recs      Continue ONS - Ensure Clear TID. Malnutrition Assessment:  Malnutrition Status:  Severe malnutrition (08/12/22 1334)    Context:  Acute illness     Findings of the 6 clinical characteristics of malnutrition:   Energy Intake:  Unable to assess  Weight Loss:  Greater than 5% over 1 month     Body Fat Loss:  Mild body fat loss, Fat overlying ribs, Orbital   Muscle Mass Loss: Moderate muscle mass loss, Clavicles (pectoralis & deltoids), Temples (temporalis)  Fluid Accumulation:  No significant fluid accumulation,     Strength:  Not performed     Nutrition Assessment:    8/25: Follow up - PO remains poor. [address] discussions continue. Pt/family declining hospice at this time. Noted new P/I per Wound care. Needs additional protein and kcal. Pt continues to dislike Pureed meals. Will continue to follow. 8/22: Pt seen eating lunch - pt moving food around the plate and poking at it more than actually eating it. Pt still doesn't like the pureed foods, and doesn't want milk/pudding/yogurt because she reports it causes more phlegm. Pt drinking some of the Ensure Clear - apple. Prefers orange juice - she says it clears her throat better. Son in room encouraging pt to eat. 8/18: MD consult for calorie count. Pt with poor PO. Wt documented as stable. Diet upgraded to Pureed/thin liquids. Adjusted ONS. Pt declined ice cream or any ONS like ice cream, and doesn't like/eat pudding. Added Ensure clear for trial in hopes to increase PO. Pt doesn't like tomato soup \"too acidic\" and nothing with cream, so no cream of mushroom. Pt likes broth, which is very low calorie. States she feels like she's going to throw up, but more spit up.        8/15: follow up. SLP evaluation today recommends advancing to full liquids.  Wt is down from last week likely with inadequate diet order & limited PO. Advanced diet to full liquids & added ONS BID as above. Pt was asleep when RD checked & DW RN. Encourage frequent offers of PO when safe and alert. Last 3 Recorded Weights in this Encounter    08/19/22 0629 08/20/22 0659 08/23/22 1713   Weight: 49.9 kg (110 lb 1.7 oz) 49.3 kg (108 lb 11 oz) 43.4 kg (95 lb 10.9 oz)     8/12: Pt is a 80year old female admitted with Hypokalemia [E87.6]  Acute kidney injury ([facility] [address] [N17.9]. She has an extensive past medical history which includes Atrial fibrillation), CAD, Chronic respiratory failure with hypoxia, Diabetes mellitus, GERD, deep venous thrombosis, Hyperlipidemia, and Systolic heart failure. BPA for unsure weight loss. Per documentation, patient has lost 22# (18%) within two months, clinically significant for timeframe. Patient had RRT this morning and transferred to ICU. NPO at this time. SLP deferred consult until patient more medically stable. Patient with noted allergies to shellfish and eggs. Patient alert and oriented in bed, able to answer some questions. States she has not had an appetite recently 2/2 feeling nauseated. Denies chewing/swallowing problems. Palliative meeting with patient now. Wt Readings from Last 10 Encounters:   08/23/22 43.4 kg (95 lb 10.9 oz)   07/18/22 53.3 kg (117 lb 6.3 oz)   05/01/22 54 kg (119 lb 0.8 oz)   04/10/22 51.3 kg (113 lb)   05/22/17 62.3 kg (137 lb 6.4 oz)   09/29/15 58.6 kg (129 lb 1.6 oz)   09/05/15 58.1 kg (128 lb)   08/31/15 58.1 kg (128 lb)   05/15/12 57.2 kg (126 lb)   03/19/12 61.2 kg (135 lb)     Nutrition Related Findings:      Wound Type: Stage I  Last Bowel Movement Date: 08/24/22  Stool Appearance: Loose  Abdominal Assessment: Intact, Soft  Appetite: Fair  Bowel Sounds: Active   Edema:Generalized: No Edema (8/24/2022  8:20 PM)  LLE: Trace (8/24/2022  8:20 PM)  RLE: Trace (8/24/2022  8:20 PM)    Nutr.  Labs:    Lab Results   Component Value Date/Time    GFR est AA 46 (L) 08/24/2022 08:20 AM    GFR est non-AA 38 (L) 08/24/2022 08:20 AM    Creatinine (POC) 1.5 (H) 03/02/2016 12:08 PM    Creatinine 1.30 (H) 08/24/2022 08:20 AM    BUN 25 (H) 08/24/2022 08:20 AM    Sodium 134 (L) 08/24/2022 08:20 AM    Potassium 5.0 08/24/2022 08:20 AM    Chloride 107 08/24/2022 08:20 AM    CO2 23 08/24/2022 08:20 AM     Lab Results   Component Value Date/Time    Glucose 100 08/24/2022 08:20 AM    Glucose (POC) 91 08/16/2022 09:13 AM     Lab Results   Component Value Date/Time    Hemoglobin A1c 5.1 04/23/2022 12:55 AM     Nutr. Meds:  Current Facility-Administered Medications   Medication Dose Route Frequency    [START ON 8/27/2022] levoFLOXacin (LEVAQUIN) tablet 250 mg  250 mg Oral Q48H    dicyclomine (BENTYL) capsule 10 mg  10 mg Oral QID    sennosides (SENOKOT) 8.8 mg/5 mL syrup 8.8 mg  5 mL Oral BID    docusate (COLACE) 50 mg/5 mL oral liquid 50 mg  50 mg Oral BID    dextrose 5% with KCl 20 mEq/L infusion   IntraVENous CONTINUOUS    polyethylene glycol (MIRALAX) packet 17 g  17 g Oral DAILY    pantoprazole (PROTONIX) tablet 40 mg  40 mg Oral BID    heparin (porcine) injection 5,000 Units  5,000 Units SubCUTAneous Q8H    nystatin (MYCOSTATIN) 100,000 unit/mL oral suspension 500,000 Units  500,000 Units Oral QID    midodrine (PROAMATINE) tablet 10 mg  10 mg Oral TID WITH MEALS    sodium chloride (NS) flush 5-40 mL  5-40 mL IntraVENous Q8H         Current Nutrition Intake & Therapies:  Average Meal Intake: 1-25%  Average Supplement Intake: 1-25%  ADULT ORAL NUTRITION SUPPLEMENT Dinner, Lunch, Breakfast; Clear Liquid  DIET ONE TIME MESSAGE  ADULT DIET Dysphagia - Pureed; Please send 2 extra salt packets on tray    Anthropometric Measures:  Height: 5' 2\" (157.5 cm)  Ideal Body Weight (IBW): 110 lbs (50 kg)  Admission Body Weight: 115 lb  Current Body Wt:  42.1 kg (92 lb 13 oz), 84.4 % IBW.  Bed scale  Current BMI (kg/m2): 17        Weight Adjustment: No adjustment                 BMI Category: Underweight (BMI less than 22) age over 65    Estimated Daily Nutrient Needs:  Energy Requirements Based On: Kcal/kg  Weight Used for Energy Requirements: Current  Energy (kcal/day): 3685-8097  Weight Used for Protein Requirements: Current  Protein (g/day): 52-62 (1.0-1.2 g/kg)  Method Used for Fluid Requirements: Other (comment)  Fluid (ml/day): 4550-1155    Nutrition Diagnosis:   Inadequate oral intake related to cognitive or neurological impairment, inadequate protein-energy intake as evidenced by NPO or clear liquid status due to medical condition  Severe malnutrition related to inadequate protein-energy intake as evidenced by BMI, Criteria as identified in malnutrition assessment    Nutrition Interventions:   Food and/or Nutrient Delivery: Start oral diet, Start oral nutrition supplement  Nutrition Education/Counseling: No recommendations at this time  Coordination of Nutrition Care: Continue to monitor while inpatient  Plan of Care discussed with: nursing    Goals:  Previous Goal Met: Progressing toward goal(s)  Goals: Initiate PO diet, by next RD assessment       Nutrition Monitoring and Evaluation:   Behavioral-Environmental Outcomes: None identified  Food/Nutrient Intake Outcomes: Diet advancement/tolerance  Physical Signs/Symptoms Outcomes: Biochemical data, Weight, Skin, Nausea/vomiting    Discharge Planning:    Continue oral nutrition supplement, Continue current diet    Connie Garcia RD  Contact: Ext: [phone], or via [employer]

## 2022-08-25 NOTE — PROGRESS NOTES
Hugo [facility]  [address] [facility] 19  [phone]       Hospitalist Progress Note      NAME: Julien Macdonald   :  1925   MRN:  [id number]     Date/Time:  2022     Patient PCP: Alyssa Grady MD    Emergency Contact:    Extended Emergency Contact Information  Primary Emergency Contact: Balta Villalta  Address: [address] [facility], [address] [facility] Phone: [phone]  Mobile Phone: [phone]  Relation: Spouse  Secondary Emergency Contact: Lyndsey Corbin  Address: [address], [address], [address] [facility]  Mobile Phone: [phone]  Relation: Son      Code: DNR     Isolation Precautions: There are currently no Active Isolations        Subjective:     REASON FOR VISIT:  Recheck SOB      HPI & INTERVAL HISTORY:     : Upon entering the room the patient's was very nauseated felt that she wanted to throw up. Reports that oral secretions have worsened since yesterday contributing to the nausea. Urine cultures growing E. coli and Klebsiella which she was started on Levaquin for this. Seth Seymour again at bedside. They were very hesitant about discharge home. Again prefer home health care with PT OT over SNF.    : Patient seen and examined. She reports that the oral secretions have improved with the Bentyl. No new complaints. Denies chest pain or shortness of breath at this time. Seth Seymour at bedside and agree to reconsult palliative care. : Patient was seen and examined. She was resting comfortably had no complaints. Reports that her shortness of breath is improving but is still on 4 L nasal cannula baseline. Palliative on board.         Allergies   Allergen Reactions    Cephalexin Unknown (comments)    Dilaudid [Hydromorphone (Bulk)] Rash, Nausea and Vomiting and Swelling    Egg Unknown (comments)    Fenofibrate Nausea Only    Gemfibrozil Unknown (comments)    Iodine Rash, Nausea and Vomiting and Swelling    Oxycodone Unknown (comments)    Pcn [Penicillins] Rash, Nausea and Vomiting, Swelling and Contact Dermatitis    Prilosec [Omeprazole] Nausea Only    Shellfish Derived Swelling    Sulfa (Sulfonamide Antibiotics) Rash, Nausea and Vomiting and Swelling         ROS:  Gen:   Negative  Resp:  cough and denies SOB  CVS:   negative  GI:       nausea           Objective:      Visit Vitals  /68 (BP 1 Location: Left upper arm, BP Patient Position: At rest;Lying)   Pulse 93   Temp 97.7 °F (36.5 °C)   Resp 14   Ht 5' 2\" (1.575 m)   Wt 43.4 kg (95 lb 10.9 oz)   SpO2 98%   BMI 17.50 kg/m²       Physical Exam:  General: alert, well appearing, and NAD  Head: Normocephalic, without obvious abnormality, atraumatic  Eyes: anicteric sclerae and conjuntiva clear  ENT: lips, mucosa, and tongue normal  Neck: normal, supple, and no tenderness  Lungs: clear to auscultation  Heart: S1, S2 normal, regular rate, and regular rhythm  Abd: soft, nontender, BS present and normactive  Ext: no cyanosis and no edema  Skin: normal skin color, no rashes, and texture normal  Neuro:  Alert to self.  CN grossly intact  Psych: not anxious, cooperative, appropriate affect      Medications:  Current Facility-Administered Medications   Medication Dose Route Frequency    dicyclomine (BENTYL) capsule 10 mg  10 mg Oral QID    sennosides (SENOKOT) 8.8 mg/5 mL syrup 8.8 mg  5 mL Oral BID    docusate (COLACE) 50 mg/5 mL oral liquid 50 mg  50 mg Oral BID    dextrose 5% with KCl 20 mEq/L infusion   IntraVENous CONTINUOUS    dextrose 10% infusion 0-250 mL  0-250 mL IntraVENous PRN    polyethylene glycol (MIRALAX) packet 17 g  17 g Oral DAILY    pantoprazole (PROTONIX) tablet 40 mg  40 mg Oral BID    heparin (porcine) injection 5,000 Units  5,000 Units SubCUTAneous Q8H    nystatin (MYCOSTATIN) 100,000 unit/mL oral suspension 500,000 Units  500,000 Units Oral QID    bisacodyL (DULCOLAX) suppository 10 mg  10 mg Rectal DAILY PRN midodrine (PROAMATINE) tablet 10 mg  10 mg Oral TID WITH MEALS    prochlorperazine (COMPAZINE) injection 5 mg  5 mg IntraVENous Q4H PRN    hydrALAZINE (APRESOLINE) 20 mg/mL injection 20 mg  20 mg IntraVENous Q6H PRN    morphine injection 2 mg  2 mg IntraVENous Q3H PRN    sodium chloride (NS) flush 5-40 mL  5-40 mL IntraVENous Q8H    sodium chloride (NS) flush 5-40 mL  5-40 mL IntraVENous PRN    acetaminophen (TYLENOL) tablet 650 mg  650 mg Oral Q6H PRN    Or    acetaminophen (TYLENOL) suppository 650 mg  650 mg Rectal Q6H PRN    polyethylene glycol (MIRALAX) packet 17 g  17 g Oral DAILY PRN    ondansetron (ZOFRAN ODT) tablet 4 mg  4 mg Oral Q8H PRN    Or    ondansetron (ZOFRAN) injection 4 mg  4 mg IntraVENous Q6H PRN        Labs:  Recent Labs     08/24/22  0820   WBC 5.2   HGB 11.5   HCT 35.5          Recent Labs     08/24/22  0820   *   K 5.0      CO2 23      BUN 25*   CREA 1.30*   CA 8.7         Radiology:  No results found. I personally reviewed and interpreted the previous imaging studies and agree with official reading    The labs, imaging studies, and medications was reviewed by me on: August 25, 2022         Assessment/Plan:      Acute UTI with Klebsiella and E. coli both pansensitive  Based on allergy profile patient will be started on Levaquin we will give 1 dose IV x1 and then continue with p.o. Nausea/vomiting/abd pain acute on chronic with increased salivation  Worsened today  Change bentyl to glycopyrolate x1 now along with scopolamine patch  CT of the abdomen showed moderate stool burden  Continue with bowel regimen  Mobilize patient     Chronic Hypoxemic Respiratory Failure:   Continue with home remains on baseline 4LNC.    Appears stable and at baseline    Hypoglycemia:  Stable use D5 as needed  Continue nutritional supplements     Hypokalemia due to GI losses poor oral intake and diuretic  Monitor and replace as needed    Hypotension requiring vasopressor:   Now off Kyree-Synephrine  Continue midodrine    EVERT likely prerenal due to poor oral intake and GI loss resulting in IVVD  Continue improving  Avoid nephrotoxins monitor labs     HFrEF:   Echo 7/2022 with EF 30-35%. ?Marliss Gals? Hold furosemide, manas, metop    CAD:   Trop slightly up, but down from prior. No plans for aggressive interventions at this time     Hx DVT:   Could consider PE but low s/f PE given neg LE dopplers. Debility:   SNF recommended however patient declining will prefer home health care with PT OT  Hospice also recommended but declining. Reconsulted palliative    Body mass index is 17.5 kg/m².:  18.5 - 24.9:  Normal weight        Discussed:  Pt's condition, Imaging findings, Lab findings, Assessment, and Care Plan discussed with: Patient, RN, and Care Manager    Prophylaxis:  Lovenox SQ    Probable disposition:  [facility] PT/OT/RN declining SNF and Hospice.   Hopefully       Total time:  minutes **I personally saw and examined the patient during this time period**      Date of service:    8/25/2022                ___________________________________________________    Admitting Physician: Lyssa Enriquez MD

## 2022-08-25 NOTE — PROGRESS NOTES
Problem: Falls - Risk of  Goal: *Absence of Falls  Description: Document Bard Oseguera Fall Risk and appropriate interventions in the flowsheet. Outcome: Progressing Towards Goal  Note: Fall Risk Interventions:  Mobility Interventions: Bed/chair exit alarm, OT consult for ADLs, Patient to call before getting OOB, Strengthening exercises (ROM-active/passive)    Mentation Interventions: Bed/chair exit alarm, Reorient patient, Toileting rounds    Medication Interventions: Bed/chair exit alarm, Patient to call before getting OOB    Elimination Interventions: Bed/chair exit alarm, Call light in reach, Elevated toilet seat, Patient to call for help with toileting needs, Toilet paper/wipes in reach    History of Falls Interventions: Bed/chair exit alarm         Problem: Patient Education: Go to Patient Education Activity  Goal: Patient/Family Education  Outcome: Progressing Towards Goal     Problem: Pressure Injury - Risk of  Goal: *Prevention of pressure injury  Description: Document Destin Scale and appropriate interventions in the flowsheet.   Outcome: Progressing Towards Goal  Note: Pressure Injury Interventions:  Sensory Interventions: Assess changes in LOC, Check visual cues for pain, Discuss PT/OT consult with provider    Moisture Interventions: Absorbent underpads, Check for incontinence Q2 hours and as needed, Contain wound drainage, Limit adult briefs    Activity Interventions: PT/OT evaluation, Assess need for specialty bed, Pressure redistribution bed/mattress(bed type)    Mobility Interventions: Assess need for specialty bed, Pressure redistribution bed/mattress (bed type)    Nutrition Interventions: Document food/fluid/supplement intake    Friction and Shear Interventions: Apply protective barrier, creams and emollients, Lift team/patient mobility team, Lift sheet                Problem: Patient Education: Go to Patient Education Activity  Goal: Patient/Family Education  Outcome: Progressing Towards Goal Problem: Nutrition Deficit  Goal: *Optimize nutritional status  Outcome: Progressing Towards Goal     Problem: Patient Education: Go to Patient Education Activity  Goal: Patient/Family Education  Outcome: Progressing Towards Goal     Problem: Hypotension  Goal: *Blood pressure within specified parameters  Outcome: Progressing Towards Goal  Goal: *Fluid volume balance  Outcome: Progressing Towards Goal  Goal: *Labs within defined limits  Outcome: Progressing Towards Goal     Problem: Patient Education: Go to Patient Education Activity  Goal: Patient/Family Education  Outcome: Progressing Towards Goal     Problem: Patient Education: Go to Patient Education Activity  Goal: Patient/Family Education  Outcome: Progressing Towards Goal     Problem: Patient Education: Go to Patient Education Activity  Goal: Patient/Family Education  Outcome: Progressing Towards Goal     Problem: Impaired Skin Integrity/Pressure Injury Treatment  Goal: *Improvement of Existing Pressure Injury  Outcome: Progressing Towards Goal  Goal: *Prevention of pressure injury  Description: Document Destin Scale and appropriate interventions in the flowsheet.   Outcome: Progressing Towards Goal  Note: Pressure Injury Interventions:  Sensory Interventions: Assess changes in LOC, Check visual cues for pain, Discuss PT/OT consult with provider    Moisture Interventions: Absorbent underpads, Check for incontinence Q2 hours and as needed, Contain wound drainage, Limit adult briefs    Activity Interventions: PT/OT evaluation, Assess need for specialty bed, Pressure redistribution bed/mattress(bed type)    Mobility Interventions: Assess need for specialty bed, Pressure redistribution bed/mattress (bed type)    Nutrition Interventions: Document food/fluid/supplement intake    Friction and Shear Interventions: Apply protective barrier, creams and emollients, Lift team/patient mobility team, Lift sheet                Problem: Patient Education: Go to Patient Education Activity  Goal: Patient/Family Education  Outcome: Progressing Towards Goal

## 2022-08-25 NOTE — PROGRESS NOTES
19:30  Bedside and Verbal shift change report given to Apurva Braga (oncoming nurse) by Neptali Weathers and Paula Deal  (offgoing nurse). Report included the following information SBAR, Intake/Output, MAR, and Recent Results.

## 2022-08-25 NOTE — PROGRESS NOTES
Problem: Falls - Risk of  Goal: *Absence of Falls  Description: Document Tina Johnson Fall Risk and appropriate interventions in the flowsheet. Outcome: Progressing Towards Goal  Note: Fall Risk Interventions:  Mobility Interventions: Bed/chair exit alarm, Communicate number of staff needed for ambulation/transfer, OT consult for ADLs, Patient to call before getting OOB, PT Consult for mobility concerns, PT Consult for assist device competence, Utilize walker, cane, or other assistive device    Mentation Interventions: Bed/chair exit alarm, Adequate sleep, hydration, pain control, Family/sitter at bedside, Increase mobility, More frequent rounding, Reorient patient, Toileting rounds    Medication Interventions: Assess postural VS orthostatic hypotension, Bed/chair exit alarm, Patient to call before getting OOB, Teach patient to arise slowly    Elimination Interventions: Bed/chair exit alarm, Call light in reach, Toileting schedule/hourly rounds    History of Falls Interventions: Bed/chair exit alarm, Consult care management for discharge planning, Door open when patient unattended, Vital signs minimum Q4HRs X 24 hrs (comment for end date)         Problem: Patient Education: Go to Patient Education Activity  Goal: Patient/Family Education  Outcome: Progressing Towards Goal     Problem: Impaired Skin Integrity/Pressure Injury Treatment  Goal: *Improvement of Existing Pressure Injury  Outcome: Progressing Towards Goal  Goal: *Prevention of pressure injury  Description: Document Destin Scale and appropriate interventions in the flowsheet. Outcome: Progressing Towards Goal  Note: Pressure Injury Interventions:  Sensory Interventions: Assess changes in LOC, Discuss PT/OT consult with provider, Float heels, Keep linens dry and wrinkle-free, Minimize linen layers, Turn and reposition approx.  every two hours (pillows and wedges if needed)    Moisture Interventions: Absorbent underpads, Check for incontinence Q2 hours and as needed, Internal/External urinary devices, Minimize layers    Activity Interventions: Assess need for specialty bed, PT/OT evaluation    Mobility Interventions: Assess need for specialty bed    Nutrition Interventions: Document food/fluid/supplement intake    Friction and Shear Interventions: Apply protective barrier, creams and emollients, Minimize layers, Transferring/repositioning devices

## 2022-08-25 NOTE — PROGRESS NOTES
Pharmacy Dosing Services:     Levaquin dose for UTI adjusted from 500 mg PO daily to 250 mg PO Q48h per Protocol.  - CrCl <20 ml/min    Thank you,  Dino Long, PharmD

## 2022-08-26 PROBLEM — E43 SEVERE PROTEIN-CALORIE MALNUTRITION (HCC): Status: ACTIVE | Noted: 2022-08-26

## 2022-08-26 PROCEDURE — 77010033678 HC OXYGEN DAILY

## 2022-08-26 PROCEDURE — 97165 OT EVAL LOW COMPLEX 30 MIN: CPT

## 2022-08-26 PROCEDURE — 97530 THERAPEUTIC ACTIVITIES: CPT

## 2022-08-26 PROCEDURE — 74011250636 HC RX REV CODE- 250/636: Performed by: INTERNAL MEDICINE

## 2022-08-26 PROCEDURE — 74011250637 HC RX REV CODE- 250/637: Performed by: INTERNAL MEDICINE

## 2022-08-26 PROCEDURE — 74011250636 HC RX REV CODE- 250/636: Performed by: FAMILY MEDICINE

## 2022-08-26 PROCEDURE — 94761 N-INVAS EAR/PLS OXIMETRY MLT: CPT

## 2022-08-26 PROCEDURE — 65270000046 HC RM TELEMETRY

## 2022-08-26 PROCEDURE — 74011000250 HC RX REV CODE- 250: Performed by: INTERNAL MEDICINE

## 2022-08-26 RX ADMIN — PANTOPRAZOLE SODIUM 40 MG: 40 TABLET, DELAYED RELEASE ORAL at 17:51

## 2022-08-26 RX ADMIN — Medication 10 ML: at 22:29

## 2022-08-26 RX ADMIN — PANTOPRAZOLE SODIUM 40 MG: 40 TABLET, DELAYED RELEASE ORAL at 10:16

## 2022-08-26 RX ADMIN — DEXTROSE MONOHYDRATE AND POTASSIUM CHLORIDE INJECTION, SOLUTION: 5; .149 INJECTION, SOLUTION INTRAVENOUS at 19:36

## 2022-08-26 RX ADMIN — MIDODRINE HYDROCHLORIDE 10 MG: 5 TABLET ORAL at 12:11

## 2022-08-26 RX ADMIN — ONDANSETRON 4 MG: 2 INJECTION INTRAMUSCULAR; INTRAVENOUS at 14:55

## 2022-08-26 RX ADMIN — DEXTROSE MONOHYDRATE AND POTASSIUM CHLORIDE INJECTION, SOLUTION: 5; .149 INJECTION, SOLUTION INTRAVENOUS at 04:41

## 2022-08-26 RX ADMIN — HEPARIN SODIUM 5000 UNITS: 5000 INJECTION INTRAVENOUS; SUBCUTANEOUS at 14:47

## 2022-08-26 RX ADMIN — HEPARIN SODIUM 5000 UNITS: 5000 INJECTION INTRAVENOUS; SUBCUTANEOUS at 22:28

## 2022-08-26 RX ADMIN — Medication 10 ML: at 14:48

## 2022-08-26 RX ADMIN — HEPARIN SODIUM 5000 UNITS: 5000 INJECTION INTRAVENOUS; SUBCUTANEOUS at 06:36

## 2022-08-26 RX ADMIN — MIDODRINE HYDROCHLORIDE 10 MG: 5 TABLET ORAL at 17:51

## 2022-08-26 RX ADMIN — Medication 10 ML: at 10:22

## 2022-08-26 NOTE — PROGRESS NOTES
Problem: Falls - Risk of  Goal: *Absence of Falls  Description: Document John Led Fall Risk and appropriate interventions in the flowsheet. Outcome: Progressing Towards Goal  Note: Fall Risk Interventions:  Mobility Interventions: Bed/chair exit alarm, OT consult for ADLs, Strengthening exercises (ROM-active/passive)    Mentation Interventions: Adequate sleep, hydration, pain control, Bed/chair exit alarm, Increase mobility, More frequent rounding, Eyeglasses and hearing aids    Medication Interventions: Bed/chair exit alarm, Patient to call before getting OOB, Teach patient to arise slowly    Elimination Interventions: Bed/chair exit alarm, Call light in reach, Patient to call for help with toileting needs, Toileting schedule/hourly rounds    History of Falls Interventions: Bed/chair exit alarm, Evaluate medications/consider consulting pharmacy         Problem: Pressure Injury - Risk of  Goal: *Prevention of pressure injury  Description: Document Destin Scale and appropriate interventions in the flowsheet. Outcome: Progressing Towards Goal  Note: Pressure Injury Interventions:  Sensory Interventions: Assess changes in LOC, Float heels, Keep linens dry and wrinkle-free, Maintain/enhance activity level, Minimize linen layers, Monitor skin under medical devices, Turn and reposition approx.  every two hours (pillows and wedges if needed)    Moisture Interventions: Absorbent underpads, Apply protective barrier, creams and emollients, Internal/External urinary devices, Limit adult briefs, Minimize layers, Moisture barrier    Activity Interventions: Increase time out of bed    Mobility Interventions: Assess need for specialty bed, PT/OT evaluation    Nutrition Interventions: Document food/fluid/supplement intake    Friction and Shear Interventions: Apply protective barrier, creams and emollients, Foam dressings/transparent film/skin sealants, Lift team/patient mobility team, Minimize layers                Problem: Patient Education: Go to Patient Education Activity  Goal: Patient/Family Education  Outcome: Progressing Towards Goal     Problem: Hypotension  Goal: *Blood pressure within specified parameters  Outcome: Progressing Towards Goal

## 2022-08-26 NOTE — PROGRESS NOTES
Hugo Jensen [facility]  [address] [facility], [facility] 19  [phone]       Hospitalist Progress Note      NAME: Yoanna Denise   :  1925   MRN:  [id number]     Date/Time:  2022     Patient PCP: Tamar Huston MD    Emergency Contact:    Extended Emergency Contact Information  Primary Emergency Contact: Jayme Love  Address: [address] [place] [address], [address] [place] [address] [address] [place] [address] Phone: [phone]  Mobile Phone: [phone]  Relation: Spouse  Secondary Emergency Contact: Mihai Gamez  Address: [address], [address], [address] [facility]  Mobile Phone: [phone]  Relation: Son      Code: DNR     Isolation Precautions: There are currently no Active Isolations        Subjective:     REASON FOR VISIT:  Recheck SOB      HPI & INTERVAL HISTORY:       : Patient was seen and examined. Today secretions have been dried up with treatment to include the glycopyrrolate x1 followed by scopolamine. .  Patient is now complaining of xerostomia. Still with some nausea. Had discussion with patient regarding discharge she is still very hesitant. I told her that she would either need home health care versus SNF. She is thinking about it. Discussed this with care management. : Upon entering the room the patient's was very nauseated felt that she wanted to throw up. Reports that oral secretions have worsened since yesterday contributing to the nausea. Urine cultures growing E. coli and Klebsiella which she was started on Levaquin for this. Son Jeanette Rodriguez again at bedside. They were very hesitant about discharge home. Again prefer home health care with PT OT over SNF.    : Patient seen and examined. She reports that the oral secretions have improved with the Bentyl. No new complaints. Denies chest pain or shortness of breath at this time. Son Jeanette Rodriguez at bedside and agree to reconsult palliative care.     : Patient was seen and examined. She was resting comfortably had no complaints. Reports that her shortness of breath is improving but is still on 4 L nasal cannula baseline. Palliative on board. Allergies   Allergen Reactions    Cephalexin Unknown (comments)    Dilaudid [Hydromorphone (Bulk)] Rash, Nausea and Vomiting and Swelling    Egg Unknown (comments)    Fenofibrate Nausea Only    Gemfibrozil Unknown (comments)    Iodine Rash, Nausea and Vomiting and Swelling    Oxycodone Unknown (comments)    Pcn [Penicillins] Rash, Nausea and Vomiting, Swelling and Contact Dermatitis    Prilosec [Omeprazole] Nausea Only    Shellfish Derived Swelling    Sulfa (Sulfonamide Antibiotics) Rash, Nausea and Vomiting and Swelling         ROS:  Gen:   Negative  Resp:  cough and denies SOB  CVS:   negative  GI:       nausea           Objective:      Visit Vitals  /70 (BP 1 Location: Left lower arm, BP Patient Position: At rest)   Pulse 92   Temp 97.8 °F (36.6 °C)   Resp 18   Ht 5' 2\" (1.575 m)   Wt 43.4 kg (95 lb 10.9 oz)   SpO2 98%   BMI 17.50 kg/m²       Physical Exam:  General: alert, well appearing, and NAD  Head: Normocephalic, without obvious abnormality, atraumatic  Eyes: anicteric sclerae and conjuntiva clear  ENT: Oral mucosa dry  Neck: normal, supple, and no tenderness  Lungs: clear to auscultation and good breath sounds  Heart: S1, S2 normal, regular rate, and regular rhythm  Abd: soft, nontender, BS present and normactive  Ext: no cyanosis and no edema  Skin: normal skin color, no rashes, and texture normal  Neuro:  Alert to self.  CN grossly intact  Psych: not anxious, cooperative, appropriate affect      Medications:  Current Facility-Administered Medications   Medication Dose Route Frequency    [START ON 8/27/2022] levoFLOXacin (LEVAQUIN) tablet 250 mg  250 mg Oral Q48H    scopolamine (TRANSDERM-SCOP) 1 mg over 3 days 1 Patch  1 Patch TransDERmal Q72H    sennosides (SENOKOT) 8.8 mg/5 mL syrup 8.8 mg  5 mL Oral BID    docusate (COLACE) 50 mg/5 mL oral liquid 50 mg  50 mg Oral BID    dextrose 5% with KCl 20 mEq/L infusion   IntraVENous CONTINUOUS    dextrose 10% infusion 0-250 mL  0-250 mL IntraVENous PRN    polyethylene glycol (MIRALAX) packet 17 g  17 g Oral DAILY    pantoprazole (PROTONIX) tablet 40 mg  40 mg Oral BID    heparin (porcine) injection 5,000 Units  5,000 Units SubCUTAneous Q8H    nystatin (MYCOSTATIN) 100,000 unit/mL oral suspension 500,000 Units  500,000 Units Oral QID    bisacodyL (DULCOLAX) suppository 10 mg  10 mg Rectal DAILY PRN    midodrine (PROAMATINE) tablet 10 mg  10 mg Oral TID WITH MEALS    prochlorperazine (COMPAZINE) injection 5 mg  5 mg IntraVENous Q4H PRN    hydrALAZINE (APRESOLINE) 20 mg/mL injection 20 mg  20 mg IntraVENous Q6H PRN    morphine injection 2 mg  2 mg IntraVENous Q3H PRN    sodium chloride (NS) flush 5-40 mL  5-40 mL IntraVENous Q8H    sodium chloride (NS) flush 5-40 mL  5-40 mL IntraVENous PRN    acetaminophen (TYLENOL) tablet 650 mg  650 mg Oral Q6H PRN    Or    acetaminophen (TYLENOL) suppository 650 mg  650 mg Rectal Q6H PRN    polyethylene glycol (MIRALAX) packet 17 g  17 g Oral DAILY PRN    ondansetron (ZOFRAN ODT) tablet 4 mg  4 mg Oral Q8H PRN    Or    ondansetron (ZOFRAN) injection 4 mg  4 mg IntraVENous Q6H PRN        Labs:  Recent Labs     08/24/22  0820   WBC 5.2   HGB 11.5   HCT 35.5          Recent Labs     08/24/22  0820   *   K 5.0      CO2 23      BUN 25*   CREA 1.30*   CA 8.7         Radiology:  No results found. I personally reviewed and interpreted the previous imaging studies and agree with official reading    The labs, imaging studies, and medications was reviewed by me on: August 26, 2022         Assessment/Plan:      Acute UTI with Klebsiella and E. coli both pansensitive  Based on allergy profile patient will be started on Levaquin we will give 1 dose IV x1 and then continue with p.o.     Nausea/vomiting/abd pain acute on chronic with increased salivation  Has improved but now with xerostomia  Discontinue scopolamine  CT of the abdomen showed moderate stool burden  Continue with bowel regimen  Mobilize patient     Chronic Hypoxemic Respiratory Failure:   Continue with home remains on baseline 4LNC. Appears stable and at baseline    Hypoglycemia:  Stable use D5 as needed  Continue nutritional supplements     Hypokalemia due to GI losses poor oral intake and diuretic  Monitor and replace as needed    Hypotension requiring vasopressor:   Now off Kyree-Synephrine  Continue midodrine    EVERT likely prerenal due to poor oral intake and GI loss resulting in IVVD  Continue improving  Avoid nephrotoxins monitor labs     HFrEF:   Echo 7/2022 with EF 30-35%. ?Rell Gowers? Hold furosemide, manas, metop    CAD:   Trop slightly up, but down from prior. No plans for aggressive interventions at this time     Hx DVT:   Could consider PE but low s/f PE given neg LE dopplers. Debility:   SNF recommended however patient declining will prefer home health care with PT OT  Hospice also recommended but declining. Reconsulted palliative    Body mass index is 17.5 kg/m².:  18.5 - 24.9:  Normal weight        Discussed:  Pt's condition, Imaging findings, Lab findings, Assessment, and Care Plan discussed with: Patient, RN, and Care Manager    Prophylaxis:  Lovenox SQ    Probable disposition:  [facility] PT/OT/RN declining SNF and Hospice.   Hopefully       Total time: -25- minutes **I personally saw and examined the patient during this time period**      Date of service:    8/26/2022                ___________________________________________________    Admitting Physician: Moreno Camacho MD

## 2022-08-26 NOTE — PROGRESS NOTES
8:09 PM  Bedside and Verbal shift change report given to James Peres (oncoming nurse) by Lexie Marcano (offgoing nurse). Report included the following information SBAR, Procedure Summary, Intake/Output, MAR, and Recent Results.

## 2022-08-26 NOTE — PROGRESS NOTES
Problem: Falls - Risk of  Goal: *Absence of Falls  Description: Document Destiny Domingo Fall Risk and appropriate interventions in the flowsheet. Outcome: Progressing Towards Goal  Note: Fall Risk Interventions:  Mobility Interventions: Bed/chair exit alarm, PT Consult for mobility concerns, Patient to call before getting OOB    Mentation Interventions: Bed/chair exit alarm    Medication Interventions: Bed/chair exit alarm, Patient to call before getting OOB    Elimination Interventions: Bed/chair exit alarm, Call light in reach, Elevated toilet seat, Patient to call for help with toileting needs    History of Falls Interventions: Bed/chair exit alarm, Consult care management for discharge planning         Problem: Pressure Injury - Risk of  Goal: *Prevention of pressure injury  Description: Document Destin Scale and appropriate interventions in the flowsheet.   Outcome: Progressing Towards Goal  Note: Pressure Injury Interventions:  Sensory Interventions: Assess changes in LOC, Discuss PT/OT consult with provider, Keep linens dry and wrinkle-free    Moisture Interventions: Absorbent underpads, Limit adult briefs, Minimize layers    Activity Interventions: PT/OT evaluation    Mobility Interventions: Assess need for specialty bed, Pressure redistribution bed/mattress (bed type), HOB 30 degrees or less, PT/OT evaluation    Nutrition Interventions: Document food/fluid/supplement intake    Friction and Shear Interventions: Apply protective barrier, creams and emollients, HOB 30 degrees or less, Minimize layers

## 2022-08-26 NOTE — PROGRESS NOTES
1021: Perfectserved Dr. Toro Big that patient BP is 136/72 and . Patient has 10mg of midodrine due. MD ordered to hold midodrine. Bedside shift change report given to Kianna Morin (oncoming nurse) by Peg Alatorre (offgoing nurse). Report included the following information SBAR, Kardex, Intake/Output, MAR, and Recent Results.

## 2022-08-26 NOTE — PROGRESS NOTES
8/26/2022 1:58 PM CM met with pt at bedside to discuss discharge. CM relayed to pt she is ready for discharge from medical standpoint. Pt reported she does not feel she is ready to discharge as she is too weak and has a UTI. CM discussed SNF placement, pt declined. Pt reported she only wants to discharge home but when she is ready. CM again relayed to pt she is ready from a hospital standpoint to discharge. Pt requested CM contact her son, Hank Gregorio. CM called to pt's son, Hank Gregorio at [phone], had to [facility] but requested return phone call to discuss pt's discharge. CM also called and spoke with Lucy Godfrey with pt's home therapy, [facility], who confirmed she will be able to see pt at home on Monday if pt is discharged today or over the weekend. CM sent pt's therapy orders and updates over to [facility] at [phone]. CM will follow. MARLIN Rogel     Care Management Progress Note         ICD-10-CM ICD-9-CM     1. Nausea and vomiting, unspecified vomiting type  R11.2 787.01         2. Acute dehydration  E86.0 276.51         3. Hypomagnesemia  E83.42 275.2         4. Hypokalemia  E87.6 276.8         5. Uremia  N19 586         6. EVERT (acute kidney injury) ([facility] [address]  N17.9 584.9         7. Chronic diastolic heart failure (HCC)  I50.32 428.32         8. Elevated troponin  R77.8 790.6         9. Palliative care by specialist [Z51.5 (ICD-10-CM)]  Z51.5 V66.7         10. Goals of care, counseling/discussion [Z71.89 (ICD-10-CM)]  Z71.89 V65.49         11. Age-related physical debility [R54 (ICD-10-CM)]  R54 797         12. Weakness generalized [R53.1 (ICD-10-CM)]  R53.1 780.79         13. Decreased appetite [R63.0 (ICD-10-CM)]  R63.0 783.0         14. Hearing loss, unspecified hearing loss type, unspecified laterality [H91.90 (ICD-10-CM)]  H91.90 389. 9            Readmission   RUR:  20%  Risk Level: []Low []Moderate [x]High  Value-based purchasing: [] Yes [x] No  Bundle patient: [] Yes [x] No              Specify: Transition of care plan:  Ongoing medical management,following for [address] discussion. Primary team recommending hospice, pt and family declining at this time   Family prefers pt to return home with family support and resumption of home health PT and OT through \"[facility]\"([phone]). Outpatient follow-up. Pt will need stretcher transport. [facility] in will call.

## 2022-08-26 NOTE — PROGRESS NOTES
Problem: Self Care Deficits Care Plan (Adult)  Goal: *Acute Goals and Plan of Care (Insert Text)  Description: FUNCTIONAL STATUS PRIOR TO ADMISSION: Per chart review and CM notes, pt required assist for self care tasks at home and was able to ambulate short distances with RW and used WC for longer distances. HOME SUPPORT: The patient lived with son and spouse and required assistance for self care and functional transfers/mobility. Occupational Therapy Goals  Weekly re-assessment 8/26/22. Goals downgraded to reflect more achievable level of assistance   1. Patient will perform grooming with CGA at EOB while  within 7 day(s). 2.  Patient will perform upper body dressing with min assist within 7 day(s). 3.  Patient will perform upper body bathing with minimal assistance/contact guard assist within 7 day(s). 4.  Patient will perform toilet transfers with moderate assist within 7 day(s). 5.  Patient will perform all aspects of toileting with moderate assist within 7 day(s). Initiated 8/19/2022  1. Patient will perform grooming with modified independence within 7 day(s). 2.  Patient will perform upper body dressing with supervision/set-up within 7 day(s). 3.  Patient will perform upper body bathing with minimal assistance/contact guard assist within 7 day(s). 4.  Patient will perform toilet transfers with minimal assistance/contact guard assist within 7 day(s). 5.  Patient will perform all aspects of toileting with minimal assistance/contact guard assist within 7 day(s). 6.  Patient will participate in upper extremity therapeutic exercise/activities with modified independence for 10 minutes within 7 day(s).     7.  Patient will utilize energy conservation techniques during functional activities with verbal cues within 7 day(s).    8/26/2022 1411 by Lexis Orellana OT  Outcome: Progressing Towards Goal   OCCUPATIONAL THERAPY TREATMENT/WEEKLY RE-ASSESSMENT  Patient: Nadege Martel (41 y.o. female)  Date: 8/26/2022  Diagnosis: Hypokalemia [E87.6]  [facility] [N17.9] <principal problem not specified>      Precautions: DNR  Chart, occupational therapy assessment, plan of care, and goals were reviewed. ASSESSMENT  Patient continues with skilled OT services and is progressing slowly towards goals. She continues to be limited by decreased activity tolerance and fatigue, general weakness, impaired sitting balance, impaired sitting tolerance, impaired standing balance, BLE pain. Patient verbalizes fear of falling and this impacts her participation. She does require 2 person assistance for transfers. SNF level rehab services recommended     Current Level of Function Impacting Discharge (ADLs): max to total assist for bathing, dressing, toileting    Other factors to consider for discharge:          PLAN :  Goals have been updated based on progression since last assessment. Patient continues to benefit from skilled intervention to address the above impairments. Continue to follow patient 3 times a week to address goals. Recommend with staff: in chair daily- 2 person transfers    Recommend next OT session: EOB ADL, sitting balance. Transfer training    Recommendation for discharge: (in order for the patient to meet his/her long term goals)  Therapy up to 5 days/week in SNF setting    This discharge recommendation:  Has been made in collaboration with the attending provider and/or case management    IF patient discharges home will need the following DME: mechanical lift and w/c       SUBJECTIVE:   Patient stated don't let me fall!     OBJECTIVE DATA SUMMARY:   Cognitive/Behavioral Status:  Neurologic State: Alert  Orientation Level: Oriented X4  Cognition: Follows commands             Functional Mobility and Transfers for ADLs:  Bed Mobility:  Rolling: Maximum assistance  Supine to Sit: Maximum assistance    Transfers:  Sit to Stand: Maximum assistance     Bed to Chair: Total assistance    Balance:  Sitting: Impaired  Sitting - Static: Fair (occasional)  Sitting - Dynamic: Fair (occasional)  Standing: Impaired  Standing - Static: Constant support;Poor  Standing - Dynamic : Poor    ADL Intervention:     Therapeutic activity in prep for increased ADL participation and independence included OEB sitting balance training. Able to maintain balance with 1UE support on bed rail. Sitting tolerance >6 min. Safe transfer training. Pain:  Leg pain     Activity Tolerance:   Poor and requires frequent rest breaks    After treatment patient left in no apparent distress:   Sitting in chair, Heels elevated for pressure relief, and Call bell within reach    COMMUNICATION/COLLABORATION:   The patients plan of care was discussed with: Registered nurse.      Abiola Banks OT  Time Calculation: 30 mins

## 2022-08-26 NOTE — PROGRESS NOTES
RRT-into assess patient due to mews 3 and DI 56, spoke with Shruthi-primary nurse and stated her heart rate increase was possibly due to pain. Pt was repositioned by primary nurse. In to check patient, pt alert, oriented, denies complaints at this time, call bell in hand. No further interventions, will continue to monitor.

## 2022-08-27 LAB
ANION GAP SERPL CALC-SCNC: 7 MMOL/L (ref 5–15)
BASOPHILS # BLD: 0.1 K/UL (ref 0–0.1)
BASOPHILS NFR BLD: 1 % (ref 0–1)
BUN SERPL-MCNC: 6 MG/DL (ref 6–20)
BUN/CREAT SERPL: 21 (ref 12–20)
CALCIUM SERPL-MCNC: 8.3 MG/DL (ref 8.5–10.1)
CHLORIDE SERPL-SCNC: 107 MMOL/L (ref 97–108)
CO2 SERPL-SCNC: 25 MMOL/L (ref 21–32)
CREAT SERPL-MCNC: 0.28 MG/DL (ref 0.55–1.02)
DIFFERENTIAL METHOD BLD: ABNORMAL
EOSINOPHIL # BLD: 0.3 K/UL (ref 0–0.4)
EOSINOPHIL NFR BLD: 3 % (ref 0–7)
ERYTHROCYTE [DISTWIDTH] IN BLOOD BY AUTOMATED COUNT: 15.9 % (ref 11.5–14.5)
GLUCOSE SERPL-MCNC: 78 MG/DL (ref 65–100)
HCT VFR BLD AUTO: 25.3 % (ref 35–47)
HEMOCCULT STL QL: NEGATIVE
HGB BLD-MCNC: 7.9 G/DL (ref 11.5–16)
IMM GRANULOCYTES # BLD AUTO: 0 K/UL (ref 0–0.04)
IMM GRANULOCYTES NFR BLD AUTO: 0 % (ref 0–0.5)
LYMPHOCYTES # BLD: 1.8 K/UL (ref 0.8–3.5)
LYMPHOCYTES NFR BLD: 20 % (ref 12–49)
MCH RBC QN AUTO: 30 PG (ref 26–34)
MCHC RBC AUTO-ENTMCNC: 31.2 G/DL (ref 30–36.5)
MCV RBC AUTO: 96.2 FL (ref 80–99)
MONOCYTES # BLD: 0.7 K/UL (ref 0–1)
MONOCYTES NFR BLD: 8 % (ref 5–13)
NEUTS SEG # BLD: 6.1 K/UL (ref 1.8–8)
NEUTS SEG NFR BLD: 68 % (ref 32–75)
NRBC # BLD: 0 K/UL (ref 0–0.01)
NRBC BLD-RTO: 0 PER 100 WBC
PLATELET # BLD AUTO: 310 K/UL (ref 150–400)
PMV BLD AUTO: 9.9 FL (ref 8.9–12.9)
POTASSIUM SERPL-SCNC: 3.6 MMOL/L (ref 3.5–5.1)
RBC # BLD AUTO: 2.63 M/UL (ref 3.8–5.2)
SODIUM SERPL-SCNC: 139 MMOL/L (ref 136–145)
WBC # BLD AUTO: 9 K/UL (ref 3.6–11)

## 2022-08-27 PROCEDURE — 77010033678 HC OXYGEN DAILY

## 2022-08-27 PROCEDURE — 36415 COLL VENOUS BLD VENIPUNCTURE: CPT

## 2022-08-27 PROCEDURE — 80048 BASIC METABOLIC PNL TOTAL CA: CPT

## 2022-08-27 PROCEDURE — 74011250637 HC RX REV CODE- 250/637: Performed by: HOSPITALIST

## 2022-08-27 PROCEDURE — 85025 COMPLETE CBC W/AUTO DIFF WBC: CPT

## 2022-08-27 PROCEDURE — 74011250636 HC RX REV CODE- 250/636: Performed by: INTERNAL MEDICINE

## 2022-08-27 PROCEDURE — 65270000046 HC RM TELEMETRY

## 2022-08-27 PROCEDURE — 82272 OCCULT BLD FECES 1-3 TESTS: CPT

## 2022-08-27 PROCEDURE — 74011250637 HC RX REV CODE- 250/637: Performed by: INTERNAL MEDICINE

## 2022-08-27 PROCEDURE — 94761 N-INVAS EAR/PLS OXIMETRY MLT: CPT

## 2022-08-27 PROCEDURE — 74011000250 HC RX REV CODE- 250: Performed by: INTERNAL MEDICINE

## 2022-08-27 RX ADMIN — MIDODRINE HYDROCHLORIDE 10 MG: 5 TABLET ORAL at 08:57

## 2022-08-27 RX ADMIN — HEPARIN SODIUM 5000 UNITS: 5000 INJECTION INTRAVENOUS; SUBCUTANEOUS at 20:41

## 2022-08-27 RX ADMIN — Medication 10 ML: at 06:32

## 2022-08-27 RX ADMIN — PANTOPRAZOLE SODIUM 40 MG: 40 TABLET, DELAYED RELEASE ORAL at 18:33

## 2022-08-27 RX ADMIN — LEVOFLOXACIN 250 MG: 250 TABLET, FILM COATED ORAL at 08:57

## 2022-08-27 RX ADMIN — Medication 10 ML: at 13:50

## 2022-08-27 RX ADMIN — MIDODRINE HYDROCHLORIDE 10 MG: 5 TABLET ORAL at 18:33

## 2022-08-27 RX ADMIN — HEPARIN SODIUM 5000 UNITS: 5000 INJECTION INTRAVENOUS; SUBCUTANEOUS at 06:31

## 2022-08-27 RX ADMIN — MIDODRINE HYDROCHLORIDE 10 MG: 5 TABLET ORAL at 13:46

## 2022-08-27 RX ADMIN — PANTOPRAZOLE SODIUM 40 MG: 40 TABLET, DELAYED RELEASE ORAL at 08:57

## 2022-08-27 RX ADMIN — Medication 10 ML: at 20:41

## 2022-08-27 RX ADMIN — HEPARIN SODIUM 5000 UNITS: 5000 INJECTION INTRAVENOUS; SUBCUTANEOUS at 13:46

## 2022-08-27 RX ADMIN — ONDANSETRON 4 MG: 2 INJECTION INTRAMUSCULAR; INTRAVENOUS at 05:12

## 2022-08-27 NOTE — PROGRESS NOTES
Bedside shift change report given to Nikki Romero (oncoming nurse) by Casey Wang (offgoing nurse). Report included the following information SBAR, Kardex, Intake/Output, MAR, and Recent Results.

## 2022-08-27 NOTE — PROGRESS NOTES
Problem: Falls - Risk of  Goal: *Absence of Falls  Description: Document Jitendra Davis Fall Risk and appropriate interventions in the flowsheet.   Outcome: Progressing Towards Goal  Note: Fall Risk Interventions:  Mobility Interventions: Bed/chair exit alarm, OT consult for ADLs, Strengthening exercises (ROM-active/passive)    Mentation Interventions: Adequate sleep, hydration, pain control, Bed/chair exit alarm, Increase mobility, More frequent rounding, Eyeglasses and hearing aids    Medication Interventions: Bed/chair exit alarm, Patient to call before getting OOB, Teach patient to arise slowly    Elimination Interventions: Bed/chair exit alarm, Call light in reach, Patient to call for help with toileting needs, Toileting schedule/hourly rounds    History of Falls Interventions: Bed/chair exit alarm, Evaluate medications/consider consulting pharmacy

## 2022-08-27 NOTE — PROGRESS NOTES
Problem: Falls - Risk of  Goal: *Absence of Falls  Description: Document Danne Lobe Fall Risk and appropriate interventions in the flowsheet. Outcome: Progressing Towards Goal  Note: Fall Risk Interventions:  Mobility Interventions: Bed/chair exit alarm, Patient to call before getting OOB, Utilize walker, cane, or other assistive device    Mentation Interventions: Adequate sleep, hydration, pain control, Bed/chair exit alarm, Increase mobility, More frequent rounding, Reorient patient    Medication Interventions: Bed/chair exit alarm, Patient to call before getting OOB, Teach patient to arise slowly    Elimination Interventions: Bed/chair exit alarm, Call light in reach, Patient to call for help with toileting needs, Toileting schedule/hourly rounds    History of Falls Interventions: Bed/chair exit alarm, Door open when patient unattended, Utilize gait belt for transfer/ambulation         Problem: Pressure Injury - Risk of  Goal: *Prevention of pressure injury  Description: Document Destin Scale and appropriate interventions in the flowsheet. Outcome: Progressing Towards Goal  Note: Pressure Injury Interventions:  Sensory Interventions: Assess changes in LOC, Assess need for specialty bed, Float heels, Keep linens dry and wrinkle-free, Discuss PT/OT consult with provider, Maintain/enhance activity level, Minimize linen layers, Monitor skin under medical devices, Turn and reposition approx. every two hours (pillows and wedges if needed), Pad between skin to skin    Moisture Interventions: Absorbent underpads, Internal/External urinary devices, Check for incontinence Q2 hours and as needed, Limit adult briefs, Minimize layers, Moisture barrier    Activity Interventions: Increase time out of bed, PT/OT evaluation    Mobility Interventions: Assess need for specialty bed, PT/OT evaluation, Turn and reposition approx.  every two hours(pillow and wedges)    Nutrition Interventions: Document food/fluid/supplement intake    Friction and Shear Interventions: Apply protective barrier, creams and emollients, Foam dressings/transparent film/skin sealants, Lift team/patient mobility team, Minimize layers                Problem: Nutrition Deficit  Goal: *Optimize nutritional status  Outcome: Progressing Towards Goal     Problem: Patient Education: Go to Patient Education Activity  Goal: Patient/Family Education  Outcome: Progressing Towards Goal     Problem: Hypotension  Goal: *Blood pressure within specified parameters  Outcome: Progressing Towards Goal     Problem: Impaired Skin Integrity/Pressure Injury Treatment  Goal: *Improvement of Existing Pressure Injury  Outcome: Progressing Towards Goal

## 2022-08-27 NOTE — PROGRESS NOTES
4:09 PM- CM reached out to pt's son again- spoke with him in depth regarding pt options for d/c home with hospice vs. SNF. Pt's son has several questions regarding hospice and states \"I don't want her to go home and just die right away\"- pt's son wishes for pt to make the decision and pt continues to want to have him make the decision. Pt's son agreeable for another Palliative Consult/conversation on Monday. CM spoke with attending- attending aware and agreeable. CM will update floor CM who will continue to follow and assist as needed. 1:15 PM- CM met with pt regarding d/c planning- pt became visibly upset and requested CM reach out to her son. CM advised her that he did not answer and we left a VM, will follow up again this afternoon. 12:59 PM- Weekend CM reached out to pt's son Kalpana Miller ([phone]) regarding d/c- no answer, HIPPA compliant VM, waiting on a return phone call.      Jalen Aj, MSW, [phone]

## 2022-08-28 LAB
BASOPHILS # BLD: 0.1 K/UL (ref 0–0.1)
BASOPHILS NFR BLD: 1 % (ref 0–1)
DIFFERENTIAL METHOD BLD: ABNORMAL
EOSINOPHIL # BLD: 0.1 K/UL (ref 0–0.4)
EOSINOPHIL NFR BLD: 2 % (ref 0–7)
ERYTHROCYTE [DISTWIDTH] IN BLOOD BY AUTOMATED COUNT: 15.2 % (ref 11.5–14.5)
HCT VFR BLD AUTO: 27.4 % (ref 35–47)
HGB BLD-MCNC: 9 G/DL (ref 11.5–16)
IMM GRANULOCYTES # BLD AUTO: 0 K/UL (ref 0–0.04)
IMM GRANULOCYTES NFR BLD AUTO: 1 % (ref 0–0.5)
LYMPHOCYTES # BLD: 1 K/UL (ref 0.8–3.5)
LYMPHOCYTES NFR BLD: 20 % (ref 12–49)
MCH RBC QN AUTO: 34.1 PG (ref 26–34)
MCHC RBC AUTO-ENTMCNC: 32.8 G/DL (ref 30–36.5)
MCV RBC AUTO: 103.8 FL (ref 80–99)
MONOCYTES # BLD: 0.7 K/UL (ref 0–1)
MONOCYTES NFR BLD: 13 % (ref 5–13)
NEUTS SEG # BLD: 3.2 K/UL (ref 1.8–8)
NEUTS SEG NFR BLD: 64 % (ref 32–75)
NRBC # BLD: 0 K/UL (ref 0–0.01)
NRBC BLD-RTO: 0 PER 100 WBC
PLATELET # BLD AUTO: 238 K/UL (ref 150–400)
PMV BLD AUTO: 9.6 FL (ref 8.9–12.9)
RBC # BLD AUTO: 2.64 M/UL (ref 3.8–5.2)
WBC # BLD AUTO: 5.1 K/UL (ref 3.6–11)

## 2022-08-28 PROCEDURE — 74011000250 HC RX REV CODE- 250: Performed by: INTERNAL MEDICINE

## 2022-08-28 PROCEDURE — 74011250636 HC RX REV CODE- 250/636: Performed by: FAMILY MEDICINE

## 2022-08-28 PROCEDURE — 74011250636 HC RX REV CODE- 250/636: Performed by: INTERNAL MEDICINE

## 2022-08-28 PROCEDURE — 94761 N-INVAS EAR/PLS OXIMETRY MLT: CPT

## 2022-08-28 PROCEDURE — 77010033678 HC OXYGEN DAILY

## 2022-08-28 PROCEDURE — 65270000046 HC RM TELEMETRY

## 2022-08-28 PROCEDURE — 85025 COMPLETE CBC W/AUTO DIFF WBC: CPT

## 2022-08-28 PROCEDURE — 74011250637 HC RX REV CODE- 250/637: Performed by: INTERNAL MEDICINE

## 2022-08-28 PROCEDURE — 77030038269 HC DRN EXT URIN PURWCK BARD -A

## 2022-08-28 PROCEDURE — 36415 COLL VENOUS BLD VENIPUNCTURE: CPT

## 2022-08-28 RX ORDER — HEPARIN SODIUM 5000 [USP'U]/ML
5000 INJECTION, SOLUTION INTRAVENOUS; SUBCUTANEOUS ONCE
Status: DISCONTINUED | OUTPATIENT
Start: 2022-08-28 | End: 2022-08-28

## 2022-08-28 RX ORDER — ENOXAPARIN SODIUM 100 MG/ML
30 INJECTION SUBCUTANEOUS EVERY 24 HOURS
Status: DISCONTINUED | OUTPATIENT
Start: 2022-08-29 | End: 2022-09-06 | Stop reason: HOSPADM

## 2022-08-28 RX ADMIN — PANTOPRAZOLE SODIUM 40 MG: 40 TABLET, DELAYED RELEASE ORAL at 09:45

## 2022-08-28 RX ADMIN — Medication 10 ML: at 13:29

## 2022-08-28 RX ADMIN — PANTOPRAZOLE SODIUM 40 MG: 40 TABLET, DELAYED RELEASE ORAL at 17:36

## 2022-08-28 RX ADMIN — ONDANSETRON 4 MG: 2 INJECTION INTRAMUSCULAR; INTRAVENOUS at 09:44

## 2022-08-28 RX ADMIN — MIDODRINE HYDROCHLORIDE 10 MG: 5 TABLET ORAL at 09:45

## 2022-08-28 RX ADMIN — HEPARIN SODIUM 5000 UNITS: 5000 INJECTION INTRAVENOUS; SUBCUTANEOUS at 05:45

## 2022-08-28 RX ADMIN — HEPARIN SODIUM 5000 UNITS: 5000 INJECTION INTRAVENOUS; SUBCUTANEOUS at 13:28

## 2022-08-28 RX ADMIN — MIDODRINE HYDROCHLORIDE 10 MG: 5 TABLET ORAL at 13:28

## 2022-08-28 RX ADMIN — Medication 10 ML: at 21:39

## 2022-08-28 RX ADMIN — Medication 10 ML: at 05:45

## 2022-08-28 RX ADMIN — DEXTROSE MONOHYDRATE AND POTASSIUM CHLORIDE INJECTION, SOLUTION: 5; .149 INJECTION, SOLUTION INTRAVENOUS at 17:36

## 2022-08-28 RX ADMIN — POLYETHYLENE GLYCOL 3350 17 G: 17 POWDER, FOR SOLUTION ORAL at 09:45

## 2022-08-28 RX ADMIN — MIDODRINE HYDROCHLORIDE 10 MG: 5 TABLET ORAL at 17:36

## 2022-08-28 RX ADMIN — DEXTROSE MONOHYDRATE AND POTASSIUM CHLORIDE INJECTION, SOLUTION 1000 ML: 5; .149 INJECTION, SOLUTION INTRAVENOUS at 03:53

## 2022-08-28 NOTE — PROGRESS NOTES
Hugo Jensen [address] [address]  [address]  [phone]       Hospitalist Progress Note      NAME: Fernando Payne   :  1925   MRN:  [id number]     Date/Time:  2022     Patient PCP: Yasmani Eaton MD    Emergency Contact:    Extended Emergency Contact Information  Primary Emergency Contact: Pro Shane  Address: [address] [place] [address], [facility] [address] [place] [address] Phone: [phone]  Mobile Phone: [phone]  Relation: Spouse  Secondary Emergency Contact: Shalonda Denise  Address: [address], [address], [address] [facility]  Mobile Phone: [phone]  Relation: Son      Code: DNR     Isolation Precautions: There are currently no Active Isolations        Subjective:     REASON FOR VISIT:  Recheck SOB      HPI & INTERVAL HISTORY:       : Patient was seen and examined. No significant changes with no new complaints other than continued nausea but has been eating. Xerostomia and excessive salivation has been controlled with scopolamine. Hemoglobin has come back at 9.0 with a negative Hemoccult. : Pt seen and examined. No new complaints. There is xerostomia has resolved and nausea improved with scopolamine patch. No acute events overnight. However, patient's hemoglobin has gone down to 7.9 but patient is asymptomatic while at rest.  Very concerned about being discharged and being \"thrown out and thrown away\". I have had discussions with her regarding either home with home health care or SNF which is what I think she would mostly benefit from prior to going back home. : Patient was seen and examined. Today secretions have been dried up with treatment to include the glycopyrrolate x1 followed by scopolamine. .  Patient is now complaining of xerostomia. Still with some nausea. Had discussion with patient regarding discharge she is still very hesitant.   I told her that she would either need home health care versus SNF. She is thinking about it. Discussed this with care management. 8/25: Upon entering the room the patient's was very nauseated felt that she wanted to throw up. Reports that oral secretions have worsened since yesterday contributing to the nausea. Urine cultures growing E. coli and Klebsiella which she was started on Levaquin for this. Son Jeanette Rodriguez again at bedside. They were very hesitant about discharge home. Again prefer home health care with PT OT over SNF.    8/24: Patient seen and examined. She reports that the oral secretions have improved with the Bentyl. No new complaints. Denies chest pain or shortness of breath at this time. Seth Rodriguez at bedside and agree to reconsult palliative care. 8/23: Patient was seen and examined. She was resting comfortably had no complaints. Reports that her shortness of breath is improving but is still on 4 L nasal cannula baseline. Palliative on board.         Allergies   Allergen Reactions    Cephalexin Unknown (comments)    Dilaudid [Hydromorphone (Bulk)] Rash, Nausea and Vomiting and Swelling    Egg Unknown (comments)    Fenofibrate Nausea Only    Gemfibrozil Unknown (comments)    Iodine Rash, Nausea and Vomiting and Swelling    Oxycodone Unknown (comments)    Pcn [Penicillins] Rash, Nausea and Vomiting, Swelling and Contact Dermatitis    Prilosec [Omeprazole] Nausea Only    Shellfish Derived Swelling    Sulfa (Sulfonamide Antibiotics) Rash, Nausea and Vomiting and Swelling         ROS:  Gen:   Negative  Resp:  cough and denies SOB  CVS:   negative  GI:       nausea           Objective:      Visit Vitals  BP (!) 133/57 (BP 1 Location: Left lower arm, BP Patient Position: At rest)   Pulse 88   Temp 98.5 °F (36.9 °C)   Resp 16   Ht 5' 2\" (1.575 m)   Wt 43.4 kg (95 lb 10.9 oz)   SpO2 99%   BMI 17.50 kg/m²       Physical Exam:  General: No acute distress  Head: Normocephalic, without obvious abnormality, atraumatic  Eyes: anicteric sclerae and conjuntiva clear  ENT: Oral mucosa moist and pink  Neck: normal, supple, and no tenderness  Lungs: clear to auscultation and normal respiratory effort  Heart: S1, S2 normal, regular rate, and regular rhythm  Abd: soft, nontender, BS present and normactive  Ext: no cyanosis and no edema  Skin: normal skin color, no rashes, and texture normal  Neuro:  Alert to self.  CN grossly intact  Psych: not anxious, cooperative, appropriate affect      Medications:  Current Facility-Administered Medications   Medication Dose Route Frequency    lactulose (CHRONULAC) 10 gram/15 mL solution 30 mL  20 g Oral DAILY PRN    levoFLOXacin (LEVAQUIN) tablet 250 mg  250 mg Oral Q48H    scopolamine (TRANSDERM-SCOP) 1 mg over 3 days 1 Patch  1 Patch TransDERmal Q72H    sennosides (SENOKOT) 8.8 mg/5 mL syrup 8.8 mg  5 mL Oral BID    docusate (COLACE) 50 mg/5 mL oral liquid 50 mg  50 mg Oral BID    dextrose 5% with KCl 20 mEq/L infusion   IntraVENous CONTINUOUS    dextrose 10% infusion 0-250 mL  0-250 mL IntraVENous PRN    polyethylene glycol (MIRALAX) packet 17 g  17 g Oral DAILY    pantoprazole (PROTONIX) tablet 40 mg  40 mg Oral BID    heparin (porcine) injection 5,000 Units  5,000 Units SubCUTAneous Q8H    nystatin (MYCOSTATIN) 100,000 unit/mL oral suspension 500,000 Units  500,000 Units Oral QID    bisacodyL (DULCOLAX) suppository 10 mg  10 mg Rectal DAILY PRN    midodrine (PROAMATINE) tablet 10 mg  10 mg Oral TID WITH MEALS    prochlorperazine (COMPAZINE) injection 5 mg  5 mg IntraVENous Q4H PRN    hydrALAZINE (APRESOLINE) 20 mg/mL injection 20 mg  20 mg IntraVENous Q6H PRN    morphine injection 2 mg  2 mg IntraVENous Q3H PRN    sodium chloride (NS) flush 5-40 mL  5-40 mL IntraVENous Q8H    sodium chloride (NS) flush 5-40 mL  5-40 mL IntraVENous PRN    acetaminophen (TYLENOL) tablet 650 mg  650 mg Oral Q6H PRN    Or    acetaminophen (TYLENOL) suppository 650 mg  650 mg Rectal Q6H PRN    polyethylene glycol (MIRALAX) packet 17 g  17 g Oral DAILY PRN    ondansetron (ZOFRAN ODT) tablet 4 mg  4 mg Oral Q8H PRN    Or    ondansetron (ZOFRAN) injection 4 mg  4 mg IntraVENous Q6H PRN        Labs:  Recent Labs     08/28/22  0352   WBC 5.1   HGB 9.0*   HCT 27.4*          Recent Labs     08/27/22  0449      K 3.6      CO2 25   GLU 78   BUN 6   CREA 0.28*   CA 8.3*         Radiology:  No results found. I personally reviewed and interpreted the previous imaging studies and agree with official reading    The labs, imaging studies, and medications was reviewed by me on: August 28, 2022         Assessment/Plan:      Acute UTI with Klebsiella and E. coli both pansensitive  Continue levaquin 250 every other day TOTAL OF 3 more doses    Nausea/vomiting/abd pain acute on chronic with increased salivation  Improving  Continue scopolamine  CT of the abdomen showed moderate stool burden  Continue with bowel regimen  Mobilize patient     Anemia  Unsure of the drop but has improved to 9.0 with negative Hemoccult    Chronic Hypoxemic Respiratory Failure:   Stable and will continue home remains on baseline 4LNC. Hypoglycemia:  Stable - hypoglycemic protocol prn  Continue nutritional supplements     Hypokalemia due to GI losses poor oral intake and diuretic  Monitor and replace as needed    Hypotension requiring vasopressor:   Has been off pressors  Continue midodrine    EVERT likely prerenal due to poor oral intake and GI loss resulting in IVVD  Has improved  Avoid nephrotoxins monitor labs     HFrEF:   Echo 7/2022 with EF 30-35%. ?Severo Massy? Hold furosemide, manas, metop    CAD:   Trop slightly up, but down from prior. No plans for aggressive interventions at this time     Hx DVT:   Could consider PE but low s/f PE given neg LE dopplers. Debility:   Patient declined hospice and initially declined SNF however now is considering but is very anxious over making a decision. Have discussed with case management.     Body mass index is 17.5 kg/m².:  18.5 - 24.9:  Normal weight        Discussed:  Pt's condition, Imaging findings, Lab findings, Assessment, and Care Plan discussed with: Patient, RN, and Care Manager    Prophylaxis:  Lovenox SQ    Probable disposition:  TBD but likely SNF vs [facility]      Total time: -25- minutes **I personally saw and examined the patient during this time period**      Date of service:    8/28/2022                ___________________________________________________    Admitting Physician: Fannie Hooper MD

## 2022-08-28 NOTE — PROGRESS NOTES
Problem: Falls - Risk of  Goal: *Absence of Falls  Description: Document Darvin Cease Fall Risk and appropriate interventions in the flowsheet. Outcome: Progressing Towards Goal  Note: Fall Risk Interventions:  Mobility Interventions: Bed/chair exit alarm, OT consult for ADLs, Patient to call before getting OOB, PT Consult for mobility concerns, PT Consult for assist device competence    Mentation Interventions: Adequate sleep, hydration, pain control, Bed/chair exit alarm    Medication Interventions: Bed/chair exit alarm, Patient to call before getting OOB, Teach patient to arise slowly, Utilize gait belt for transfers/ambulation    Elimination Interventions: Bed/chair exit alarm, Call light in reach, Patient to call for help with toileting needs, Toileting schedule/hourly rounds    History of Falls Interventions: Bed/chair exit alarm, Door open when patient unattended         Problem: Pressure Injury - Risk of  Goal: *Prevention of pressure injury  Description: Document Destin Scale and appropriate interventions in the flowsheet. Outcome: Progressing Towards Goal  Note: Pressure Injury Interventions:  Sensory Interventions: Assess changes in LOC, Assess need for specialty bed, Discuss PT/OT consult with provider, Float heels, Keep linens dry and wrinkle-free, Maintain/enhance activity level, Minimize linen layers, Monitor skin under medical devices, Pad between skin to skin, Turn and reposition approx. every two hours (pillows and wedges if needed)    Moisture Interventions: Absorbent underpads, Apply protective barrier, creams and emollients, Internal/External urinary devices, Limit adult briefs, Minimize layers, Moisture barrier, Maintain skin hydration (lotion/cream), Check for incontinence Q2 hours and as needed    Activity Interventions: Increase time out of bed    Mobility Interventions: Assess need for specialty bed, Float heels, PT/OT evaluation, Turn and reposition approx.  every two hours(pillow and wedges)    Nutrition Interventions: Document food/fluid/supplement intake    Friction and Shear Interventions: Apply protective barrier, creams and emollients, Feet elevated on foot rest, Foam dressings/transparent film/skin sealants, Lift team/patient mobility team, Minimize layers                Problem: Patient Education: Go to Patient Education Activity  Goal: Patient/Family Education  Outcome: Progressing Towards Goal     Problem: Hypotension  Goal: *Blood pressure within specified parameters  Outcome: Progressing Towards Goal

## 2022-08-28 NOTE — PROGRESS NOTES
Hugo Jensen demarcus [place] [address]  [address] [facility] [address]  [phone]       Hospitalist Progress Note      NAME: Anusha Olson   :  1925   MRN:  [id number]     Date/Time:  2022     Patient PCP: Sergey Gooden MD    Emergency Contact:    Extended Emergency Contact Information  Primary Emergency Contact: Elena Pittman  Address: [address] [place] [address], [facility] [address] [place] [address] Phone: [phone]  Mobile Phone: [phone]  Relation: Spouse  Secondary Emergency Contact: Sarah Child  Address: [address], [address], [facility]  Mobile Phone: [phone]  Relation: Son      Code: DNR     Isolation Precautions: There are currently no Active Isolations        Subjective:     REASON FOR VISIT:  Recheck SOB      HPI & INTERVAL HISTORY:       : Pt seen and examined. No new complaints. There is xerostomia has resolved and nausea improved with scopolamine patch. No acute events overnight. However, patient's hemoglobin has gone down to 7.9 but patient is asymptomatic while at rest.  Very concerned about being discharged and being \"thrown out and thrown away\". I have had discussions with her regarding either home with home health care or SNF which is what I think she would mostly benefit from prior to going back home. : Patient was seen and examined. Today secretions have been dried up with treatment to include the glycopyrrolate x1 followed by scopolamine. .  Patient is now complaining of xerostomia. Still with some nausea. Had discussion with patient regarding discharge she is still very hesitant. I told her that she would either need home health care versus SNF. She is thinking about it. Discussed this with care management. : Upon entering the room the patient's was very nauseated felt that she wanted to throw up.   Reports that oral secretions have worsened since yesterday contributing to the nausea. Urine cultures growing E. coli and Klebsiella which she was started on Levaquin for this. Son Aracely Ardon again at bedside. They were very hesitant about discharge home. Again prefer home health care with PT OT over SNF.    8/24: Patient seen and examined. She reports that the oral secretions have improved with the Bentyl. No new complaints. Denies chest pain or shortness of breath at this time. Seth Ardon at bedside and agree to reconsult palliative care. 8/23: Patient was seen and examined. She was resting comfortably had no complaints. Reports that her shortness of breath is improving but is still on 4 L nasal cannula baseline. Palliative on board.         Allergies   Allergen Reactions    Cephalexin Unknown (comments)    Dilaudid [Hydromorphone (Bulk)] Rash, Nausea and Vomiting and Swelling    Egg Unknown (comments)    Fenofibrate Nausea Only    Gemfibrozil Unknown (comments)    Iodine Rash, Nausea and Vomiting and Swelling    Oxycodone Unknown (comments)    Pcn [Penicillins] Rash, Nausea and Vomiting, Swelling and Contact Dermatitis    Prilosec [Omeprazole] Nausea Only    Shellfish Derived Swelling    Sulfa (Sulfonamide Antibiotics) Rash, Nausea and Vomiting and Swelling         ROS:  Gen:   Negative  Resp:  cough and denies SOB  CVS:   negative  GI:       nausea           Objective:      Visit Vitals  BP (!) 123/47 (BP 1 Location: Left lower arm, BP Patient Position: At rest)   Pulse 87   Temp 98.2 °F (36.8 °C)   Resp 15   Ht 5' 2\" (1.575 m)   Wt 43.4 kg (95 lb 10.9 oz)   SpO2 100%   BMI 17.50 kg/m²       Physical Exam:  General: NAD  Head: Normocephalic, without obvious abnormality, atraumatic  Eyes: anicteric sclerae and conjuntiva clear  ENT: Oral mucosa moist and pink  Neck: normal, supple, and no tenderness  Lungs: clear to auscultation  Heart: S1, S2 normal, regular rate, and regular rhythm  Abd: soft, nontender, BS present and normactive  Ext: no cyanosis and no edema  Skin: normal skin color, no rashes, and texture normal  Neuro:  Alert to self.  CN grossly intact  Psych: not anxious, cooperative, appropriate affect      Medications:  Current Facility-Administered Medications   Medication Dose Route Frequency    lactulose (CHRONULAC) 10 gram/15 mL solution 30 mL  20 g Oral DAILY PRN    levoFLOXacin (LEVAQUIN) tablet 250 mg  250 mg Oral Q48H    scopolamine (TRANSDERM-SCOP) 1 mg over 3 days 1 Patch  1 Patch TransDERmal Q72H    sennosides (SENOKOT) 8.8 mg/5 mL syrup 8.8 mg  5 mL Oral BID    docusate (COLACE) 50 mg/5 mL oral liquid 50 mg  50 mg Oral BID    dextrose 5% with KCl 20 mEq/L infusion   IntraVENous CONTINUOUS    dextrose 10% infusion 0-250 mL  0-250 mL IntraVENous PRN    polyethylene glycol (MIRALAX) packet 17 g  17 g Oral DAILY    pantoprazole (PROTONIX) tablet 40 mg  40 mg Oral BID    heparin (porcine) injection 5,000 Units  5,000 Units SubCUTAneous Q8H    nystatin (MYCOSTATIN) 100,000 unit/mL oral suspension 500,000 Units  500,000 Units Oral QID    bisacodyL (DULCOLAX) suppository 10 mg  10 mg Rectal DAILY PRN    midodrine (PROAMATINE) tablet 10 mg  10 mg Oral TID WITH MEALS    prochlorperazine (COMPAZINE) injection 5 mg  5 mg IntraVENous Q4H PRN    hydrALAZINE (APRESOLINE) 20 mg/mL injection 20 mg  20 mg IntraVENous Q6H PRN    morphine injection 2 mg  2 mg IntraVENous Q3H PRN    sodium chloride (NS) flush 5-40 mL  5-40 mL IntraVENous Q8H    sodium chloride (NS) flush 5-40 mL  5-40 mL IntraVENous PRN    acetaminophen (TYLENOL) tablet 650 mg  650 mg Oral Q6H PRN    Or    acetaminophen (TYLENOL) suppository 650 mg  650 mg Rectal Q6H PRN    polyethylene glycol (MIRALAX) packet 17 g  17 g Oral DAILY PRN    ondansetron (ZOFRAN ODT) tablet 4 mg  4 mg Oral Q8H PRN    Or    ondansetron (ZOFRAN) injection 4 mg  4 mg IntraVENous Q6H PRN        Labs:  Recent Labs     08/27/22  0449   WBC 9.0   HGB 7.9*   HCT 25.3*          Recent Labs     08/27/22  0449      K 3.6      CO2 25   GLU 78 BUN 6   CREA 0.28*   CA 8.3*         Radiology:  No results found. I personally reviewed and interpreted the previous imaging studies and agree with official reading    The labs, imaging studies, and medications was reviewed by me on: August 27, 2022         Assessment/Plan:      Acute UTI with Klebsiella and E. coli both pansensitive  Continue levaquin 250 every other day TOTAL OF 3 more doses    Nausea/vomiting/abd pain acute on chronic with increased salivation  Improving  We will continue with scopolamine for the meantime  Xerostomia likely exacerbated by the glycopyrrolate  CT of the abdomen showed moderate stool burden  Continue with bowel regimen  Mobilize patient     Anemia  May have a dilutional component but will check FOBT  Monitor labs    Chronic Hypoxemic Respiratory Failure:   Stable and will continue home remains on baseline 4LNC. Hypoglycemia:  Stable use D5 as needed  Continue nutritional supplements     Hypokalemia due to GI losses poor oral intake and diuretic  Monitor and replace as needed    Hypotension requiring vasopressor:   Has been off pressors  Continue midodrine    EVERT likely prerenal due to poor oral intake and GI loss resulting in IVVD  Has improved  Avoid nephrotoxins monitor labs     HFrEF:   Echo 7/2022 with EF 30-35%. ?Myrna Shivers? Hold furosemide, manas, metop    CAD:   Trop slightly up, but down from prior. No plans for aggressive interventions at this time     Hx DVT:   Could consider PE but low s/f PE given neg LE dopplers. Debility:   Patient declined hospice and initially declined SNF however now is considering but is very anxious over making a decision. Have discussed with case management.     Body mass index is 17.5 kg/m².:  18.5 - 24.9:  Normal weight        Discussed:  Pt's condition, Imaging findings, Lab findings, Assessment, and Care Plan discussed with: Patient, RN, and Care Manager    Prophylaxis:  Lovenox SQ    Probable disposition: Continue having extended discussion with patient regarding discharge planning initially thought to go home with home health care but the patient feels that she will be brought back into the hospital and at this point I am asking her to consider SNF. Have discussed this also with case management. Son did not return call yesterday we will try to reach out again today.       Total time: -35- minutes **I personally saw and examined the patient during this time period**      Date of service:    8/27/2022                ___________________________________________________    Admitting Physician: Yasmany Pressley MD

## 2022-08-28 NOTE — PROGRESS NOTES
Problem: Falls - Risk of  Goal: *Absence of Falls  Description: Document Cross Flow Fall Risk and appropriate interventions in the flowsheet. Outcome: Progressing Towards Goal  Note: Fall Risk Interventions:  Mobility Interventions: Bed/chair exit alarm, Patient to call before getting OOB    Mentation Interventions: Adequate sleep, hydration, pain control, Bed/chair exit alarm    Medication Interventions: Bed/chair exit alarm, Patient to call before getting OOB, Teach patient to arise slowly    Elimination Interventions: Bed/chair exit alarm, Call light in reach    History of Falls Interventions: Bed/chair exit alarm         Problem: Patient Education: Go to Patient Education Activity  Goal: Patient/Family Education  Outcome: Progressing Towards Goal     Problem: Pressure Injury - Risk of  Goal: *Prevention of pressure injury  Description: Document Destin Scale and appropriate interventions in the flowsheet.   Outcome: Progressing Towards Goal  Note: Pressure Injury Interventions:  Sensory Interventions: Assess changes in LOC, Pad between skin to skin, Minimize linen layers, Keep linens dry and wrinkle-free    Moisture Interventions: Absorbent underpads, Minimize layers, Internal/External urinary devices    Activity Interventions: Pressure redistribution bed/mattress(bed type)    Mobility Interventions: Assess need for specialty bed, Pressure redistribution bed/mattress (bed type)    Nutrition Interventions: Document food/fluid/supplement intake    Friction and Shear Interventions: Apply protective barrier, creams and emollients, HOB 30 degrees or less, Minimize layers                Problem: Patient Education: Go to Patient Education Activity  Goal: Patient/Family Education  Outcome: Progressing Towards Goal     Problem: Nutrition Deficit  Goal: *Optimize nutritional status  Outcome: Progressing Towards Goal     Problem: Patient Education: Go to Patient Education Activity  Goal: Patient/Family Education  Outcome: Progressing Towards Goal     Problem: Hypotension  Goal: *Blood pressure within specified parameters  Outcome: Progressing Towards Goal  Goal: *Fluid volume balance  Outcome: Progressing Towards Goal  Goal: *Labs within defined limits  Outcome: Progressing Towards Goal     Problem: Patient Education: Go to Patient Education Activity  Goal: Patient/Family Education  Outcome: Progressing Towards Goal     Problem: Patient Education: Go to Patient Education Activity  Goal: Patient/Family Education  Outcome: Progressing Towards Goal     Problem: Patient Education: Go to Patient Education Activity  Goal: Patient/Family Education  Outcome: Progressing Towards Goal     Problem: Impaired Skin Integrity/Pressure Injury Treatment  Goal: *Improvement of Existing Pressure Injury  Outcome: Progressing Towards Goal  Goal: *Prevention of pressure injury  Description: Document Destin Scale and appropriate interventions in the flowsheet.   Outcome: Progressing Towards Goal     Problem: Patient Education: Go to Patient Education Activity  Goal: Patient/Family Education  Outcome: Progressing Towards Goal

## 2022-08-29 LAB
ANION GAP SERPL CALC-SCNC: 8 MMOL/L (ref 5–15)
BASOPHILS # BLD: 0.1 K/UL (ref 0–0.1)
BASOPHILS NFR BLD: 1 % (ref 0–1)
BUN SERPL-MCNC: 17 MG/DL (ref 6–20)
BUN/CREAT SERPL: 12 (ref 12–20)
CALCIUM SERPL-MCNC: 8.1 MG/DL (ref 8.5–10.1)
CHLORIDE SERPL-SCNC: 105 MMOL/L (ref 97–108)
CO2 SERPL-SCNC: 17 MMOL/L (ref 21–32)
CREAT SERPL-MCNC: 1.39 MG/DL (ref 0.55–1.02)
DIFFERENTIAL METHOD BLD: ABNORMAL
EOSINOPHIL # BLD: 0.2 K/UL (ref 0–0.4)
EOSINOPHIL NFR BLD: 3 % (ref 0–7)
ERYTHROCYTE [DISTWIDTH] IN BLOOD BY AUTOMATED COUNT: 15.1 % (ref 11.5–14.5)
GLUCOSE SERPL-MCNC: 99 MG/DL (ref 65–100)
HCT VFR BLD AUTO: 28.1 % (ref 35–47)
HGB BLD-MCNC: 9.2 G/DL (ref 11.5–16)
IMM GRANULOCYTES # BLD AUTO: 0 K/UL (ref 0–0.04)
IMM GRANULOCYTES NFR BLD AUTO: 1 % (ref 0–0.5)
LYMPHOCYTES # BLD: 1.5 K/UL (ref 0.8–3.5)
LYMPHOCYTES NFR BLD: 34 % (ref 12–49)
MCH RBC QN AUTO: 33.9 PG (ref 26–34)
MCHC RBC AUTO-ENTMCNC: 32.7 G/DL (ref 30–36.5)
MCV RBC AUTO: 103.7 FL (ref 80–99)
MONOCYTES # BLD: 0.6 K/UL (ref 0–1)
MONOCYTES NFR BLD: 13 % (ref 5–13)
NEUTS SEG # BLD: 2.1 K/UL (ref 1.8–8)
NEUTS SEG NFR BLD: 48 % (ref 32–75)
NRBC # BLD: 0 K/UL (ref 0–0.01)
NRBC BLD-RTO: 0 PER 100 WBC
PLATELET # BLD AUTO: 221 K/UL (ref 150–400)
PMV BLD AUTO: 9.7 FL (ref 8.9–12.9)
POTASSIUM SERPL-SCNC: 5.3 MMOL/L (ref 3.5–5.1)
RBC # BLD AUTO: 2.71 M/UL (ref 3.8–5.2)
SODIUM SERPL-SCNC: 130 MMOL/L (ref 136–145)
WBC # BLD AUTO: 4.4 K/UL (ref 3.6–11)

## 2022-08-29 PROCEDURE — 74011000250 HC RX REV CODE- 250: Performed by: INTERNAL MEDICINE

## 2022-08-29 PROCEDURE — 65270000046 HC RM TELEMETRY

## 2022-08-29 PROCEDURE — 97530 THERAPEUTIC ACTIVITIES: CPT

## 2022-08-29 PROCEDURE — 94761 N-INVAS EAR/PLS OXIMETRY MLT: CPT

## 2022-08-29 PROCEDURE — 77010033678 HC OXYGEN DAILY

## 2022-08-29 PROCEDURE — 74011250637 HC RX REV CODE- 250/637: Performed by: INTERNAL MEDICINE

## 2022-08-29 PROCEDURE — 85025 COMPLETE CBC W/AUTO DIFF WBC: CPT

## 2022-08-29 PROCEDURE — 80048 BASIC METABOLIC PNL TOTAL CA: CPT

## 2022-08-29 PROCEDURE — 77030038269 HC DRN EXT URIN PURWCK BARD -A

## 2022-08-29 PROCEDURE — 74011250636 HC RX REV CODE- 250/636: Performed by: HOSPITALIST

## 2022-08-29 PROCEDURE — 2709999900 HC NON-CHARGEABLE SUPPLY

## 2022-08-29 PROCEDURE — 36415 COLL VENOUS BLD VENIPUNCTURE: CPT

## 2022-08-29 PROCEDURE — 74011250637 HC RX REV CODE- 250/637: Performed by: HOSPITALIST

## 2022-08-29 PROCEDURE — 92526 ORAL FUNCTION THERAPY: CPT | Performed by: SPEECH-LANGUAGE PATHOLOGIST

## 2022-08-29 PROCEDURE — 74011250636 HC RX REV CODE- 250/636: Performed by: INTERNAL MEDICINE

## 2022-08-29 PROCEDURE — 74011250636 HC RX REV CODE- 250/636: Performed by: FAMILY MEDICINE

## 2022-08-29 RX ADMIN — ONDANSETRON 4 MG: 2 INJECTION INTRAMUSCULAR; INTRAVENOUS at 08:42

## 2022-08-29 RX ADMIN — NYSTATIN 500000 UNITS: 100000 SUSPENSION ORAL at 08:42

## 2022-08-29 RX ADMIN — DOCUSATE SODIUM 50 MG: 50 LIQUID ORAL at 18:12

## 2022-08-29 RX ADMIN — NYSTATIN 500000 UNITS: 100000 SUSPENSION ORAL at 13:07

## 2022-08-29 RX ADMIN — MIDODRINE HYDROCHLORIDE 10 MG: 5 TABLET ORAL at 13:07

## 2022-08-29 RX ADMIN — MIDODRINE HYDROCHLORIDE 10 MG: 5 TABLET ORAL at 18:10

## 2022-08-29 RX ADMIN — PANTOPRAZOLE SODIUM 40 MG: 40 TABLET, DELAYED RELEASE ORAL at 18:11

## 2022-08-29 RX ADMIN — LEVOFLOXACIN 250 MG: 250 TABLET, FILM COATED ORAL at 08:42

## 2022-08-29 RX ADMIN — ENOXAPARIN SODIUM 30 MG: 100 INJECTION SUBCUTANEOUS at 08:42

## 2022-08-29 RX ADMIN — Medication 10 ML: at 21:28

## 2022-08-29 RX ADMIN — PANTOPRAZOLE SODIUM 40 MG: 40 TABLET, DELAYED RELEASE ORAL at 08:42

## 2022-08-29 RX ADMIN — Medication 10 ML: at 13:09

## 2022-08-29 RX ADMIN — MIDODRINE HYDROCHLORIDE 10 MG: 5 TABLET ORAL at 08:41

## 2022-08-29 RX ADMIN — DEXTROSE MONOHYDRATE AND POTASSIUM CHLORIDE INJECTION, SOLUTION 1000 ML: 5; .149 INJECTION, SOLUTION INTRAVENOUS at 06:18

## 2022-08-29 RX ADMIN — Medication 10 ML: at 06:00

## 2022-08-29 NOTE — PROGRESS NOTES
Problem: Mobility Impaired (Adult and Pediatric)  Goal: *Acute Goals and Plan of Care (Insert Text)  Description: FUNCTIONAL STATUS PRIOR TO ADMISSION: Pt reports w/c level mobility with pull to stand on RW with assist to complete transfer. Pt states that she is able to propel w/c in home at times, requires family assist at times. Notes recent functional decline due to progressive weakness. States that outside physician told her she has six months to live, and that information has taken away \"all my motivation. \"    HOME SUPPORT PRIOR TO ADMISSION: The patient lived with  and son who provides assist.    Physical Therapy Goals  Initiated 8/16/2022; reviewed and continue 8/29/2022   1. Patient will move from supine to sit and sit to supine  in bed with moderate assistance  within 7 day(s). 2.  Patient will transfer from bed to chair and chair to bed with moderate assistance  using the least restrictive device within 7 day(s). 3.  Patient will perform sit to stand with moderate assistance  within 7 day(s). 4.  Patient will complete functional dynamic sitting activities EOB with supervision/set-up within 7 day(s). Outcome: Progressing Towards Goal   PHYSICAL THERAPY TREATMENT: WEEKLY REASSESSMENT  Patient: Asa Payne (31 y.o. female)  Date: 8/29/2022  Primary Diagnosis: Hypokalemia [E87.6]  Acute kidney injury ([facility] [address] [N17.9]       Precautions:   DNR      ASSESSMENT  Patient continues with skilled PT services and is progressing slowly towards goals. Patient received in bedside chair following OT and happy to work with this writer. She remains limited by impaired ROM, balance, endurance, and a high fear of falling with all activity. She was able to initiate an anterior weight shift for lateral transfers from chair to bed but required maximum assist to complete and blocking of bilateral feet to prevent sliding on the floor.  Completed a 2nd standing trial to RW requiring maximum assist to clear her buttocks and then able to stand for ~20 seconds with moderate-max assist before fatiguing and returning to sitting. Noted ongoing discussion of goals of care. She was requiring assist for transfers prior to admission but appears to require > assist than baseline. Recommend home with 24 hour assist and HHPT follow up pending continued restorative care. Patient's progression toward goals since last assessment: No goals met but patient shows improved engagement this date  Current Level of Function Impacting Discharge (mobility/balance): maximum assist for bed mobility and transfers           PLAN :  Goals have been updated based on progression since last assessment. Patient continues to benefit from skilled intervention to address the above impairments. Recommendations and Planned Interventions: bed mobility training, transfer training, therapeutic exercises, neuromuscular re-education, and therapeutic activities      Frequency/Duration: Patient will be followed by physical therapy:  4 times a week to address goals. Recommendation for discharge: (in order for the patient to meet his/her long term goals)  Physical therapy at least 2 days/week in the home AND ensure assist and/or supervision for safety with all transfers    This discharge recommendation:  Has been made in collaboration with the attending provider and/or case management    IF patient discharges home will need the following DME: to be determined           SUBJECTIVE:   Patient stated Don't let me fall!     OBJECTIVE DATA SUMMARY:   HISTORY:    Past Medical History:   Diagnosis Date    Anemia     Arthritis     Atrial fibrillation ([facility] [address]     CAD (coronary artery disease)     Chronic pain     Chronic respiratory failure with hypoxia (HCC)     On supplemental home oxygen at 4 L/min continuously    Cor pulmonale (HCC)     Mildly reduced right ventricular function per echocardiogram of 7/12/2022    Diabetes mellitus ([address]     GERD (gastroesophageal reflux disease)     Glaucoma     Hx of completed stroke     MRI of the brain with and without contrast on 9/5/2015 showed right parietal and temporal encephalomalacia probably represents old infarctions    Hx of deep venous thrombosis     Hyperlipidemia     Macular degeneration     Mild mitral stenosis     Per echocardiogram of 7/12/2022    Osteoporosis, post-menopausal     Systolic heart failure (HCC)     LVEF 30-35% per echocardiogram of 7/12/2022     Past Surgical History:   Procedure Laterality Date    HX CHOLECYSTECTOMY      HX HEENT      HX HIP REPLACEMENT      HX PACEMAKER      HX ROTATOR CUFF REPAIR Bilateral     VASCULAR SURGERY PROCEDURE UNLIST         Personal factors and/or comorbidities impacting plan of care:     Home Situation  Home Environment: Private residence  # Steps to Enter: 0  One/Two Story Residence: One story  Living Alone: No  Support Systems: Spouse/Significant Other, Child(ramo) (son)  Patient Expects to be Discharged to[de-identified] Unable to determine at this time  Current DME Used/Available at Home: Ruth Budd, rolling, Wheelchair, [address] chair, Grab bars, Raised toilet seat    EXAMINATION/PRESENTATION/DECISION MAKING:   Critical Behavior:  Neurologic State: Alert  Orientation Level: Oriented X4  Cognition: Follows commands  Safety/Judgement: Insight into deficits, Good awareness of safety precautions, Fall prevention  Hearing: Auditory  Auditory Impairment: (P) Hard of hearing, bilateral  Functional Mobility:  Bed Mobility:  Rolling: Moderate assistance  Supine to Sit: Maximum assistance;Assist x1;Additional time  Sit to Supine: Maximum assistance  Scooting: Maximum assistance;Assist x1;Total assistance  Transfers:  Sit to Stand: Maximum assistance  Stand to Sit: Moderate assistance        Bed to Chair: Maximum assistance;Assist x2; Additional time              Balance:   Sitting: Impaired  Sitting - Static: Good (unsupported)  Sitting - Dynamic: Fair (occasional)  Standing: Impaired  Standing - Static: Constant support; Fair  Standing - Dynamic : Poor;Constant support  Therapeutic Exercises: Activity Tolerance:   Fair and requires rest breaks    After treatment patient left in no apparent distress:   Supine in bed and Call bell within reach Purewick soiled in chair. Removed and asked nursing to replace    COMMUNICATION/EDUCATION:   The patients plan of care was discussed with: Registered nurse. Fall prevention education was provided and the patient/caregiver indicated understanding., Patient/family have participated as able in goal setting and plan of care. , and Patient/family agree to work toward stated goals and plan of care.     Thank you for this referral.  Romina Rueda, PT, DPT   Time Calculation: 31 mins

## 2022-08-29 NOTE — PROGRESS NOTES
08/29/22      Medicare pt has received Medicare 2nd IMM letter. Patient was sleeping when I entered room unable to explain Details of the letter or get a signature. Letter placed on bedside table.

## 2022-08-29 NOTE — PROGRESS NOTES
Bedside shift change report given to Lore Enriquez (oncoming nurse) by Rick Kumari (offgoing nurse). Report included the following information SBAR, Kardex, Procedure Summary, Intake/Output, MAR, and Recent Results.

## 2022-08-29 NOTE — PROGRESS NOTES
[facility]o [facility] [place] [address]  [address] [facility] [address]  [phone]       Hospitalist Progress Note      NAME: Mark Prince   :  1925   MRN:  [id number]     Date/Time:  2022     Patient PCP: Jaky Griffin MD    Emergency Contact:    Extended Emergency Contact Information  Primary Emergency Contact: Luh Limon  Address: [address] [facility], [address] [facility] Phone: [phone]  Mobile Phone: [phone]  Relation: Spouse  Secondary Emergency Contact: Haris Trevino  Address: [address], [address], [address] [facility]  Mobile Phone: [phone]  Relation: Son      Code: DNR     Isolation Precautions: There are currently no Active Isolations        Subjective:     REASON FOR VISIT:  Recheck SOB      HPI & INTERVAL HISTORY:       : Patient was seen and examined she was resting comfortably. No complaints today. Denies chest pain shortness of breath and nausea is better. . Palliative will see again today. : Patient was seen and examined. No significant changes with no new complaints other than continued nausea but has been eating. Xerostomia and excessive salivation has been controlled with scopolamine. Hemoglobin has come back at 9.0 with a negative Hemoccult. : Pt seen and examined. No new complaints. There is xerostomia has resolved and nausea improved with scopolamine patch. No acute events overnight. However, patient's hemoglobin has gone down to 7.9 but patient is asymptomatic while at rest.  Very concerned about being discharged and being \"thrown out and thrown away\". I have had discussions with her regarding either home with home health care or SNF which is what I think she would mostly benefit from prior to going back home. : Patient was seen and examined. Today secretions have been dried up with treatment to include the glycopyrrolate x1 followed by scopolamine. Makenna Callaway Patient is now complaining of xerostomia. Still with some nausea. Had discussion with patient regarding discharge she is still very hesitant. I told her that she would either need home health care versus SNF. She is thinking about it. Discussed this with care management. 8/25: Upon entering the room the patient's was very nauseated felt that she wanted to throw up. Reports that oral secretions have worsened since yesterday contributing to the nausea. Urine cultures growing E. coli and Klebsiella which she was started on Levaquin for this. Son Sanjeev Esposito again at bedside. They were very hesitant about discharge home. Again prefer home health care with PT OT over SNF.    8/24: Patient seen and examined. She reports that the oral secretions have improved with the Bentyl. No new complaints. Denies chest pain or shortness of breath at this time. Son Sanjeev Esposito at bedside and agree to reconsult palliative care. 8/23: Patient was seen and examined. She was resting comfortably had no complaints. Reports that her shortness of breath is improving but is still on 4 L nasal cannula baseline. Palliative on board.         Allergies   Allergen Reactions    Cephalexin Unknown (comments)    Dilaudid [Hydromorphone (Bulk)] Rash, Nausea and Vomiting and Swelling    Egg Unknown (comments)    Fenofibrate Nausea Only    Gemfibrozil Unknown (comments)    Iodine Rash, Nausea and Vomiting and Swelling    Oxycodone Unknown (comments)    Pcn [Penicillins] Rash, Nausea and Vomiting, Swelling and Contact Dermatitis    Prilosec [Omeprazole] Nausea Only    Shellfish Derived Swelling    Sulfa (Sulfonamide Antibiotics) Rash, Nausea and Vomiting and Swelling         ROS:  Gen:   Negative  Resp:  cough and denies SOB  CVS:   negative  GI:       nausea           Objective:      Visit Vitals  /69 (BP 1 Location: Left lower arm, BP Patient Position: At rest)   Pulse 89   Temp 97.5 °F (36.4 °C)   Resp 18   Ht 5' 2\" (1.575 m)   Wt 43.4 kg (95 lb 10.9 oz)   SpO2 97%   BMI 17.50 kg/m²       Physical Exam:  General: No acute distress  Head: Normocephalic, without obvious abnormality, atraumatic  Eyes: anicteric sclerae and conjuntiva clear  ENT: Oral mucosa moist and pink  Neck: normal, supple, and no tenderness  Lungs:  CTA   Heart: S1, S2 normal, regular rate, and regular rhythm  Abd: soft, nontender, BS present and normactive  Ext: no cyanosis and no edema  Skin: normal skin color, no rashes, and texture normal  Neuro:  Alert to self.  CN grossly intact  Psych: not anxious, cooperative, appropriate affect      Medications:  Current Facility-Administered Medications   Medication Dose Route Frequency    enoxaparin (LOVENOX) injection 30 mg  30 mg SubCUTAneous Q24H    lactulose (CHRONULAC) 10 gram/15 mL solution 30 mL  20 g Oral DAILY PRN    levoFLOXacin (LEVAQUIN) tablet 250 mg  250 mg Oral Q48H    scopolamine (TRANSDERM-SCOP) 1 mg over 3 days 1 Patch  1 Patch TransDERmal Q72H    sennosides (SENOKOT) 8.8 mg/5 mL syrup 8.8 mg  5 mL Oral BID    docusate (COLACE) 50 mg/5 mL oral liquid 50 mg  50 mg Oral BID    dextrose 5% with KCl 20 mEq/L infusion   IntraVENous CONTINUOUS    dextrose 10% infusion 0-250 mL  0-250 mL IntraVENous PRN    polyethylene glycol (MIRALAX) packet 17 g  17 g Oral DAILY    pantoprazole (PROTONIX) tablet 40 mg  40 mg Oral BID    nystatin (MYCOSTATIN) 100,000 unit/mL oral suspension 500,000 Units  500,000 Units Oral QID    bisacodyL (DULCOLAX) suppository 10 mg  10 mg Rectal DAILY PRN    midodrine (PROAMATINE) tablet 10 mg  10 mg Oral TID WITH MEALS    prochlorperazine (COMPAZINE) injection 5 mg  5 mg IntraVENous Q4H PRN    hydrALAZINE (APRESOLINE) 20 mg/mL injection 20 mg  20 mg IntraVENous Q6H PRN    morphine injection 2 mg  2 mg IntraVENous Q3H PRN    sodium chloride (NS) flush 5-40 mL  5-40 mL IntraVENous Q8H    sodium chloride (NS) flush 5-40 mL  5-40 mL IntraVENous PRN    acetaminophen (TYLENOL) tablet 650 mg  650 mg Oral Q6H PRN Or    acetaminophen (TYLENOL) suppository 650 mg  650 mg Rectal Q6H PRN    polyethylene glycol (MIRALAX) packet 17 g  17 g Oral DAILY PRN    ondansetron (ZOFRAN ODT) tablet 4 mg  4 mg Oral Q8H PRN    Or    ondansetron (ZOFRAN) injection 4 mg  4 mg IntraVENous Q6H PRN        Labs:  Recent Labs     08/29/22  0617   WBC 4.4   HGB 9.2*   HCT 28.1*          Recent Labs     08/29/22  0617   *   K 5.3*      CO2 17*   GLU 99   BUN 17   CREA 1.39*   CA 8.1*         Radiology:  No results found. I personally reviewed and interpreted the previous imaging studies and agree with official reading    The labs, imaging studies, and medications was reviewed by me on: August 29, 2022         Assessment/Plan:      Acute UTI with Klebsiella and E. coli both pansensitive  Continue levaquin 250 every other day TOTAL OF 2 more doses    Nausea/vomiting/abd pain acute on chronic with increased salivation  Improving  Scopolamine DC'd due to xerostomia  CT of the abdomen showed moderate stool burden  Continue with bowel regimen  Mobilize patient     Anemia  Unsure of the drop but has improved to 9.0 with negative Hemoccult    Chronic Hypoxemic Respiratory Failure:   Stable and will continue home remains on baseline 4LNC. Hypoglycemia:  Stable - hypoglycemic protocol prn  Continue nutritional supplements     Hypokalemia due to GI losses poor oral intake and diuretic  Monitor and replace as needed    Hypotension requiring vasopressor:   Has been off pressors  Continue midodrine    EVERT likely prerenal due to poor oral intake and GI loss resulting in IVVD  Has improved  Avoid nephrotoxins monitor labs     HFrEF:   Echo 7/2022 with EF 30-35%. ?Myrna Shivers? Hold furosemide, manas, metop    CAD:   Trop slightly up, but down from prior. No plans for aggressive interventions at this time     Hx DVT:   Could consider PE but low s/f PE given neg LE dopplers.       Debility:   Patient declined hospice and initially declined SNF however now is considering but is very anxious over making a decision. Have discussed with case management.     Body mass index is 17.5 kg/m².:  18.5 - 24.9:  Normal weight        Discussed:  Pt's condition, Imaging findings, Lab findings, Assessment, and Care Plan discussed with: Patient, RN, and Care Manager    Prophylaxis:  Lovenox SQ    Probable disposition:  TBD SNF vs HHC but will meet with Palliative      Total time: -25- minutes **I personally saw and examined the patient during this time period**      Date of service:    8/29/2022                ___________________________________________________    Admitting Physician: Laurence Davis MD

## 2022-08-29 NOTE — PROGRESS NOTES
Problem: Self Care Deficits Care Plan (Adult)  Goal: *Acute Goals and Plan of Care (Insert Text)  Description: FUNCTIONAL STATUS PRIOR TO ADMISSION: Per chart review and CM notes, pt required assist for self care tasks at home and was able to ambulate short distances with RW and used WC for longer distances. HOME SUPPORT: The patient lived with son and spouse and required assistance for self care and functional transfers/mobility. Occupational Therapy Goals  Weekly re-assessment 8/26/22. Goals downgraded to reflect more achievable level of assistance   1. Patient will perform grooming with CGA at EOB while  within 7 day(s). 2.  Patient will perform upper body dressing with min assist within 7 day(s). 3.  Patient will perform upper body bathing with minimal assistance/contact guard assist within 7 day(s). 4.  Patient will perform toilet transfers with moderate assist within 7 day(s). 5.  Patient will perform all aspects of toileting with moderate assist within 7 day(s). Initiated 8/19/2022  1. Patient will perform grooming with modified independence within 7 day(s). 2.  Patient will perform upper body dressing with supervision/set-up within 7 day(s). 3.  Patient will perform upper body bathing with minimal assistance/contact guard assist within 7 day(s). 4.  Patient will perform toilet transfers with minimal assistance/contact guard assist within 7 day(s). 5.  Patient will perform all aspects of toileting with minimal assistance/contact guard assist within 7 day(s). 6.  Patient will participate in upper extremity therapeutic exercise/activities with modified independence for 10 minutes within 7 day(s). 7.  Patient will utilize energy conservation techniques during functional activities with verbal cues within 7 day(s).     Outcome: Progressing Towards Goal   OCCUPATIONAL THERAPY TREATMENT  Patient: Jackie Nails (39 y.o. female)  Date: 8/29/2022  Diagnosis: Hypokalemia [E87.6]  Acute kidney injury ([facility] [address] [N17.9] <principal problem not specified>      Precautions: DNR  Chart, occupational therapy assessment, plan of care, and goals were reviewed. ASSESSMENT  Patient continues with skilled OT services and is progressing very slowly towards goals. Pt received supine in bed on 4L O2 satting 97%. She still c/o \"not feeling well\" today (still has emesis bag for sputum) but is willing to work with OT today to get to EOB and chair stating she has not been comfortable but is unable to change her position in bed. Pt to EOB with overall Max A x1 for bed mobility and tolerates sitting ~7 minutes with ankle pumps and knee flexion/extension and tricep extensions. SPT to recliner chair with Max A x2, patient stating positioning is more comfortable and verbalizes understanding to call for help to get back to bed, agreeable to sit up for ~1 hour. Current Level of Function Impacting Discharge (ADLs): Max-Total A for bed mobility, functional mobility, LB ADLs    Other factors to consider for discharge: none         PLAN :  Patient continues to benefit from skilled intervention to address the above impairments. Continue treatment per established plan of care to address goals. Recommend with staff: OOB to recliner as tolerated. OOB to [facility] via SPT with assist x2 and or RW for safety     Recommend next OT session: EOB ADLs    Recommendation for discharge: (in order for the patient to meet his/her long term goals)  Occupational therapy at least 2 days/week in the home AND ensure assist and/or supervision for safety with transfers, adls, functional mobility, bed mobility    This discharge recommendation:  Has been made in collaboration with the attending provider and/or case management    IF patient discharges home will need the following DME: TBD       SUBJECTIVE:   Patient stated I can try.     OBJECTIVE DATA SUMMARY:   Cognitive/Behavioral Status:  Neurologic State: Alert  Orientation Level: Oriented X4  Cognition: Follows commands  Perception: Appears intact  Perseveration: No perseveration noted  Safety/Judgement: Insight into deficits;Good awareness of safety precautions; Fall prevention    Functional Mobility and Transfers for ADLs:  Bed Mobility:  Rolling: Moderate assistance  Supine to Sit: Maximum assistance;Assist x1;Additional time  Scooting: Maximum assistance;Assist x1;Additional time    Transfers:  Sit to Stand: Maximum assistance;Assist x2; Additional time     Bed to Chair: Maximum assistance;Assist x2; Additional time    Balance:  Sitting: Intact  Sitting - Static: Good (unsupported)  Sitting - Dynamic: Fair (occasional)  Standing: Impaired    ADL Intervention:       Lower Body Dressing Assistance  Socks: Total assistance (dependent)    Toileting  Bladder Hygiene: Total assistance (dependent)  Clothing Management: Total assistance (dependent)    Cognitive Retraining  Safety/Judgement: Insight into deficits;Good awareness of safety precautions; Fall prevention    Pain:  none    Activity Tolerance:   Fair, requires rest breaks, and observed SOB with activity    After treatment patient left in no apparent distress:   Sitting in chair, Heels elevated for pressure relief, Call bell within reach, Bed / chair alarm activated, and RN in room administering medication    COMMUNICATION/COLLABORATION:   The patients plan of care was discussed with: Physical therapist, Registered nurse, and Case management.      Nigel Xiao OT  Time Calculation: 30 mins

## 2022-08-29 NOTE — PROGRESS NOTES
Problem: Dysphagia (Adult)  Goal: *Acute Goals and Plan of Care (Insert Text)  Description: Speech pathology goals  Initiated 8/12/2022  1. Patient will tolerate clear liquid diet with no overt s/s aspiration within 7 days-met  2. Tolerate full liquids without s/s aspiration by 8-18-22-met   3> tolerate purees, thins without s/s aspiraiton by 8-19-22; continue to 8-26-22 met  Outcome: Resolved/Met   SPEECH LANGUAGE PATHOLOGY DYSPHAGIA TREATMENT/DISCHARGE  Patient: Brian Shanks (70 y.o. female)  Date: 8/29/2022  Diagnosis: Hypokalemia [E87.6]  Acute kidney injury ([facility] [address] [N17.9] <principal problem not specified>      Precautions:   DNR    ASSESSMENT:  Patient has been tolerating a puree diet with thin liquids free of change in respiratory status, no elevated WBC at last check. She continues with limited intake, throat clearing/ \"hawking\" after PO due to globus sensation. She allows extra time after this before eating or drinking more. This is suggestive of an esophageal dysphagia, especially in light of her advanced age. She agrees that she would not be able to manage solids, even though she does not enjoy purees. Nothing further for SLP to add at this time. PLAN:  Puree, thins  Allow extra time for meals  She will likely clear her throat frequently  Patient will be discharged from acute skilled speech therapy at this time. Rationale for discharge:  Goals achieved    Discharge Recommendations:  None     SUBJECTIVE:   Patient stated I was doing real good, but now I need some time to clear out my throat. .    OBJECTIVE:   Cognitive and Communication Status:  Neurologic State: Alert  Orientation Level: Oriented X4  Cognition: Follows commands    Perception: Appears intact    Perseveration: No perseveration noted    Safety/Judgement: Decreased insight into deficits  Dysphagia Treatment:  Oral Assessment:  Oral Assessment  Labial: No impairment  Oral Hygiene: moist, clean  Lingual: No impairment  Velum: No impairment  Mandible: No impairment  P.O. Trials:  Patient Position: up in bed  Vocal quality prior to P.O.: No impairment  Consistency Presented: Thin liquid  How Presented: Self-fed/presented;Straw     Bolus Acceptance: No impairment  Bolus Formation/Control: No impairment     Propulsion: No impairment  Oral Residue: None  Initiation of Swallow: No impairment     Aspiration Signs/Symptoms: None                                                                                                                                                   After treatment:   Patient left in no apparent distress in bed and Call bell within reach    COMMUNICATION/EDUCATION:   Patient was educated regarding purpose of SLP visit. She agreed to participate. The patient's plan of care including recommendations, planned interventions, and recommended diet changes were discussed with: Registered nurse.      Aleta Sosa SLP  Time Calculation: 15 mins

## 2022-08-29 NOTE — PROGRESS NOTES
Problem: Falls - Risk of  Goal: *Absence of Falls  Description: Document Ishaan Chandana Fall Risk and appropriate interventions in the flowsheet. Outcome: Progressing Towards Goal  Note: Fall Risk Interventions:  Mobility Interventions: Bed/chair exit alarm, Patient to call before getting OOB    Mentation Interventions: Bed/chair exit alarm, Adequate sleep, hydration, pain control    Medication Interventions: Bed/chair exit alarm, Teach patient to arise slowly, Patient to call before getting OOB    Elimination Interventions: Call light in reach, Bed/chair exit alarm    History of Falls Interventions: Bed/chair exit alarm         Problem: Patient Education: Go to Patient Education Activity  Goal: Patient/Family Education  Outcome: Progressing Towards Goal     Problem: Pressure Injury - Risk of  Goal: *Prevention of pressure injury  Description: Document Destin Scale and appropriate interventions in the flowsheet.   Outcome: Progressing Towards Goal  Note: Pressure Injury Interventions:  Sensory Interventions: Minimize linen layers, Float heels    Moisture Interventions: Absorbent underpads, Minimize layers    Activity Interventions: Increase time out of bed, Pressure redistribution bed/mattress(bed type)    Mobility Interventions: Float heels, HOB 30 degrees or less    Nutrition Interventions: Document food/fluid/supplement intake    Friction and Shear Interventions: Apply protective barrier, creams and emollients, Minimize layers, Lift team/patient mobility team                Problem: Patient Education: Go to Patient Education Activity  Goal: Patient/Family Education  Outcome: Progressing Towards Goal     Problem: Nutrition Deficit  Goal: *Optimize nutritional status  Outcome: Progressing Towards Goal     Problem: Patient Education: Go to Patient Education Activity  Goal: Patient/Family Education  Outcome: Progressing Towards Goal     Problem: Hypotension  Goal: *Blood pressure within specified parameters  Outcome: Progressing Towards Goal  Goal: *Fluid volume balance  Outcome: Progressing Towards Goal  Goal: *Labs within defined limits  Outcome: Progressing Towards Goal     Problem: Patient Education: Go to Patient Education Activity  Goal: Patient/Family Education  Outcome: Progressing Towards Goal     Problem: Patient Education: Go to Patient Education Activity  Goal: Patient/Family Education  Outcome: Progressing Towards Goal     Problem: Patient Education: Go to Patient Education Activity  Goal: Patient/Family Education  Outcome: Progressing Towards Goal     Problem: Impaired Skin Integrity/Pressure Injury Treatment  Goal: *Improvement of Existing Pressure Injury  Outcome: Progressing Towards Goal  Goal: *Prevention of pressure injury  Description: Document Destin Scale and appropriate interventions in the flowsheet.   Outcome: Progressing Towards Goal     Problem: Patient Education: Go to Patient Education Activity  Goal: Patient/Family Education  Outcome: Progressing Towards Goal

## 2022-08-29 NOTE — PROGRESS NOTES
8/29/2022   4:56 PM CM called back to pt's son, Rod Santiago, was able to speak with him regarding pt's discharge. CM relayed pt's readiness for discharge and discharge recommendations, SNF placement vs home hospice. Pt's son reported had concerns regarding pt's swallowing, iv medications, and pt's weakness that he was under the impression he would be speaking with Palliative about. Pt's son reported he will be to [facility] at [address] on 8/30. CM relayed to Palliative team.  CM will follow up regarding [address] discussion. 3:54 PM CM called back to pt's son, Rod Santiago, no answer. Another vm was not left. 2:46 PM CM spoke with pt at bedside regarding discharge, home health vs SNF. Pt requested CM contact her son. CM called and and lvm for pt's son, Rod Santiago at [phone] requested return phone call. CM will follow up.     8:56 AM CM spoke with pt's attending this AM. Per attending, Palliative to meet with pt and family today for goals of care discussion. CM will follow for final discharge needs. MARLIN Villegas    Care Management Progress Note         ICD-10-CM ICD-9-CM     1. Nausea and vomiting, unspecified vomiting type  R11.2 787.01         2. Acute dehydration  E86.0 276.51         3. Hypomagnesemia  E83.42 275.2         4. Hypokalemia  E87.6 276.8         5. Uremia  N19 586         6. EVERT (acute kidney injury) ([facility] [address]  N17.9 584.9         7. Chronic diastolic heart failure (HCC)  I50.32 428.32         8. Elevated troponin  R77.8 790.6         9. Palliative care by specialist [Z51.5 (ICD-10-CM)]  Z51.5 V66.7         10. Goals of care, counseling/discussion [Z71.89 (ICD-10-CM)]  Z71.89 V65.49         11. Age-related physical debility [R54 (ICD-10-CM)]  R54 797         12. Weakness generalized [R53.1 (ICD-10-CM)]  R53.1 780.79         13. Decreased appetite [R63.0 (ICD-10-CM)]  R63.0 783.0         14. Hearing loss, unspecified hearing loss type, unspecified laterality [H91.90 (ICD-10-CM)]  H91.90 389. 9            Readmission   RUR: 20%  Risk Level: []Low []Moderate [x]High  Value-based purchasing: [] Yes [x] No  Bundle patient: [] Yes [x] No              Specify:      Transition of care plan:  Ongoing medical management,following for [address] discussion. Palliative to meet with pt today. Primary team recommending hospice, pt and family declining at this time   Family prefers pt to return home with family support and resumption of home health PT and OT through \"[facility]\"([phone]). Outpatient follow-up. Pt will need stretcher transport. [facility] in will call.

## 2022-08-29 NOTE — PROGRESS NOTES
Comprehensive Nutrition Assessment    Type and Reason for Visit: Reassess    Nutrition Recommendations/Plan:   Purees/thin liquids - per SLP recs      Reduce ONS - Ensure Clear BID. Malnutrition Assessment:  Malnutrition Status:  Severe malnutrition (08/12/22 1334)    Context:  Acute illness     Findings of the 6 clinical characteristics of malnutrition:   Energy Intake:  Unable to assess  Weight Loss:  Greater than 5% over 1 month     Body Fat Loss:  Mild body fat loss, Fat overlying ribs, Orbital   Muscle Mass Loss: Moderate muscle mass loss, Clavicles (pectoralis & deltoids), Temples (temporalis)  Fluid Accumulation:  No significant fluid accumulation,     Strength:  Not performed     Nutrition Assessment:    8/29: Follow up. Little change in pts PO intake. ~25% on average. Awaiting goals of care. SLP recs for Pureed/thin diet continues. Continue to monitor. Reduce ONS to BID. Patient Vitals for the past 168 hrs:   % Diet Eaten   08/28/22 1757 1 - 25%   08/28/22 1233 1 - 25%   08/28/22 0745 1 - 25%   08/27/22 1823 26 - 50%   08/27/22 1431 26 - 50%   08/27/22 0950 26 - 50%   08/26/22 1756 26 - 50%   08/26/22 1254 26 - 50%   08/26/22 0853 26 - 50%   08/24/22 1235 1 - 25%         8/25: Follow up - PO remains poor. [address] discussions continue. Pt/family declining hospice at this time. Noted new P/I per Wound care. Needs additional protein and kcal. Pt continues to dislike Pureed meals. Will continue to follow. 8/22: Pt seen eating lunch - pt moving food around the plate and poking at it more than actually eating it. Pt still doesn't like the pureed foods, and doesn't want milk/pudding/yogurt because she reports it causes more phlegm. Pt drinking some of the Ensure Clear - apple. Prefers orange juice - she says it clears her throat better. Son in room encouraging pt to eat. 8/18: MD consult for calorie count. Pt with poor PO. Wt documented as stable. Diet upgraded to Pureed/thin liquids.  Adjusted ONS. Pt declined ice cream or any ONS like ice cream, and doesn't like/eat pudding. Added Ensure clear for trial in hopes to increase PO. Pt doesn't like tomato soup \"too acidic\" and nothing with cream, so no cream of mushroom. Pt likes broth, which is very low calorie. States she feels like she's going to throw up, but more spit up.        8/15: follow up. SLP evaluation today recommends advancing to full liquids. Wt is down from last week likely with inadequate diet order & limited PO. Advanced diet to full liquids & added ONS BID as above. Pt was asleep when RD checked & DW RN. Encourage frequent offers of PO when safe and alert. Last 3 Recorded Weights in this Encounter    08/20/22 0659 08/23/22 1713 08/29/22 0840   Weight: 49.3 kg (108 lb 11 oz) 43.4 kg (95 lb 10.9 oz) 53.1 kg (117 lb 1 oz)     8/12: Pt is a 80year old female admitted with Hypokalemia [E87.6]  Acute kidney injury ([facility] [address] [N17.9]. She has an extensive past medical history which includes Atrial fibrillation), CAD, Chronic respiratory failure with hypoxia, Diabetes mellitus, GERD, deep venous thrombosis, Hyperlipidemia, and Systolic heart failure. BPA for unsure weight loss. Per documentation, patient has lost 22# (18%) within two months, clinically significant for timeframe. Patient had RRT this morning and transferred to ICU. NPO at this time. SLP deferred consult until patient more medically stable. Patient with noted allergies to shellfish and eggs. Patient alert and oriented in bed, able to answer some questions. States she has not had an appetite recently 2/2 feeling nauseated. Denies chewing/swallowing problems. Palliative meeting with patient now.      Wt Readings from Last 10 Encounters:   08/29/22 53.1 kg (117 lb 1 oz)   07/18/22 53.3 kg (117 lb 6.3 oz)   05/01/22 54 kg (119 lb 0.8 oz)   04/10/22 51.3 kg (113 lb)   05/22/17 62.3 kg (137 lb 6.4 oz)   09/29/15 58.6 kg (129 lb 1.6 oz)   09/05/15 58.1 kg (128 lb)   08/31/15 58.1 kg (128 lb)   05/15/12 57.2 kg (126 lb)   03/19/12 61.2 kg (135 lb)     Nutrition Related Findings:      Wound Type: Stage I  Last Bowel Movement Date: 08/28/22  Stool Appearance: Soft  Abdominal Assessment: Soft  Appetite: Fair  Bowel Sounds: Active   Edema:No data recorded    Nutr. Labs:    Lab Results   Component Value Date/Time    GFR est AA 43 (L) 08/29/2022 06:17 AM    GFR est non-AA 35 (L) 08/29/2022 06:17 AM    Creatinine (POC) 1.5 (H) 03/02/2016 12:08 PM    Creatinine 1.39 (H) 08/29/2022 06:17 AM    BUN 17 08/29/2022 06:17 AM    Sodium 130 (L) 08/29/2022 06:17 AM    Potassium 5.3 (H) 08/29/2022 06:17 AM    Chloride 105 08/29/2022 06:17 AM    CO2 17 (L) 08/29/2022 06:17 AM     Lab Results   Component Value Date/Time    Glucose 99 08/29/2022 06:17 AM    Glucose (POC) 91 08/16/2022 09:13 AM     Lab Results   Component Value Date/Time    Hemoglobin A1c 5.1 04/23/2022 12:55 AM     Nutr.  Meds:  Current Facility-Administered Medications   Medication Dose Route Frequency    enoxaparin (LOVENOX) injection 30 mg  30 mg SubCUTAneous Q24H    levoFLOXacin (LEVAQUIN) tablet 250 mg  250 mg Oral Q48H    scopolamine (TRANSDERM-SCOP) 1 mg over 3 days 1 Patch  1 Patch TransDERmal Q72H    sennosides (SENOKOT) 8.8 mg/5 mL syrup 8.8 mg  5 mL Oral BID    docusate (COLACE) 50 mg/5 mL oral liquid 50 mg  50 mg Oral BID    polyethylene glycol (MIRALAX) packet 17 g  17 g Oral DAILY    pantoprazole (PROTONIX) tablet 40 mg  40 mg Oral BID    nystatin (MYCOSTATIN) 100,000 unit/mL oral suspension 500,000 Units  500,000 Units Oral QID    midodrine (PROAMATINE) tablet 10 mg  10 mg Oral TID WITH MEALS    sodium chloride (NS) flush 5-40 mL  5-40 mL IntraVENous Q8H         Current Nutrition Intake & Therapies:  Average Meal Intake: 1-25%  Average Supplement Intake: 1-25%  ADULT ORAL NUTRITION SUPPLEMENT Dinner, Lunch, Breakfast; Clear Liquid  DIET ONE TIME MESSAGE  ADULT DIET Dysphagia - Pureed; Please send 2 extra salt packets on tray    Anthropometric Measures:  Height: 5' 2\" (157.5 cm)  Ideal Body Weight (IBW): 110 lbs (50 kg)  Admission Body Weight: 115 lb  Current Body Wt:  42.1 kg (92 lb 13 oz), 84.4 % IBW.  Bed scale  Current BMI (kg/m2): 17        Weight Adjustment: No adjustment                 BMI Category: Underweight (BMI less than 22) age over 72    Estimated Daily Nutrient Needs:  Energy Requirements Based On: Kcal/kg  Weight Used for Energy Requirements: Current  Energy (kcal/day): 4992-0853  Weight Used for Protein Requirements: Current  Protein (g/day): 52-62 (1.0-1.2 g/kg)  Method Used for Fluid Requirements: Other (comment)  Fluid (ml/day): 0135-0259    Nutrition Diagnosis:   Inadequate oral intake related to cognitive or neurological impairment, inadequate protein-energy intake as evidenced by NPO or clear liquid status due to medical condition  Severe malnutrition related to inadequate protein-energy intake as evidenced by BMI, Criteria as identified in malnutrition assessment    Nutrition Interventions:   Food and/or Nutrient Delivery: Start oral diet, Start oral nutrition supplement  Nutrition Education/Counseling: No recommendations at this time  Coordination of Nutrition Care: Continue to monitor while inpatient  Plan of Care discussed with: nursing    Goals:  Previous Goal Met: Progressing toward goal(s)  Goals: Initiate PO diet, by next RD assessment       Nutrition Monitoring and Evaluation:   Behavioral-Environmental Outcomes: None identified  Food/Nutrient Intake Outcomes: Diet advancement/tolerance  Physical Signs/Symptoms Outcomes: Biochemical data, Weight, Skin, Nausea/vomiting    Discharge Planning:    Continue oral nutrition supplement, Continue current diet    Jose Smith RD  Contact: Ext: [phone], or via [employer]

## 2022-08-30 PROCEDURE — 94761 N-INVAS EAR/PLS OXIMETRY MLT: CPT

## 2022-08-30 PROCEDURE — 74011250636 HC RX REV CODE- 250/636: Performed by: HOSPITALIST

## 2022-08-30 PROCEDURE — 99356 PR PROLONGED SVC I/P OR OBS SETTING 1ST HOUR: CPT | Performed by: INTERNAL MEDICINE

## 2022-08-30 PROCEDURE — 2709999900 HC NON-CHARGEABLE SUPPLY

## 2022-08-30 PROCEDURE — 74011000250 HC RX REV CODE- 250: Performed by: INTERNAL MEDICINE

## 2022-08-30 PROCEDURE — 74011250636 HC RX REV CODE- 250/636: Performed by: INTERNAL MEDICINE

## 2022-08-30 PROCEDURE — 65270000046 HC RM TELEMETRY

## 2022-08-30 PROCEDURE — 77010033678 HC OXYGEN DAILY

## 2022-08-30 PROCEDURE — 99233 SBSQ HOSP IP/OBS HIGH 50: CPT | Performed by: INTERNAL MEDICINE

## 2022-08-30 PROCEDURE — 74011250637 HC RX REV CODE- 250/637: Performed by: INTERNAL MEDICINE

## 2022-08-30 PROCEDURE — 77030038269 HC DRN EXT URIN PURWCK BARD -A

## 2022-08-30 RX ADMIN — ENOXAPARIN SODIUM 30 MG: 100 INJECTION SUBCUTANEOUS at 08:29

## 2022-08-30 RX ADMIN — PROCHLORPERAZINE EDISYLATE 5 MG: 5 INJECTION INTRAMUSCULAR; INTRAVENOUS at 12:32

## 2022-08-30 RX ADMIN — SODIUM CHLORIDE 500 ML: 900 INJECTION, SOLUTION INTRAVENOUS at 00:00

## 2022-08-30 RX ADMIN — MIDODRINE HYDROCHLORIDE 10 MG: 5 TABLET ORAL at 08:30

## 2022-08-30 RX ADMIN — NYSTATIN 500000 UNITS: 100000 SUSPENSION ORAL at 21:24

## 2022-08-30 RX ADMIN — ONDANSETRON 4 MG: 2 INJECTION INTRAMUSCULAR; INTRAVENOUS at 08:30

## 2022-08-30 RX ADMIN — Medication 10 ML: at 06:13

## 2022-08-30 RX ADMIN — PANTOPRAZOLE SODIUM 40 MG: 40 TABLET, DELAYED RELEASE ORAL at 18:23

## 2022-08-30 RX ADMIN — Medication 10 ML: at 21:24

## 2022-08-30 RX ADMIN — MIDODRINE HYDROCHLORIDE 10 MG: 5 TABLET ORAL at 11:55

## 2022-08-30 RX ADMIN — NYSTATIN 500000 UNITS: 100000 SUSPENSION ORAL at 08:30

## 2022-08-30 RX ADMIN — Medication 10 ML: at 12:15

## 2022-08-30 RX ADMIN — MIDODRINE HYDROCHLORIDE 10 MG: 5 TABLET ORAL at 18:23

## 2022-08-30 RX ADMIN — PANTOPRAZOLE SODIUM 40 MG: 40 TABLET, DELAYED RELEASE ORAL at 08:30

## 2022-08-30 NOTE — PROGRESS NOTES
Problem: Falls - Risk of  Goal: *Absence of Falls  Description: Document Edelmira Munoz Fall Risk and appropriate interventions in the flowsheet. Outcome: Progressing Towards Goal  Note: Fall Risk Interventions:  Mobility Interventions: Bed/chair exit alarm, Communicate number of staff needed for ambulation/transfer, Patient to call before getting OOB, Utilize walker, cane, or other assistive device, Utilize gait belt for transfers/ambulation    Mentation Interventions: Adequate sleep, hydration, pain control    Medication Interventions: Bed/chair exit alarm, Patient to call before getting OOB, Teach patient to arise slowly    Elimination Interventions: Bed/chair exit alarm, Call light in reach, Patient to call for help with toileting needs    History of Falls Interventions: Bed/chair exit alarm, Investigate reason for fall, Utilize gait belt for transfer/ambulation         Problem: Patient Education: Go to Patient Education Activity  Goal: Patient/Family Education  Outcome: Progressing Towards Goal     Problem: Pressure Injury - Risk of  Goal: *Prevention of pressure injury  Description: Document Destin Scale and appropriate interventions in the flowsheet.   Outcome: Progressing Towards Goal  Note: Pressure Injury Interventions:  Sensory Interventions: Assess changes in LOC, Keep linens dry and wrinkle-free, Maintain/enhance activity level, Minimize linen layers    Moisture Interventions: Absorbent underpads    Activity Interventions: Assess need for specialty bed, Increase time out of bed, PT/OT evaluation    Mobility Interventions: Assess need for specialty bed, HOB 30 degrees or less, PT/OT evaluation    Nutrition Interventions: Document food/fluid/supplement intake, Offer support with meals,snacks and hydration    Friction and Shear Interventions: Lift sheet, Minimize layers                Problem: Patient Education: Go to Patient Education Activity  Goal: Patient/Family Education  Outcome: Progressing Towards Goal Problem: Nutrition Deficit  Goal: *Optimize nutritional status  Outcome: Progressing Towards Goal     Problem: Patient Education: Go to Patient Education Activity  Goal: Patient/Family Education  Outcome: Progressing Towards Goal     Problem: Hypotension  Goal: *Blood pressure within specified parameters  Outcome: Progressing Towards Goal  Goal: *Fluid volume balance  Outcome: Progressing Towards Goal  Goal: *Labs within defined limits  Outcome: Progressing Towards Goal     Problem: Patient Education: Go to Patient Education Activity  Goal: Patient/Family Education  Outcome: Progressing Towards Goal     Problem: Patient Education: Go to Patient Education Activity  Goal: Patient/Family Education  Outcome: Progressing Towards Goal     Problem: Patient Education: Go to Patient Education Activity  Goal: Patient/Family Education  Outcome: Progressing Towards Goal     Problem: Impaired Skin Integrity/Pressure Injury Treatment  Goal: *Improvement of Existing Pressure Injury  Outcome: Progressing Towards Goal  Goal: *Prevention of pressure injury  Description: Document Destin Scale and appropriate interventions in the flowsheet.   Outcome: Progressing Towards Goal     Problem: Patient Education: Go to Patient Education Activity  Goal: Patient/Family Education  Outcome: Progressing Towards Goal

## 2022-08-30 NOTE — PROGRESS NOTES
Responded to RRT for Vitals Signs Outside of Defined Limitsblood pressure    Provider at bedside: no    Interventions ordered: Other (Comment)  500 ml Fluid Bolus ordered    Sepsis Suspected: no    Transfer to Higher Level of Care: no    Patient gets Midodrine 10mg 3 times a day. She was found to have a low blood pressure however at approximately 11 pm.  Provider was contacted and ordered a 500 ml fluid bolus. Provider declined ordering maintenance fluid for patient and wants to be notified of blood pressure after bolus. Provider will determine what needs to be done upon re-evaluation.       Visit Vitals  BP (!) 95/45   Pulse 100   Temp 98.3 °F (36.8 °C)   Resp 20   Ht 5' 2\" (1.575 m)   Wt 53.1 kg (117 lb 1 oz)   SpO2 97%   BMI 21.41 kg/m²        Soumya Barragan RN

## 2022-08-30 NOTE — PROGRESS NOTES
Bedside shift change report given to Maria D Thomas RN (oncoming nurse) by Tim Glaser RN (offgoing nurse). Report included the following information SBAR, Kardex, Intake/Output, MAR, and Recent Results.

## 2022-08-30 NOTE — PROGRESS NOTES
2325: RRT called. Patient's B/P 61/40. Paged Dr. Sonia Rosa. N/S 500 mL bolus ordered. Patient was asymptomatic and alert speaking with the nursing staff. IVF was stopped in the morning and it is possible for patient to be dehydrated. 0002: Last B/P after bolus was 107/74. Informed Dr. Sonia Rosa.

## 2022-08-30 NOTE — PROGRESS NOTES
Problem: Falls - Risk of  Goal: *Absence of Falls  Description: Document Edelmira Munoz Fall Risk and appropriate interventions in the flowsheet. Outcome: Progressing Towards Goal  Note: Fall Risk Interventions:  Mobility Interventions: Bed/chair exit alarm    Mentation Interventions: Adequate sleep, hydration, pain control, Bed/chair exit alarm    Medication Interventions: Bed/chair exit alarm    Elimination Interventions: Bed/chair exit alarm, Call light in reach    History of Falls Interventions: Bed/chair exit alarm         Problem: Patient Education: Go to Patient Education Activity  Goal: Patient/Family Education  Outcome: Progressing Towards Goal     Problem: Pressure Injury - Risk of  Goal: *Prevention of pressure injury  Description: Document Destin Scale and appropriate interventions in the flowsheet.   Outcome: Progressing Towards Goal  Note: Pressure Injury Interventions:  Sensory Interventions: Assess changes in LOC, Keep linens dry and wrinkle-free    Moisture Interventions: Absorbent underpads, Apply protective barrier, creams and emollients, Internal/External urinary devices    Activity Interventions: Assess need for specialty bed    Mobility Interventions: Assess need for specialty bed    Nutrition Interventions: Document food/fluid/supplement intake    Friction and Shear Interventions: Apply protective barrier, creams and emollients, HOB 30 degrees or less, Lift sheet, Minimize layers                Problem: Patient Education: Go to Patient Education Activity  Goal: Patient/Family Education  Outcome: Progressing Towards Goal     Problem: Nutrition Deficit  Goal: *Optimize nutritional status  Outcome: Progressing Towards Goal     Problem: Patient Education: Go to Patient Education Activity  Goal: Patient/Family Education  Outcome: Progressing Towards Goal     Problem: Hypotension  Goal: *Blood pressure within specified parameters  Outcome: Progressing Towards Goal  Goal: *Fluid volume balance  Outcome: Progressing Towards Goal  Goal: *Labs within defined limits  Outcome: Progressing Towards Goal     Problem: Patient Education: Go to Patient Education Activity  Goal: Patient/Family Education  Outcome: Progressing Towards Goal     Problem: Patient Education: Go to Patient Education Activity  Goal: Patient/Family Education  Outcome: Progressing Towards Goal     Problem: Patient Education: Go to Patient Education Activity  Goal: Patient/Family Education  Outcome: Progressing Towards Goal     Problem: Impaired Skin Integrity/Pressure Injury Treatment  Goal: *Improvement of Existing Pressure Injury  Outcome: Progressing Towards Goal  Goal: *Prevention of pressure injury  Description: Document Destin Scale and appropriate interventions in the flowsheet.   Outcome: Progressing Towards Goal  Note: Pressure Injury Interventions:  Sensory Interventions: Assess changes in LOC, Keep linens dry and wrinkle-free    Moisture Interventions: Absorbent underpads, Apply protective barrier, creams and emollients, Internal/External urinary devices    Activity Interventions: Assess need for specialty bed    Mobility Interventions: Assess need for specialty bed    Nutrition Interventions: Document food/fluid/supplement intake    Friction and Shear Interventions: Apply protective barrier, creams and emollients, HOB 30 degrees or less, Lift sheet, Minimize layers                Problem: Patient Education: Go to Patient Education Activity  Goal: Patient/Family Education  Outcome: Progressing Towards Goal

## 2022-08-30 NOTE — PROGRESS NOTES
[facility]  [address] [facility], [address] [place] [address]  [phone] [address] Adult  Hospitalist Group                                                                                          Hospitalist Progress Note  Ramon Krueger MD        Date of Service:  2022  NAME:  Marco Fairbanks  :  1925  MRN:  [id number]      Admission Summary:       Interval history / Subjective:   Patient is resting comfortably in bed     Assessment & Plan:     Acute UTI with Klebsiella and E. coli both pansensitive  Continue levaquin 250 through tomorrow      Nausea/vomiting/abd pain acute on chronic with increased salivation  Improving  Scopolamine DC'd due to xerostomia  CT of the abdomen showed moderate stool burden  Continue with bowel regimen  Mobilize patient      Anemia  Unsure of the drop but has improved to 9.0 with negative Hemoccult     Chronic Hypoxemic Respiratory Failure:   Stable and will continue home remains on baseline 4LNC. Hypoglycemia:  Stable - hypoglycemic protocol prn  Continue nutritional supplements     Hypokalemia due to GI losses poor oral intake and diuretic  Monitor and replace as needed    Hypotension requiring vasopressor:   Has been off pressors  Continue midodrine     EVERT likely prerenal due to poor oral intake and GI loss resulting in IVVD  Has improved  Avoid nephrotoxins monitor labs     HFrEF:   Echo 2022 with EF 30-35%. ?Nonnie Silvia? Hold furosemide, manas, metop    CAD:   Trop slightly up, but down from prior. No plans for aggressive interventions at this time     Hx DVT:   Could consider PE but low s/f PE given neg LE dopplers. Debility:   Patient declined hospice and would be a poor candidate for SNF however now is considering but is very anxious over making a decision. Palliative care is assisting. Patient had a family meeting with palliative care .     Code status: DNR  DVT prophylaxis: [facility] Problems  Date Reviewed: 7/16/2022            Codes Class Noted POA    Severe protein-calorie malnutrition ([facility] [address] ICD-10-CM: L16  ICD-9-CM: 959  8/26/2022 Unknown        Acute kidney injury [facility]) ICD-10-CM: N17.9  ICD-9-CM: 584.9  8/11/2022 Unknown        Hypokalemia ICD-10-CM: E87.6  ICD-9-CM: 276.8  7/10/2022 Unknown             Review of Systems:   A comprehensive review of systems was negative except for that written in the HPI. Vital Signs:    Last 24hrs VS reviewed since prior progress note. Most recent are:  Visit Vitals  BP (!) 143/80 (BP 1 Location: Left upper arm, BP Patient Position: At rest)   Pulse 64   Temp 97.4 °F (36.3 °C)   Resp 18   Ht 5' 2\" (1.575 m)   Wt 52.3 kg (115 lb 4.8 oz)   SpO2 96%   BMI 21.09 kg/m²       No intake or output data in the 24 hours ending 08/30/22 1532     Physical Examination:     Physical Exam:  General: No acute distress, frail and cachectic appearing  Head: Normocephalic, without obvious abnormality, atraumatic  Eyes: anicteric sclerae and conjuntiva clear  ENT: Oral mucosa moist and pink  Neck: normal, supple, and no tenderness  Lungs:  CTA   Heart: S1, S2 normal, regular rate, and regular rhythm  Abd: soft, nontender, BS present and normactive  Ext: no cyanosis and no edema  Skin: normal skin color, no rashes, and texture normal  Neuro:  Alert to self. CN grossly intact  Psych: not anxious, cooperative, appropriate affect    Data Review:    Review and/or order of clinical lab test      Labs:     Recent Labs     08/29/22  0617 08/28/22  0352   WBC 4.4 5.1   HGB 9.2* 9.0*   HCT 28.1* 27.4*    238     Recent Labs     08/29/22  0617   *   K 5.3*      CO2 17*   BUN 17   CREA 1.39*   GLU 99   CA 8.1*     No results for input(s): ALT, AP, TBIL, TBILI, TP, ALB, GLOB, GGT, AML, LPSE in the last 72 hours. No lab exists for component: SGOT, GPT, AMYP, HLPSE  No results for input(s): INR, PTP, APTT, INREXT in the last 72 hours.    No results for input(s): FE, TIBC, PSAT, FERR in the last 72 hours. Lab Results   Component Value Date/Time    Folate 37.7 (H) 04/10/2022 03:46 AM      No results for input(s): PH, PCO2, PO2 in the last 72 hours. No results for input(s): CPK, CKNDX, TROIQ in the last 72 hours. No lab exists for component: CPKMB  Lab Results   Component Value Date/Time    Cholesterol, total <50 04/10/2022 03:46 AM    HDL Cholesterol 8 04/10/2022 03:46 AM    LDL, calculated  04/10/2022 03:46 AM     Unable to calculate LDL or VLDL due to low cholesterol.     Triglyceride 96 04/10/2022 03:46 AM    CHOL/HDL Ratio Cannot be calculated 04/10/2022 03:46 AM     Lab Results   Component Value Date/Time    Glucose (POC) 91 08/16/2022 09:13 AM    Glucose (POC) 82 04/23/2022 04:00 PM    Glucose (POC) 207 (H) 04/11/2022 11:07 AM    Glucose (POC) 234 (H) 04/11/2022 07:59 AM    Glucose (POC) 59 (L) 04/11/2022 05:43 AM     Lab Results   Component Value Date/Time    Color DARK YELLOW 08/12/2022 12:30 PM    Appearance CLEAR 08/12/2022 12:30 PM    Specific gravity 1.009 08/12/2022 12:30 PM    pH (UA) 6.5 08/12/2022 12:30 PM    Protein TRACE (A) 08/12/2022 12:30 PM    Glucose Negative 08/12/2022 12:30 PM    Ketone TRACE (A) 08/12/2022 12:30 PM    Bilirubin Negative 08/12/2022 12:30 PM    Urobilinogen 1.0 08/12/2022 12:30 PM    Nitrites Negative 08/12/2022 12:30 PM    Leukocyte Esterase SMALL (A) 08/12/2022 12:30 PM    Epithelial cells FEW 08/12/2022 12:30 PM    Bacteria Negative 08/12/2022 12:30 PM    WBC 0-4 08/12/2022 12:30 PM    RBC 0-5 08/12/2022 12:30 PM         Medications Reviewed:     Current Facility-Administered Medications   Medication Dose Route Frequency    acetaminophen (TYLENOL) solution 650 mg  650 mg Oral Q4H PRN    enoxaparin (LOVENOX) injection 30 mg  30 mg SubCUTAneous Q24H    lactulose (CHRONULAC) 10 gram/15 mL solution 30 mL  20 g Oral DAILY PRN    levoFLOXacin (LEVAQUIN) tablet 250 mg  250 mg Oral Q48H    scopolamine (TRANSDERM-SCOP) 1 mg over 3 days 1 Patch  1 Patch TransDERmal Q72H    sennosides (SENOKOT) 8.8 mg/5 mL syrup 8.8 mg  5 mL Oral BID    docusate (COLACE) 50 mg/5 mL oral liquid 50 mg  50 mg Oral BID    dextrose 10% infusion 0-250 mL  0-250 mL IntraVENous PRN    polyethylene glycol (MIRALAX) packet 17 g  17 g Oral DAILY    pantoprazole (PROTONIX) tablet 40 mg  40 mg Oral BID    nystatin (MYCOSTATIN) 100,000 unit/mL oral suspension 500,000 Units  500,000 Units Oral QID    bisacodyL (DULCOLAX) suppository 10 mg  10 mg Rectal DAILY PRN    midodrine (PROAMATINE) tablet 10 mg  10 mg Oral TID WITH MEALS    prochlorperazine (COMPAZINE) injection 5 mg  5 mg IntraVENous Q4H PRN    hydrALAZINE (APRESOLINE) 20 mg/mL injection 20 mg  20 mg IntraVENous Q6H PRN    morphine injection 2 mg  2 mg IntraVENous Q3H PRN    sodium chloride (NS) flush 5-40 mL  5-40 mL IntraVENous Q8H    sodium chloride (NS) flush 5-40 mL  5-40 mL IntraVENous PRN    acetaminophen (TYLENOL) suppository 650 mg  650 mg Rectal Q6H PRN    polyethylene glycol (MIRALAX) packet 17 g  17 g Oral DAILY PRN    ondansetron (ZOFRAN ODT) tablet 4 mg  4 mg Oral Q8H PRN    Or    ondansetron (ZOFRAN) injection 4 mg  4 mg IntraVENous Q6H PRN     ______________________________________________________________________  EXPECTED LENGTH OF STAY: 3d 19h  ACTUAL LENGTH OF STAY:          23                 Mo Catherine MD

## 2022-08-30 NOTE — CONSULTS
Palliative Medicine Consult  Devin: [phone])    Patient Name: Alyse Freitas  YOB: 1925    Date of Initial Consult: 8/12/2022  Reason for Consult: Care Decisions  Requesting Provider: Dr. Michelle Albright   Primary Care Physician: Malik Sanchez MD     SUMMARY:   Alyse Freitas is a 80 y.o. with CHF, AF, CAD, chronic respiratory failure on baseline 4L NC, who was admitted on 8/11/2022 from home with a diagnosis of electrolyte abnormalities and EVERT felt to be related to GI losses and diuretic use. She is being treated with IVF and electrolyte repletion. She was also found to have moderate stool burden on CT A/P. With multiple hospital admissions in the last several months and overall FTT, she is considered to be a good candidate for hospice. Current medical issues leading to Palliative Medicine involvement include: Care Decisions. Social:  Patient lives with  Esthela Foy and son Kevon Carnes who is her primary caregiver. Patient has 4 children. Son says his mom is sharp as a tack but also that she has dementia. She has very limited mobility and mostly uses a WC. She can help lift herself to transfer from couch to walker or from walker to [facility]. She can sometimes take a few steps with walker. He says her appetite has been terrible, she doesn't eat much and has been losing weight. She takes more liquids such as boost, buttermilk, tea, etc. He thinks she has more of a fear of swallowing rather than true difficulty swallowing. PALLIATIVE DIAGNOSES:   Encounter for Palliative Care  Goals of Care discussion  Debility/Generalized weakness  Decreased appetite  Thirsty  Hard of hearing     PLAN:   Meeting held with patient her son Kevon Carnes at bedside. We review her symptoms and talk some about her poor appetite and swallowing issues. She did not tolerate scopolamine patch (for drying secretions) and that has been stopped.   Discussed the big picture of her complex medical issues and advanced age, unfortunately we don't have a fix for her poor appetite and dysphagia. We don't recommend an artificial feeding tube and she wouldn't want one anyway. Her weight loss and poor appetite are due to multiple factors including her weak heart and lungs. Rapid response was rough on her last night, she didn't get back to sleep. Acknowledge how burdensome hospital setting can be for her. We talk about option of SNF, but don't recommend that,  as she would likely find it exhausting and do not expect her to make gains with her situation. We explore at length the option of hospice, they have heard much about this before. She feels like it's only for the \"very end\" and I discuss that hospice would not change her reality or try to hasten her dying, it's about helping her live the best she can with whatever time she has. She still has living left to do, and hospice would help support her in the home (supports provided reviewed) but would manage things w gentle measures in the home, preventing her from being back in hospital and eventually dying in the hospital setting. Advantage of having sooner rather than later, is having more control, preventing crisis. We talk also about what hospice does NOT provide (IVF, labs, procedures) and that people only choose hospice when they don't want aggressive measures. At end of discussion, son tells me it is a lot of new information, they need time to talk and process what we've talked about today. Will check back in again tomorrow. If they do decide they want hospice, can let primary attending or nurse or case management know.   If she goes home with home health services as she had before, anticipate soon readmission, but if they decide later for hospice, explain they can contact PCP for hospice referral .     GOALS OF CARE / TREATMENT PREFERENCES:     GOALS OF CARE:   Considering hospice care but not yet decided     TREATMENT PREFERENCES:   Code Status: DNR    Patient and family's personal goals include: to be able to drink water    Important upcoming milestones or family events: n/a    The patient identifies the following as important for living well: states she doesn't like going to the hospital      Advance Care Planning:  [] The [place] [id number] Team has updated the ACP Navigator with [address] and Patient Capacity      Primary Decision Maker: Tc Tobin - [phone]  Advance Care Planning 8/15/2022   Patient's Healthcare Decision Maker is: Named in scanned ACP document   Confirm Advance Directive Yes, on file   Does the patient have other document types Do Not Resuscitate               Other:    As far as possible, the palliative care team has discussed with patient / health care proxy about goals of care / treatment preferences for patient. HISTORY:     History obtained from: chart, patient    CHIEF COMPLAINT: Thirsty    HPI/SUBJECTIVE:    The patient is:   [x] Verbal and participatory  [] Non-participatory due to:     8/12: patient is awake. States she is thirsty. Otherwise no major complaints at this time.      Clinical Pain Assessment (nonverbal scale for severity on nonverbal patients):   Clinical Pain Assessment  Severity: 0     Activity (Movement): Lying quietly, normal position    Duration: for how long has pt been experiencing pain (e.g., 2 days, 1 month, years)  Frequency: how often pain is an issue (e.g., several times per day, once every few days, constant)     FUNCTIONAL ASSESSMENT:     Palliative Performance Scale (PPS):  PPS: 40       PSYCHOSOCIAL/SPIRITUAL SCREENING:     Palliative IDT has assessed this patient for cultural preferences / practices and a referral made as appropriate to needs (Cultural Services, Patient Advocacy, Ethics, etc.)    Any spiritual / Mosque concerns:  [] Yes /  [x] No   If \"Yes\" to discuss with pastoral care during IDT     Does caregiver feel burdened by caring for their loved one:   [] Yes /  [] No / [x] No Caregiver Present/Available [] No Caregiver [] Pt Lives at Facility  If \"Yes\" to discuss with social work during IDT    Anticipatory grief assessment:   [x] Normal  / [] Maladaptive     If \"Maladaptive\" to discuss with social work during IDT    ESAS Anxiety: Anxiety: 0    ESAS Depression:          REVIEW OF SYSTEMS:     Positive and pertinent negative findings in ROS are noted above in HPI. The following systems were [x] reviewed / [] unable to be reviewed as noted in HPI  Other findings are noted below. Systems: constitutional, ears/nose/mouth/throat, respiratory, gastrointestinal, genitourinary, musculoskeletal, integumentary, neurologic, psychiatric, endocrine. Positive findings noted below. Modified ESAS Completed by: provider           Pain: 0   Anxiety: 0 Nausea: 0     Dyspnea: 0           Stool Occurrence(s): 1        PHYSICAL EXAM:     From RN flowsheet:  Wt Readings from Last 3 Encounters:   08/30/22 52.3 kg (115 lb 4.8 oz)   07/18/22 53.3 kg (117 lb 6.3 oz)   05/01/22 54 kg (119 lb 0.8 oz)     Blood pressure (!) 143/80, pulse 64, temperature 97.4 °F (36.3 °C), resp. rate 18, height 5' 2\" (1.575 m), weight 52.3 kg (115 lb 4.8 oz), last menstrual period 11/16/1975, SpO2 96 %.     Pain Scale 1: Numeric (0 - 10)  Pain Intensity 1: 0     Pain Location 1: Generalized     Pain Description 1: Aching  Pain Intervention(s) 1: Repositioned  Last bowel movement, if known:     General: appears elderly and frail  Eyes: lids normal, no drainage  Nose: nares normal, no drainage  Mouth: mmm, no drainage  Pulm: rate is normal, respirations are unlabored  Neuro: alert, responds to questions, moves extremities  Psych: calm, speech and behavior seem appropriate     HISTORY:     Active Problems:    Hypokalemia (7/10/2022)      Acute kidney injury ([facility] (8/11/2022)      Severe protein-calorie malnutrition ([facility] (8/26/2022)    Past Medical History:   Diagnosis Date    Anemia     Arthritis     Atrial fibrillation ([facility] CAD (coronary artery disease)     Chronic pain     Chronic respiratory failure with hypoxia (HCC)     On supplemental home oxygen at 4 L/min continuously    Cor pulmonale (HCC)     Mildly reduced right ventricular function per echocardiogram of 7/12/2022    Diabetes mellitus (HCC)     GERD (gastroesophageal reflux disease)     Glaucoma     Hx of completed stroke     MRI of the brain with and without contrast on 9/5/2015 showed right parietal and temporal encephalomalacia probably represents old infarctions    Hx of deep venous thrombosis     Hyperlipidemia     Macular degeneration     Mild mitral stenosis     Per echocardiogram of 7/12/2022    Osteoporosis, post-menopausal     Systolic heart failure (HCC)     LVEF 30-35% per echocardiogram of 7/12/2022      Past Surgical History:   Procedure Laterality Date    HX CHOLECYSTECTOMY      HX HEENT      HX HIP REPLACEMENT      HX PACEMAKER      HX ROTATOR CUFF REPAIR Bilateral     VASCULAR SURGERY PROCEDURE UNLIST        Family History   Problem Relation Age of Onset    Heart Failure Father       History reviewed, no pertinent family history.   Social History     Tobacco Use    Smoking status: Never    Smokeless tobacco: Never   Substance Use Topics    Alcohol use: No     Allergies   Allergen Reactions    Cephalexin Unknown (comments)    Dilaudid [Hydromorphone (Bulk)] Rash, Nausea and Vomiting and Swelling    Egg Unknown (comments)    Fenofibrate Nausea Only    Gemfibrozil Unknown (comments)    Iodine Rash, Nausea and Vomiting and Swelling    Oxycodone Unknown (comments)    Pcn [Penicillins] Rash, Nausea and Vomiting, Swelling and Contact Dermatitis    Prilosec [Omeprazole] Nausea Only    Shellfish Derived Swelling    Sulfa (Sulfonamide Antibiotics) Rash, Nausea and Vomiting and Swelling      Current Facility-Administered Medications   Medication Dose Route Frequency    acetaminophen (TYLENOL) solution 650 mg  650 mg Oral Q4H PRN    enoxaparin (LOVENOX) injection 30 mg 30 mg SubCUTAneous Q24H    lactulose (CHRONULAC) 10 gram/15 mL solution 30 mL  20 g Oral DAILY PRN    levoFLOXacin (LEVAQUIN) tablet 250 mg  250 mg Oral Q48H    scopolamine (TRANSDERM-SCOP) 1 mg over 3 days 1 Patch  1 Patch TransDERmal Q72H    sennosides (SENOKOT) 8.8 mg/5 mL syrup 8.8 mg  5 mL Oral BID    docusate (COLACE) 50 mg/5 mL oral liquid 50 mg  50 mg Oral BID    dextrose 10% infusion 0-250 mL  0-250 mL IntraVENous PRN    polyethylene glycol (MIRALAX) packet 17 g  17 g Oral DAILY    pantoprazole (PROTONIX) tablet 40 mg  40 mg Oral BID    nystatin (MYCOSTATIN) 100,000 unit/mL oral suspension 500,000 Units  500,000 Units Oral QID    bisacodyL (DULCOLAX) suppository 10 mg  10 mg Rectal DAILY PRN    midodrine (PROAMATINE) tablet 10 mg  10 mg Oral TID WITH MEALS    prochlorperazine (COMPAZINE) injection 5 mg  5 mg IntraVENous Q4H PRN    hydrALAZINE (APRESOLINE) 20 mg/mL injection 20 mg  20 mg IntraVENous Q6H PRN    morphine injection 2 mg  2 mg IntraVENous Q3H PRN    sodium chloride (NS) flush 5-40 mL  5-40 mL IntraVENous Q8H    sodium chloride (NS) flush 5-40 mL  5-40 mL IntraVENous PRN    acetaminophen (TYLENOL) suppository 650 mg  650 mg Rectal Q6H PRN    polyethylene glycol (MIRALAX) packet 17 g  17 g Oral DAILY PRN    ondansetron (ZOFRAN ODT) tablet 4 mg  4 mg Oral Q8H PRN    Or    ondansetron (ZOFRAN) injection 4 mg  4 mg IntraVENous Q6H PRN          LAB AND IMAGING FINDINGS:     Lab Results   Component Value Date/Time    WBC 4.4 08/29/2022 06:17 AM    HGB 9.2 (L) 08/29/2022 06:17 AM    PLATELET 442 14/09/4502 06:17 AM     Lab Results   Component Value Date/Time    Sodium 130 (L) 08/29/2022 06:17 AM    Potassium 5.3 (H) 08/29/2022 06:17 AM    Chloride 105 08/29/2022 06:17 AM    CO2 17 (L) 08/29/2022 06:17 AM    BUN 17 08/29/2022 06:17 AM    Creatinine 1.39 (H) 08/29/2022 06:17 AM    Calcium 8.1 (L) 08/29/2022 06:17 AM    Magnesium 1.5 (L) 08/16/2022 08:11 AM    Phosphorus 3.5 04/23/2022 12:55 AM      Lab Results   Component Value Date/Time    Alk. phosphatase 137 (H) 08/20/2022 02:47 AM    Protein, total 4.4 (L) 08/20/2022 02:47 AM    Albumin 1.7 (L) 08/20/2022 02:47 AM    Globulin 2.7 08/20/2022 02:47 AM     Lab Results   Component Value Date/Time    INR 1.1 04/10/2022 03:46 AM    Prothrombin time 11.9 (H) 04/10/2022 03:46 AM    aPTT 28.1 05/06/2009 12:00 PM      Lab Results   Component Value Date/Time    Iron 8 (L) 04/10/2022 03:46 AM    TIBC 109 (L) 04/10/2022 03:46 AM    Iron % saturation 7 (L) 04/10/2022 03:46 AM      Lab Results   Component Value Date/Time    pH 7.52 (H) 05/13/2009 05:23 AM    PCO2 28 (LL) 05/13/2009 05:23 AM    PO2 100 05/13/2009 05:23 AM     No components found for: GLPOC   No results found for: CPK, CKMB             Total time:65min  Counseling / coordination time, spent as noted above: 50  > 50% counseling / coordination?: yes, talking about pros and cons of hospice, SNF and home health with patient and son, discussing with case management and hospice team    Prolonged service was provided for  []30 min   []75 min in face to face time in the presence of the patient, spent as noted above. Time Start: 11;00  Time End: 12:10pm  Note: this can only be billed with  (initial) or [id number] (follow up). If multiple start / stop times, list each separately.

## 2022-08-30 NOTE — PROGRESS NOTES
Bedside shift change report given to Bola Olvera RN (oncoming nurse) by Sayra De Souza RN (offgoing nurse). Report included the following information SBAR, Kardex, Intake/Output, MAR, and Recent Results.

## 2022-08-30 NOTE — PROGRESS NOTES
Problem: Falls - Risk of  Goal: *Absence of Falls  Description: Document Webber Reason Fall Risk and appropriate interventions in the flowsheet. Outcome: Progressing Towards Goal  Note: Fall Risk Interventions:  Mobility Interventions: Bed/chair exit alarm, Communicate number of staff needed for ambulation/transfer, Patient to call before getting OOB, Utilize walker, cane, or other assistive device, Utilize gait belt for transfers/ambulation    Mentation Interventions: Adequate sleep, hydration, pain control, Bed/chair exit alarm    Medication Interventions: Bed/chair exit alarm, Patient to call before getting OOB, Teach patient to arise slowly    Elimination Interventions: Bed/chair exit alarm, Call light in reach, Patient to call for help with toileting needs    History of Falls Interventions: Bed/chair exit alarm, Investigate reason for fall         Problem: Patient Education: Go to Patient Education Activity  Goal: Patient/Family Education  Outcome: Progressing Towards Goal     Problem: Pressure Injury - Risk of  Goal: *Prevention of pressure injury  Description: Document Destin Scale and appropriate interventions in the flowsheet.   Outcome: Progressing Towards Goal  Note: Pressure Injury Interventions:  Sensory Interventions: Assess changes in LOC, Float heels, Maintain/enhance activity level, Minimize linen layers    Moisture Interventions: Absorbent underpads    Activity Interventions: Assess need for specialty bed, Increase time out of bed, PT/OT evaluation    Mobility Interventions: Assess need for specialty bed, HOB 30 degrees or less, PT/OT evaluation    Nutrition Interventions: Document food/fluid/supplement intake, Offer support with meals,snacks and hydration    Friction and Shear Interventions: Apply protective barrier, creams and emollients, HOB 30 degrees or less, Lift sheet, Minimize layers                Problem: Patient Education: Go to Patient Education Activity  Goal: Patient/Family Education  Outcome: Progressing Towards Goal     Problem: Nutrition Deficit  Goal: *Optimize nutritional status  Outcome: Progressing Towards Goal     Problem: Patient Education: Go to Patient Education Activity  Goal: Patient/Family Education  Outcome: Progressing Towards Goal     Problem: Hypotension  Goal: *Blood pressure within specified parameters  Outcome: Progressing Towards Goal  Goal: *Fluid volume balance  Outcome: Progressing Towards Goal  Goal: *Labs within defined limits  Outcome: Progressing Towards Goal     Problem: Patient Education: Go to Patient Education Activity  Goal: Patient/Family Education  Outcome: Progressing Towards Goal     Problem: Patient Education: Go to Patient Education Activity  Goal: Patient/Family Education  Outcome: Progressing Towards Goal     Problem: Patient Education: Go to Patient Education Activity  Goal: Patient/Family Education  Outcome: Progressing Towards Goal     Problem: Impaired Skin Integrity/Pressure Injury Treatment  Goal: *Improvement of Existing Pressure Injury  Outcome: Progressing Towards Goal  Goal: *Prevention of pressure injury  Description: Document Destin Scale and appropriate interventions in the flowsheet.   Outcome: Progressing Towards Goal     Problem: Patient Education: Go to Patient Education Activity  Goal: Patient/Family Education  Outcome: Progressing Towards Goal

## 2022-08-30 NOTE — HOSPICE
[facility]  [phone]     Patient Name: Harleen Hampton  YOB: 1925  Age: 80 y.o. 190 Kimberley Gaurav RN Note:  Hospice consult to [facility] has . Up until this point son and patient have declined hospice services at discharge. Patient lives outside service area for [employer] so if plan is for home hospice, referral will need to be sent to alternate agency. If patient declines warranting re-eval for IP admission please feel free to reconsult. [facility] hospice to sign off. Thank you for the opportunity to be of service to this patient.

## 2022-08-30 NOTE — PROGRESS NOTES
Music Therapy Assessment  [address] [place] [address] [phone]     6/14/1925  80 y.o.  female    Patient Telephone Number: [phone] (home)   Druze Affiliation: Quaker   Language: English   Patient Active Problem List    Diagnosis Date Noted    Severe protein-calorie malnutrition ([facility] 08/26/2022    Acute kidney injury ([facility] 08/11/2022    CKD (chronic kidney disease), stage III ([facility] 07/16/2022    Anemia     Arthritis     CAD (coronary artery disease)     Chronic pain     DM type 2 causing renal disease (HCC)     DM type 2 causing vascular disease (HCC)     GERD (gastroesophageal reflux disease)     HFrEF (heart failure with reduced ejection fraction) ([facility]     Hx of completed stroke     Hx of deep venous thrombosis     Hypercholesterolemia     Pacemaker     Debility     Nausea and vomiting 07/10/2022    Hypomagnesemia 07/10/2022    Elevated troponin 07/10/2022    Hypokalemia 07/10/2022    Acute on chronic combined systolic and diastolic ACC/AHA stage C congestive heart failure ([facility] 04/22/2022        Date: 8/30/2022            Total Time (in minutes): 35          SFM 4M POST SURG ORT 2    Mental Status:   [x] Alert [  ] Elida Shank [  ]  Confused  [  ] Minimally responsive  [  ] Sleeping    Communication Status: [  ] Impaired Speech [  ] Nonverbal -N/A    Physical Status:   [  ] Oxygen in use  [x] Hard of Hearing [  ] Vision Impaired  [  ] Ambulatory  [  ] Ambulatory with assistance [  ] Non-ambulatory     Music Preferences, Background: Pt said she liked many genres of music. She said she liked Old Standards and songs that were popular when she was growing up, as well as some [employer] and songs from [employer]. Pt said she especially likes the musical [place] the [place]. Clinical Problem addressed: Emotional support, positive social interaction.     Goal(s) met in session:  Physical/Pain management (Scale of 1-10):    Pre-session rating: Pt reported pain in her back, especially when she's been sitting up for a period of time. Post-session rating: Pt reported pain in her back, especially when she's been sitting up for a period of time. [  ] Increased relaxation   [  ] Affected breathing patterns  [  ] Decreased muscle tension   [  ] Decreased agitation  [  ] Affected heart rate    [  ] Increased alertness     Emotional/Psychological:  [x] Increased self-expression   [  ] Decreased aggressive behavior   [  ] Decreased feelings of stress  [  ] Discussed healthy coping skills     [  ] Improved mood    [  ] Decreased withdrawn behavior     Social:  [  ] Decreased feelings of isolation/loneliness [x] Positive social interaction   [  ] Provided support and/or comfort for family/friends    Spiritual:  [  ] Spiritual support    [  ] Expressed peace  [  ] Expressed sweetie    [  ] Discussed beliefs    Techniques Utilized (Check all that apply):   [  ] Procedural support MT [x] Music for relaxation [x] Patient preferred music  [  ] Laura analysis  [  ] Song choice  [  ] Music for validation  [  ] Entrainment  [  ] Movement to music [  ] Guided visualization  [  ] Christine Cortés  [  ] Patient instrument playing [  ] Fadia Manual writing  [  ] Oilton Doom along   [  ] Sabrina Mcintosh  [  ] Sensory stimulation  [x] Active Listening  [  ] Music for spiritual support [  ] Making of CDs as gifts    Session Observations:  Referral from [facility], Palliative . The patient (pt) is known to this music therapist (MT) from a previous hospitalization in April, 2022. MT reintroduced self and asked the pt how she was feeling. Pt was alert lying in bed and holding an emesis bag. She reported feeling like she needed to spit, and MT was unable to clarify through follow up questions if the pt was describing nausea discomfort, if her discomfort was from being unable to clear her throat, or from something else. Pt was amenable to music therapy to provide a pleasant stimulus to which she could redirect her focus.  Pt felt stuck in that sitting up helped her discomfort so she could clear her throat but doing so caused her back pain. MT sat at bedside and provided active listening and words of validation. MT then sang and played with guitar Matchmaker, Matchmaker from the [employer] on the [place]. Pt continued to appear uncomfortable, as evidenced by (AEB) facial muscle tension. After the song she asked the MT to adjust her bed so she was sitting up more to clear her throat. MT did so, and then continued the music. MT sang and played with guitar Never Walk Alone from the [facility], [facility], and  [facility]. Pt's discomfort appeared to decrease in response to the music as evidenced by (AEB) decreased facial muscle tension and the pt expressing enjoyment in the music. Pt needed help with her tray table and MT provided this assistance. Pt thanked MT for the session.     LAUREN McnamaraBC (Music Therapist-Board Certified)  Spiritual Care Department  Referral-based service

## 2022-08-30 NOTE — PROGRESS NOTES
8/30/2022 2:21 PM   Care Management Progress Note         ICD-10-CM ICD-9-CM     1. Nausea and vomiting, unspecified vomiting type  R11.2 787.01         2. Acute dehydration  E86.0 276.51         3. Hypomagnesemia  E83.42 275.2         4. Hypokalemia  E87.6 276.8         5. Uremia  N19 586         6. EVERT (acute kidney injury) ([facility] [address]  N17.9 584.9         7. Chronic diastolic heart failure (HCC)  I50.32 428.32         8. Elevated troponin  R77.8 790.6         9. Palliative care by specialist [Z51.5 (ICD-10-CM)]  Z51.5 V66.7         10. Goals of care, counseling/discussion [Z71.89 (ICD-10-CM)]  Z71.89 V65.49         11. Age-related physical debility [R54 (ICD-10-CM)]  R54 797         12. Weakness generalized [R53.1 (ICD-10-CM)]  R53.1 780.79         13. Decreased appetite [R63.0 (ICD-10-CM)]  R63.0 783.0         14. Hearing loss, unspecified hearing loss type, unspecified laterality [H91.90 (ICD-10-CM)]  H91.90 389. 9            Readmission   RUR:  20%  Risk Level: []Low []Moderate [x]High  Value-based purchasing: [] Yes [x] No  Bundle patient: [] Yes [x] No              Specify:      Transition of care plan:  Ongoing medical management,following for Doc 64 discussion. Palliative met with pt today, family deciding on hospice  Primary team recommending hospice   If family declines hospice, plan for pt to return home with family support and resumption of home health PT and OT through \"[facility]\"([phone]). Outpatient follow-up. Pt will need stretcher transport. [facility] will call.

## 2022-08-31 LAB
ANION GAP SERPL CALC-SCNC: 8 MMOL/L (ref 5–15)
BUN SERPL-MCNC: 15 MG/DL (ref 6–20)
BUN/CREAT SERPL: 12 (ref 12–20)
CALCIUM SERPL-MCNC: 8.6 MG/DL (ref 8.5–10.1)
CHLORIDE SERPL-SCNC: 109 MMOL/L (ref 97–108)
CO2 SERPL-SCNC: 18 MMOL/L (ref 21–32)
CREAT SERPL-MCNC: 1.28 MG/DL (ref 0.55–1.02)
ERYTHROCYTE [DISTWIDTH] IN BLOOD BY AUTOMATED COUNT: 15.2 % (ref 11.5–14.5)
GLUCOSE BLD STRIP.AUTO-MCNC: 74 MG/DL (ref 65–117)
GLUCOSE SERPL-MCNC: 72 MG/DL (ref 65–100)
HCT VFR BLD AUTO: 28.3 % (ref 35–47)
HGB BLD-MCNC: 9.2 G/DL (ref 11.5–16)
MAGNESIUM SERPL-MCNC: 1.5 MG/DL (ref 1.6–2.4)
MCH RBC QN AUTO: 34.1 PG (ref 26–34)
MCHC RBC AUTO-ENTMCNC: 32.5 G/DL (ref 30–36.5)
MCV RBC AUTO: 104.8 FL (ref 80–99)
NRBC # BLD: 0 K/UL (ref 0–0.01)
NRBC BLD-RTO: 0 PER 100 WBC
PHOSPHATE SERPL-MCNC: 3.1 MG/DL (ref 2.6–4.7)
PLATELET # BLD AUTO: 188 K/UL (ref 150–400)
PMV BLD AUTO: 10.6 FL (ref 8.9–12.9)
POTASSIUM SERPL-SCNC: 4.6 MMOL/L (ref 3.5–5.1)
RBC # BLD AUTO: 2.7 M/UL (ref 3.8–5.2)
SERVICE CMNT-IMP: NORMAL
SODIUM SERPL-SCNC: 135 MMOL/L (ref 136–145)
WBC # BLD AUTO: 5 K/UL (ref 3.6–11)

## 2022-08-31 PROCEDURE — 84100 ASSAY OF PHOSPHORUS: CPT

## 2022-08-31 PROCEDURE — 74011250637 HC RX REV CODE- 250/637: Performed by: INTERNAL MEDICINE

## 2022-08-31 PROCEDURE — 80048 BASIC METABOLIC PNL TOTAL CA: CPT

## 2022-08-31 PROCEDURE — 36415 COLL VENOUS BLD VENIPUNCTURE: CPT

## 2022-08-31 PROCEDURE — 85027 COMPLETE CBC AUTOMATED: CPT

## 2022-08-31 PROCEDURE — 97535 SELF CARE MNGMENT TRAINING: CPT

## 2022-08-31 PROCEDURE — 74011000250 HC RX REV CODE- 250: Performed by: INTERNAL MEDICINE

## 2022-08-31 PROCEDURE — 97530 THERAPEUTIC ACTIVITIES: CPT

## 2022-08-31 PROCEDURE — 74011250636 HC RX REV CODE- 250/636: Performed by: INTERNAL MEDICINE

## 2022-08-31 PROCEDURE — 74011250636 HC RX REV CODE- 250/636: Performed by: HOSPITALIST

## 2022-08-31 PROCEDURE — 83735 ASSAY OF MAGNESIUM: CPT

## 2022-08-31 PROCEDURE — 94761 N-INVAS EAR/PLS OXIMETRY MLT: CPT

## 2022-08-31 PROCEDURE — 82962 GLUCOSE BLOOD TEST: CPT

## 2022-08-31 PROCEDURE — 2709999900 HC NON-CHARGEABLE SUPPLY

## 2022-08-31 PROCEDURE — 65270000046 HC RM TELEMETRY

## 2022-08-31 PROCEDURE — 74011250637 HC RX REV CODE- 250/637: Performed by: HOSPITALIST

## 2022-08-31 RX ORDER — MAGNESIUM SULFATE HEPTAHYDRATE 40 MG/ML
2 INJECTION, SOLUTION INTRAVENOUS ONCE
Status: COMPLETED | OUTPATIENT
Start: 2022-08-31 | End: 2022-08-31

## 2022-08-31 RX ORDER — ASPIRIN 81 MG/1
81 TABLET ORAL DAILY
Status: DISCONTINUED | OUTPATIENT
Start: 2022-09-01 | End: 2022-09-06 | Stop reason: HOSPADM

## 2022-08-31 RX ORDER — CARVEDILOL 3.12 MG/1
3.12 TABLET ORAL 2 TIMES DAILY WITH MEALS
Status: DISCONTINUED | OUTPATIENT
Start: 2022-08-31 | End: 2022-09-06 | Stop reason: HOSPADM

## 2022-08-31 RX ADMIN — ACETAMINOPHEN 650 MG: 160 SOLUTION ORAL at 01:56

## 2022-08-31 RX ADMIN — LEVOFLOXACIN 250 MG: 250 TABLET, FILM COATED ORAL at 09:07

## 2022-08-31 RX ADMIN — PANTOPRAZOLE SODIUM 40 MG: 40 TABLET, DELAYED RELEASE ORAL at 17:59

## 2022-08-31 RX ADMIN — Medication 10 ML: at 22:40

## 2022-08-31 RX ADMIN — NYSTATIN 500000 UNITS: 100000 SUSPENSION ORAL at 13:50

## 2022-08-31 RX ADMIN — POLYETHYLENE GLYCOL 3350 17 G: 17 POWDER, FOR SOLUTION ORAL at 09:07

## 2022-08-31 RX ADMIN — MIDODRINE HYDROCHLORIDE 10 MG: 5 TABLET ORAL at 09:07

## 2022-08-31 RX ADMIN — NYSTATIN 500000 UNITS: 100000 SUSPENSION ORAL at 09:21

## 2022-08-31 RX ADMIN — MIDODRINE HYDROCHLORIDE 10 MG: 5 TABLET ORAL at 11:17

## 2022-08-31 RX ADMIN — CARVEDILOL 3.12 MG: 3.12 TABLET, FILM COATED ORAL at 17:58

## 2022-08-31 RX ADMIN — Medication 10 ML: at 13:42

## 2022-08-31 RX ADMIN — MAGNESIUM SULFATE HEPTAHYDRATE 2 G: 40 INJECTION, SOLUTION INTRAVENOUS at 13:50

## 2022-08-31 RX ADMIN — PANTOPRAZOLE SODIUM 40 MG: 40 TABLET, DELAYED RELEASE ORAL at 09:07

## 2022-08-31 RX ADMIN — MIDODRINE HYDROCHLORIDE 10 MG: 5 TABLET ORAL at 17:59

## 2022-08-31 RX ADMIN — ENOXAPARIN SODIUM 30 MG: 100 INJECTION SUBCUTANEOUS at 09:07

## 2022-08-31 NOTE — PROGRESS NOTES
Problem: Mobility Impaired (Adult and Pediatric)  Goal: *Acute Goals and Plan of Care (Insert Text)  Description: FUNCTIONAL STATUS PRIOR TO ADMISSION: Pt reports w/c level mobility with pull to stand on RW with assist to complete transfer. Pt states that she is able to propel w/c in home at times, requires family assist at times. Notes recent functional decline due to progressive weakness. States that outside physician told her she has six months to live, and that information has taken away \"all my motivation. \"    HOME SUPPORT PRIOR TO ADMISSION: The patient lived with  and son who provides assist.    Physical Therapy Goals  Initiated 8/16/2022; reviewed and continue 8/29/2022   1. Patient will move from supine to sit and sit to supine  in bed with moderate assistance  within 7 day(s). 2.  Patient will transfer from bed to chair and chair to bed with moderate assistance  using the least restrictive device within 7 day(s). 3.  Patient will perform sit to stand with moderate assistance  within 7 day(s). 4.  Patient will complete functional dynamic sitting activities EOB with supervision/set-up within 7 day(s). Outcome: Progressing Towards Goal   PHYSICAL THERAPY TREATMENT  Patient: Yariel Solorzano (32 y.o. female)  Date: 8/31/2022  Diagnosis: Hypokalemia [E87.6]  Acute kidney injury ([facility] [address] [N17.9] <principal problem not specified>      Precautions: DNR  Chart, physical therapy assessment, plan of care and goals were reviewed. ASSESSMENT  Patient continues with skilled PT services and is not progressing towards goals. Pt received supine in bed calling out for assistance for incontinence care and to be repositioned in bed. Pt rolls with Max A this day and chux pads dry. Encouraged pt to get up to the chair for change in position though she is adamantly declining this day. Sits EOB briefly though with minimal interest in engaging with seated exercises and self-initiates return to supine.  Requests sacral pad to be changed d/t discomfort. PCT present and assists with rolling and incontinence care at this time. Zinc cream applied to reddened areas. Pillows under heels for pressure relief. Will continue to follow. Current Level of Function Impacting Discharge (mobility/balance): Max A bed mobility this day    Other factors to consider for discharge: appropriate for hospice         PLAN :  Patient continues to benefit from skilled intervention to address the above impairments. Continue treatment per established plan of care. to address goals. Recommendation for discharge: (in order for the patient to meet his/her long term goals)  Physical therapy at least 2 days/week in the home with continued family assistance    This discharge recommendation:  Has been made in collaboration with the attending provider and/or case management    IF patient discharges home will need the following DME: patient owns DME required for discharge       SUBJECTIVE:   Patient stated I am tried from sitting up like this I can't get to the chair.     OBJECTIVE DATA SUMMARY:   Critical Behavior:  Neurologic State: Alert  Orientation Level: Oriented X4  Cognition: Follows commands  Safety/Judgement: Insight into deficits, Good awareness of safety precautions, Fall prevention  Functional Mobility Training:  Bed Mobility:  Rolling: Maximum assistance  Supine to Sit: Maximum assistance  Sit to Supine: Maximum assistance           Transfers:                                   Balance:  Sitting: Impaired  Sitting - Static: Fair (occasional)  Sitting - Dynamic: Fair (occasional)  Ambulation/Gait Training:                        Activity Tolerance:   Fair    After treatment patient left in no apparent distress:   Supine in bed, Heels elevated for pressure relief, and Call bell within reach    COMMUNICATION/COLLABORATION:   The patients plan of care was discussed with: Registered nurse.      Farhana Marin PT   Time Calculation: 15 mins

## 2022-08-31 NOTE — PROGRESS NOTES
8/31/2022   3:30 PM CM called back to pt's son, Rodriguez Robles to discuss discharge and hospice. Pt's son reported he has more questions for Palliative that he wanted answered before deciding on hospice. After further discussion, pt's son did confirm all questions were related to hospice and was agreeable to meeting with a hospice agency instead of Palliative. Pt's son [facility] Pt's son was agreeable to meeting with [facility]. CM spoke with Jem Schmitz with [facility] who confirmed he can have family meeting with pt and pt's son at [address] on 9/1. Clinicals sent to [facility] via [employer]. 1:14 PM CM met with pt to discuss discharge. Pt relayed she is agreeable to home hospice. Pt requested CM contact her son regarding preference of hospice agency. CM called to pt's son, Rodriguez Robles, had to [facility]. CM will follow up. MARLIN Fuller     Care Management Progress Note         ICD-10-CM ICD-9-CM     1. Nausea and vomiting, unspecified vomiting type  R11.2 787.01         2. Acute dehydration  E86.0 276.51         3. Hypomagnesemia  E83.42 275.2         4. Hypokalemia  E87.6 276.8         5. Uremia  N19 586         6. EVERT (acute kidney injury) ([facility] [address]  N17.9 584.9         7. Chronic diastolic heart failure (HCC)  I50.32 428.32         8. Elevated troponin  R77.8 790.6         9. Palliative care by specialist [Z51.5 (ICD-10-CM)]  Z51.5 V66.7         10. Goals of care, counseling/discussion [Z71.89 (ICD-10-CM)]  Z71.89 V65.49         11. Age-related physical debility [R54 (ICD-10-CM)]  R54 797         12. Weakness generalized [R53.1 (ICD-10-CM)]  R53.1 780.79         13. Decreased appetite [R63.0 (ICD-10-CM)]  R63.0 783.0         14. Hearing loss, unspecified hearing loss type, unspecified laterality [H91.90 (ICD-10-CM)]  H91.90 389. 9            Readmission   RUR:  20%  Risk Level: []Low []Moderate [x]High  Value-based purchasing: [] Yes [x] No  Bundle patient: [] Yes [x] No              Specify:      Transition of care plan:  Ongoing medical management,family now agreeable to meeting with a hospice agency. [facility] to meet with pt and her son on 9/1 at 11AM.  If family declined hospice, pt to return home with family support and resumption of home health PT and OT through \"[facility]\"([phone]). Outpatient follow-up. Pt will need stretcher transport. [facility] will [facility].

## 2022-08-31 NOTE — PROGRESS NOTES
[facility]  [address] [facility], [address] [place] [address]  [phone]      Medical Progress Note      NAME: Emory Ortiz   :  1925  MRM:  [id number]    Date/Time of service: 2022  1:27 PM       Subjective:     Chief Complaint:  Patient was personally seen and examined by me during this time period. Chart reviewed. Still with weakness, poor PO intake. Agreeable to home hospice        Objective:       Vitals:       Last 24hrs VS reviewed since prior progress note.  Most recent are:    Visit Vitals  /61 (BP 1 Location: Left upper arm, BP Patient Position: At rest)   Pulse 86   Temp 97.9 °F (36.6 °C)   Resp 18   Ht 5' 2\" (1.575 m)   Wt 52.3 kg (115 lb 4.8 oz)   SpO2 98%   BMI 21.09 kg/m²     SpO2 Readings from Last 6 Encounters:   22 98%   22 100%   22 93%   22 100%   17 94%   09/29/15 99%    O2 Flow Rate (L/min): 4 l/min   No intake or output data in the 24 hours ending 22 1327     Exam:     Physical Exam:    Gen:  elderly, disheveled, thin, frail, NAD  HEENT:  Pink conjunctivae, PERRL, hearing intact to voice, dry mucous membranes  Neck:  Supple, without masses, thyroid non-tender  Resp:  No accessory muscle use, clear breath sounds without wheezes rales or rhonchi  Card:  No murmurs, normal S1, S2 without thrills, bruits or peripheral edema  Abd:  Soft, non-tender, non-distended, normoactive bowel sounds are present  Musc:  No cyanosis or clubbing  Skin:  No rashes   Neuro:  Cranial nerves 3-12 are grossly intact, generalized weakness, follows commands appropriately  Psych:  fair insight, oriented to person, place and time, alert    Medications Reviewed: (see below)    Lab Data Reviewed: (see below)    ______________________________________________________________________    Medications:     Current Facility-Administered Medications   Medication Dose Route Frequency    acetaminophen (TYLENOL) solution 650 mg  650 mg Oral Q4H PRN enoxaparin (LOVENOX) injection 30 mg  30 mg SubCUTAneous Q24H    lactulose (CHRONULAC) 10 gram/15 mL solution 30 mL  20 g Oral DAILY PRN    [Held by provider] scopolamine (TRANSDERM-SCOP) 1 mg over 3 days 1 Patch  1 Patch TransDERmal Q72H    sennosides (SENOKOT) 8.8 mg/5 mL syrup 8.8 mg  5 mL Oral BID    docusate (COLACE) 50 mg/5 mL oral liquid 50 mg  50 mg Oral BID    dextrose 10% infusion 0-250 mL  0-250 mL IntraVENous PRN    polyethylene glycol (MIRALAX) packet 17 g  17 g Oral DAILY    pantoprazole (PROTONIX) tablet 40 mg  40 mg Oral BID    nystatin (MYCOSTATIN) 100,000 unit/mL oral suspension 500,000 Units  500,000 Units Oral QID    bisacodyL (DULCOLAX) suppository 10 mg  10 mg Rectal DAILY PRN    midodrine (PROAMATINE) tablet 10 mg  10 mg Oral TID WITH MEALS    prochlorperazine (COMPAZINE) injection 5 mg  5 mg IntraVENous Q4H PRN    hydrALAZINE (APRESOLINE) 20 mg/mL injection 20 mg  20 mg IntraVENous Q6H PRN    morphine injection 2 mg  2 mg IntraVENous Q3H PRN    sodium chloride (NS) flush 5-40 mL  5-40 mL IntraVENous Q8H    sodium chloride (NS) flush 5-40 mL  5-40 mL IntraVENous PRN    acetaminophen (TYLENOL) suppository 650 mg  650 mg Rectal Q6H PRN    polyethylene glycol (MIRALAX) packet 17 g  17 g Oral DAILY PRN    ondansetron (ZOFRAN ODT) tablet 4 mg  4 mg Oral Q8H PRN    Or    ondansetron (ZOFRAN) injection 4 mg  4 mg IntraVENous Q6H PRN          Lab Review:     Recent Labs     08/31/22  0740 08/29/22  0617   WBC 5.0 4.4   HGB 9.2* 9.2*   HCT 28.3* 28.1*    221     Recent Labs     08/31/22  0740 08/29/22  0617   * 130*   K 4.6 5.3*   * 105   CO2 18* 17*   GLU 72 99   BUN 15 17   CREA 1.28* 1.39*   CA 8.6 8.1*   MG 1.5*  --    PHOS 3.1  --      Lab Results   Component Value Date/Time    Glucose (POC) 74 08/31/2022 08:08 AM    Glucose (POC) 91 08/16/2022 09:13 AM    Glucose (POC) 82 04/23/2022 04:00 PM    Glucose (POC) 207 (H) 04/11/2022 11:07 AM    Glucose (POC) 234 (H) 04/11/2022 07:59 AM          Assessment / Plan:     79 yo hx of chronic resp failure to 4L O2, chronic sCHF EF 30-35%, hypotension, right renal mass, CKD 3, presented w/ N/V, constipation, hypotension, EVERT. 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 Patient is agreeable to home hospice. Cont PO intake as tolerated    2) UTI: UCx with klebsiella and E. Coli. S/p levaquin     3) Chronic sCHF/chronic resp failure: EF 30-35%. On 4L O2 at baseline. Cont low dose metoprolol. Hold ACEi, aldactone due to hypotension. Can use diuretics prn for volume overload    4) CAD: trop was slightly elevated on admission. Likely from demand ischemia. No further work up. Cont ASA    5) Hypotension: now resolved. Due to dehydration from poor PO intake. Cont midodrine    6) EVERT: was improving. Likely from N/V, dehydration.   Will monitor prn    Total time spent with patient care: 35 Minutes **I personally saw and examined the patient during this time period**                 Care Plan discussed with: Patient, nursing     Discussed:  Care Plan    Prophylaxis:  Lovenox    Disposition:   home hospice           ___________________________________________________    Attending Physician: Lelia Arenas MD

## 2022-08-31 NOTE — PROGRESS NOTES
Problem: Self Care Deficits Care Plan (Adult)  Goal: *Acute Goals and Plan of Care (Insert Text)  Description: FUNCTIONAL STATUS PRIOR TO ADMISSION: Per chart review and CM notes, pt required assist for self care tasks at home and was able to ambulate short distances with RW and used WC for longer distances. HOME SUPPORT: The patient lived with son and spouse and required assistance for self care and functional transfers/mobility. Occupational Therapy Goals  Weekly re-assessment 8/26/22. Goals downgraded to reflect more achievable level of assistance   1. Patient will perform grooming with CGA at EOB while  within 7 day(s). 2.  Patient will perform upper body dressing with min assist within 7 day(s). 3.  Patient will perform upper body bathing with minimal assistance/contact guard assist within 7 day(s). 4.  Patient will perform toilet transfers with moderate assist within 7 day(s). 5.  Patient will perform all aspects of toileting with moderate assist within 7 day(s). Initiated 8/19/2022  1. Patient will perform grooming with modified independence within 7 day(s). 2.  Patient will perform upper body dressing with supervision/set-up within 7 day(s). 3.  Patient will perform upper body bathing with minimal assistance/contact guard assist within 7 day(s). 4.  Patient will perform toilet transfers with minimal assistance/contact guard assist within 7 day(s). 5.  Patient will perform all aspects of toileting with minimal assistance/contact guard assist within 7 day(s). 6.  Patient will participate in upper extremity therapeutic exercise/activities with modified independence for 10 minutes within 7 day(s). 7.  Patient will utilize energy conservation techniques during functional activities with verbal cues within 7 day(s).     Outcome: Progressing Towards Goal   OCCUPATIONAL THERAPY TREATMENT  Patient: Jose Boyd (61 y.o. female)  Date: 8/31/2022  Diagnosis: Hypokalemia [E87.6]  Acute kidney injury ([facility] [address] [N17.9] <principal problem not specified>      Precautions: DNR  Chart, occupational therapy assessment, plan of care, and goals were reviewed. ASSESSMENT  Patient continues with skilled OT services and is progressing towards goals. Pt sat edge of bed in prep for ADL tasks. Pt sat 20 min with contact guard, combed her hair min assist, performed ankle pumps, kicks and hand flex/ext. She needed max assist to return to supine. Current Level of Function Impacting Discharge (ADLs): min assist grooming seated edge of bed    Other factors to consider for discharge:          PLAN :  Patient continues to benefit from skilled intervention to address the above impairments. Continue treatment per established plan of care to address goals. Recommend with staff: ADL's bed level, edge of bed    Recommend next OT session: cont towards goals    Recommendation for discharge: (in order for the patient to meet his/her long term goals)  To be determined: per MD    This discharge recommendation:  Has not yet been discussed the attending provider and/or case management    IF patient discharges home will need the following DME:        SUBJECTIVE:   Patient stated [employer] will  try.     OBJECTIVE DATA SUMMARY:   Cognitive/Behavioral Status:  Neurologic State: Alert  Orientation Level: Oriented X4  Cognition: Follows commands             Functional Mobility and Transfers for ADLs:  Bed Mobility:  Rolling: Maximum assistance  Supine to Sit: Maximum assistance  Sit to Supine: Maximum assistance    Transfers:             Balance:  Sitting: Impaired  Sitting - Static: Fair (occasional)  Sitting - Dynamic: Fair (occasional)    ADL Intervention:       Grooming  Grooming Assistance: Minimum assistance  Position Performed: Seated edge of bed  Brushing/Combing Hair: Minimum assistance         Type of Bath: Bath Pack;Partial            Therapeutic Exercises:   Pt sits edge of bed for ankle pumps, kicks, hand flex/ext      Activity Tolerance:   Fair    After treatment patient left in no apparent distress:   Supine in bed and Call bell within reach    COMMUNICATION/COLLABORATION:   The patients plan of care was discussed with: Occupational therapist and Registered nurse.      ELIZABETH Pittman/L  Time Calculation: 37 mins

## 2022-09-01 PROCEDURE — 74011250637 HC RX REV CODE- 250/637: Performed by: INTERNAL MEDICINE

## 2022-09-01 PROCEDURE — 74011000250 HC RX REV CODE- 250: Performed by: INTERNAL MEDICINE

## 2022-09-01 PROCEDURE — 77030037878 HC DRSG MEPILEX >48IN BORD MOLN -B

## 2022-09-01 PROCEDURE — 65270000046 HC RM TELEMETRY

## 2022-09-01 PROCEDURE — 94761 N-INVAS EAR/PLS OXIMETRY MLT: CPT

## 2022-09-01 PROCEDURE — 74011250636 HC RX REV CODE- 250/636: Performed by: HOSPITALIST

## 2022-09-01 RX ADMIN — PANTOPRAZOLE SODIUM 40 MG: 40 TABLET, DELAYED RELEASE ORAL at 17:27

## 2022-09-01 RX ADMIN — MIDODRINE HYDROCHLORIDE 10 MG: 5 TABLET ORAL at 13:38

## 2022-09-01 RX ADMIN — Medication 10 ML: at 22:43

## 2022-09-01 RX ADMIN — MIDODRINE HYDROCHLORIDE 10 MG: 5 TABLET ORAL at 09:59

## 2022-09-01 RX ADMIN — Medication 10 ML: at 13:39

## 2022-09-01 RX ADMIN — CARVEDILOL 3.12 MG: 3.12 TABLET, FILM COATED ORAL at 17:27

## 2022-09-01 RX ADMIN — CARVEDILOL 3.12 MG: 3.12 TABLET, FILM COATED ORAL at 09:59

## 2022-09-01 RX ADMIN — PANTOPRAZOLE SODIUM 40 MG: 40 TABLET, DELAYED RELEASE ORAL at 09:58

## 2022-09-01 RX ADMIN — ASPIRIN 81 MG: 81 TABLET, COATED ORAL at 09:58

## 2022-09-01 RX ADMIN — NYSTATIN 500000 UNITS: 100000 SUSPENSION ORAL at 10:00

## 2022-09-01 RX ADMIN — MIDODRINE HYDROCHLORIDE 10 MG: 5 TABLET ORAL at 17:27

## 2022-09-01 RX ADMIN — Medication 10 ML: at 05:37

## 2022-09-01 RX ADMIN — ENOXAPARIN SODIUM 30 MG: 100 INJECTION SUBCUTANEOUS at 09:59

## 2022-09-01 NOTE — PROGRESS NOTES
9/1/2022 12:49 PM Met with pt and pt's son following hospice information session. Pt's son reported they would like to talk more about the hospice decision tonight with pt's  to confirm everyone is on board. Pt's son reported he will contact CM or Zoey Eugene with [facility] tomorrow on their decision. Pt's son is aware pt is ready for discharge once discharge disposition is determined. Pt's son is understanding. CM will follow. 9/1/2022 9:18 AM Hospice meeting scheduled for today at [facility] with pt and her son with [facility]. CM will follow up after. MARLIN Delatorre    Care Management Progress Note         ICD-10-CM ICD-9-CM     1. Nausea and vomiting, unspecified vomiting type  R11.2 787.01         2. Acute dehydration  E86.0 276.51         3. Hypomagnesemia  E83.42 275.2         4. Hypokalemia  E87.6 276.8         5. Uremia  N19 586         6. EVERT (acute kidney injury) ([facility] [address]  N17.9 584.9         7. Chronic diastolic heart failure (HCC)  I50.32 428.32         8. Elevated troponin  R77.8 790.6         9. Palliative care by specialist [Z51.5 (ICD-10-CM)]  Z51.5 V66.7         10. Goals of care, counseling/discussion [Z71.89 (ICD-10-CM)]  Z71.89 V65.49         11. Age-related physical debility [R54 (ICD-10-CM)]  R54 797         12. Weakness generalized [R53.1 (ICD-10-CM)]  R53.1 780.79         13. Decreased appetite [R63.0 (ICD-10-CM)]  R63.0 783.0         14. Hearing loss, unspecified hearing loss type, unspecified laterality [H91.90 (ICD-10-CM)]  H91.90 389. 9            Readmission   RUR:  20%  Risk Level: []Low []Moderate [x]High  Value-based purchasing: [] Yes [x] No  Bundle patient: [] Yes [x] No              Specify:      Transition of care plan:  Ongoing medical management,family now agreeable to meeting with a hospice agency. [facility] met with pt and family today. Pt's family to follow up with CM on 9/2 regarding their preference of discharge disposition.    If family declines hospice, pt to return home with family support and resumption of home health PT and OT through \"[facility]\"([phone]). Outpatient follow-up. Pt will need stretcher transport. [facility].

## 2022-09-01 NOTE — PROGRESS NOTES
Reviewed chart and spoke with nurse. Attempted physical Therapy earlier today, patient and family meeting with hospice. Will continue PT depending on family and pt's choice.

## 2022-09-01 NOTE — PROGRESS NOTES
09/01/22      Medicare pt has received, reviewed, and signed 2nd IM letter informing them of their right to appeal the discharge. Signed copied has been placed on pt bedside chart.

## 2022-09-01 NOTE — PROGRESS NOTES
[facility] [place] [place] [facility]  [address] [place] [address]  [phone]      Medical Progress Note      NAME: Liberty Medellin   :  1925  MRM:  [id number]    Date/Time of service: 2022  11:43 AM       Subjective:     Chief Complaint:  Patient was personally seen and examined by me during this time period. Chart reviewed. Doing well. Spoke to son at bedside. Awaiting meeting with hospice        Objective:       Vitals:       Last 24hrs VS reviewed since prior progress note.  Most recent are:    Visit Vitals  /74 (BP 1 Location: Left lower arm)   Pulse 84   Temp 97.5 °F (36.4 °C)   Resp 18   Ht 5' 2\" (1.575 m)   Wt 52.3 kg (115 lb 4.8 oz)   SpO2 100%   BMI 21.09 kg/m²     SpO2 Readings from Last 6 Encounters:   22 100%   22 100%   22 93%   22 100%   17 94%   09/29/15 99%    O2 Flow Rate (L/min): 4 l/min     Intake/Output Summary (Last 24 hours) at 2022 1143  Last data filed at 2022 0851  Gross per 24 hour   Intake --   Output 0 ml   Net -2050 ml          Exam:     Physical Exam:    Gen:  elderly, disheveled, thin, frail, NAD  HEENT:  Pink conjunctivae, PERRL, hearing intact to voice, dry mucous membranes  Neck:  Supple, without masses, thyroid non-tender  Resp:  No accessory muscle use, clear breath sounds without wheezes rales or rhonchi  Card:  No murmurs, normal S1, S2 without thrills, bruits or peripheral edema  Abd:  Soft, non-tender, non-distended, normoactive bowel sounds are present  Musc:  No cyanosis or clubbing  Skin:  No rashes   Neuro:  Cranial nerves 3-12 are grossly intact, generalized weakness, follows commands appropriately  Psych:  fair insight, oriented to person, place and time, alert    Medications Reviewed: (see below)    Lab Data Reviewed: (see below)    ______________________________________________________________________    Medications:     Current Facility-Administered Medications   Medication Dose Route Frequency    aspirin delayed-release tablet 81 mg  81 mg Oral DAILY    carvediloL (COREG) tablet 3.125 mg  3.125 mg Oral BID WITH MEALS    acetaminophen (TYLENOL) solution 650 mg  650 mg Oral Q4H PRN    enoxaparin (LOVENOX) injection 30 mg  30 mg SubCUTAneous Q24H    lactulose (CHRONULAC) 10 gram/15 mL solution 30 mL  20 g Oral DAILY PRN    [Held by provider] scopolamine (TRANSDERM-SCOP) 1 mg over 3 days 1 Patch  1 Patch TransDERmal Q72H    sennosides (SENOKOT) 8.8 mg/5 mL syrup 8.8 mg  5 mL Oral BID    docusate (COLACE) 50 mg/5 mL oral liquid 50 mg  50 mg Oral BID    dextrose 10% infusion 0-250 mL  0-250 mL IntraVENous PRN    polyethylene glycol (MIRALAX) packet 17 g  17 g Oral DAILY    pantoprazole (PROTONIX) tablet 40 mg  40 mg Oral BID    nystatin (MYCOSTATIN) 100,000 unit/mL oral suspension 500,000 Units  500,000 Units Oral QID    bisacodyL (DULCOLAX) suppository 10 mg  10 mg Rectal DAILY PRN    midodrine (PROAMATINE) tablet 10 mg  10 mg Oral TID WITH MEALS    prochlorperazine (COMPAZINE) injection 5 mg  5 mg IntraVENous Q4H PRN    hydrALAZINE (APRESOLINE) 20 mg/mL injection 20 mg  20 mg IntraVENous Q6H PRN    morphine injection 2 mg  2 mg IntraVENous Q3H PRN    sodium chloride (NS) flush 5-40 mL  5-40 mL IntraVENous Q8H    sodium chloride (NS) flush 5-40 mL  5-40 mL IntraVENous PRN    acetaminophen (TYLENOL) suppository 650 mg  650 mg Rectal Q6H PRN    polyethylene glycol (MIRALAX) packet 17 g  17 g Oral DAILY PRN    ondansetron (ZOFRAN ODT) tablet 4 mg  4 mg Oral Q8H PRN    Or    ondansetron (ZOFRAN) injection 4 mg  4 mg IntraVENous Q6H PRN          Lab Review:     Recent Labs     08/31/22  0740   WBC 5.0   HGB 9.2*   HCT 28.3*          Recent Labs     08/31/22  0740   *   K 4.6   *   CO2 18*   GLU 72   BUN 15   CREA 1.28*   CA 8.6   MG 1.5*   PHOS 3.1       Lab Results   Component Value Date/Time    Glucose (POC) 74 08/31/2022 08:08 AM    Glucose (POC) 91 08/16/2022 09:13 AM    Glucose (POC) 82 04/23/2022 04:00 PM    Glucose (POC) 207 (H) 04/11/2022 11:07 AM    Glucose (POC) 234 (H) 04/11/2022 07:59 AM          Assessment / Plan:     81 yo hx of chronic resp failure to 4L O2, chronic sCHF EF 30-35%, hypotension, right renal mass, CKD 3, presented w/ N/V, constipation, hypotension, EVERT. 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 Cont PO intake as tolerated. Patient and son will meet with hospice today     2) UTI: UCx with klebsiella and E. Coli. S/p levaquin     3) Chronic sCHF/chronic resp failure: EF 30-35%. On 4L O2 at baseline. Cont low dose metoprolol. Hold ACEi, aldactone due to hypotension. Can use diuretics prn for volume overload    4) CAD: trop was slightly elevated on admission. Likely from demand ischemia. No further work up. Cont ASA    5) Hypotension: now resolved. Due to dehydration from poor PO intake. Cont midodrine    6) EVERT: was improving. Likely from N/V, dehydration.   Will monitor prn    Total time spent with patient care: 30 Minutes **I personally saw and examined the patient during this time period**                 Care Plan discussed with: Patient, nursing, CM, son     Discussed:  Care Plan    Prophylaxis:  Lovenox    Disposition:   home hospice           ___________________________________________________    Attending Physician: Natty Raymundo MD

## 2022-09-01 NOTE — PROGRESS NOTES
Nutrition Note    Noted pt/family meeting with Hospice today at 11am. Will monitor decision and follow as appropriate.      Electronically signed by Mariel Brown RD on 9/1/2022     Contact: [phone]

## 2022-09-01 NOTE — PROGRESS NOTES
[address] visit. Mrs. Rohini Heart was in bed with her lunch tray in front of her showing attempts to eat. Her son was visiting who cares for her. We had a long conversation about his work and engineering skills. Prayer and communion offered to Mrs. Anna Chacon and her son joined in the prayer and declined communion today. Mrs. Anna Chacon shared she is going home with hospice care.     SATURNINO Cerda, RN, ACSW, LCSW   Page:  [phone]

## 2022-09-02 PROCEDURE — 74011250637 HC RX REV CODE- 250/637: Performed by: INTERNAL MEDICINE

## 2022-09-02 PROCEDURE — 74011250636 HC RX REV CODE- 250/636: Performed by: HOSPITALIST

## 2022-09-02 PROCEDURE — 65270000046 HC RM TELEMETRY

## 2022-09-02 PROCEDURE — 94760 N-INVAS EAR/PLS OXIMETRY 1: CPT

## 2022-09-02 PROCEDURE — 2709999900 HC NON-CHARGEABLE SUPPLY

## 2022-09-02 PROCEDURE — 74011000250 HC RX REV CODE- 250: Performed by: INTERNAL MEDICINE

## 2022-09-02 PROCEDURE — 77030038269 HC DRN EXT URIN PURWCK BARD -A

## 2022-09-02 PROCEDURE — 94761 N-INVAS EAR/PLS OXIMETRY MLT: CPT

## 2022-09-02 RX ADMIN — CARVEDILOL 3.12 MG: 3.12 TABLET, FILM COATED ORAL at 10:25

## 2022-09-02 RX ADMIN — Medication 10 ML: at 22:09

## 2022-09-02 RX ADMIN — Medication 10 ML: at 18:26

## 2022-09-02 RX ADMIN — SODIUM CHLORIDE 250 ML: 9 INJECTION, SOLUTION INTRAVENOUS at 22:08

## 2022-09-02 RX ADMIN — MIDODRINE HYDROCHLORIDE 10 MG: 5 TABLET ORAL at 18:25

## 2022-09-02 RX ADMIN — PANTOPRAZOLE SODIUM 40 MG: 40 TABLET, DELAYED RELEASE ORAL at 18:25

## 2022-09-02 RX ADMIN — MIDODRINE HYDROCHLORIDE 10 MG: 5 TABLET ORAL at 12:48

## 2022-09-02 RX ADMIN — MIDODRINE HYDROCHLORIDE 10 MG: 5 TABLET ORAL at 10:25

## 2022-09-02 RX ADMIN — CARVEDILOL 3.12 MG: 3.12 TABLET, FILM COATED ORAL at 18:25

## 2022-09-02 RX ADMIN — PANTOPRAZOLE SODIUM 40 MG: 40 TABLET, DELAYED RELEASE ORAL at 10:25

## 2022-09-02 RX ADMIN — ENOXAPARIN SODIUM 30 MG: 100 INJECTION SUBCUTANEOUS at 10:26

## 2022-09-02 RX ADMIN — Medication 10 ML: at 07:29

## 2022-09-02 NOTE — PROGRESS NOTES
Problem: Falls - Risk of  Goal: *Absence of Falls  Description: Document Crystal Minium Fall Risk and appropriate interventions in the flowsheet. Outcome: Progressing Towards Goal  Note: Fall Risk Interventions:  Mobility Interventions: Bed/chair exit alarm, Patient to call before getting OOB, Utilize walker, cane, or other assistive device, Utilize gait belt for transfers/ambulation    Mentation Interventions: Adequate sleep, hydration, pain control, Bed/chair exit alarm, Door open when patient unattended, HELP ([address]) if available, More frequent rounding, Reorient patient    Medication Interventions: Bed/chair exit alarm, Teach patient to arise slowly, Patient to call before getting OOB    Elimination Interventions: Bed/chair exit alarm, Call light in reach, Patient to call for help with toileting needs, Toileting schedule/hourly rounds    History of Falls Interventions: Bed/chair exit alarm, Door open when patient unattended, Evaluate medications/consider consulting pharmacy         Problem: Pressure Injury - Risk of  Goal: *Prevention of pressure injury  Description: Document Destin Scale and appropriate interventions in the flowsheet. Outcome: Progressing Towards Goal  Note: Pressure Injury Interventions:  Sensory Interventions: Assess changes in LOC, Assess need for specialty bed, Float heels, Keep linens dry and wrinkle-free, Maintain/enhance activity level, Minimize linen layers, Monitor skin under medical devices, Turn and reposition approx.  every two hours (pillows and wedges if needed)    Moisture Interventions: Absorbent underpads, Apply protective barrier, creams and emollients, Internal/External urinary devices, Limit adult briefs, Minimize layers, Moisture barrier    Activity Interventions: Increase time out of bed    Mobility Interventions: Float heels, Pressure redistribution bed/mattress (bed type)    Nutrition Interventions: Document food/fluid/supplement intake    Friction and Shear Interventions: Lift team/patient mobility team, Minimize layers, Feet elevated on foot rest, Foam dressings/transparent film/skin sealants

## 2022-09-02 NOTE — PROGRESS NOTES
Bedside and Verbal shift change report given to Clayton(oncoming nurse) by Nicole Osuna (offgoing nurse). Report included the following information SBAR, Kardex, Intake/Output, MAR, and Recent Results.

## 2022-09-02 NOTE — PROGRESS NOTES
9/2/2022 1:46 PM CM spoke with pt and pt's son at bedside. Confirmed with pt and pt's son they have decided to elect hospice at discharge with [facility]. Pt's son reported he has to arrange things at home for pt and will not have arrangements made until Tuesday. Pt's son is aware pt is ready for discharge but reported he cannot accommodate pt at home this weekend nor on Monday due to the holiday. Pt's son agreeable to transport being arranged for 1PM on Tuesday, 9/6. [facility] scheduled through [employer]. CM spoke with Teresa Garcia with [facility] who confirmed he will reach out to pt's son about having equipment delivered this weekend or Monday and plan to admit pt at home on 9/6. CM will follow. 9/2/2022 9:53 AM     Care Management Progress Note         ICD-10-CM ICD-9-CM     1. Nausea and vomiting, unspecified vomiting type  R11.2 787.01         2. Acute dehydration  E86.0 276.51         3. Hypomagnesemia  E83.42 275.2         4. Hypokalemia  E87.6 276.8         5. Uremia  N19 586         6. EVERT (acute kidney injury) ([facility] [address]  N17.9 584.9         7. Chronic diastolic heart failure (HCC)  I50.32 428.32         8. Elevated troponin  R77.8 790.6         9. Palliative care by specialist [Z51.5 (ICD-10-CM)]  Z51.5 V66.7         10. Goals of care, counseling/discussion [Z71.89 (ICD-10-CM)]  Z71.89 V65.49         11. Age-related physical debility [R54 (ICD-10-CM)]  R54 797         12. Weakness generalized [R53.1 (ICD-10-CM)]  R53.1 780.79         13. Decreased appetite [R63.0 (ICD-10-CM)]  R63.0 783.0         14. Hearing loss, unspecified hearing loss type, unspecified laterality [H91.90 (ICD-10-CM)]  H91.90 389. 9            Readmission   RUR:  20%  Risk Level: []Low []Moderate [x]High  Value-based purchasing: [] Yes [x] No  Bundle patient: [] Yes [x] No              Specify:      Transition of care plan:  Ongoing medical management, family has elected home hospice  [facility] arranged for home hospice  Discharge on 9/6, [facility] arranged for 1PM.

## 2022-09-02 NOTE — PROGRESS NOTES
[facility] [place] [facility]  [address] [facility], [address] [place] [address]  [phone]      Medical Progress Note      NAME: Jana Hayes   :  1925  MRM:  [id number]    Date/Time of service: 2022  11:43 AM       Subjective:     Chief Complaint:  Patient was personally seen and examined by me during this time period. Chart reviewed. Doing well. I tried to call son he did not        Objective:       Vitals:       Last 24hrs VS reviewed since prior progress note.  Most recent are:    Visit Vitals  /61 (BP 1 Location: Left upper arm, BP Patient Position: At rest)   Pulse 78   Temp 97.6 °F (36.4 °C)   Resp 16   Ht 5' 2\" (1.575 m)   Wt 51.5 kg (113 lb 8.6 oz)   SpO2 100%   BMI 20.77 kg/m²     SpO2 Readings from Last 6 Encounters:   22 100%   22 100%   22 93%   22 100%   17 94%   09/29/15 99%    O2 Flow Rate (L/min): 4 l/min     Intake/Output Summary (Last 24 hours) at 2022 0919  Last data filed at 2022 0850  Gross per 24 hour   Intake 270 ml   Output 450 ml   Net -180 ml          Exam:     Physical Exam:    Gen:  elderly, disheveled, thin, frail, NAD  HEENT:  Pink conjunctivae, PERRL, hearing intact to voice, dry mucous membranes  Neck:  Supple, without masses, thyroid non-tender  Resp:  No accessory muscle use, clear breath sounds without wheezes rales or rhonchi  Card:  No murmurs, normal S1, S2 without thrills, bruits or peripheral edema  Abd:  Soft, non-tender, non-distended, normoactive bowel sounds are present  Musc:  No cyanosis or clubbing  Skin:  No rashes   Neuro:  Cranial nerves 3-12 are grossly intact, generalized weakness, follows commands appropriately  Psych:  fair insight, oriented to person, place and time, alert    Medications Reviewed: (see below)    Lab Data Reviewed: (see below)    ______________________________________________________________________    Medications:     Current Facility-Administered Medications Medication Dose Route Frequency    aspirin delayed-release tablet 81 mg  81 mg Oral DAILY    carvediloL (COREG) tablet 3.125 mg  3.125 mg Oral BID WITH MEALS    acetaminophen (TYLENOL) solution 650 mg  650 mg Oral Q4H PRN    enoxaparin (LOVENOX) injection 30 mg  30 mg SubCUTAneous Q24H    lactulose (CHRONULAC) 10 gram/15 mL solution 30 mL  20 g Oral DAILY PRN    [Held by provider] scopolamine (TRANSDERM-SCOP) 1 mg over 3 days 1 Patch  1 Patch TransDERmal Q72H    sennosides (SENOKOT) 8.8 mg/5 mL syrup 8.8 mg  5 mL Oral BID    docusate (COLACE) 50 mg/5 mL oral liquid 50 mg  50 mg Oral BID    dextrose 10% infusion 0-250 mL  0-250 mL IntraVENous PRN    polyethylene glycol (MIRALAX) packet 17 g  17 g Oral DAILY    pantoprazole (PROTONIX) tablet 40 mg  40 mg Oral BID    nystatin (MYCOSTATIN) 100,000 unit/mL oral suspension 500,000 Units  500,000 Units Oral QID    bisacodyL (DULCOLAX) suppository 10 mg  10 mg Rectal DAILY PRN    midodrine (PROAMATINE) tablet 10 mg  10 mg Oral TID WITH MEALS    prochlorperazine (COMPAZINE) injection 5 mg  5 mg IntraVENous Q4H PRN    hydrALAZINE (APRESOLINE) 20 mg/mL injection 20 mg  20 mg IntraVENous Q6H PRN    morphine injection 2 mg  2 mg IntraVENous Q3H PRN    sodium chloride (NS) flush 5-40 mL  5-40 mL IntraVENous Q8H    sodium chloride (NS) flush 5-40 mL  5-40 mL IntraVENous PRN    acetaminophen (TYLENOL) suppository 650 mg  650 mg Rectal Q6H PRN    polyethylene glycol (MIRALAX) packet 17 g  17 g Oral DAILY PRN    ondansetron (ZOFRAN ODT) tablet 4 mg  4 mg Oral Q8H PRN    Or    ondansetron (ZOFRAN) injection 4 mg  4 mg IntraVENous Q6H PRN          Lab Review:     Recent Labs     08/31/22  0740   WBC 5.0   HGB 9.2*   HCT 28.3*          Recent Labs     08/31/22  0740   *   K 4.6   *   CO2 18*   GLU 72   BUN 15   CREA 1.28*   CA 8.6   MG 1.5*   PHOS 3.1       Lab Results   Component Value Date/Time    Glucose (POC) 74 08/31/2022 08:08 AM    Glucose (POC) 91 08/16/2022 09:13 AM    Glucose (POC) 82 04/23/2022 04:00 PM    Glucose (POC) 207 (H) 04/11/2022 11:07 AM    Glucose (POC) 234 (H) 04/11/2022 07:59 AM          Assessment / Plan:     79 yo hx of chronic resp failure to 4L O2, chronic sCHF EF 30-35%, hypotension, right renal mass, CKD 3, presented w/ N/V, constipation, hypotension, EVERT. 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 Cont PO intake as tolerated. [facility] home hospice on case I tried to call son however he did not . Discussed with case management     2) UTI: UCx with klebsiella and E. Coli. S/p levaquin     3) Chronic sCHF/chronic resp failure: EF 30-35%. On 4L O2 at baseline. Cont low dose metoprolol. Hold ACEi, aldactone due to hypotension. Can use diuretics prn for volume overload    4) CAD: trop was slightly elevated on admission. Likely from demand ischemia. No further work up. Cont ASA    5) Hypotension: now resolved. Due to dehydration from poor PO intake. Cont midodrine    6) EVERT: was improving. Likely from N/V, dehydration.   Will monitor prn    Total time spent with patient care: 30 Minutes **I personally saw and examined the patient during this time period**                 Care Plan discussed with: Patient, nursing, CM, son     Discussed:  Care Plan    Prophylaxis:  Lovenox    Disposition:   home hospice           ___________________________________________________    Attending Physician: Ajit Cueto MD

## 2022-09-02 NOTE — PROGRESS NOTES
PT treatment attempted. However, nsg going in the room to change the pt. Per CM, pt/ family decision pending for hospice care. Will follow and continue with PT depending on pt's/family choice.

## 2022-09-03 PROCEDURE — 77010033678 HC OXYGEN DAILY

## 2022-09-03 PROCEDURE — 65270000046 HC RM TELEMETRY

## 2022-09-03 PROCEDURE — 94761 N-INVAS EAR/PLS OXIMETRY MLT: CPT

## 2022-09-03 PROCEDURE — 74011250637 HC RX REV CODE- 250/637: Performed by: INTERNAL MEDICINE

## 2022-09-03 PROCEDURE — 74011000250 HC RX REV CODE- 250: Performed by: INTERNAL MEDICINE

## 2022-09-03 PROCEDURE — 77030038269 HC DRN EXT URIN PURWCK BARD -A

## 2022-09-03 PROCEDURE — 74011250636 HC RX REV CODE- 250/636: Performed by: HOSPITALIST

## 2022-09-03 PROCEDURE — 94760 N-INVAS EAR/PLS OXIMETRY 1: CPT

## 2022-09-03 RX ADMIN — Medication 10 ML: at 21:16

## 2022-09-03 RX ADMIN — MIDODRINE HYDROCHLORIDE 10 MG: 5 TABLET ORAL at 09:16

## 2022-09-03 RX ADMIN — ENOXAPARIN SODIUM 30 MG: 100 INJECTION SUBCUTANEOUS at 09:16

## 2022-09-03 RX ADMIN — MIDODRINE HYDROCHLORIDE 10 MG: 5 TABLET ORAL at 17:24

## 2022-09-03 RX ADMIN — PANTOPRAZOLE SODIUM 40 MG: 40 TABLET, DELAYED RELEASE ORAL at 17:24

## 2022-09-03 RX ADMIN — Medication 10 ML: at 13:07

## 2022-09-03 RX ADMIN — PANTOPRAZOLE SODIUM 40 MG: 40 TABLET, DELAYED RELEASE ORAL at 09:16

## 2022-09-03 RX ADMIN — Medication 10 ML: at 06:28

## 2022-09-03 RX ADMIN — ACETAMINOPHEN 650 MG: 160 SOLUTION ORAL at 21:53

## 2022-09-03 RX ADMIN — MIDODRINE HYDROCHLORIDE 10 MG: 5 TABLET ORAL at 13:07

## 2022-09-03 NOTE — PROGRESS NOTES
Problem: Falls - Risk of  Goal: *Absence of Falls  Description: Document Chago Esquivel Fall Risk and appropriate interventions in the flowsheet. Outcome: Progressing Towards Goal  Note: Fall Risk Interventions:  Mobility Interventions: Bed/chair exit alarm, Patient to call before getting OOB, Utilize walker, cane, or other assistive device, Utilize gait belt for transfers/ambulation    Mentation Interventions: Adequate sleep, hydration, pain control, Bed/chair exit alarm, Door open when patient unattended, HELP ([address]) if available, More frequent rounding, Reorient patient    Medication Interventions: Bed/chair exit alarm, Teach patient to arise slowly, Patient to call before getting OOB    Elimination Interventions: Bed/chair exit alarm, Call light in reach, Patient to call for help with toileting needs, Toileting schedule/hourly rounds    History of Falls Interventions: Bed/chair exit alarm, Door open when patient unattended, Evaluate medications/consider consulting pharmacy         Problem: Pressure Injury - Risk of  Goal: *Prevention of pressure injury  Description: Document Destin Scale and appropriate interventions in the flowsheet. Outcome: Progressing Towards Goal  Note: Pressure Injury Interventions:  Sensory Interventions: Assess changes in LOC, Assess need for specialty bed, Float heels, Keep linens dry and wrinkle-free, Maintain/enhance activity level, Minimize linen layers, Monitor skin under medical devices, Turn and reposition approx. every two hours (pillows and wedges if needed)    Moisture Interventions: Internal/External urinary devices, Moisture barrier, Apply protective barrier, creams and emollients, Absorbent underpads    Activity Interventions: Increase time out of bed    Mobility Interventions: Turn and reposition approx.  every two hours(pillow and wedges)    Nutrition Interventions: Document food/fluid/supplement intake    Friction and Shear Interventions: Lift team/patient mobility team, Minimize layers, Feet elevated on foot rest, Foam dressings/transparent film/skin sealants                Problem: Patient Education: Go to Patient Education Activity  Goal: Patient/Family Education  Outcome: Progressing Towards Goal     Problem: Hypotension  Goal: *Labs within defined limits  Outcome: Progressing Towards Goal     Problem: Impaired Skin Integrity/Pressure Injury Treatment  Goal: *Improvement of Existing Pressure Injury  Outcome: Progressing Towards Goal  Goal: *Prevention of pressure injury  Description: Document Destin Scale and appropriate interventions in the flowsheet. Outcome: Progressing Towards Goal  Note: Pressure Injury Interventions:  Sensory Interventions: Assess changes in LOC, Assess need for specialty bed, Float heels, Keep linens dry and wrinkle-free, Maintain/enhance activity level, Minimize linen layers, Monitor skin under medical devices, Turn and reposition approx. every two hours (pillows and wedges if needed)    Moisture Interventions: Internal/External urinary devices, Moisture barrier, Apply protective barrier, creams and emollients, Absorbent underpads    Activity Interventions: Increase time out of bed    Mobility Interventions: Turn and reposition approx.  every two hours(pillow and wedges)    Nutrition Interventions: Document food/fluid/supplement intake    Friction and Shear Interventions: Lift team/patient mobility team, Minimize layers, Feet elevated on foot rest, Foam dressings/transparent film/skin sealants

## 2022-09-03 NOTE — PROGRESS NOTES
Problem: Falls - Risk of  Goal: *Absence of Falls  Description: Document [place] Flow Fall Risk and appropriate interventions in the flowsheet. Outcome: Progressing Towards Goal  Note: Fall Risk Interventions:  Mobility Interventions: Bed/chair exit alarm, PT Consult for mobility concerns, PT Consult for assist device competence, OT consult for ADLs, Communicate number of staff needed for ambulation/transfer    Mentation Interventions: Adequate sleep, hydration, pain control, Bed/chair exit alarm, Door open when patient unattended, Eyeglasses and hearing aids, Increase mobility, More frequent rounding    Medication Interventions: Bed/chair exit alarm    Elimination Interventions: Bed/chair exit alarm, Call light in reach, Patient to call for help with toileting needs    History of Falls Interventions: Bed/chair exit alarm, Door open when patient unattended, Evaluate medications/consider consulting pharmacy         Problem: Patient Education: Go to Patient Education Activity  Goal: Patient/Family Education  Outcome: Progressing Towards Goal     Problem: Pressure Injury - Risk of  Goal: *Prevention of pressure injury  Description: Document Destin Scale and appropriate interventions in the flowsheet. Outcome: Progressing Towards Goal  Note: Pressure Injury Interventions:  Sensory Interventions: Assess need for specialty bed, Assess changes in LOC, Float heels, Keep linens dry and wrinkle-free, Maintain/enhance activity level, Minimize linen layers, Monitor skin under medical devices, Pad between skin to skin, Turn and reposition approx.  every two hours (pillows and wedges if needed)    Moisture Interventions: Absorbent underpads, Apply protective barrier, creams and emollients, Check for incontinence Q2 hours and as needed, Internal/External urinary devices, Limit adult briefs, Maintain skin hydration (lotion/cream), Minimize layers, Moisture barrier    Activity Interventions: Increase time out of bed    Mobility Interventions: Assess need for specialty bed, PT/OT evaluation    Nutrition Interventions: Document food/fluid/supplement intake    Friction and Shear Interventions: Apply protective barrier, creams and emollients, Feet elevated on foot rest, Foam dressings/transparent film/skin sealants, Lift team/patient mobility team, Minimize layers                Problem: Patient Education: Go to Patient Education Activity  Goal: Patient/Family Education  Outcome: Progressing Towards Goal     Problem: Nutrition Deficit  Goal: *Optimize nutritional status  Outcome: Progressing Towards Goal     Problem: Patient Education: Go to Patient Education Activity  Goal: Patient/Family Education  Outcome: Progressing Towards Goal     Problem: Hypotension  Goal: *Blood pressure within specified parameters  Outcome: Progressing Towards Goal  Goal: *Fluid volume balance  Outcome: Progressing Towards Goal  Goal: *Labs within defined limits  Outcome: Progressing Towards Goal     Problem: Patient Education: Go to Patient Education Activity  Goal: Patient/Family Education  Outcome: Progressing Towards Goal     Problem: Patient Education: Go to Patient Education Activity  Goal: Patient/Family Education  Outcome: Progressing Towards Goal     Problem: Patient Education: Go to Patient Education Activity  Goal: Patient/Family Education  Outcome: Progressing Towards Goal     Problem: Impaired Skin Integrity/Pressure Injury Treatment  Goal: *Improvement of Existing Pressure Injury  Outcome: Progressing Towards Goal  Goal: *Prevention of pressure injury  Description: Document Destin Scale and appropriate interventions in the flowsheet.   Outcome: Progressing Towards Goal  Note: Pressure Injury Interventions:  Sensory Interventions: Assess need for specialty bed, Assess changes in LOC, Float heels, Keep linens dry and wrinkle-free, Maintain/enhance activity level, Minimize linen layers, Monitor skin under medical devices, Pad between skin to skin, Turn and reposition approx.  every two hours (pillows and wedges if needed)    Moisture Interventions: Absorbent underpads, Apply protective barrier, creams and emollients, Check for incontinence Q2 hours and as needed, Internal/External urinary devices, Limit adult briefs, Maintain skin hydration (lotion/cream), Minimize layers, Moisture barrier    Activity Interventions: Increase time out of bed    Mobility Interventions: Assess need for specialty bed, PT/OT evaluation    Nutrition Interventions: Document food/fluid/supplement intake    Friction and Shear Interventions: Apply protective barrier, creams and emollients, Feet elevated on foot rest, Foam dressings/transparent film/skin sealants, Lift team/patient mobility team, Minimize layers                Problem: Patient Education: Go to Patient Education Activity  Goal: Patient/Family Education  Outcome: Progressing Towards Goal

## 2022-09-03 NOTE — PROGRESS NOTES
Hugo Jensen [facility] [address]  [address] [facility], [address] [place] [address]  [phone]      Medical Progress Note      NAME: Anusha Olson   :  1925  MRM:  [id number]    Date/Time of service: 9/3/2022  11:43 AM       Subjective:     Chief Complaint:  Patient was personally seen and examined by me during this time period. Chart reviewed. Doing well. I tried to call son he did not  again today        Objective:       Vitals:       Last 24hrs VS reviewed since prior progress note.  Most recent are:    Visit Vitals  /63 (BP 1 Location: Left upper arm, BP Patient Position: At rest)   Pulse 67   Temp 98 °F (36.7 °C)   Resp 16   Ht 5' 2\" (1.575 m)   Wt 47.4 kg (104 lb 8 oz)   SpO2 100%   BMI 19.11 kg/m²     SpO2 Readings from Last 6 Encounters:   22 100%   22 100%   22 93%   22 100%   17 94%   09/29/15 99%    O2 Flow Rate (L/min): 4 l/min     Intake/Output Summary (Last 24 hours) at 9/3/2022 0959  Last data filed at 9/3/2022 2702  Gross per 24 hour   Intake 230 ml   Output 1000 ml   Net -770 ml          Exam:     Physical Exam:    Gen:  elderly, disheveled, thin, frail, NAD  HEENT:  Pink conjunctivae, PERRL, hearing intact to voice, dry mucous membranes  Neck:  Supple, without masses, thyroid non-tender  Resp:  No accessory muscle use, clear breath sounds without wheezes rales or rhonchi  Card:  No murmurs, normal S1, S2 without thrills, bruits or peripheral edema  Abd:  Soft, non-tender, non-distended, normoactive bowel sounds are present  Musc:  No cyanosis or clubbing  Skin:  No rashes   Neuro:  Cranial nerves 3-12 are grossly intact, generalized weakness, follows commands appropriately  Psych:  fair insight, oriented to person, place and time, alert    Medications Reviewed: (see below)    Lab Data Reviewed: (see below)    ______________________________________________________________________    Medications:     Current Facility-Administered Medications Medication Dose Route Frequency    aspirin delayed-release tablet 81 mg  81 mg Oral DAILY    [Held by provider] carvediloL (COREG) tablet 3.125 mg  3.125 mg Oral BID WITH MEALS    acetaminophen (TYLENOL) solution 650 mg  650 mg Oral Q4H PRN    enoxaparin (LOVENOX) injection 30 mg  30 mg SubCUTAneous Q24H    lactulose (CHRONULAC) 10 gram/15 mL solution 30 mL  20 g Oral DAILY PRN    [Held by provider] scopolamine (TRANSDERM-SCOP) 1 mg over 3 days 1 Patch  1 Patch TransDERmal Q72H    sennosides (SENOKOT) 8.8 mg/5 mL syrup 8.8 mg  5 mL Oral BID    docusate (COLACE) 50 mg/5 mL oral liquid 50 mg  50 mg Oral BID    dextrose 10% infusion 0-250 mL  0-250 mL IntraVENous PRN    polyethylene glycol (MIRALAX) packet 17 g  17 g Oral DAILY    pantoprazole (PROTONIX) tablet 40 mg  40 mg Oral BID    nystatin (MYCOSTATIN) 100,000 unit/mL oral suspension 500,000 Units  500,000 Units Oral QID    bisacodyL (DULCOLAX) suppository 10 mg  10 mg Rectal DAILY PRN    midodrine (PROAMATINE) tablet 10 mg  10 mg Oral TID WITH MEALS    prochlorperazine (COMPAZINE) injection 5 mg  5 mg IntraVENous Q4H PRN    hydrALAZINE (APRESOLINE) 20 mg/mL injection 20 mg  20 mg IntraVENous Q6H PRN    morphine injection 2 mg  2 mg IntraVENous Q3H PRN    sodium chloride (NS) flush 5-40 mL  5-40 mL IntraVENous Q8H    sodium chloride (NS) flush 5-40 mL  5-40 mL IntraVENous PRN    acetaminophen (TYLENOL) suppository 650 mg  650 mg Rectal Q6H PRN    polyethylene glycol (MIRALAX) packet 17 g  17 g Oral DAILY PRN    ondansetron (ZOFRAN ODT) tablet 4 mg  4 mg Oral Q8H PRN    Or    ondansetron (ZOFRAN) injection 4 mg  4 mg IntraVENous Q6H PRN          Lab Review:     No results for input(s): WBC, HGB, HCT, PLT, HGBEXT, HCTEXT, PLTEXT, HGBEXT, HCTEXT, PLTEXT in the last 72 hours. No results for input(s): NA, K, CL, CO2, GLU, BUN, CREA, CA, MG, PHOS, ALB, TBIL, TBILI, ALT, INR, INREXT, INREXT in the last 72 hours.     No lab exists for component: SGOT    Lab Results Component Value Date/Time    Glucose (POC) 74 08/31/2022 08:08 AM    Glucose (POC) 91 08/16/2022 09:13 AM    Glucose (POC) 82 04/23/2022 04:00 PM    Glucose (POC) 207 (H) 04/11/2022 11:07 AM    Glucose (POC) 234 (H) 04/11/2022 07:59 AM          Assessment / Plan:     81 yo hx of chronic resp failure to 4L O2, chronic sCHF EF 30-35%, hypotension, right renal mass, CKD 3, presented w/ N/V, constipation, hypotension, EVERT. 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 Cont PO intake as tolerated. [facility] home hospice on case I tried to call son however he did not  yesterday and today . Discussed with case management   She will go home on 9/6 with [facility]. 2) UTI: UCx with klebsiella and E. Coli. S/p levaquin     3) Chronic sCHF/chronic resp failure: EF 30-35%. On 4L O2 at baseline. Cont low dose metoprolol. Hold ACEi, aldactone due to hypotension. Can use diuretics prn for volume overload    4) CAD: trop was slightly elevated on admission. Likely from demand ischemia. No further work up. Cont ASA    5) Hypotension: now resolved. Due to dehydration from poor PO intake. Cont midodrine    6) EVERT: was improving. Likely from N/V, dehydration.   Will monitor prn                     Care Plan discussed with: Patient, nursing    Discussed:  Care Plan    Prophylaxis:  Lovenox    Disposition:   home hospice           ___________________________________________________    Attending Physician: Yaneth Merida MD

## 2022-09-03 NOTE — PROGRESS NOTES
Bedside shift change report given to Megan Stephenson and Pako Hilton (oncoming nurse) by Delon Martin (offgoing nurse). Report included the following information SBAR, Kardex, Procedure Summary, Intake/Output, MAR, and Recent Results.

## 2022-09-03 NOTE — PROGRESS NOTES
Bedside shift change report given to Kamala Jennings and Jo Ann Bob (oncoming nurses) by Kodi Correa (offgoing nurse). Report included the following information SBAR, Kardex, Procedure Summary, Intake/Output, MAR, and Recent Results.

## 2022-09-04 PROCEDURE — 74011250637 HC RX REV CODE- 250/637: Performed by: INTERNAL MEDICINE

## 2022-09-04 PROCEDURE — 94761 N-INVAS EAR/PLS OXIMETRY MLT: CPT

## 2022-09-04 PROCEDURE — 65270000046 HC RM TELEMETRY

## 2022-09-04 PROCEDURE — 2709999900 HC NON-CHARGEABLE SUPPLY

## 2022-09-04 PROCEDURE — 74011000250 HC RX REV CODE- 250: Performed by: INTERNAL MEDICINE

## 2022-09-04 PROCEDURE — 74011250636 HC RX REV CODE- 250/636: Performed by: HOSPITALIST

## 2022-09-04 PROCEDURE — 77010033678 HC OXYGEN DAILY

## 2022-09-04 RX ADMIN — Medication 10 ML: at 17:26

## 2022-09-04 RX ADMIN — MIDODRINE HYDROCHLORIDE 10 MG: 5 TABLET ORAL at 10:08

## 2022-09-04 RX ADMIN — ENOXAPARIN SODIUM 30 MG: 100 INJECTION SUBCUTANEOUS at 10:09

## 2022-09-04 RX ADMIN — Medication 10 ML: at 05:57

## 2022-09-04 RX ADMIN — PANTOPRAZOLE SODIUM 40 MG: 40 TABLET, DELAYED RELEASE ORAL at 10:09

## 2022-09-04 RX ADMIN — Medication 10 ML: at 21:01

## 2022-09-04 RX ADMIN — PANTOPRAZOLE SODIUM 40 MG: 40 TABLET, DELAYED RELEASE ORAL at 17:24

## 2022-09-04 RX ADMIN — MIDODRINE HYDROCHLORIDE 10 MG: 5 TABLET ORAL at 12:07

## 2022-09-04 RX ADMIN — MIDODRINE HYDROCHLORIDE 10 MG: 5 TABLET ORAL at 17:24

## 2022-09-04 NOTE — PROGRESS NOTES
Hugo Jensen [facility] [address]  [address] [facility], [address] [place] [address]  [phone]      Medical Progress Note      NAME: Nadeem Coleman   :  1925  MRM:  [id number]    Date/Time of service: 2022  11:43 AM       Subjective:     Chief Complaint:  Patient was personally seen and examined by me during this time period. Chart reviewed. Doing well. She is in good mood this morning       Objective:       Vitals:       Last 24hrs VS reviewed since prior progress note.  Most recent are:    Visit Vitals  /81 (BP 1 Location: Left lower arm, BP Patient Position: At rest)   Pulse 86   Temp 97.9 °F (36.6 °C)   Resp 16   Ht 5' 2\" (1.575 m)   Wt 47.4 kg (104 lb 8 oz)   SpO2 100%   BMI 19.11 kg/m²     SpO2 Readings from Last 6 Encounters:   22 100%   22 100%   22 93%   22 100%   17 94%   09/29/15 99%    O2 Flow Rate (L/min): 4 l/min     Intake/Output Summary (Last 24 hours) at 2022 1058  Last data filed at 2022 0604  Gross per 24 hour   Intake 100 ml   Output 550 ml   Net -450 ml          Exam:     Physical Exam:    Gen:  elderly, disheveled, thin, frail, NAD  HEENT:  Pink conjunctivae, PERRL, hearing intact to voice, dry mucous membranes  Neck:  Supple, without masses, thyroid non-tender  Resp:  No accessory muscle use, clear breath sounds without wheezes rales or rhonchi  Card:  No murmurs, normal S1, S2 without thrills, bruits or peripheral edema  Abd:  Soft, non-tender, non-distended, normoactive bowel sounds are present  Musc:  No cyanosis or clubbing  Skin:  No rashes   Neuro:  Cranial nerves 3-12 are grossly intact, generalized weakness, follows commands appropriately  Psych:  fair insight, oriented to person, place and time, alert    Medications Reviewed: (see below)    Lab Data Reviewed: (see below)    ______________________________________________________________________    Medications:     Current Facility-Administered Medications   Medication Dose Route Frequency    aspirin delayed-release tablet 81 mg  81 mg Oral DAILY    [Held by provider] carvediloL (COREG) tablet 3.125 mg  3.125 mg Oral BID WITH MEALS    acetaminophen (TYLENOL) solution 650 mg  650 mg Oral Q4H PRN    enoxaparin (LOVENOX) injection 30 mg  30 mg SubCUTAneous Q24H    lactulose (CHRONULAC) 10 gram/15 mL solution 30 mL  20 g Oral DAILY PRN    [Held by provider] scopolamine (TRANSDERM-SCOP) 1 mg over 3 days 1 Patch  1 Patch TransDERmal Q72H    sennosides (SENOKOT) 8.8 mg/5 mL syrup 8.8 mg  5 mL Oral BID    docusate (COLACE) 50 mg/5 mL oral liquid 50 mg  50 mg Oral BID    dextrose 10% infusion 0-250 mL  0-250 mL IntraVENous PRN    polyethylene glycol (MIRALAX) packet 17 g  17 g Oral DAILY    pantoprazole (PROTONIX) tablet 40 mg  40 mg Oral BID    nystatin (MYCOSTATIN) 100,000 unit/mL oral suspension 500,000 Units  500,000 Units Oral QID    bisacodyL (DULCOLAX) suppository 10 mg  10 mg Rectal DAILY PRN    midodrine (PROAMATINE) tablet 10 mg  10 mg Oral TID WITH MEALS    prochlorperazine (COMPAZINE) injection 5 mg  5 mg IntraVENous Q4H PRN    hydrALAZINE (APRESOLINE) 20 mg/mL injection 20 mg  20 mg IntraVENous Q6H PRN    morphine injection 2 mg  2 mg IntraVENous Q3H PRN    sodium chloride (NS) flush 5-40 mL  5-40 mL IntraVENous Q8H    sodium chloride (NS) flush 5-40 mL  5-40 mL IntraVENous PRN    acetaminophen (TYLENOL) suppository 650 mg  650 mg Rectal Q6H PRN    polyethylene glycol (MIRALAX) packet 17 g  17 g Oral DAILY PRN    ondansetron (ZOFRAN ODT) tablet 4 mg  4 mg Oral Q8H PRN    Or    ondansetron (ZOFRAN) injection 4 mg  4 mg IntraVENous Q6H PRN          Lab Review:     No results for input(s): WBC, HGB, HCT, PLT, HGBEXT, HCTEXT, PLTEXT, HGBEXT, HCTEXT, PLTEXT in the last 72 hours. No results for input(s): NA, K, CL, CO2, GLU, BUN, CREA, CA, MG, PHOS, ALB, TBIL, TBILI, ALT, INR, INREXT, INREXT in the last 72 hours.     No lab exists for component: SGOT    Lab Results   Component Value Date/Time    Glucose (POC) 74 08/31/2022 08:08 AM    Glucose (POC) 91 08/16/2022 09:13 AM    Glucose (POC) 82 04/23/2022 04:00 PM    Glucose (POC) 207 (H) 04/11/2022 11:07 AM    Glucose (POC) 234 (H) 04/11/2022 07:59 AM          Assessment / Plan:     81 yo hx of chronic resp failure to 4L O2, chronic sCHF EF 30-35%, hypotension, right renal mass, CKD 3, presented w/ N/V, constipation, hypotension, EVERT. 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 Cont PO intake as tolerated. [facility] home [facility] on case I tried to call son however he did not  yesterday and today . Discussed with case management   She will go home on 9/6 with [facility]. 2) UTI: UCx with klebsiella and E. Coli. S/p levaquin     3) Chronic sCHF/chronic resp failure: EF 30-35%. On 4L O2 at baseline. Cont low dose metoprolol. Hold ACEi, aldactone due to hypotension. Can use diuretics prn for volume overload    4) CAD: trop was slightly elevated on admission. Likely from demand ischemia. No further work up. Cont ASA    5) Hypotension: now resolved. Due to dehydration from poor PO intake. Cont midodrine    6) EVERT: was improving. Likely from N/V, dehydration.   Will monitor prn                     Care Plan discussed with: Patient, nursing    Discussed:  Care Plan    Prophylaxis:  Lovenox    Disposition:   home hospice           ___________________________________________________    Attending Physician: Kelly Esquivel MD

## 2022-09-04 NOTE — PROGRESS NOTES
Problem: Falls - Risk of  Goal: *Absence of Falls  Description: Document Solis Palomo Fall Risk and appropriate interventions in the flowsheet. Outcome: Progressing Towards Goal  Note: Fall Risk Interventions:  Mobility Interventions: Bed/chair exit alarm    Mentation Interventions: Toileting rounds    Medication Interventions: Bed/chair exit alarm    Elimination Interventions: Bed/chair exit alarm    History of Falls Interventions: Bed/chair exit alarm         Problem: Pressure Injury - Risk of  Goal: *Prevention of pressure injury  Description: Document Destin Scale and appropriate interventions in the flowsheet. Outcome: Progressing Towards Goal  Note: Pressure Injury Interventions:  Sensory Interventions: Assess need for specialty bed, Assess changes in LOC, Float heels, Keep linens dry and wrinkle-free, Maintain/enhance activity level, Minimize linen layers, Monitor skin under medical devices, Pad between skin to skin, Turn and reposition approx.  every two hours (pillows and wedges if needed)    Moisture Interventions: Absorbent underpads, Apply protective barrier, creams and emollients, Check for incontinence Q2 hours and as needed, Internal/External urinary devices, Limit adult briefs, Maintain skin hydration (lotion/cream), Minimize layers, Moisture barrier    Activity Interventions: Increase time out of bed    Mobility Interventions: Assess need for specialty bed, PT/OT evaluation    Nutrition Interventions: Document food/fluid/supplement intake    Friction and Shear Interventions: Apply protective barrier, creams and emollients, Feet elevated on foot rest, Foam dressings/transparent film/skin sealants, Lift team/patient mobility team, Minimize layers                Problem: Nutrition Deficit  Goal: *Optimize nutritional status  Outcome: Progressing Towards Goal

## 2022-09-05 PROCEDURE — 77030038269 HC DRN EXT URIN PURWCK BARD -A

## 2022-09-05 PROCEDURE — 74011250637 HC RX REV CODE- 250/637: Performed by: INTERNAL MEDICINE

## 2022-09-05 PROCEDURE — 94761 N-INVAS EAR/PLS OXIMETRY MLT: CPT

## 2022-09-05 PROCEDURE — 74011250636 HC RX REV CODE- 250/636: Performed by: HOSPITALIST

## 2022-09-05 PROCEDURE — 77010033678 HC OXYGEN DAILY

## 2022-09-05 PROCEDURE — 65270000046 HC RM TELEMETRY

## 2022-09-05 PROCEDURE — 74011000250 HC RX REV CODE- 250: Performed by: INTERNAL MEDICINE

## 2022-09-05 RX ADMIN — SENNOSIDES 8.8 MG: 8.8 LIQUID ORAL at 12:39

## 2022-09-05 RX ADMIN — PANTOPRAZOLE SODIUM 40 MG: 40 TABLET, DELAYED RELEASE ORAL at 08:51

## 2022-09-05 RX ADMIN — Medication 10 ML: at 05:23

## 2022-09-05 RX ADMIN — MIDODRINE HYDROCHLORIDE 10 MG: 5 TABLET ORAL at 12:39

## 2022-09-05 RX ADMIN — MIDODRINE HYDROCHLORIDE 10 MG: 5 TABLET ORAL at 08:51

## 2022-09-05 RX ADMIN — ENOXAPARIN SODIUM 30 MG: 100 INJECTION SUBCUTANEOUS at 08:53

## 2022-09-05 RX ADMIN — MIDODRINE HYDROCHLORIDE 10 MG: 5 TABLET ORAL at 17:04

## 2022-09-05 RX ADMIN — NYSTATIN 500000 UNITS: 100000 SUSPENSION ORAL at 08:51

## 2022-09-05 RX ADMIN — Medication 10 ML: at 14:00

## 2022-09-05 RX ADMIN — DOCUSATE SODIUM 50 MG: 50 LIQUID ORAL at 12:39

## 2022-09-05 RX ADMIN — Medication 10 ML: at 21:15

## 2022-09-05 RX ADMIN — PANTOPRAZOLE SODIUM 40 MG: 40 TABLET, DELAYED RELEASE ORAL at 17:04

## 2022-09-05 NOTE — PROGRESS NOTES
Nutrition Note    No changes. Continue current Nutrition intervention. Brief:  Family & patient have plans for discharge home with hospice care. Although ready for discharge, Family will not have supplies and home arrangements ready until Tuesday due to the holiday. CM notes reflect plans for home transport at 1pm Tuesday. Pt with diet order, ONS and intake as noted below. No labs. Minimal meds although bowel regimen continues appropriately for comfort. Last BM documented yesterday.      ADULT ORAL NUTRITION SUPPLEMENT Dinner, Lunch, Breakfast; Clear Liquid  DIET ONE TIME MESSAGE  ADULT DIET Dysphagia - Pureed; Please send 2 extra salt packets on tray    Documented Meal intake:  Patient Vitals for the past 168 hrs:   % Diet Eaten   09/03/22 2158 26 - 50%   09/02/22 1750 1 - 25%   09/02/22 1234 26 - 50%   09/02/22 0850 26 - 50%   09/01/22 1848 1 - 25%   09/01/22 1246 26 - 50%   09/01/22 0850 1 - 25%       Documentation of supplement intake:  Patient Vitals for the past 168 hrs:   Supplement intake %   09/03/22 2158 26 - 50%   09/02/22 1750 0%   09/02/22 1234 0%   09/02/22 0850 0%   09/01/22 0850 76 - 100%     Last BM:   09/04/22, Soft      Electronically signed by Dinora Alfaro RD, MS on 9/5/2022 at 6:50 PM  Contact via [address] [place] [address] or office [phone]

## 2022-09-05 NOTE — PROGRESS NOTES
Problem: Falls - Risk of  Goal: *Absence of Falls  Description: Document Danne Lobe Fall Risk and appropriate interventions in the flowsheet. Outcome: Progressing Towards Goal  Note: Fall Risk Interventions:  Mobility Interventions: Bed/chair exit alarm    Mentation Interventions: Bed/chair exit alarm    Medication Interventions: Bed/chair exit alarm    Elimination Interventions: Bed/chair exit alarm, Call light in reach    History of Falls Interventions: Bed/chair exit alarm         Problem: Patient Education: Go to Patient Education Activity  Goal: Patient/Family Education  Outcome: Progressing Towards Goal     Problem: Pressure Injury - Risk of  Goal: *Prevention of pressure injury  Description: Document Destin Scale and appropriate interventions in the flowsheet.   Outcome: Progressing Towards Goal  Note: Pressure Injury Interventions:  Sensory Interventions: Float heels    Moisture Interventions: Absorbent underpads    Activity Interventions: Assess need for specialty bed    Mobility Interventions: HOB 30 degrees or less    Nutrition Interventions: Document food/fluid/supplement intake    Friction and Shear Interventions: Apply protective barrier, creams and emollients                Problem: Patient Education: Go to Patient Education Activity  Goal: Patient/Family Education  Outcome: Progressing Towards Goal     Problem: Nutrition Deficit  Goal: *Optimize nutritional status  Outcome: Progressing Towards Goal     Problem: Patient Education: Go to Patient Education Activity  Goal: Patient/Family Education  Outcome: Progressing Towards Goal     Problem: Hypotension  Goal: *Blood pressure within specified parameters  Outcome: Progressing Towards Goal  Goal: *Fluid volume balance  Outcome: Progressing Towards Goal  Goal: *Labs within defined limits  Outcome: Progressing Towards Goal     Problem: Patient Education: Go to Patient Education Activity  Goal: Patient/Family Education  Outcome: Progressing Towards Goal Problem: Patient Education: Go to Patient Education Activity  Goal: Patient/Family Education  Outcome: Progressing Towards Goal     Problem: Patient Education: Go to Patient Education Activity  Goal: Patient/Family Education  Outcome: Progressing Towards Goal     Problem: Impaired Skin Integrity/Pressure Injury Treatment  Goal: *Improvement of Existing Pressure Injury  Outcome: Progressing Towards Goal  Goal: *Prevention of pressure injury  Description: Document Destin Scale and appropriate interventions in the flowsheet.   Outcome: Progressing Towards Goal     Problem: Patient Education: Go to Patient Education Activity  Goal: Patient/Family Education  Outcome: Progressing Towards Goal

## 2022-09-05 NOTE — PROGRESS NOTES
Bedside shift change report given to Mindy Roche (oncoming nurse) by Emanuel Mendoza (offgoing nurse). Report included the following information SBAR, Kardex, Procedure Summary, Intake/Output, MAR, and Recent Results.

## 2022-09-05 NOTE — PROGRESS NOTES
Bedside and Verbal shift change report given to Robert Ramos (oncoming nurse) by Odessa Keller (offgoing nurse). Report included the following information SBAR, Kardex, Intake/Output, MAR, and Recent Results.

## 2022-09-05 NOTE — PROGRESS NOTES
Hugo Jensen [facility] [address]  [address] [place] [address], [address] [place] [address]  [phone]      Medical Progress Note      NAME: Merly Roy   :  1925  MRM:  [id number]    Date/Time of service: 2022  12:06 PM       Subjective:     Chief Complaint:  Patient was personally seen and examined by me during this time period. Chart reviewed. No issues overnight. Doing well        Objective:       Vitals:       Last 24hrs VS reviewed since prior progress note.  Most recent are:    Visit Vitals  /64 (BP 1 Location: Left upper arm, BP Patient Position: At rest)   Pulse 93   Temp 97.9 °F (36.6 °C)   Resp 20   Ht 5' 2\" (1.575 m)   Wt 50 kg (110 lb 3.7 oz)   SpO2 99%   BMI 20.16 kg/m²     SpO2 Readings from Last 6 Encounters:   22 99%   22 100%   22 93%   22 100%   17 94%   09/29/15 99%    O2 Flow Rate (L/min): 3 l/min     Intake/Output Summary (Last 24 hours) at 2022 1206  Last data filed at 2022 1109  Gross per 24 hour   Intake --   Output 1100 ml   Net -1100 ml          Exam:     Physical Exam:    Gen:  elderly, disheveled, thin, frail, NAD  HEENT:  Pink conjunctivae, PERRL, hearing intact to voice, dry mucous membranes  Neck:  Supple, without masses, thyroid non-tender  Resp:  No accessory muscle use, clear breath sounds without wheezes rales or rhonchi  Card:  No murmurs, normal S1, S2 without thrills, bruits or peripheral edema  Abd:  Soft, non-tender, non-distended, normoactive bowel sounds are present  Musc:  No cyanosis or clubbing  Skin:  No rashes   Neuro:  Cranial nerves 3-12 are grossly intact, generalized weakness, follows commands appropriately  Psych:  fair insight, oriented to person, place and time, alert    Medications Reviewed: (see below)    Lab Data Reviewed: (see below)    ______________________________________________________________________    Medications:     Current Facility-Administered Medications   Medication Dose Route Frequency aspirin delayed-release tablet 81 mg  81 mg Oral DAILY    [Held by provider] carvediloL (COREG) tablet 3.125 mg  3.125 mg Oral BID WITH MEALS    acetaminophen (TYLENOL) solution 650 mg  650 mg Oral Q4H PRN    enoxaparin (LOVENOX) injection 30 mg  30 mg SubCUTAneous Q24H    lactulose (CHRONULAC) 10 gram/15 mL solution 30 mL  20 g Oral DAILY PRN    [Held by provider] scopolamine (TRANSDERM-SCOP) 1 mg over 3 days 1 Patch  1 Patch TransDERmal Q72H    sennosides (SENOKOT) 8.8 mg/5 mL syrup 8.8 mg  5 mL Oral BID    docusate (COLACE) 50 mg/5 mL oral liquid 50 mg  50 mg Oral BID    dextrose 10% infusion 0-250 mL  0-250 mL IntraVENous PRN    polyethylene glycol (MIRALAX) packet 17 g  17 g Oral DAILY    pantoprazole (PROTONIX) tablet 40 mg  40 mg Oral BID    nystatin (MYCOSTATIN) 100,000 unit/mL oral suspension 500,000 Units  500,000 Units Oral QID    bisacodyL (DULCOLAX) suppository 10 mg  10 mg Rectal DAILY PRN    midodrine (PROAMATINE) tablet 10 mg  10 mg Oral TID WITH MEALS    prochlorperazine (COMPAZINE) injection 5 mg  5 mg IntraVENous Q4H PRN    hydrALAZINE (APRESOLINE) 20 mg/mL injection 20 mg  20 mg IntraVENous Q6H PRN    morphine injection 2 mg  2 mg IntraVENous Q3H PRN    sodium chloride (NS) flush 5-40 mL  5-40 mL IntraVENous Q8H    sodium chloride (NS) flush 5-40 mL  5-40 mL IntraVENous PRN    acetaminophen (TYLENOL) suppository 650 mg  650 mg Rectal Q6H PRN    polyethylene glycol (MIRALAX) packet 17 g  17 g Oral DAILY PRN    ondansetron (ZOFRAN ODT) tablet 4 mg  4 mg Oral Q8H PRN    Or    ondansetron (ZOFRAN) injection 4 mg  4 mg IntraVENous Q6H PRN          Lab Review:     No results for input(s): WBC, HGB, HCT, PLT, HGBEXT, HCTEXT, PLTEXT, HGBEXT, HCTEXT, PLTEXT in the last 72 hours. No results for input(s): NA, K, CL, CO2, GLU, BUN, CREA, CA, MG, PHOS, ALB, TBIL, TBILI, ALT, INR, INREXT, INREXT in the last 72 hours.     No lab exists for component: SGOT    Lab Results   Component Value Date/Time Glucose (POC) 74 08/31/2022 08:08 AM    Glucose (POC) 91 08/16/2022 09:13 AM    Glucose (POC) 82 04/23/2022 04:00 PM    Glucose (POC) 207 (H) 04/11/2022 11:07 AM    Glucose (POC) 234 (H) 04/11/2022 07:59 AM          Assessment / Plan:     81 yo hx of chronic resp failure to 4L O2, chronic sCHF EF 30-35%, hypotension, right renal mass, CKD 3, presented w/ N/V, constipation, hypotension, EVERT. Hospital course complicated by worsening failure to thrive, weakness, UTI    1) Failure to thrive/severe pro-abduolaye malnutrition: Now more stable. Poor prognosis. Cont PO intake as tolerated. Will go home with hospice on 09/06    2) UTI: UCx with klebsiella and E. Coli. S/p levaquin     3) Chronic sCHF/chronic resp failure: EF 30-35%. On 4L O2 at baseline. Held coreg, ACEi, aldactone due to hypotension. Can use diuretics prn for volume overload    4) CAD: trop was slightly elevated on admission. Likely from demand ischemia. No further work up. Cont ASA    5) Hypotension: now resolved. Due to dehydration from poor PO intake. Cont midodrine. Can resume coreg slowly. Would not resume ACEi or aldactone     6) EVERT: was improving. Likely from N/V, dehydration.   Will monitor prn    Total time spent with patient care: 25 Minutes **I personally saw and examined the patient during this time period**                 Care Plan discussed with: Patient, nursing, CM    Discussed:  Care Plan    Prophylaxis:  Lovenox    Disposition:   home hospice           ___________________________________________________    Attending Physician: Waddell Spatz, MD

## 2022-09-05 NOTE — PROGRESS NOTES
Bedside shift change report given to [address] [place] [address] (oncoming nurse) by Tyrone Corona (offgoing nurse). Report included the following information SBAR, Kardex, MAR, Accordion, Recent Results, and Med Rec Status.

## 2022-09-06 VITALS
HEIGHT: 62 IN | HEART RATE: 85 BPM | BODY MASS INDEX: 19.03 KG/M2 | OXYGEN SATURATION: 99 % | TEMPERATURE: 98.3 F | RESPIRATION RATE: 18 BRPM | SYSTOLIC BLOOD PRESSURE: 158 MMHG | WEIGHT: 103.4 LBS | DIASTOLIC BLOOD PRESSURE: 82 MMHG

## 2022-09-06 PROCEDURE — 77010033678 HC OXYGEN DAILY

## 2022-09-06 PROCEDURE — 74011000250 HC RX REV CODE- 250: Performed by: INTERNAL MEDICINE

## 2022-09-06 PROCEDURE — 74011250636 HC RX REV CODE- 250/636: Performed by: HOSPITALIST

## 2022-09-06 PROCEDURE — 94761 N-INVAS EAR/PLS OXIMETRY MLT: CPT

## 2022-09-06 PROCEDURE — 74011250637 HC RX REV CODE- 250/637: Performed by: INTERNAL MEDICINE

## 2022-09-06 PROCEDURE — 94760 N-INVAS EAR/PLS OXIMETRY 1: CPT

## 2022-09-06 PROCEDURE — 77030038269 HC DRN EXT URIN PURWCK BARD -A

## 2022-09-06 RX ORDER — MIDODRINE HYDROCHLORIDE 10 MG/1
10 TABLET ORAL
Qty: 90 TABLET | Refills: 0 | Status: SHIPPED
Start: 2022-09-06 | End: 2022-10-06

## 2022-09-06 RX ORDER — SENNOSIDES 8.8 MG/5ML
5 LIQUID ORAL 2 TIMES DAILY
Qty: 60 ML | Refills: 0 | Status: SHIPPED
Start: 2022-09-06

## 2022-09-06 RX ORDER — POLYETHYLENE GLYCOL 3350 17 G/17G
17 POWDER, FOR SOLUTION ORAL DAILY
Qty: 30 PACKET | Refills: 0 | Status: SHIPPED
Start: 2022-09-07

## 2022-09-06 RX ADMIN — PANTOPRAZOLE SODIUM 40 MG: 40 TABLET, DELAYED RELEASE ORAL at 08:48

## 2022-09-06 RX ADMIN — Medication 10 ML: at 05:44

## 2022-09-06 RX ADMIN — ENOXAPARIN SODIUM 30 MG: 100 INJECTION SUBCUTANEOUS at 08:48

## 2022-09-06 RX ADMIN — MIDODRINE HYDROCHLORIDE 10 MG: 5 TABLET ORAL at 08:48

## 2022-09-06 NOTE — DISCHARGE SUMMARY
Hugo Jensen [facility] 79  [address] [facility], [facility] 19  [phone] SUMMARY        Name:  Antonia Cruz 80 y.o.  :    1925  MRN:    [id number]  PCP:    Florina Willingham MD    Code: DNR    Date of Admission: 2022  Date of Discharge:   2022 12:01 PM  Disposition:  Home Hospice   Condition:  stable    Consultations:  IP CONSULT TO PALLIATIVE CARE - PROVIDER  IP CONSULT TO GASTROENTEROLOGY  IP CONSULT TO PALLIATIVE CARE - PROVIDER  IP CONSULT TO PALLIATIVE CARE - PROVIDER  IP CONSULT TO PALLIATIVE CARE - PROVIDER    Reason for Admission:   Hypokalemia [E87.6]  Acute kidney injury ([address] [N17.9]    Discharge Diagnoses:    Chronic Hypoxemic Respiratory Failure: on home oxygen baseline 4LNC. Nausea/vomiting/abd pain: acute on chronic: improved and stable  Hypoglycemia: resolved  Hypokalemia due to GI losses poor oral intake and diuretic  Hypotension requiring vasopressor no on midodrine  EVERT likely prerenal due to poor oral intake and GI loss resulting in IVVD   HFrEF: Echo 2022 with EF 30-35%. 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 Hospital course complicated by worsening failure to thrive, weakness, UTI. She eventually decided on home with hospice.        Physical Exam:  Visit Vitals  BP (!) 158/82 (BP 1 Location: Left lower arm, BP Patient Position: At rest)   Pulse 85   Temp 98.3 °F (36.8 °C)   Resp 18   Ht 5' 2\" (1.575 m)   Wt 46.9 kg (103 lb 6.3 oz)   SpO2 99%   BMI 18.91 kg/m²     Gen:  alert and no distress  Head:  Normocephalic, without obvious abnormality, atraumatic  ENT:  Lips, mucosa, and tongue normal.   Neck:  normal, supple, and no tenderness  Lungs: clear to auscultation with good breath sounds and normal respiratory effort  Heart:  S1, S2 normal, regular rate, and regular rhythm  Abd:  not distended, soft, nontender, BS present and normactive  Ext:  no cyanosis and no edema  Skin:  normal skin color, no rashes, and texture normal  Neuro: alert, oriented, no defects noted in general exam.  Psych: not anxious, cooperative, appropriate affect      Immunizations Given During Admission:   None        PATIENT INSTRUCTIONS       Activity: Activity as tolerated  Diet: Dysphagia diet-pureed    Follow-up(s): Follow-up Information       Follow up With Specialties Details Why [facility] Follow up [facility], If you haven't recieved a phone call within 24, hours. Please contact the agency directly. [address] [facility], [address] [facility]           Current Discharge Medication List        START taking these medications    Details   midodrine (PROAMATINE) 10 mg tablet Take 1 Tablet by mouth three (3) times daily (with meals) for 30 days. Qty: 90 Tablet, Refills: 0      sennosides (SENOKOT) 8.8 mg/5 mL syrup Take 5 mL by mouth two (2) times a day. Qty: 60 mL, Refills: 0      polyethylene glycol (MIRALAX) 17 gram packet Take 1 Packet by mouth daily. Qty: 30 Packet, Refills: 0           CONTINUE these medications which have NOT CHANGED    Details   latanoprost (XALATAN) 0.005 % ophthalmic solution 1 Drop nightly. aspirin delayed-release 81 mg tablet Take 1 Tablet by mouth daily. Qty: 30 Tablet, Refills: 1      metoprolol succinate (TOPROL-XL) 25 mg XL tablet Take 1 Tablet by mouth daily. Qty: 60 Tablet, Refills: 1      acetaminophen (Tylenol Arthritis Pain) 650 mg TbER Take 1 Tablet by mouth every six (6) hours as needed for Pain. Qty: 30 Tablet, Refills: 0      MV-Min-Folic Acid-Lutein 866-451 mcg chew Take 1 Tablet by mouth daily.   Qty: 30 Tablet, Refills: 0           STOP taking these medications       furosemide (LASIX) 20 mg tablet Comments:   Reason for Stopping:         pantoprazole (PROTONIX) 40 mg tablet Comments:   Reason for Stopping:         promethazine (PHENERGAN) 25 mg tablet Comments:   Reason for Stopping:         ondansetron (ZOFRAN ODT) 4 mg disintegrating tablet Comments:   Reason for Stopping:         spironolactone (ALDACTONE) 25 mg tablet Comments:   Reason for Stopping:         amitriptyline (ELAVIL) 25 mg tablet Comments:   Reason for Stopping:         pyridoxine, vitamin B6, (Vitamin B-6) 25 mg tablet Comments:   Reason for Stopping:               Discharge Plan D/W:  Patient and Care Manager          **Total time spent coordinating discharge (preparing & going over instructions, follow-ups, prescriptions, and documentation):  32 minutes                 ___________________________________________________    Admitting Physician: Alejandro Bearden MD   Date of service:    9/6/2022         CC: Hayley Rodney MD

## 2022-09-06 NOTE — DISCHARGE INSTRUCTIONS
Patient Discharge Instructions    [facility] / [id number] : 1925    Admitted 2022 Discharged: 2022     It is important that you take the medication exactly as they are prescribed. Keep your medication in the bottles provided by the pharmacist and keep a list of the medication names, dosages, and times to be taken with you at all times. Do not take other medications without consulting your doctor.      What to do at Home    Follow up with [facility] at home    Recommended Diet: Dysphagia diet-pureed    Recommended Activity: Activity as tolerated      Signed By: Sydell Aschoff, MD     2022

## 2022-09-06 NOTE — PROGRESS NOTES
Bedside and Verbal shift change report given to Kody (oncoming nurse) by Britton Schmidt (offgoing nurse). Report included the following information SBAR, Kardex, Intake/Output, MAR, and Recent Results.

## 2022-09-06 NOTE — PROGRESS NOTES
9/6/2022 12:15 PM Confirmed with pt's son, Jillian De La Torre and Cristi Zarco with [facility], pt's hospital bed has been delivered. CM relayed to pt. CM also confirmed [facility] transport is still set for 1PM.     9/6/2022  9:47 AM Pt to discharge home today with [facility]. CM called and spoke with Cristi Zarco with [facility] who confirmed pt's equipment is planned to be delivered by 11AM today. Confirmed plan for transport at 1PM.   CM called and confirmed plan with pt's son, Jillian De La Torre on plan for discharge today. Pt's son Time reported pt's equipment has not been delivered and he is worried pt will arrive home before equipment. CM confirmed with pt's son if pt's equipment is not there by 1PM, transport will be moved back. Hospice order was sent to [facility] via [employer]. Pt's RN is aware of transport timing. CM will follow. MARLIN Mcintosh     Care Management Interventions  PCP Verified by CM: Yes  Last Visit to PCP: 07/19/22  Mode of Transport at Discharge:  Other (see comment) (TBD depending on mobility at dc)  Transition of Care Consult (CM Consult): Sheila: No  Reason Outside [place]: Out of service area  Discharge Durable Medical Equipment: No  Speech Therapy Consult: Yes  Support Systems: Spouse/Significant Other, Child(ramo)  Confirm Follow Up Transport: Family  Discharge Location  Patient Expects to be Discharged to[de-identified] Home with hospice

## 2022-09-06 NOTE — PROGRESS NOTES
Physical Therapy    Chart reviewed. Note family and patient decision to transition to hospice with planned d/c home today. Will complete orders.     Eriberto Aguilar MS, PT

## 2022-09-06 NOTE — PALLIATIVE CARE DISCHARGE
The Palliative Medicine team was consulted as part of your / your loved one's care in the hospital. Our team is a supportive service; we strive to relieve suffering and improve quality of life. You identified the following goal(s) as your main focus for healthcare: Focusing on your quality of life; you will be returning home with support from [facility]. We reviewed advance care planning information, which includes the following:  Advance Care Planning 8/15/2022   Patient's Healthcare Decision Maker is: Named in scanned ACP document   Confirm Advance Directive Yes, on file   Does the patient have other document types Do Not Resuscitate       We reviewed / discussed your code status as: DNR     Full Code means perform CPR in the event of cardiac arrest     [facility] means do NOT perform CPR in the event of cardiac arrest     Partial Code means you have specific preferences, please discuss with your health care team     No Order means this issue was not addressed / resolved during your stay    You have a Durable Do Not Resuscitate Order in place, which should travel with you. When you are in a facility, this form should be placed on your chart. Once you are home, it is recommended that the [facility] form be placed in a visible location such as on the refrigerator or bedroom door. Because of the importance of this information, we are providing you with a printed copy to share with other healthcare providers after this hospitalization is complete. If any of the above information is incomplete or incorrect, please contact the Palliative Medicine team at [phone].

## 2022-09-06 NOTE — PROGRESS NOTES
Medicare pt has received, reviewed, and signed 2nd IM letter informing them of their right to appeal the discharge. Signed copied has been placed on pt bedside chart.   Sydni Tong CMS

## 2022-09-06 NOTE — PROGRESS NOTES
Occupational Therapy Contact Note  09/06/22    Chart reviewed, noted pt and family have decided on d/c today at 1PM with hospice planned for return home. Will complete orders.     Marcelina Echols, ISAIAH, OTR/L

## 2022-09-06 NOTE — PROGRESS NOTES
Provided pastoral care visit to [facility] patient. Did not include sacramental care.      Jenny Villalta

## 2022-09-06 NOTE — PROGRESS NOTES
[address] visit. Mrs. John Soto wants to go home today.   Prayer and communion offered and assured her of my prayers today for discharge,    [facility] Officer, SATURNINO RN, ACSW, LCSW   Page:  [phone]

## 2022-09-06 NOTE — PROGRESS NOTES
Problem: Falls - Risk of  Goal: *Absence of Falls  Description: Document Hiram Mendez Fall Risk and appropriate interventions in the flowsheet. Outcome: Progressing Towards Goal  Note: Fall Risk Interventions:  Mobility Interventions: Bed/chair exit alarm    Mentation Interventions: Bed/chair exit alarm    Medication Interventions: Bed/chair exit alarm    Elimination Interventions: Bed/chair exit alarm    History of Falls Interventions: Bed/chair exit alarm         Problem: Patient Education: Go to Patient Education Activity  Goal: Patient/Family Education  Outcome: Progressing Towards Goal     Problem: Pressure Injury - Risk of  Goal: *Prevention of pressure injury  Description: Document Destin Scale and appropriate interventions in the flowsheet.   Outcome: Progressing Towards Goal  Note: Pressure Injury Interventions:  Sensory Interventions: Keep linens dry and wrinkle-free    Moisture Interventions: Absorbent underpads    Activity Interventions: Increase time out of bed    Mobility Interventions: PT/OT evaluation    Nutrition Interventions: Document food/fluid/supplement intake    Friction and Shear Interventions: Lift sheet                Problem: Patient Education: Go to Patient Education Activity  Goal: Patient/Family Education  Outcome: Progressing Towards Goal     Problem: Hypotension  Goal: *Blood pressure within specified parameters  Outcome: Progressing Towards Goal  Goal: *Fluid volume balance  Outcome: Progressing Towards Goal     Problem: Patient Education: Go to Patient Education Activity  Goal: Patient/Family Education  Outcome: Progressing Towards Goal     Problem: Patient Education: Go to Patient Education Activity  Goal: Patient/Family Education  Outcome: Progressing Towards Goal

## 2022-09-07 ENCOUNTER — PATIENT OUTREACH (OUTPATIENT)
Dept: CASE MANAGEMENT | Age: 87
End: 2022-09-07

## 2023-03-06 NOTE — PROGRESS NOTES
Hugo Jensen [facility]  [address] [facility], [address] [place] [address]  [phone]      Medical Progress Note      NAME: Jackie Nails   :  1925  MRM:  [id number]    Date/Time: 2022           Assessment / Plan:     Chronic Hypoxemic Respiratory Failure: remains on baseline 4L nc. She has no e/o pulmonary edema, CHF. Nausea/vomiting/abd pain: acute on chronic. Improving but still present. CT abd does show moderate stool burden, but otherwise nothing acute. Follow up KUB 8/15 showing stool burden s/p suppository 8/15; repeat KUB . Supportive care with anti-emetics, low dose IV morphine prn. Bowel regimen. Lactulose today. Hypoglycemia: having to continue D5 to maintain Bgs. Continue nutrition supplements. Discussed calorie count with nutritionist     Hypokalemia:  2/2 GI losses, poor oral intake and diuretic use. Hypotension: weaned off ruchi drip. Continue Midodrine. EVERT: baseline normal creatinine she presents with creatinine 1.69, azotemia, clinically volume down. Due to GI losses and diuretic use. Renally dose meds, avoid nephrotoxins    HFrEF: Echo 2022 with EF 30-35%. ?Mirta Decree? Presently volume down. Hold furosemide, manas, metop    CAD: Trop slightly up, but down from prior. No pans for aggressive interventions at this time    Hx DVT: Could consider PE but low s/f PE given neg LE dopplers     FTT: With readmission and overwhelming symptoms, do think she is Hospice appropriate. Patient now states she is not interested in hospice. Care Plan discussed with: Patient, Care Manager, Nursing Staff, and Consultant/Specialist    Discussed:  Care Plan    Prophylaxis:  Lovenox    Disposition:  SNF/LTC           ___________________________________________________    Attending Physician: Jesus Manzano MD        Subjective:     Chief Complaint:  Blood pressure improved. ROS:  Some Abd pain, nausea/ no vomiting. Poor PO intake.            Objective: Vitals:          Last 24hrs VS reviewed since prior progress note.  Most recent are:    Visit Vitals  /61 (BP 1 Location: Left upper arm, BP Patient Position: At rest)   Pulse 86   Temp 97.3 °F (36.3 °C)   Resp 18   Ht 5' 2\" (1.575 m)   Wt 49.9 kg (110 lb 1.7 oz)   SpO2 100%   BMI 20.14 kg/m²     SpO2 Readings from Last 6 Encounters:   08/19/22 100%   07/18/22 100%   05/02/22 93%   04/14/22 100%   05/22/17 94%   09/29/15 99%    O2 Flow Rate (L/min): 4 l/min     Intake/Output Summary (Last 24 hours) at 8/19/2022 1539  Last data filed at 8/19/2022 2892  Gross per 24 hour   Intake 1263.33 ml   Output 1075 ml   Net 188.33 ml            Exam:     Physical Exam:    Gen:  Frail, elderly  HEENT:  Pink conjunctivae, PERRL, hard of hearing   Resp:  No accessory muscle use, clear breath sounds without wheezes rales or rhonchi  Card:  No murmurs, normal S1, S2 without thrills, bruits or peripheral edema  Abd:  Soft, improved diff TTP, non-distended, normoactive bowel sounds are present  Musc:  No cyanosis or clubbing  Skin:  No rashes or ulcers, skin turgor is good  Neuro:  Cranial nerves 3-12 are grossly intact, diffuse weakness, follows commands appropriately  Psych:  Good insight, oriented to person, place and time, alert       Medications Reviewed: (see below)    Lab Data Reviewed: (see below)    ______________________________________________________________________    Medications:     Current Facility-Administered Medications   Medication Dose Route Frequency    lactulose (CHRONULAC) 10 gram/15 mL solution 45 mL  45 mL Oral ONCE    sennosides (SENOKOT) 8.8 mg/5 mL syrup 8.8 mg  5 mL Oral BID    docusate (COLACE) 50 mg/5 mL oral liquid 50 mg  50 mg Oral BID    dextrose 5% with KCl 20 mEq/L infusion   IntraVENous CONTINUOUS    dextrose 10% infusion 0-250 mL  0-250 mL IntraVENous PRN    polyethylene glycol (MIRALAX) packet 17 g  17 g Oral DAILY    pantoprazole (PROTONIX) tablet 40 mg  40 mg Oral BID    heparin (porcine) injection 5,000 Units  5,000 Units SubCUTAneous Q8H    nystatin (MYCOSTATIN) 100,000 unit/mL oral suspension 500,000 Units  500,000 Units Oral QID    bisacodyL (DULCOLAX) suppository 10 mg  10 mg Rectal DAILY PRN    midodrine (PROAMATINE) tablet 10 mg  10 mg Oral TID WITH MEALS    prochlorperazine (COMPAZINE) injection 5 mg  5 mg IntraVENous Q4H PRN    hydrALAZINE (APRESOLINE) 20 mg/mL injection 20 mg  20 mg IntraVENous Q6H PRN    morphine injection 2 mg  2 mg IntraVENous Q3H PRN    sodium chloride (NS) flush 5-40 mL  5-40 mL IntraVENous Q8H    sodium chloride (NS) flush 5-40 mL  5-40 mL IntraVENous PRN    acetaminophen (TYLENOL) tablet 650 mg  650 mg Oral Q6H PRN    Or    acetaminophen (TYLENOL) suppository 650 mg  650 mg Rectal Q6H PRN    polyethylene glycol (MIRALAX) packet 17 g  17 g Oral DAILY PRN    ondansetron (ZOFRAN ODT) tablet 4 mg  4 mg Oral Q8H PRN    Or    ondansetron (ZOFRAN) injection 4 mg  4 mg IntraVENous Q6H PRN            Lab Review:     No results for input(s): WBC, HGB, HCT, PLT, HGBEXT, HCTEXT, PLTEXT, HGBEXT, HCTEXT, PLTEXT in the last 72 hours.     Recent Labs     08/18/22  0553 08/17/22  1356 08/17/22  0147    137 134*   K 3.7 3.4* 6.5*    104 109*   CO2 26 24 18*   * 109* 131*   BUN 23* 24* 21*   CREA 1.04* 0.96 0.78   CA 8.8 9.1 7.3*   ALB 1.8*  --  1.7*   ALT 18  --  22       No components found for: [place] (1) Other Diagnosis

## 2024-12-12 NOTE — PROGRESS NOTES
[facility] [place] [address]  [address] [facility], [address] [place] [address]  [phone]      Medical Progress Note      NAME: Emory Ortiz   :  1925  MRM:  [id number]    Date/Time: 2022           Assessment / Plan:     Chronic Hypoxemic Respiratory Failure: remains on baseline 4L nc. She has no e/o pulmonary edema, CHF. Nausea/vomiting/abd pain: acute on chronic. Improving but still present. CT abd does show moderate stool burden, but otherwise nothing acute. Follow up KUB 8/15 showing stool burden s/p suppository 8/15; repeat KUB . Supportive care with anti-emetics, low dose IV morphine prn. Bowel regimen. Hypoglycemia: having to continue D5 to maintain Bgs. Continue nutrition supplements. Discussed calorie count with nutritionist     Hypokalemia:  2/2 GI losses, poor oral intake and diuretic use. Hypotension: weaned off ruchi drip. Continue Midodrine. EVERT: baseline normal creatinine she presents with creatinine 1.69, azotemia, clinically volume down. Due to GI losses and diuretic use. Renally dose meds, avoid nephrotoxins    HFrEF: Echo 2022 with EF 30-35%. ?Erica Points? Presently volume down. Hold furosemide, manas, metop    CAD: Trop slightly up, but down from prior. No pans for aggressive interventions at this time    Hx DVT: Could consider PE but low s/f PE given neg LE dopplers     FTT: With readmission and overwhelming symptoms, do think she is Hospice appropriate. Patient now states she is not interested in hospice. Care Plan discussed with: Patient, Care Manager, Nursing Staff, and Consultant/Specialist    Discussed:  Care Plan    Prophylaxis:  Lovenox    Disposition:  SNF/LTC           ___________________________________________________    Attending Physician: Casie Vale DO        Subjective:     Chief Complaint:  Blood pressure improved.      ROS:  Some Abd pain, nausea/ no vomiting           Objective:       Vitals:          Last 24hrs VS reviewed No further refills without office visit. Electronically signed: Bran Gibbs PA-C     since prior progress note.  Most recent are:    Visit Vitals  /61 (BP 1 Location: Right upper arm, BP Patient Position: At rest)   Pulse 96   Temp 98 °F (36.7 °C)   Resp 18   Ht 5' 2\" (1.575 m)   Wt 53.1 kg (117 lb 1 oz)   SpO2 96%   BMI 21.41 kg/m²     SpO2 Readings from Last 6 Encounters:   08/18/22 96%   07/18/22 100%   05/02/22 93%   04/14/22 100%   05/22/17 94%   09/29/15 99%    O2 Flow Rate (L/min): 4 l/min     Intake/Output Summary (Last 24 hours) at 8/18/2022 1139  Last data filed at 8/18/2022 0600  Gross per 24 hour   Intake 80 ml   Output 500 ml   Net -420 ml            Exam:     Physical Exam:    Gen:  Frail, elderly  HEENT:  Pink conjunctivae, PERRL, hard of hearing   Resp:  No accessory muscle use, clear breath sounds without wheezes rales or rhonchi  Card:  No murmurs, normal S1, S2 without thrills, bruits or peripheral edema  Abd:  Soft, improved diff TTP, non-distended, normoactive bowel sounds are present  Musc:  No cyanosis or clubbing  Skin:  No rashes or ulcers, skin turgor is good  Neuro:  Cranial nerves 3-12 are grossly intact, diffuse weakness, follows commands appropriately  Psych:  Good insight, oriented to person, place and time, alert       Medications Reviewed: (see below)    Lab Data Reviewed: (see below)    ______________________________________________________________________    Medications:     Current Facility-Administered Medications   Medication Dose Route Frequency    dextrose 5% with KCl 20 mEq/L infusion   IntraVENous CONTINUOUS    dextrose 10% infusion 0-250 mL  0-250 mL IntraVENous PRN    polyethylene glycol (MIRALAX) packet 17 g  17 g Oral DAILY    pantoprazole (PROTONIX) tablet 40 mg  40 mg Oral BID    heparin (porcine) injection 5,000 Units  5,000 Units SubCUTAneous Q8H    sennosides (SENOKOT) 8.8 mg/5 mL syrup 8.8 mg  5 mL Oral DAILY    docusate (COLACE) 50 mg/5 mL oral liquid 50 mg  50 mg Oral DAILY    nystatin (MYCOSTATIN) 100,000 unit/mL oral suspension 500,000 Units  500,000 Units Oral QID    bisacodyL (DULCOLAX) suppository 10 mg  10 mg Rectal DAILY PRN    midodrine (PROAMATINE) tablet 10 mg  10 mg Oral TID WITH MEALS    prochlorperazine (COMPAZINE) injection 5 mg  5 mg IntraVENous Q4H PRN    hydrALAZINE (APRESOLINE) 20 mg/mL injection 20 mg  20 mg IntraVENous Q6H PRN    morphine injection 2 mg  2 mg IntraVENous Q3H PRN    sodium chloride (NS) flush 5-40 mL  5-40 mL IntraVENous Q8H    sodium chloride (NS) flush 5-40 mL  5-40 mL IntraVENous PRN    acetaminophen (TYLENOL) tablet 650 mg  650 mg Oral Q6H PRN    Or    acetaminophen (TYLENOL) suppository 650 mg  650 mg Rectal Q6H PRN    polyethylene glycol (MIRALAX) packet 17 g  17 g Oral DAILY PRN    ondansetron (ZOFRAN ODT) tablet 4 mg  4 mg Oral Q8H PRN    Or    ondansetron (ZOFRAN) injection 4 mg  4 mg IntraVENous Q6H PRN            Lab Review:     No results for input(s): WBC, HGB, HCT, PLT, HGBEXT, HCTEXT, PLTEXT, HGBEXT, HCTEXT, PLTEXT in the last 72 hours.     Recent Labs     08/18/22  0553 08/17/22  1356 08/17/22  0147 08/16/22  0811    137 134* 142   K 3.7 3.4* 6.5* 3.0*    104 109* 109*   CO2 26 24 18* 25   * 109* 131* 53*   BUN 23* 24* 21* 25*   CREA 1.04* 0.96 0.78 0.92   CA 8.8 9.1 7.3* 8.0*   MG  --   --   --  1.5*   ALB 1.8*  --  1.7* 1.7*   ALT 18  --  22 24       No components found for: [place]